# Patient Record
Sex: FEMALE | Race: BLACK OR AFRICAN AMERICAN | NOT HISPANIC OR LATINO | Employment: OTHER | URBAN - METROPOLITAN AREA
[De-identification: names, ages, dates, MRNs, and addresses within clinical notes are randomized per-mention and may not be internally consistent; named-entity substitution may affect disease eponyms.]

---

## 2017-02-02 ENCOUNTER — DOCUMENTATION ONLY (OUTPATIENT)
Dept: FAMILY MEDICINE | Facility: CLINIC | Age: 77
End: 2017-02-02

## 2017-02-02 NOTE — PROGRESS NOTES
Pre-Visit Chart Review  For Appointment Scheduled on 2/3/17    Health Maintenance Due   Topic Date Due    Lipid Panel  1940    TETANUS VACCINE  09/08/1959    DEXA SCAN  09/08/1981    Colonoscopy  09/08/1991    Zoster Vaccine  09/08/2001    Pneumococcal (65+) (1 of 2 - PCV13) 09/08/2006    Influenza Vaccine  08/01/2016

## 2017-02-09 ENCOUNTER — APPOINTMENT (OUTPATIENT)
Dept: LAB | Facility: HOSPITAL | Age: 77
End: 2017-02-09
Attending: NURSE PRACTITIONER
Payer: MEDICARE

## 2017-02-09 ENCOUNTER — OFFICE VISIT (OUTPATIENT)
Dept: UROLOGY | Facility: CLINIC | Age: 77
End: 2017-02-09
Payer: MEDICARE

## 2017-02-09 VITALS
SYSTOLIC BLOOD PRESSURE: 129 MMHG | WEIGHT: 219 LBS | HEIGHT: 68 IN | HEART RATE: 87 BPM | DIASTOLIC BLOOD PRESSURE: 71 MMHG | TEMPERATURE: 98 F | BODY MASS INDEX: 33.19 KG/M2

## 2017-02-09 DIAGNOSIS — R35.0 URINARY FREQUENCY: Primary | ICD-10-CM

## 2017-02-09 DIAGNOSIS — N39.0 URINARY TRACT INFECTION WITH HEMATURIA, SITE UNSPECIFIED: ICD-10-CM

## 2017-02-09 DIAGNOSIS — R31.9 URINARY TRACT INFECTION WITH HEMATURIA, SITE UNSPECIFIED: ICD-10-CM

## 2017-02-09 LAB
BILIRUB SERPL-MCNC: ABNORMAL MG/DL
BLOOD URINE, POC: 50
COLOR, POC UA: ABNORMAL
GLUCOSE UR QL STRIP: 50
KETONES UR QL STRIP: ABNORMAL
LEUKOCYTE ESTERASE URINE, POC: ABNORMAL
NITRITE, POC UA: ABNORMAL
PH, POC UA: 5
PROTEIN, POC: ABNORMAL
SPECIFIC GRAVITY, POC UA: 1.02
UROBILINOGEN, POC UA: ABNORMAL

## 2017-02-09 PROCEDURE — 81001 URINALYSIS AUTO W/SCOPE: CPT | Mod: PBBFAC,PO | Performed by: NURSE PRACTITIONER

## 2017-02-09 PROCEDURE — 87088 URINE BACTERIA CULTURE: CPT

## 2017-02-09 PROCEDURE — 99215 OFFICE O/P EST HI 40 MIN: CPT | Mod: PBBFAC,PO | Performed by: NURSE PRACTITIONER

## 2017-02-09 PROCEDURE — 88112 CYTOPATH CELL ENHANCE TECH: CPT | Mod: 26,,, | Performed by: PATHOLOGY

## 2017-02-09 PROCEDURE — 87186 SC STD MICRODIL/AGAR DIL: CPT

## 2017-02-09 PROCEDURE — 87077 CULTURE AEROBIC IDENTIFY: CPT

## 2017-02-09 PROCEDURE — 99999 PR PBB SHADOW E&M-EST. PATIENT-LVL V: CPT | Mod: PBBFAC,,, | Performed by: NURSE PRACTITIONER

## 2017-02-09 PROCEDURE — 87086 URINE CULTURE/COLONY COUNT: CPT

## 2017-02-09 PROCEDURE — 99204 OFFICE O/P NEW MOD 45 MIN: CPT | Mod: S$PBB,,, | Performed by: NURSE PRACTITIONER

## 2017-02-09 PROCEDURE — 88112 CYTOPATH CELL ENHANCE TECH: CPT | Performed by: PATHOLOGY

## 2017-02-09 RX ORDER — FLUCONAZOLE 150 MG/1
150 TABLET ORAL DAILY
Qty: 3 TABLET | Refills: 0 | Status: SHIPPED | OUTPATIENT
Start: 2017-02-09 | End: 2017-02-12

## 2017-02-09 RX ORDER — CIPROFLOXACIN 500 MG/1
500 TABLET ORAL 2 TIMES DAILY
Qty: 28 TABLET | Refills: 0 | Status: SHIPPED | OUTPATIENT
Start: 2017-02-09 | End: 2017-02-10

## 2017-02-09 NOTE — PATIENT INSTRUCTIONS

## 2017-02-09 NOTE — PROGRESS NOTES
[facility] Progress Note  Staff: TERE Richard    Chief Complaint:   Chief Complaint   Patient presents with    Urinary Frequency      HPI: Deonna Montero is a 76 y.o. female new patient here for increased urinary frequency which has progressively worsened x one month.  Pt is accompanied to clinic today by a family member female.    Questions asked the pt during office visit today:  DTF: Yes, NTF: 5x night  Urgency:Yes, incontinence with urgency? No;   Incontinence with laughing or straining: No;   If yes, how many pads/day? Changes 4 pads daily  G2, P 2, vaginal deliveries with no complications  Gross Hematuria:  No  History of UTI: Yes - few years ago.  Unknown if has history of kidney stones.  No Family History of bladder/kidney/prostate cancer.    Urologic meds: Ditropan XL 15 mg one tablet daily  Anticoagulant meds: None    Medical Hx includes: Pt is diabetic and takes insulin injections and metformin on a daily basis.  Pt and family states during ov today that she checks her glucose twice daily and is under control at this time.  Pt and family states that her glucose stays in the 130s.    No history of decreased kidney function or problems with kidneys functioning.    Relevant Labs:   UA today:    Color, UA yellow cloudy   Spec Grav, UA 1.025   pH, UA 5   WBC, UA ++   Nitrite, UA neg   Protein, UA ++ (A)   Glucose, UA 50   Ketones, UA neg   Urobilinogen, UA neg   Bilirubin, UA neg   Blood, UA 50     Review of Systems   Constitutional: Negative for chills, diaphoresis, fever and weight loss.   HENT: Negative for congestion, hearing loss, nosebleeds and sore throat.    Eyes: Negative for blurred vision and pain.   Respiratory: Negative for cough and wheezing.    Cardiovascular: Negative for chest pain, palpitations and leg swelling.   Gastrointestinal: Negative for abdominal pain, heartburn, nausea and vomiting.   Genitourinary: Positive for dysuria, frequency and urgency.  Negative for flank pain and hematuria.   Musculoskeletal: Negative for back pain, joint pain, myalgias and neck pain.   Skin: Negative for itching and rash.   Neurological: Negative for dizziness, tremors, sensory change, seizures, loss of consciousness, weakness and headaches.   Endo/Heme/Allergies: Does not bruise/bleed easily.   Psychiatric/Behavioral: Negative for depression and suicidal ideas. The patient is not nervous/anxious.      Physical Exam   Constitutional: She is oriented to person, place, and time. She appears well-developed and well-nourished.   HENT:   Head: Normocephalic and atraumatic.   Eyes: Conjunctivae and EOM are normal. Pupils are equal, round, and reactive to light.   Neck: Normal range of motion. Neck supple.   Cardiovascular: Normal rate, regular rhythm, normal heart sounds and intact distal pulses.    Pulmonary/Chest: Effort normal and breath sounds normal.   Abdominal: Soft. Bowel sounds are normal.   Musculoskeletal: Normal range of motion.   Neurological: She is alert and oriented to person, place, and time. She has normal reflexes.   Skin: Skin is warm and dry.     Psychiatric: She has a normal mood and affect. Her behavior is normal. Judgment and thought content normal.     A) Deonna Montero is a 76 y.o. female with   1. Urinary frequency    2. Urinary tract infection with hematuria, site unspecified      Plan)   1. We will send urine for culture and cytology testing.  2. Cipro 500 mg 1 po bid x 14 days prescribed to the patient.      Diflucan 150 mg prescribed to the patient.    I explained to the patient and family about Diabetes and urinary tract infections and how they affect each other.    I will see the pt back in office in three weeks to recheck urine and do exam if needed.  We will discuss then about discontinuing the Ditropan and starting on another medication should pt's symptoms not be mostly related to a current infection.    I have spent 30 minutes with this patient  and over 50% of visit was spent counseling with the patient.    Kusum MILESP-C

## 2017-02-09 NOTE — MR AVS SNAPSHOT
[facility]   [address]  [place] [address]  Phone: [phone]                  Deonna Montero   2017 2:00 PM   Office Visit    Description:  Female : 1940   Provider:  DIONISIO Patino   Department:  [facility]           Reason for Visit     Urinary Frequency           Diagnoses this Visit        Comments    Urinary frequency    -  Primary     Urinary tract infection with hematuria, site unspecified                To Do List           Goals (5 Years of Data)     None       These Medications        Disp Refills Start End    ciprofloxacin HCl (CIPRO) 500 MG tablet 28 tablet 0 2017    Take 1 tablet (500 mg total) by mouth 2 (two) times daily. - Oral    Pharmacy: [address] #: [phone]       fluconazole (DIFLUCAN) 150 MG Tab 3 tablet 0 2017    Take 1 tablet (150 mg total) by mouth once daily. - Oral    Pharmacy: [address] #: [phone]         [facility]     [facility] On Call Nurse Care Line -  Assistance  Registered nurses in the [facility] provide clinical advisement, health education, appointment booking, and other advisory services.  Call for this free service at [phone].             Medications           Message regarding Medications     Verify the changes and/or additions to your medication regime listed below are the same as discussed with your clinician today.  If any of these changes or additions are incorrect, please notify your healthcare provider.        START taking these NEW medications        Refills    ciprofloxacin HCl (CIPRO) 500 MG tablet 0    Sig: Take 1 tablet (500 mg total) by mouth 2 (two) times daily.    Class: Normal    Route: Oral    fluconazole (DIFLUCAN) 150 MG Tab 0    Sig: Take 1 tablet (150 mg total) by mouth once daily.    Class: Normal    Route: Oral          "  Verify that the below list of medications is an accurate representation of the medications you are currently taking.  If none reported, the list may be blank. If incorrect, please contact your healthcare provider. Carry this list with you in case of emergency.           Current Medications     amlodipine (NORVASC) 5 MG tablet once daily.     atorvastatin (LIPITOR) 10 MG tablet Take 10 mg by mouth once daily.      BD INSULIN PEN NEEDLE UF ORIG 29 x 1/2 " Ndle     BD INSULIN PEN NEEDLE UF SHORT 31 X 5/16 " Ndle     BD INSULIN SYRINGE ULT-FINE II 1/2 mL 31 x 5/16" Syrg     BD INSULIN SYRINGE ULTRA-FINE 1 mL 31 x 5/16" Syrg     BENICAR 40 mg tablet once daily.     donepezil (ARICEPT) 5 MG tablet Take 5 mg by mouth every evening.      gabapentin (NEURONTIN) 800 MG tablet TAKE 1 TABLET 3 TIMES A DAY    insulin aspart protamine-insulin aspart (NOVOLOG 70/30) 100 unit/mL (70-30) injection Inject into the skin 2 (two) times daily before meals. Dose Depends on Blood Sugar level 30 u am and 40 u pm if blood sugar high    olmesartan (BENICAR) 20 MG tablet Take 20 mg by mouth once daily.      ONE TOUCH ULTRA TEST Strp     oxybutynin (DITROPAN XL) 15 MG TR24 Take 15 mg by mouth once daily.     pantoprazole (PROTONIX) 20 MG tablet Take 40 mg by mouth once daily.    sertraline (ZOLOFT) 50 MG tablet Take 50 mg by mouth once daily.     timolol maleate 0.5% (TIMOPTIC) 0.5 % Drop     tizanidine (ZANAFLEX) 4 MG tablet once daily.     ciprofloxacin HCl (CIPRO) 500 MG tablet Take 1 tablet (500 mg total) by mouth 2 (two) times daily.    fluconazole (DIFLUCAN) 150 MG Tab Take 1 tablet (150 mg total) by mouth once daily.           Clinical Reference Information           Your Vitals Were     BP Pulse Temp Height Weight BMI    129/71 (BP Location: Right arm, Patient Position: Sitting, BP Method: Automatic) 87 97.7 °F (36.5 °C) (Oral) 5' 8" (1.727 m) 99.3 kg (219 lb) 33.3 kg/m2      Blood Pressure          Most Recent Value    BP  129/71    "   Allergies as of 2/9/2017     Codeine    Morphine (Pf)      Immunizations Administered on Date of Encounter - 2/9/2017     None      Orders Placed During Today's Visit      Normal Orders This Visit    Cytology, urine     POCT urinalysis, dipstick or tablet reag     Urine culture          2/9/2017  2:14 PM - Cammy Alonzo MA      Component Results     Component    Color, UA    yellow cloudy    Spec Grav, UA    1.025    pH, UA    5    WBC, UA    ++    Nitrite, UA    neg    Protein, UA (Abnormal)    ++    Glucose, UA    50    Ketones, UA    neg    Urobilinogen, UA    neg    Bilirubin, UA    neg    Blood, UA    50            [facility] Sign-Up     Activating your [facility] account is as easy as 1-2-3!     1) Visit [email or web address], select Sign Up Now, enter this activation code and your date of birth, then select Next.  [email or web address]  Expires: 3/26/2017  2:57 PM      2) Create a username and password to use when you visit [facility] in the future and select a security question in case you lose your password and select Next.    3) Enter your e-mail address and click Sign Up!    Additional Information  If you have questions, please e-mail [email or web address] or call [phone] to talk to our [facility] staff. Remember, [facility] is NOT to be used for urgent needs. For medical emergencies, dial 911.         Instructions      Bladder Infection, Female (Adult)    Urine is normally doesn't have any bacteria in it. But bacteria can get into the urinary tract from the skin around the rectum. Or they can travel in the blood from elsewhere in the body. Once they are in your urinary tract, they can cause infection in the urethra (urethritis), the bladder (cystitis), or the kidneys (pyelonephritis).  The most common place for an infection is in the bladder. This is called a bladder infection. This is one of the most common infections in women. Most bladder infections are easily treated. They are not serious unless the  "infection spreads to the kidney.  The phrases "bladder infection," "UTI," and "cystitis" are often used to describe the same thing. But they are not always the same. Cystitis is an inflammation of the bladder. The most common cause of cystitis is an infection.  Symptoms  The infection causes inflammation in the urethra and bladder. This causes many of the symptoms. The most common symptoms of a bladder infection are:  · Pain or burning when urinating  · Having to urinate more often than usual  · Urgent need to urinate  · Only a small amount of urine comes out  · Blood in urine  · Abdominal discomfort. This is usually in the lower abdomen above the pubic bone.  · Cloudy urine  · Strong- or bad-smelling urine  · Unable to urinate (urinary retention)  · Unable to hold urine in (urinary incontinence)  · Fever  · Loss of appetite  · Confusion (in older adults)  Causes  Bladder infections are not contagious. You can't get one from someone else, from a toilet seat, or from sharing a bath.  The most common cause of bladder infections is bacteria from the bowels. The bacteria get onto the skin around the opening of the urethra. From there, they can get into the urine and travel up to the bladder, causing inflammation and infection. This usually happens because of:  · Wiping improperly after urinating. Always wipe from front to back.  · Bowel incontinence  · Pregnancy  · Procedures such as having a catheter inserted  · Older age  · Not emptying your bladder. This can allow bacteria a chance to grow in your urine.  · Dehydration  · Constipation  · Sex  · Use of a diaphragm for birth control   Treatment  Bladder infections are diagnosed by a urine test. They are treated with antibiotics and usually clear up quickly without complications. Treatment helps prevent a more serious kidney infection.  Medicines  Medicines can help in the treatment of a bladder infection:  · Take antibiotics until they are used up, even if you feel " better. It is important to finish them to make sure the infection has cleared.  · You can use acetaminophen or ibuprofen for pain, fever, or discomfort, unless another medicine was prescribed. If you have chronic liver or kidney disease, talk with your healthcare provider before using these medicines. Also talk with your provider if you've ever had a stomach ulcer or gastrointestinal bleeding, or are taking blood-thinner medicines.  · If you are given phenazopydridine to reduce burning with urination, it will cause your urine to become a bright orange color. This can stain clothing.  Care and prevention  These self-care steps can help prevent future infections:  · Drink plenty of fluids to prevent dehydration and flush out your bladder. Do this unless you must restrict fluids for other health reasons, or your doctor told you not to.  · Proper cleaning after going to the bathroom is important. Wipe from front to back after using the toilet to prevent the spread of bacteria.  · Urinate more often. Don't try to hold urine in for a long time.  · Wear loose-fitting clothes and cotton underwear. Avoid tight-fitting pants.  · Improve your diet and prevent constipation. Eat more fresh fruit and vegetables, and fiber, and less junk and fatty foods.  · Avoid sex until your symptoms are gone.  · Avoid caffeine, alcohol, and spicy foods. These can irritate your bladder.  · Urinate right after intercourse to flush out your bladder.  · If you use birth control pills and have frequent bladder infections, discuss it with your doctor.  Follow-up care  Call your healthcare provider if all symptoms are not gone after 3 days of treatment. This is especially important if you have repeat infections.  If a culture was done, you will be told if your treatment needs to be changed. If directed, you can call to find out the results.  If X-rays were done, you will be told if the results will affect your treatment.  Call 911  Call 911 if any of  the following occur:  · Trouble breathing  · Hard to wake up or confusion  · Fainting or loss of consciousness  · Rapid heart rate  When to seek medical advice  Call your healthcare provider right away if any of these occur:  · Fever of 100.4ºF (38.0ºC) or higher, or as directed by your healthcare provider  · Symptoms are not better by the third day of treatment  · Back or belly (abdominal) pain that gets worse  · Repeated vomiting, or unable to keep medicine down  · Weakness or dizziness  · Vaginal discharge  · Pain, redness, or swelling in the outer vaginal area (labia)  Date Last Reviewed: 10/1/2016  © 0818-1763 [employer]. [address] [place] [address], [place] [address]. All rights reserved. This information is not intended as a substitute for professional medical care. Always follow your healthcare professional's instructions.             Language Assistance Services     ATTENTION: Language assistance services are available, free of charge. Please call [phone].      ATENCIÓN: Si habla elianañol, tiene a charles disposición servicios gratuitos de asistencia lingüística. Llame al [phone].     [facility] Ý: N?u b?n nói Ti?ng Vi?t, có các d?ch v? h? tr? ngôn ng? mi?n phí dành cho b?n. G?i s? [phone].         [facility] complies with applicable Federal civil rights laws and does not discriminate on the basis of race, color, national origin, age, disability, or sex.

## 2017-02-10 ENCOUNTER — TELEPHONE (OUTPATIENT)
Dept: UROLOGY | Facility: CLINIC | Age: 77
End: 2017-02-10

## 2017-02-10 RX ORDER — CEFUROXIME AXETIL 500 MG/1
500 TABLET ORAL 2 TIMES DAILY
Qty: 28 TABLET | Refills: 0 | Status: SHIPPED | OUTPATIENT
Start: 2017-02-10 | End: 2017-02-24

## 2017-02-10 NOTE — TELEPHONE ENCOUNTER
Called and spoke to the pharmacist the Cipro is contraindated with the Zanaflex that the patient is taking. Message given to NP to advise.

## 2017-02-10 NOTE — TELEPHONE ENCOUNTER
----- Message from Kim Toth sent at 2/10/2017  3:43 PM CST -----  Contact: pt  Pt was seen on 2-9-17, script was sent in error to [facility], please send to [facility]  cefUROXime (CEFTIN) 500 MG tablet    Call placed to pod, no answer  Call back on # [phone]  thanks      [facility] [place] [place] - [address]  [address]  [place] [address]  Phone: [phone] Fax: [phone]

## 2017-02-10 NOTE — TELEPHONE ENCOUNTER
Ceftin 500mg 1 tablet BID #[id number] called in to [facility] pharmacy to [facility]. Verbally clarified understanding.

## 2017-02-10 NOTE — TELEPHONE ENCOUNTER
Please call and notify the patient that we cancelled the Cipro and we are prescribing Ceftin twice daily for 15 days for possible infection.  Let them know we do not have her test results back yet but will call them when we do.  Thanks.

## 2017-02-10 NOTE — TELEPHONE ENCOUNTER
----- Message from Gregoria Cuevas sent at 2/10/2017 11:16 AM CST -----  Contact: patient  Patient returning a missed call about medication. Please advise. Call to pod. No answer.  Call back .  Thanks!

## 2017-02-10 NOTE — TELEPHONE ENCOUNTER
----- Message from Mira Higuera sent at 2/10/2017 10:32 AM CST -----  Contact: [facility] [phone]   wants to speak with a nurse regarding the Rx for CIPRO. Please call back at [phone]

## 2017-02-10 NOTE — TELEPHONE ENCOUNTER
Spoke with patient, asked patient is she still taking Zanaflex as it is contraindacated with the Cipro that the NP RX for her. She stated that she is, message given to the NP to advise and will notify patient for changes.

## 2017-02-12 LAB — BACTERIA UR CULT: NORMAL

## 2017-02-13 ENCOUNTER — TELEPHONE (OUTPATIENT)
Dept: UROLOGY | Facility: CLINIC | Age: 77
End: 2017-02-13

## 2017-02-13 NOTE — TELEPHONE ENCOUNTER
Called and spoke with patient, urine culture results given and informed to complete her antibiotic course, patient verbally understood.

## 2017-02-13 NOTE — TELEPHONE ENCOUNTER
----- Message from DIONISIO Patino sent at 2/13/2017  7:59 AM CST -----  Please let pt know their urine culture showed POSITIVE GROWTH FOR E.COLI INFECTION and the Cipro I prescribed to her will treat the infection.

## 2017-02-13 NOTE — TELEPHONE ENCOUNTER
I am sorry I forgot we switched the Cipro to Ceftin.  Please tell her to keep taking the Ceftin that it will treat the infection.  Thanks.    *Please see my original urine culture result note.

## 2017-03-07 ENCOUNTER — APPOINTMENT (OUTPATIENT)
Dept: LAB | Facility: HOSPITAL | Age: 77
End: 2017-03-07
Attending: NURSE PRACTITIONER
Payer: MEDICARE

## 2017-03-07 ENCOUNTER — OFFICE VISIT (OUTPATIENT)
Dept: UROLOGY | Facility: CLINIC | Age: 77
End: 2017-03-07
Payer: MEDICARE

## 2017-03-07 VITALS
DIASTOLIC BLOOD PRESSURE: 89 MMHG | HEIGHT: 68 IN | TEMPERATURE: 98 F | BODY MASS INDEX: 33.28 KG/M2 | HEART RATE: 90 BPM | SYSTOLIC BLOOD PRESSURE: 144 MMHG | WEIGHT: 219.56 LBS

## 2017-03-07 DIAGNOSIS — N39.0 URINARY TRACT INFECTION WITHOUT HEMATURIA, SITE UNSPECIFIED: Primary | ICD-10-CM

## 2017-03-07 DIAGNOSIS — R35.0 URINARY FREQUENCY: ICD-10-CM

## 2017-03-07 LAB
BILIRUB SERPL-MCNC: ABNORMAL MG/DL
BLOOD URINE, POC: ABNORMAL
COLOR, POC UA: ABNORMAL
GLUCOSE UR QL STRIP: ABNORMAL
KETONES UR QL STRIP: ABNORMAL
LEUKOCYTE ESTERASE URINE, POC: ABNORMAL
NITRITE, POC UA: ABNORMAL
PH, POC UA: 5
PROTEIN, POC: ABNORMAL
SPECIFIC GRAVITY, POC UA: 1.02
UROBILINOGEN, POC UA: ABNORMAL

## 2017-03-07 PROCEDURE — 99213 OFFICE O/P EST LOW 20 MIN: CPT | Mod: S$PBB,,, | Performed by: NURSE PRACTITIONER

## 2017-03-07 PROCEDURE — 88112 CYTOPATH CELL ENHANCE TECH: CPT | Mod: 26,,, | Performed by: PATHOLOGY

## 2017-03-07 PROCEDURE — 87086 URINE CULTURE/COLONY COUNT: CPT

## 2017-03-07 PROCEDURE — 87077 CULTURE AEROBIC IDENTIFY: CPT

## 2017-03-07 PROCEDURE — 88112 CYTOPATH CELL ENHANCE TECH: CPT | Performed by: PATHOLOGY

## 2017-03-07 PROCEDURE — 99215 OFFICE O/P EST HI 40 MIN: CPT | Mod: PBBFAC,PO | Performed by: NURSE PRACTITIONER

## 2017-03-07 PROCEDURE — 99999 PR PBB SHADOW E&M-EST. PATIENT-LVL V: CPT | Mod: PBBFAC,,, | Performed by: NURSE PRACTITIONER

## 2017-03-07 PROCEDURE — 87088 URINE BACTERIA CULTURE: CPT

## 2017-03-07 PROCEDURE — 87186 SC STD MICRODIL/AGAR DIL: CPT

## 2017-03-07 PROCEDURE — 81001 URINALYSIS AUTO W/SCOPE: CPT | Mod: PBBFAC,PO | Performed by: NURSE PRACTITIONER

## 2017-03-07 RX ORDER — OXYBUTYNIN CHLORIDE 15 MG/1
5 TABLET, EXTENDED RELEASE ORAL DAILY
COMMUNITY
End: 2017-03-07 | Stop reason: ALTCHOICE

## 2017-03-07 NOTE — PROGRESS NOTES
[facility] Progress Note  Staff: TERE Richard    Chief Complaint:   Chief Complaint   Patient presents with    Follow-up    Urinary Tract Infection      HPI: Deonna Montero is a 76 y.o. female here for routine recheck.  I initially saw this patient on 02/09/2017 with complaints of increased urinary frequency x one month.  Urine culture done at last ov showed ++E.Coli UTI and pt was treated with Cipro 500 mg 1 po BID x 14 days for the infection.    Urine cytology during last ov showed atypical cells at last ov but that was also same urine from infection source therefore it was inconclusive.    Urologic meds: Oxybutynin XL 15 mg one tablet daily  Anticoagulant meds: None    Relevant Labs:   UA today:    Color yellow clear   Spec Grav 1.025   pH, UA 5   WBC, UA trace (A)   Nitrite neg   Protein trace   Glucose, UA neg   Ketones, UA neg   Urobilinogen neg   Bilirubin neg   Blood, UA trace     Review of Systems   Constitutional: Negative for chills, diaphoresis, fever and weight loss.   HENT: Negative for congestion, hearing loss, nosebleeds and sore throat.    Eyes: Negative for blurred vision and pain.   Respiratory: Negative for cough and wheezing.    Cardiovascular: Negative for chest pain, palpitations and leg swelling.   Gastrointestinal: Negative for abdominal pain, heartburn, nausea and vomiting.   Genitourinary: Positive for frequency and urgency. Negative for dysuria, flank pain and hematuria.   Musculoskeletal: Negative for back pain, joint pain, myalgias and neck pain.   Skin: Negative for itching and rash.   Neurological: Negative for dizziness, tremors, sensory change, seizures, loss of consciousness, weakness and headaches.   Endo/Heme/Allergies: Does not bruise/bleed easily.   Psychiatric/Behavioral: Negative for depression and suicidal ideas. The patient is not nervous/anxious.      Physical Exam   Constitutional: She is oriented to person, place, and time. She  appears well-developed and well-nourished.   HENT:   Head: Normocephalic and atraumatic.   Eyes: Conjunctivae and EOM are normal. Pupils are equal, round, and reactive to light.   Neck: Normal range of motion. Neck supple.   Cardiovascular: Normal rate, regular rhythm, normal heart sounds and intact distal pulses.    Pulmonary/Chest: Effort normal and breath sounds normal.   Abdominal: Soft. Bowel sounds are normal.   Musculoskeletal: Normal range of motion.   Neurological: She is alert and oriented to person, place, and time. She has normal reflexes.   Skin: Skin is warm and dry.     Psychiatric: She has a normal mood and affect. Her behavior is normal. Judgment and thought content normal.     A) Deonna Montero is a 76 y.o. female with   1. Urinary tract infection without hematuria, site unspecified    2. Urinary frequency      Plan)   Hx of recent urinary tract infection:  1. We will repeat the urine culture and cytology to make sure pt UTI is gone and see if cytology still comes back abnormal.    Chronic urinary frequency:  1.  Oxybutynin discontinued.  2.  We will place the patient on a trial dose of Myrbetriq 25 mg one tablet daily for her urinary frequency.    I will see the patient back in 3-4 weeks for med recheck.    I have spent >20 minutes with this patient and over 50% of visit was spent counseling with the patient.    Kusum CARRANZA

## 2017-03-07 NOTE — MR AVS SNAPSHOT
"    [facility]   [address]  [place] [address]  Phone: [phone]                  Deonna Montero   3/7/2017 2:00 PM   Office Visit    Description:  Female : 1940   Provider:  DIONISIO Patino   Department:  [facility]           Reason for Visit     Follow-up     Urinary Tract Infection           Diagnoses this Visit        Comments    Urinary tract infection without hematuria, site unspecified    -  Primary     Urinary frequency                To Do List           Goals (5 Years of Data)     None      [facility]     [facility] Care [facility] -  Assistance  Registered nurses in the [facility] provide clinical advisement, health education, appointment booking, and other advisory services.  Call for this free service at [phone].             Medications           Message regarding Medications     Verify the changes and/or additions to your medication regime listed below are the same as discussed with your clinician today.  If any of these changes or additions are incorrect, please notify your healthcare provider.        STOP taking these medications     oxybutynin (DITROPAN XL) 15 MG TR24 Take 15 mg by mouth once daily.     oxybutynin (DITROPAN XL) 15 MG TR24 Take 5 mg by mouth once daily.           Verify that the below list of medications is an accurate representation of the medications you are currently taking.  If none reported, the list may be blank. If incorrect, please contact your healthcare provider. Carry this list with you in case of emergency.           Current Medications     amlodipine (NORVASC) 5 MG tablet once daily.     atorvastatin (LIPITOR) 10 MG tablet Take 10 mg by mouth once daily.      BD INSULIN PEN NEEDLE UF ORIG 29 x 1/2 " Ndle     BD INSULIN PEN NEEDLE UF SHORT 31 X 5/16 " Ndle     BD INSULIN SYRINGE ULT-FINE II 1/2 mL 31 x 5/16" Syrg     BD INSULIN SYRINGE ULTRA-FINE 1 mL 31 x 5/16" Syrg     " "BENICAR 40 mg tablet once daily.     donepezil (ARICEPT) 5 MG tablet Take 5 mg by mouth every evening.      gabapentin (NEURONTIN) 800 MG tablet TAKE 1 TABLET 3 TIMES A DAY    insulin aspart protamine-insulin aspart (NOVOLOG 70/30) 100 unit/mL (70-30) injection Inject into the skin 2 (two) times daily before meals. Dose Depends on Blood Sugar level 30 u am and 40 u pm if blood sugar high    olmesartan (BENICAR) 20 MG tablet Take 20 mg by mouth once daily.      ONE TOUCH ULTRA TEST Strp     pantoprazole (PROTONIX) 20 MG tablet Take 40 mg by mouth once daily.    sertraline (ZOLOFT) 50 MG tablet Take 50 mg by mouth once daily.     timolol maleate 0.5% (TIMOPTIC) 0.5 % Drop     tizanidine (ZANAFLEX) 4 MG tablet once daily.            Clinical Reference Information           Your Vitals Were     BP Pulse Temp Height Weight BMI    144/89 (BP Location: Left arm, Patient Position: Sitting, BP Method: Automatic) 90 97.8 °F (36.6 °C) (Oral) 5' 8" (1.727 m) 99.6 kg (219 lb 9.3 oz) 33.39 kg/m2      Blood Pressure          Most Recent Value    BP  (!)  144/89      Allergies as of 3/7/2017     Codeine    Morphine (Pf)      Immunizations Administered on Date of Encounter - 3/7/2017     None      Orders Placed During Today's Visit      Normal Orders This Visit    Cytology, urine     POCT urinalysis, dipstick or tablet reag     Urine culture          3/7/2017  1:49 PM - Cammy Alonzo MA      Component Results     Component    Color    yellow clear    Spec Grav    1.025    pH, UA    5    WBC, UA (Abnormal)    trace    Nitrite    neg    Protein    trace    Glucose, UA    neg    Ketones, UA    neg    Urobilinogen    neg    Bilirubin    neg    Blood, UA    trace            [facility] Sign-Up     Activating your [facility] account is as easy as 1-2-3!     1) Visit [email or web address], select Sign Up Now, enter this activation code and your date of birth, then select Next.  [email or web address]  Expires: 3/26/2017  2:57 PM      2) Create a " username and password to use when you visit [facility] in the future and select a security question in case you lose your password and select Next.    3) Enter your e-mail address and click Sign Up!    Additional Information  If you have questions, please e-mail [email or web address] or call [phone] to talk to our [facility] staff. Remember, [facility] is NOT to be used for urgent needs. For medical emergencies, dial 911.         Instructions      Overactive Bladder Syndrome (OAB)     Normally, urine stays in the bladder until a person decides to release it. With OAB, the bladder muscles contract involuntarily, causing a sudden urge to urinate and even urine leakage.   Your healthcare provider has told you that you have overactive bladder syndrome (OAB). Why OAB occurs is not known. But treatments are available to help control the bladder muscle and manage OAB. Read on to learn more.  What is overactive bladder syndrome?  OAB causes the bladder muscle to contract (squeeze involuntarily). This causes an intense urge to urinate, known as urgency. Urgency can occur many times during the day and night. In some cases, accidental urine leakage occurs with the urgency. This is called urge incontinence, which is the inability to control the urinary bladder function. There are many types of incontinence, urge incontinence being one of then. A disease that affects the bladder nerves, such as multiple sclerosis, can lead to OAB. Other conditions, such as urinary tract infection (UTI) or prostate problems in men, can lead to OAB.  But the exact cause of OAB is often not known.  How is overactive bladder syndrome diagnosed?  Your healthcare provider examines you and asks about your symptoms and health history. You may also have one or more of the following:  · Urine test to take samples of urine and have them checked for problems.  · Urinary diary to record how much fluid you take in and urinate out in a 3 day  period.  · Bladder ultrasound to study the bladder as it empties. Ultrasound uses sound waves to create detailed images of the inside of the body.  · Cystoscopy to allow the healthcare provider to look for problems in the urinary tract. The test uses a thin, flexible scope called a cystoscope with a light and camera on the end. The scope is inserted into the urethra (the tube that carries urine out of the body).  · Urodynamic studies, a battery of tests designed to measure and record many aspects of urinary bladder function, including pressures, volume, and urine flow.  How is overactive bladder syndrome treated?  Treatment depends on the cause and severity of your OAB. Treatments may include the following:  · Changing urination habits may be suggested. For instance, your healthcare provider may suggest that you urinate as soon as you feel the urge. You may also need to limit how much fluid you have during the day.  · Exercising your pelvic muscles can help strengthen muscles used during urination. These exercises are called Kegels. They involve prasanna as if you were stopping your urine stream and tightening your rectum as if trying not to pass gas. Your healthcare provider can help you learn how to do Kegels.  · Biofeedback to help you learn to control the movement of your bladder muscles. Sensors are placed on your abdomen. They turn signals given off by your muscles into lines on a computer screen.  · Medication may be given to relax the bladder muscle. Medication can also help ease bladder contractions, which reduces the urge to urinate.  · Neuromodulation may be done if medication and behavioral changes dont work. Electrical pulses are sent to the sacral nerves (nerves that affect the pelvic area). These pulses help relieve OAB and urge incontinence.  · Surgery to make the bladder larger may be done in severe cases.  With treatment, OAB can be managed. A condition, such as UTI, that has caused you to have  OAB will be treated. Treatment may involve taking medications for months or years. You may also need to make changes in your daily routine. This may include going to the bathroom more often than you think you need to. Or, you may need to cut back on caffeine and alcohol because these can make OAB symptoms worse. Your healthcare provider can tell you more.     Call the healthcare provider right away if you have any of the following:  · Fever of 100.4°F (38.0 °C) or higher   · No improvement with treatment  · Trouble urinating because of pain  · Back or abdominal pain   Date Last Reviewed: 9/8/2014 © 2000-2016 [employer]. [address] [place] [address], [place] [address]. All rights reserved. This information is not intended as a substitute for professional medical care. Always follow your healthcare professional's instructions.        Mirabegron extended-release tablets  What is this medicine?  MIRABEGRON (TOM a BEG marshall) is used to treat overactive bladder. This medicine reduces the amount of bathroom visits. It may also help to control wetting accidents.  How should I use this medicine?  Take this medicine by mouth with a glass of water. Follow the directions on the prescription label. Do not cut, crush or chew this medicine. You can take it with or without food. If it upsets your stomach, take it with food. Take your medicine at regular intervals. Do not take it more often than directed. Do not stop taking except on your doctor's advice.  Talk to your pediatrician regarding the use of this medicine in children. Special care may be needed.  What side effects may I notice from receiving this medicine?  Side effects that you should report to your doctor or health care professional as soon as possible:  · allergic reactions like skin rash, itching or hives, swelling of the face, lips, or tongue  · chest pain or palpitations  · severe or sudden headache  · high blood pressure  · fast, irregular  heartbeat  · redness, blistering, peeling or loosening of the skin, including inside the mouth  · signs of infection like fever or chills; cough; sore throat; pain or difficulty passing urine  · trouble passing urine or change in the amount of urine  Side effects that usually do not require medical attention (Report these to your doctor or health care professional if they continue or are bothersome.):  · constipation  · diarrhea  · dizziness  · dry eyes  · joint pain  · mild headache  · nausea  · runny nose  What may interact with this medicine?  · certain medicines for bladder problems like fesoterodine, oxybutynin, solifenacin, tolterodine  · desipramine  · digoxin  · flecainide  · ketoconazole  · MAOIs like Carbex, Eldepryl, Marplan, Nardil, and Parnate  · metoprolol  · propafenone  · thioridazine  · warfarin  What if I miss a dose?  If you miss a dose, take it as soon as you can. If it is almost time for your next dose, take only that dose. Do not take double or extra doses.  Where should I keep my medicine?  Keep out of the reach of children.  Store at room temperature between 15 and 30 degrees C (59 and 86 degrees F). Throw away any unused medicine after the expiration date.  What should I tell my health care provider before I take this medicine?  They need to know if you have any of these conditions:  · difficulty passing urine  · high blood pressure  · kidney disease  · liver disease  · an unusual or allergic reaction to mirabegron, other medicines, foods, dyes, or preservatives  · pregnant or trying to get pregnant  · breast-feeding  What should I watch for while using this medicine?  It may take 8 weeks to notice the full benefit from this medicine.  You may need to limit your intake tea, coffee, caffeinated sodas, and alcohol. These drinks may make your symptoms worse.  Visit your doctor or health care professional for regular checks on your progress. Check your blood pressure as directed. Ask your doctor  or health care professional what your blood pressure should be and when you should contact him or her.  Date Last Reviewed:   NOTE:This sheet is a summary. It may not cover all possible information. If you have questions about this medicine, talk to your doctor, pharmacist, or health care provider. Copyright© 2016 [employer]             Language Assistance Services     ATTENTION: Language assistance services are available, free of charge. Please call [phone].      ATENCIÓN: Si habla doc, tiene a charles disposición servicios gratuitos de asistencia lingüística. Llame al [phone].     CHÚ Ý: N?u b?n nói Ti?ng Vi?t, có các d?ch v? h? tr? ngôn ng? mi?n phí dành cho b?n. G?i s? [phone].         [facility] complies with applicable Federal civil rights laws and does not discriminate on the basis of race, color, national origin, age, disability, or sex.

## 2017-03-07 NOTE — PATIENT INSTRUCTIONS
Overactive Bladder Syndrome (OAB)     Normally, urine stays in the bladder until a person decides to release it. With OAB, the bladder muscles contract involuntarily, causing a sudden urge to urinate and even urine leakage.   Your healthcare provider has told you that you have overactive bladder syndrome (OAB). Why OAB occurs is not known. But treatments are available to help control the bladder muscle and manage OAB. Read on to learn more.  What is overactive bladder syndrome?  OAB causes the bladder muscle to contract (squeeze involuntarily). This causes an intense urge to urinate, known as urgency. Urgency can occur many times during the day and night. In some cases, accidental urine leakage occurs with the urgency. This is called urge incontinence, which is the inability to control the urinary bladder function. There are many types of incontinence, urge incontinence being one of then. A disease that affects the bladder nerves, such as multiple sclerosis, can lead to OAB. Other conditions, such as urinary tract infection (UTI) or prostate problems in men, can lead to OAB.  But the exact cause of OAB is often not known.  How is overactive bladder syndrome diagnosed?  Your healthcare provider examines you and asks about your symptoms and health history. You may also have one or more of the following:  · Urine test to take samples of urine and have them checked for problems.  · Urinary diary to record how much fluid you take in and urinate out in a 3 day period.  · Bladder ultrasound to study the bladder as it empties. Ultrasound uses sound waves to create detailed images of the inside of the body.  · Cystoscopy to allow the healthcare provider to look for problems in the urinary tract. The test uses a thin, flexible scope called a cystoscope with a light and camera on the end. The scope is inserted into the urethra (the tube that carries urine out of the body).  · Urodynamic studies, a battery of tests designed  to measure and record many aspects of urinary bladder function, including pressures, volume, and urine flow.  How is overactive bladder syndrome treated?  Treatment depends on the cause and severity of your OAB. Treatments may include the following:  · Changing urination habits may be suggested. For instance, your healthcare provider may suggest that you urinate as soon as you feel the urge. You may also need to limit how much fluid you have during the day.  · Exercising your pelvic muscles can help strengthen muscles used during urination. These exercises are called Kegels. They involve prasanna as if you were stopping your urine stream and tightening your rectum as if trying not to pass gas. Your healthcare provider can help you learn how to do Kegels.  · Biofeedback to help you learn to control the movement of your bladder muscles. Sensors are placed on your abdomen. They turn signals given off by your muscles into lines on a computer screen.  · Medication may be given to relax the bladder muscle. Medication can also help ease bladder contractions, which reduces the urge to urinate.  · Neuromodulation may be done if medication and behavioral changes dont work. Electrical pulses are sent to the sacral nerves (nerves that affect the pelvic area). These pulses help relieve OAB and urge incontinence.  · Surgery to make the bladder larger may be done in severe cases.  With treatment, OAB can be managed. A condition, such as UTI, that has caused you to have OAB will be treated. Treatment may involve taking medications for months or years. You may also need to make changes in your daily routine. This may include going to the bathroom more often than you think you need to. Or, you may need to cut back on caffeine and alcohol because these can make OAB symptoms worse. Your healthcare provider can tell you more.     Call the healthcare provider right away if you have any of the following:  · Fever of 100.4°F (38.0 °C)  or higher   · No improvement with treatment  · Trouble urinating because of pain  · Back or abdominal pain   Date Last Reviewed: 9/8/2014 © 2000-2016 [employer]. [address] [place] [address], [place] [address]. All rights reserved. This information is not intended as a substitute for professional medical care. Always follow your healthcare professional's instructions.        Mirabegron extended-release tablets  What is this medicine?  MIRABEGRON (TOM a BEG marshall) is used to treat overactive bladder. This medicine reduces the amount of bathroom visits. It may also help to control wetting accidents.  How should I use this medicine?  Take this medicine by mouth with a glass of water. Follow the directions on the prescription label. Do not cut, crush or chew this medicine. You can take it with or without food. If it upsets your stomach, take it with food. Take your medicine at regular intervals. Do not take it more often than directed. Do not stop taking except on your doctor's advice.  Talk to your pediatrician regarding the use of this medicine in children. Special care may be needed.  What side effects may I notice from receiving this medicine?  Side effects that you should report to your doctor or health care professional as soon as possible:  · allergic reactions like skin rash, itching or hives, swelling of the face, lips, or tongue  · chest pain or palpitations  · severe or sudden headache  · high blood pressure  · fast, irregular heartbeat  · redness, blistering, peeling or loosening of the skin, including inside the mouth  · signs of infection like fever or chills; cough; sore throat; pain or difficulty passing urine  · trouble passing urine or change in the amount of urine  Side effects that usually do not require medical attention (Report these to your doctor or health care professional if they continue or are bothersome.):  · constipation  · diarrhea  · dizziness  · dry eyes  · joint pain  · mild  headache  · nausea  · runny nose  What may interact with this medicine?  · certain medicines for bladder problems like fesoterodine, oxybutynin, solifenacin, tolterodine  · desipramine  · digoxin  · flecainide  · ketoconazole  · MAOIs like Carbex, Eldepryl, Marplan, Nardil, and Parnate  · metoprolol  · propafenone  · thioridazine  · warfarin  What if I miss a dose?  If you miss a dose, take it as soon as you can. If it is almost time for your next dose, take only that dose. Do not take double or extra doses.  Where should I keep my medicine?  Keep out of the reach of children.  Store at room temperature between 15 and 30 degrees C (59 and 86 degrees F). Throw away any unused medicine after the expiration date.  What should I tell my health care provider before I take this medicine?  They need to know if you have any of these conditions:  · difficulty passing urine  · high blood pressure  · kidney disease  · liver disease  · an unusual or allergic reaction to mirabegron, other medicines, foods, dyes, or preservatives  · pregnant or trying to get pregnant  · breast-feeding  What should I watch for while using this medicine?  It may take 8 weeks to notice the full benefit from this medicine.  You may need to limit your intake tea, coffee, caffeinated sodas, and alcohol. These drinks may make your symptoms worse.  Visit your doctor or health care professional for regular checks on your progress. Check your blood pressure as directed. Ask your doctor or health care professional what your blood pressure should be and when you should contact him or her.  Date Last Reviewed:   NOTE:This sheet is a summary. It may not cover all possible information. If you have questions about this medicine, talk to your doctor, pharmacist, or health care provider. Copyright© 2016 [employer]

## 2017-03-10 ENCOUNTER — TELEPHONE (OUTPATIENT)
Dept: UROLOGY | Facility: CLINIC | Age: 77
End: 2017-03-10

## 2017-03-10 LAB — BACTERIA UR CULT: NORMAL

## 2017-03-10 RX ORDER — DOXYCYCLINE HYCLATE 100 MG
100 TABLET ORAL 2 TIMES DAILY
Qty: 28 TABLET | Refills: 0 | Status: SHIPPED | OUTPATIENT
Start: 2017-03-10 | End: 2017-03-28 | Stop reason: ALTCHOICE

## 2017-03-28 ENCOUNTER — OFFICE VISIT (OUTPATIENT)
Dept: UROLOGY | Facility: CLINIC | Age: 77
End: 2017-03-28
Payer: MEDICARE

## 2017-03-28 VITALS — HEART RATE: 90 BPM | DIASTOLIC BLOOD PRESSURE: 78 MMHG | SYSTOLIC BLOOD PRESSURE: 127 MMHG | TEMPERATURE: 98 F

## 2017-03-28 DIAGNOSIS — R35.0 URINARY FREQUENCY: Primary | ICD-10-CM

## 2017-03-28 DIAGNOSIS — N39.498 OTHER URINARY INCONTINENCE: ICD-10-CM

## 2017-03-28 LAB
BILIRUB SERPL-MCNC: NORMAL MG/DL
BLOOD URINE, POC: NORMAL
COLOR, POC UA: NORMAL
GLUCOSE UR QL STRIP: NORMAL
KETONES UR QL STRIP: NORMAL
LEUKOCYTE ESTERASE URINE, POC: NORMAL
NITRITE, POC UA: NORMAL
PH, POC UA: 5
PROTEIN, POC: NORMAL
SPECIFIC GRAVITY, POC UA: 1.02
UROBILINOGEN, POC UA: NORMAL

## 2017-03-28 PROCEDURE — 99213 OFFICE O/P EST LOW 20 MIN: CPT | Mod: S$PBB,,, | Performed by: NURSE PRACTITIONER

## 2017-03-28 PROCEDURE — 99213 OFFICE O/P EST LOW 20 MIN: CPT | Mod: PBBFAC,PO | Performed by: NURSE PRACTITIONER

## 2017-03-28 PROCEDURE — 99999 PR PBB SHADOW E&M-EST. PATIENT-LVL III: CPT | Mod: PBBFAC,,, | Performed by: NURSE PRACTITIONER

## 2017-03-28 PROCEDURE — 81001 URINALYSIS AUTO W/SCOPE: CPT | Mod: PBBFAC,PO | Performed by: NURSE PRACTITIONER

## 2017-03-28 NOTE — PROGRESS NOTES
[facility] Note  Staff: TERE Richard    Referring provider and please cc:   PCP: Dr. Tompkins    Chief Complaint: Follow-up: UTI and urinary incontinence.    Subjective:        HPI: Deonna Montero is a 76 y.o. female presents for routine recheck in reference to her hx of urinary tract infection and incontinence issues.  I saw the pt at last ov on 03/07/2017 in which we discontinued the oxybutynin and tried the pt on Myrbetriq 25 mg one tablet daily for her incontinence/leakage issues.  Pt states today the medication is working very well for her lower urinary tract symptoms and she is happy with this medication.    Today pt denies any dysuria, hematuria or changes in her urinary habits.  She does not feel that she has a current infection at this time.    REVIEW OF SYSTEMS:  Review of Systems   Constitutional: Negative for chills, diaphoresis, fever and weight loss.   HENT: Negative for congestion, hearing loss, nosebleeds and sore throat.    Eyes: Negative for blurred vision and pain.   Respiratory: Negative for cough and wheezing.    Cardiovascular: Negative for chest pain, palpitations and leg swelling.   Gastrointestinal: Negative for abdominal pain, heartburn, nausea and vomiting.   Genitourinary: Positive for frequency and urgency. Negative for dysuria, flank pain and hematuria.   Musculoskeletal: Negative for back pain, joint pain, myalgias and neck pain.   Skin: Negative for itching and rash.   Neurological: Negative for dizziness, tremors, sensory change, seizures, loss of consciousness, weakness and headaches.   Endo/Heme/Allergies: Does not bruise/bleed easily.   Psychiatric/Behavioral: Negative for depression and suicidal ideas. The patient is not nervous/anxious.      PMHx:  Past Medical History:   Diagnosis Date    Back pain     Diabetes mellitus     Hypertension     Reflux     Short-term memory loss      PSHx:  Past Surgical History:   Procedure Laterality  Date    BACK SURGERY      with tea    CHOLECYSTECTOMY      napoleon      2 years ago    HYSTERECTOMY       Social History     Social History    Marital status:      Spouse name: N/A    Number of children: N/A    Years of education: N/A     Social History Main Topics    Smoking status: Former Smoker     Quit date: 10/15/2010    Smokeless tobacco: None    Alcohol use No    Drug use: None    Sexual activity: Not Asked     Other Topics Concern    None     Social History Narrative     Allergies:  Codeine and Morphine (pf)    Medications: reviewed   Anticoagulation: No    Objective:     Vitals:    03/28/17 1402   BP: 127/78   Pulse: 90   Temp: 97.8 °F (36.6 °C)     Physical Exam   Constitutional: She is oriented to person, place, and time. She appears well-developed and well-nourished.   HENT:   Head: Normocephalic and atraumatic.   Eyes: Conjunctivae and EOM are normal. Pupils are equal, round, and reactive to light.   Neck: Normal range of motion. Neck supple.   Cardiovascular: Normal rate, regular rhythm, normal heart sounds and intact distal pulses.    Pulmonary/Chest: Effort normal and breath sounds normal.   Abdominal: Soft. Bowel sounds are normal.   Musculoskeletal: Normal range of motion.   Neurological: She is alert and oriented to person, place, and time. She has normal reflexes.   Skin: Skin is warm and dry.     Psychiatric: She has a normal mood and affect. Her behavior is normal. Judgment and thought content normal.     LABS REVIEW:  UA today:   Color:Clear, Yellow  Spec. Grav.  1.020  PH  5.0  Negative for leukocytes, nitrates, protein, glucose, ketones, urobili, bili, and blood.    UCx:   Results for orders placed or performed in visit on 03/07/17   Urine culture   Result Value Ref Range    Urine Culture, Routine ESCHERICHIA COLI  50,000 - 99,999 cfu/ml          Susceptibility    Escherichia coli - CULTURE, URINE     Amp/Sulbactam <=8/4 Sensitive mcg/mL     Amikacin <=16 Sensitive mcg/mL      Ampicillin <=8 Sensitive mcg/mL     Amox/K Clav'ate <=8/4 Sensitive mcg/mL     Ceftriaxone <=8 Sensitive mcg/mL     Cefazolin <=8 Sensitive mcg/mL     Ciprofloxacin <=1 Sensitive mcg/mL     Cefepime <=8 Sensitive mcg/mL     Ertapenem <=2 Sensitive mcg/mL     Nitrofurantoin <=32 Sensitive mcg/mL     Gentamicin <=4 Sensitive mcg/mL     Meropenem <=4 Sensitive mcg/mL     Piperacillin/Tazo <=16 Sensitive mcg/mL     Trimeth/Sulfa <=2/38 Sensitive mcg/mL     Tetracycline <=4 Sensitive mcg/mL     Tobramycin <=4 Sensitive mcg/mL   Results for orders placed or performed in visit on 02/09/17   Urine culture   Result Value Ref Range    Urine Culture, Routine ESCHERICHIA COLI  >100,000 cfu/ml          Susceptibility    Escherichia coli - CULTURE, URINE     Amp/Sulbactam <=8/4 Sensitive mcg/mL     Amikacin <=16 Sensitive mcg/mL     Ampicillin <=8 Sensitive mcg/mL     Amox/K Clav'ate <=8/4 Sensitive mcg/mL     Ceftriaxone <=8 Sensitive mcg/mL     Cefazolin <=8 Sensitive mcg/mL     Ciprofloxacin <=1 Sensitive mcg/mL     Cefepime <=8 Sensitive mcg/mL     Ertapenem <=2 Sensitive mcg/mL     Nitrofurantoin <=32 Sensitive mcg/mL     Gentamicin <=4 Sensitive mcg/mL     Meropenem <=4 Sensitive mcg/mL     Piperacillin/Tazo <=16 Sensitive mcg/mL     Trimeth/Sulfa <=2/38 Sensitive mcg/mL     Tetracycline <=4 Sensitive mcg/mL     Tobramycin <=4 Sensitive mcg/mL     Cr:   Lab Results   Component Value Date    CREATININE 0.7 01/04/2013     Assessment:       1. Urinary frequency    2. Other urinary incontinence        Plan:     1.  Continue the Myrbetriq 25 mg one tablet daily for her urinary incontinence/leakage issues.    F/u with me in 3-4 months for recheck.    Kusum Benavides, FNP-C

## 2017-03-28 NOTE — MR AVS SNAPSHOT
[facility]   [address]  [place] [address]  Phone: [phone]                  Deonna Montero   3/28/2017 1:30 PM   Office Visit    Description:  Female : 1940   Provider:  DIONISIO Patino   Department:  [facility]           Reason for Visit     3 week recheck           Diagnoses this Visit        Comments    Urinary frequency    -  Primary            To Do List           Goals (5 Years of Data)     None       These Medications        Disp Refills Start End    mirabegron (MYRBETRIQ) 25 mg Tb24 ER tablet 30 tablet 11 3/28/2017 3/28/2018    Take 1 tablet (25 mg total) by mouth once daily. - Oral    Pharmacy: [facility] - [place], [place] - [address]  #: [phone]         [facility] On Call     [facility] -  Assistance  Registered nurses in the [facility] On Call Center provide clinical advisement, health education, appointment booking, and other advisory services.  Call for this free service at [phone].             Medications           Message regarding Medications     Verify the changes and/or additions to your medication regime listed below are the same as discussed with your clinician today.  If any of these changes or additions are incorrect, please notify your healthcare provider.        START taking these NEW medications        Refills    mirabegron (MYRBETRIQ) 25 mg Tb24 ER tablet 11    Sig: Take 1 tablet (25 mg total) by mouth once daily.    Class: Normal    Route: Oral      STOP taking these medications     doxycycline (VIBRA-TABS) 100 MG tablet Take 1 tablet (100 mg total) by mouth 2 (two) times daily.           Verify that the below list of medications is an accurate representation of the medications you are currently taking.  If none reported, the list may be blank. If incorrect, please contact your healthcare provider. Carry this list with you in case of emergency.           Current  "Medications     amlodipine (NORVASC) 5 MG tablet once daily.     atorvastatin (LIPITOR) 10 MG tablet Take 10 mg by mouth once daily.      BD INSULIN PEN NEEDLE UF ORIG 29 x 1/2 " Ndle     BD INSULIN PEN NEEDLE UF SHORT 31 X 5/16 " Ndle     BD INSULIN SYRINGE ULT-FINE II 1/2 mL 31 x 5/16" Syrg     BD INSULIN SYRINGE ULTRA-FINE 1 mL 31 x 5/16" Syrg     BENICAR 40 mg tablet once daily.     donepezil (ARICEPT) 5 MG tablet Take 5 mg by mouth every evening.      gabapentin (NEURONTIN) 800 MG tablet TAKE 1 TABLET 3 TIMES A DAY    insulin aspart protamine-insulin aspart (NOVOLOG 70/30) 100 unit/mL (70-30) injection Inject into the skin 2 (two) times daily before meals. Dose Depends on Blood Sugar level 30 u am and 40 u pm if blood sugar high    olmesartan (BENICAR) 20 MG tablet Take 20 mg by mouth once daily.      ONE TOUCH ULTRA TEST Strp     pantoprazole (PROTONIX) 20 MG tablet Take 40 mg by mouth once daily.    sertraline (ZOLOFT) 50 MG tablet Take 50 mg by mouth once daily.     timolol maleate 0.5% (TIMOPTIC) 0.5 % Drop     tizanidine (ZANAFLEX) 4 MG tablet once daily.     mirabegron (MYRBETRIQ) 25 mg Tb24 ER tablet Take 1 tablet (25 mg total) by mouth once daily.           Clinical Reference Information           Your Vitals Were     BP Pulse Temp             127/78 (BP Location: Right arm, Patient Position: Sitting, BP Method: Automatic) 90 97.8 °F (36.6 °C) (Oral)         Blood Pressure          Most Recent Value    BP  127/78      Allergies as of 3/28/2017     Codeine    Morphine (Pf)      Immunizations Administered on Date of Encounter - 3/28/2017     None      Orders Placed During Today's Visit      Normal Orders This Visit    POCT urinalysis, dipstick or tablet reag          3/28/2017  2:07 PM - Crystal Simon MA      Component Results     Component    Color    yellow clear    Spec Grav    1.020    pH, UA    5    WBC, UA    n    Nitrite    n    Protein    n    Glucose, UA    n    Ketones, UA    n    Urobilinogen "    n    Bilirubin    n    Blood, UA    n            [facility] Sign-Up     Activating your [facility] account is as easy as 1-2-3!     1) Visit [email or web address], select Sign Up Now, enter this activation code and your date of birth, then select Next.  [email or web address]  Expires: 5/12/2017  2:13 PM      2) Create a username and password to use when you visit [facility] in the future and select a security question in case you lose your password and select Next.    3) Enter your e-mail address and click Sign Up!    Additional Information  If you have questions, please e-mail [email or web address] or call [phone] to talk to our [facility] [facility] staff. Remember, [facility] is NOT to be used for urgent needs. For medical emergencies, dial 911.         Language Assistance Services     ATTENTION: Language assistance services are available, free of charge. Please call [phone].      ATENCIÓN: Si habla eliantrever, tiene a charles disposición servicios gratuitos de asistencia lingüística. Llame al [phone].     [facility] Ý: N?u b?n nói Ti?ng Vi?t, có các d?ch v? h? tr? ngôn ng? mi?n phí dành cho b?n. G?i s? [phone].         [facility] complies with applicable Federal civil rights laws and does not discriminate on the basis of race, color, national origin, age, disability, or sex.

## 2017-03-30 ENCOUNTER — TELEPHONE (OUTPATIENT)
Dept: UROLOGY | Facility: CLINIC | Age: 77
End: 2017-03-30

## 2017-03-30 NOTE — TELEPHONE ENCOUNTER
Returned pina and spoke with patient, she wanted coupon for Mybertriq. Coupon left at  to be picked up, she stated she will have her niece Angela get it for her. Patient verbally understood.

## 2017-03-30 NOTE — TELEPHONE ENCOUNTER
----- Message from Bruna Branham sent at 3/30/2017  3:14 PM CDT -----  Pt called stated that she was told by the pharmacist that the medication $340 and she wanted to know if she can get the coupons to help with the medication/pls call back at [phone]

## 2017-04-05 ENCOUNTER — TELEPHONE (OUTPATIENT)
Dept: UROLOGY | Facility: CLINIC | Age: 77
End: 2017-04-05

## 2017-04-05 NOTE — TELEPHONE ENCOUNTER
----- Message from Yvette Valdez sent at 4/5/2017  1:07 PM CDT -----  Contact: self   Patient wants to speak with a nurse regarding a generic for myrbetriq if so please send to [facility], any questions please call back at [phone]    [id number] - [place], [place] - [address]  [address]   [place] [address]  Phone: [phone] Fax: [phone]

## 2017-04-05 NOTE — TELEPHONE ENCOUNTER
Returned call and spoke with the patient, informed her that there is not a generic for Myrbetriq. The NP will send her insurance company, [employer],a letter of appeal and will will let her know the outcome, she verbally understood.

## 2017-04-06 ENCOUNTER — TELEPHONE (OUTPATIENT)
Dept: UROLOGY | Facility: CLINIC | Age: 77
End: 2017-04-06

## 2017-04-06 NOTE — TELEPHONE ENCOUNTER
----- Message from Leena Napier sent at 4/5/2017  2:28 PM CDT -----  Patient states that office is going to call up her insurance company to work out a price for her medication. She wanted to give office the number to call and her ID number. You should call [phone] and her ID is [id number]. If office has any questions, please call [phone].

## 2017-04-11 ENCOUNTER — TELEPHONE (OUTPATIENT)
Dept: UROLOGY | Facility: CLINIC | Age: 77
End: 2017-04-11

## 2017-04-11 NOTE — TELEPHONE ENCOUNTER
Patient returned call to office, informed that the appeal is still processing and we will notify her when we get something back, she verbally understood.

## 2017-04-11 NOTE — TELEPHONE ENCOUNTER
Returned call to patient to let her know that the myrbrtriq appeal is still in processing. No answer, unable to leave a message as the voice mailbox is not set up.

## 2017-04-11 NOTE — TELEPHONE ENCOUNTER
----- Message from Mira Higuera sent at 4/11/2017 11:43 AM CDT -----  Contact: self  Patient wants to speak with a nurse regarding the Rx for mirabegron. Please call back at [phone] (fzru)

## 2017-07-25 ENCOUNTER — OFFICE VISIT (OUTPATIENT)
Dept: UROLOGY | Facility: CLINIC | Age: 77
End: 2017-07-25
Payer: MEDICARE

## 2017-07-25 VITALS
BODY MASS INDEX: 33.04 KG/M2 | HEART RATE: 77 BPM | WEIGHT: 218 LBS | DIASTOLIC BLOOD PRESSURE: 75 MMHG | HEIGHT: 68 IN | TEMPERATURE: 98 F | SYSTOLIC BLOOD PRESSURE: 136 MMHG

## 2017-07-25 DIAGNOSIS — R32 URINARY INCONTINENCE, UNSPECIFIED TYPE: ICD-10-CM

## 2017-07-25 DIAGNOSIS — N39.0 RECURRENT UTI: Primary | ICD-10-CM

## 2017-07-25 DIAGNOSIS — R35.0 URINARY FREQUENCY: ICD-10-CM

## 2017-07-25 LAB
BILIRUB SERPL-MCNC: ABNORMAL MG/DL
BLOOD URINE, POC: ABNORMAL
COLOR, POC UA: ABNORMAL
GLUCOSE UR QL STRIP: ABNORMAL
KETONES UR QL STRIP: ABNORMAL
LEUKOCYTE ESTERASE URINE, POC: ABNORMAL
NITRITE, POC UA: POSITIVE
PH, POC UA: 5
PROTEIN, POC: ABNORMAL
SPECIFIC GRAVITY, POC UA: 1.02
UROBILINOGEN, POC UA: 1

## 2017-07-25 PROCEDURE — 99999 PR PBB SHADOW E&M-EST. PATIENT-LVL IV: CPT | Mod: PBBFAC,,, | Performed by: NURSE PRACTITIONER

## 2017-07-25 PROCEDURE — 81001 URINALYSIS AUTO W/SCOPE: CPT | Mod: PBBFAC,PO | Performed by: NURSE PRACTITIONER

## 2017-07-25 PROCEDURE — 99214 OFFICE O/P EST MOD 30 MIN: CPT | Mod: PBBFAC,PO | Performed by: NURSE PRACTITIONER

## 2017-07-25 PROCEDURE — 87077 CULTURE AEROBIC IDENTIFY: CPT

## 2017-07-25 PROCEDURE — 87088 URINE BACTERIA CULTURE: CPT

## 2017-07-25 PROCEDURE — 87086 URINE CULTURE/COLONY COUNT: CPT

## 2017-07-25 PROCEDURE — 99213 OFFICE O/P EST LOW 20 MIN: CPT | Mod: S$PBB,,, | Performed by: NURSE PRACTITIONER

## 2017-07-25 PROCEDURE — 87186 SC STD MICRODIL/AGAR DIL: CPT

## 2017-07-25 PROCEDURE — 1159F MED LIST DOCD IN RCRD: CPT | Mod: ,,, | Performed by: NURSE PRACTITIONER

## 2017-07-25 RX ORDER — TOLTERODINE 4 MG/1
4 CAPSULE, EXTENDED RELEASE ORAL DAILY
Qty: 30 CAPSULE | Refills: 12 | Status: SHIPPED | OUTPATIENT
Start: 2017-07-25 | End: 2017-08-15 | Stop reason: SDUPTHER

## 2017-07-25 RX ORDER — PANTOPRAZOLE SODIUM 40 MG/1
40 TABLET, DELAYED RELEASE ORAL DAILY
COMMUNITY
Start: 2017-05-12 | End: 2017-09-19

## 2017-07-25 RX ORDER — VALSARTAN 80 MG/1
80 TABLET ORAL NIGHTLY
COMMUNITY
Start: 2017-05-04 | End: 2018-09-11

## 2017-07-25 RX ORDER — HUMAN INSULIN 100 [IU]/ML
60 INJECTION, SUSPENSION SUBCUTANEOUS NIGHTLY
Status: ON HOLD | COMMUNITY
Start: 2017-05-12 | End: 2019-09-04 | Stop reason: HOSPADM

## 2017-07-25 RX ORDER — DONEPEZIL HYDROCHLORIDE 10 MG/1
10 TABLET, FILM COATED ORAL NIGHTLY
COMMUNITY
Start: 2017-07-04 | End: 2022-11-16

## 2017-07-25 RX ORDER — CEFUROXIME AXETIL 500 MG/1
500 TABLET ORAL 2 TIMES DAILY
Qty: 28 TABLET | Refills: 0 | Status: SHIPPED | OUTPATIENT
Start: 2017-07-25 | End: 2017-08-08

## 2017-07-25 NOTE — PROGRESS NOTES
[facility] Note  Staff: TERE Richard      Chief Complaint: Urinary frequency, urgency, hx of recurrent utis    Subjective:        HPI: Deonna Montero is a 76 y.o. female presents for routine recheck concerning her hx of recurrent UTIs, urinary frequency, urgency and leakage.    Pt has taken Oxybutynin XL 15 mg daily in the past which did not help with her lower urinary tract issues.  She tried Myrbetriq 25 mg daily which helped with some of her LUTS, but her insurance does not cover the medication cost.    REVIEW OF SYSTEMS:  Review of Systems   Constitutional: Negative for chills, diaphoresis, fever and weight loss.   HENT: Negative for congestion, hearing loss, nosebleeds and sore throat.    Eyes: Negative for blurred vision and pain.   Respiratory: Negative for cough and wheezing.    Cardiovascular: Negative for chest pain, palpitations and leg swelling.   Gastrointestinal: Negative for abdominal pain, heartburn, nausea and vomiting.   Genitourinary: Positive for flank pain, frequency and urgency. Negative for dysuria and hematuria.   Musculoskeletal: Negative for back pain, joint pain, myalgias and neck pain.   Skin: Negative for itching and rash.   Neurological: Negative for dizziness, tremors, sensory change, seizures, loss of consciousness, weakness and headaches.   Endo/Heme/Allergies: Does not bruise/bleed easily.   Psychiatric/Behavioral: Negative for depression and suicidal ideas. The patient is not nervous/anxious.      PMHx:  Past Medical History:   Diagnosis Date    Back pain     Diabetes mellitus     Hypertension     Reflux     Short-term memory loss      PSHx:  Past Surgical History:   Procedure Laterality Date    BACK SURGERY      with tea    CHOLECYSTECTOMY      napoleon      2 years ago    HYSTERECTOMY       Social History     Social History    Marital status:      Spouse name: N/A    Number of children: N/A    Years of education: N/A      Social History Main Topics    Smoking status: Former Smoker     Quit date: 10/15/2010    Smokeless tobacco: None    Alcohol use No    Drug use: Unknown    Sexual activity: Not Asked     Other Topics Concern    None     Social History Narrative    None     Allergies:  Codeine and Morphine (pf)    Medications: reviewed   Anticoagulation: No    Objective:     Vitals:    07/25/17 1301   BP: 136/75   Pulse: 77   Temp: 97.8 °F (36.6 °C)     Physical Exam   Vitals reviewed.  Constitutional: She is oriented to person, place, and time. She appears well-developed and well-nourished.   HENT:   Head: Normocephalic and atraumatic.   Eyes: Conjunctivae and EOM are normal. Pupils are equal, round, and reactive to light.   Neck: Normal range of motion. Neck supple.   Cardiovascular: Normal rate, regular rhythm, normal heart sounds and intact distal pulses.    Pulmonary/Chest: Effort normal and breath sounds normal.   Abdominal: Soft. Bowel sounds are normal.   Musculoskeletal: Normal range of motion.   Neurological: She is alert and oriented to person, place, and time. She has normal reflexes.   Skin: Skin is warm and dry.     Psychiatric: She has a normal mood and affect. Her behavior is normal. Judgment and thought content normal.     LABS REVIEW:  UA today:   Color, UA  dark yellow   Spec Grav UA 1.025   pH, UA 5   WBC, UA 1+   Nitrite, UA  positive   Protein trace   Glucose, UA n   Ketones, UA n   Urobilinogen, UA 1   Bilirubin 1+   Blood, UA trace     Assessment:       1. Recurrent UTI    2. Urinary incontinence, unspecified type          Plan:     1.  We will send urine for culture testing.  In the meantime I gave pt RX for Ceftin 500 mg 1 po BID x 14 days.    2.  CT RSS for evaluation of kidneys and to rule out any stones, hydronephrosis or other abnormalities due to recurrent UTIS and flank pain.    3.  We will try the pt on Detrol LA 4 mg one tablet daily for her LUTS.  Myrbetriq is not covered under her RX  insurance.    F/u with me in three weeks.    Kusum Benavides, JDP-C

## 2017-07-28 LAB — BACTERIA UR CULT: NORMAL

## 2017-08-02 ENCOUNTER — HOSPITAL ENCOUNTER (OUTPATIENT)
Dept: RADIOLOGY | Facility: HOSPITAL | Age: 77
Discharge: HOME | End: 2017-08-02
Attending: NURSE PRACTITIONER
Payer: MEDICARE

## 2017-08-02 DIAGNOSIS — R32 URINARY INCONTINENCE, UNSPECIFIED TYPE: ICD-10-CM

## 2017-08-02 DIAGNOSIS — N39.0 RECURRENT UTI: ICD-10-CM

## 2017-08-02 PROCEDURE — 74176 CT ABD & PELVIS W/O CONTRAST: CPT | Mod: TC

## 2017-08-02 PROCEDURE — 74176 CT ABD & PELVIS W/O CONTRAST: CPT | Mod: 26,,, | Performed by: RADIOLOGY

## 2017-08-15 ENCOUNTER — OFFICE VISIT (OUTPATIENT)
Dept: UROLOGY | Facility: CLINIC | Age: 77
End: 2017-08-15
Payer: MEDICARE

## 2017-08-15 VITALS
WEIGHT: 220 LBS | TEMPERATURE: 98 F | DIASTOLIC BLOOD PRESSURE: 78 MMHG | SYSTOLIC BLOOD PRESSURE: 159 MMHG | HEART RATE: 86 BPM | BODY MASS INDEX: 33.34 KG/M2 | HEIGHT: 68 IN

## 2017-08-15 DIAGNOSIS — R35.0 URINARY FREQUENCY: ICD-10-CM

## 2017-08-15 DIAGNOSIS — N39.0 RECURRENT UTI: Primary | ICD-10-CM

## 2017-08-15 LAB
BILIRUB SERPL-MCNC: ABNORMAL MG/DL
BLOOD URINE, POC: ABNORMAL
COLOR, POC UA: ABNORMAL
GLUCOSE UR QL STRIP: ABNORMAL
KETONES UR QL STRIP: ABNORMAL
LEUKOCYTE ESTERASE URINE, POC: ABNORMAL
NITRITE, POC UA: POSITIVE
PH, POC UA: 5
PROTEIN, POC: ABNORMAL
SPECIFIC GRAVITY, POC UA: 1.03
UROBILINOGEN, POC UA: ABNORMAL

## 2017-08-15 PROCEDURE — 87088 URINE BACTERIA CULTURE: CPT

## 2017-08-15 PROCEDURE — 99999 PR PBB SHADOW E&M-EST. PATIENT-LVL IV: CPT | Mod: PBBFAC,,, | Performed by: NURSE PRACTITIONER

## 2017-08-15 PROCEDURE — 99213 OFFICE O/P EST LOW 20 MIN: CPT | Mod: S$PBB,,, | Performed by: NURSE PRACTITIONER

## 2017-08-15 PROCEDURE — 1126F AMNT PAIN NOTED NONE PRSNT: CPT | Mod: ,,, | Performed by: NURSE PRACTITIONER

## 2017-08-15 PROCEDURE — 87077 CULTURE AEROBIC IDENTIFY: CPT

## 2017-08-15 PROCEDURE — 87086 URINE CULTURE/COLONY COUNT: CPT

## 2017-08-15 PROCEDURE — 1159F MED LIST DOCD IN RCRD: CPT | Mod: ,,, | Performed by: NURSE PRACTITIONER

## 2017-08-15 PROCEDURE — 99214 OFFICE O/P EST MOD 30 MIN: CPT | Mod: PBBFAC,PO | Performed by: NURSE PRACTITIONER

## 2017-08-15 PROCEDURE — 3008F BODY MASS INDEX DOCD: CPT | Mod: ,,, | Performed by: NURSE PRACTITIONER

## 2017-08-15 PROCEDURE — 87186 SC STD MICRODIL/AGAR DIL: CPT

## 2017-08-15 PROCEDURE — 81001 URINALYSIS AUTO W/SCOPE: CPT | Mod: PBBFAC,PO | Performed by: NURSE PRACTITIONER

## 2017-08-15 RX ORDER — ATORVASTATIN CALCIUM 10 MG/1
10 TABLET, FILM COATED ORAL DAILY
COMMUNITY
Start: 2017-08-14 | End: 2018-04-19

## 2017-08-15 RX ORDER — METFORMIN HYDROCHLORIDE 500 MG/1
500 TABLET ORAL NIGHTLY
Status: ON HOLD | COMMUNITY
Start: 2017-08-07 | End: 2019-09-04 | Stop reason: HOSPADM

## 2017-08-15 RX ORDER — HYDROCHLOROTHIAZIDE 25 MG/1
25 TABLET ORAL DAILY
COMMUNITY
Start: 2017-08-14 | End: 2017-09-19 | Stop reason: SDUPTHER

## 2017-08-15 RX ORDER — TOLTERODINE 4 MG/1
4 CAPSULE, EXTENDED RELEASE ORAL DAILY
Qty: 30 CAPSULE | Refills: 12 | Status: SHIPPED | OUTPATIENT
Start: 2017-08-15 | End: 2017-08-18

## 2017-08-15 NOTE — PROGRESS NOTES
[facility] Note  Staff: TERE Richard      Chief Complaint: Recurrent urinary tract infections, urinary incontinence.    Subjective:        HPI: Deonna Montero is a 76 y.o. female presents for routine recheck.  Pt has hx of recurrent E.Coli UTIs and urinary incontinence.  She has tried Oxybutynin in the past with no improvement in her symptoms.  I have tried Myrbetriq on this patient for her LUTS which helped with her leakage issues, but she is unable to afford the medication due to cost and insurance.  Therefore last ov I started pt on Detrol LA 4 mg daily and repeated urine culture which still showed +E.Coli UTI.      Today pt finished the Ceftin for recent E.Coli UTI, but does not remember starting the Detrol LA.  (Pt brought meds with her today, and I do not see a bottle of Detrol)  Unclear as to whether she has been taking the Detrol LA or if she never got it filled?     Pt currently denies dysuria, hematuria or problems with urination.  Pt does have some memory loss and lives by herself at home.    CT RSS performed on 08/02/2017:  IMPRESSION:  1.  No evidence for nephroureterolithiasis or obstructive uropathy.   2. No other acute CT findings in the abdomen or pelvis to explain this patient's symptoms. 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 A component of avascular necrosis is possible.    REVIEW OF SYSTEMS:  Review of Systems   Constitutional: Negative for chills, diaphoresis, fever and weight loss.   HENT: Negative for congestion, hearing loss, nosebleeds and sore throat.    Eyes: Negative for blurred vision and pain.   Respiratory: Negative for cough and wheezing.    Cardiovascular: Negative for chest pain, palpitations and leg swelling.   Gastrointestinal: Negative for abdominal pain,  heartburn, nausea and vomiting.   Genitourinary: Negative for dysuria, flank pain, frequency, hematuria and urgency.   Musculoskeletal: Negative for back pain, joint pain, myalgias and neck pain.   Skin: Negative for itching and rash.   Neurological: Negative for dizziness, tremors, sensory change, seizures, loss of consciousness, weakness and headaches.   Endo/Heme/Allergies: Does not bruise/bleed easily.   Psychiatric/Behavioral: Negative for depression and suicidal ideas. The patient is not nervous/anxious.      PMHx:  Past Medical History:   Diagnosis Date    Back pain     Diabetes mellitus     Hypertension     Reflux     Short-term memory loss      PSHx:  Past Surgical History:   Procedure Laterality Date    BACK SURGERY      with tea    CHOLECYSTECTOMY      napoleon      2 years ago    HYSTERECTOMY       Allergies:  Codeine and Morphine (pf)    Medications: reviewed   Anticoagulation: No    Objective:     Vitals:    08/15/17 1319   BP: (!) 159/78   Pulse: 86   Temp: 98 °F (36.7 °C)       Physical Exam   Vitals reviewed.  Constitutional: She is oriented to person, place, and time. She appears well-developed and well-nourished.   HENT:   Head: Normocephalic and atraumatic.   Eyes: Conjunctivae and EOM are normal. Pupils are equal, round, and reactive to light.   Neck: Normal range of motion. Neck supple.   Cardiovascular: Normal rate, regular rhythm, normal heart sounds and intact distal pulses.    Pulmonary/Chest: Effort normal and breath sounds normal.   Abdominal: Soft. Bowel sounds are normal.   Musculoskeletal: Normal range of motion.   Neurological: She is alert and oriented to person, place, and time. She has normal reflexes.   Skin: Skin is warm and dry.     Psychiatric: She has a normal mood and affect. Her behavior is normal. Judgment and thought content normal.     LABS REVIEW:  UA today:  Color, UA  yellow clear   Spec Grav UA 1.030   pH, UA 5   WBC, UA 1+   Nitrite, UA  positive   Protein trace    Glucose, UA n   Ketones, UA n   Urobilinogen, UA n   Bilirubin n   Blood, UA trace     Cr:   Lab Results   Component Value Date    CREATININE 0.7 01/04/2013       Assessment:       1. Recurrent UTI    2. Urinary frequency          Plan:     1.  Urine culture to be performed.  2.  I hand gave pt today a RX for Detrol LA to be filled and started today for her LUTS.  Pt verbalized understanding.    F/u with Urologist for further evaluation of recurrent E.Coli infections that are asymptomatic.  Colonized??  Due to pt's hx of memory loss and living by herself, would she know when she is having UTI symptoms?  I don't know if it is best to do a cystoscopy at this time for further evaluation???  I am referring for another opinion in this pt's case.     Kusum Benavides, DIONISIO-C

## 2017-08-16 ENCOUNTER — TELEPHONE (OUTPATIENT)
Dept: UROLOGY | Facility: CLINIC | Age: 77
End: 2017-08-16

## 2017-08-16 NOTE — TELEPHONE ENCOUNTER
----- Message from Dara Chamorro sent at 8/16/2017 10:39 AM CDT -----  Contact patient regarding new Medication given yesterday, she states she is unable to afford, phone # [phone].    Thank you

## 2017-08-16 NOTE — TELEPHONE ENCOUNTER
Patient states that tolterodine (Detrol) that was prescribed yesterday is too expensive ($187 for 30-day supply) and is requesting an alternative. Was unable to see what would be covered. Please advise.

## 2017-08-16 NOTE — TELEPHONE ENCOUNTER
----- Message from Sanjana Baptiste sent at 8/16/2017 11:01 AM CDT -----  Contact: self  Prescription that was written yesterday is expensive and needs something else called in.  Needs to be sent to [facility] with Aezacharyna. Please call back at [phone] (home)

## 2017-08-18 ENCOUNTER — TELEPHONE (OUTPATIENT)
Dept: UROLOGY | Facility: CLINIC | Age: 77
End: 2017-08-18

## 2017-08-18 LAB — BACTERIA UR CULT: NORMAL

## 2017-08-18 RX ORDER — FLUCONAZOLE 150 MG/1
150 TABLET ORAL DAILY
Qty: 3 TABLET | Refills: 0 | Status: SHIPPED | OUTPATIENT
Start: 2017-08-18 | End: 2017-08-21

## 2017-08-18 RX ORDER — CEFUROXIME AXETIL 500 MG/1
500 TABLET ORAL 2 TIMES DAILY
Qty: 42 TABLET | Refills: 0 | Status: SHIPPED | OUTPATIENT
Start: 2017-08-18 | End: 2017-09-08

## 2017-08-18 NOTE — TELEPHONE ENCOUNTER
----- Message from Laura Bauer sent at 8/18/2017  1:32 PM CDT -----  Call pt at [phone] twice she has gone to bathroom and see's light blood in urine

## 2017-08-18 NOTE — TELEPHONE ENCOUNTER
Returned call and spoke with patient, she stated that she is now seeing blood in her urine. Informed her that with a bladder infection it happens sometimes. Patient stated that she was scared, so she will go to the ER so they can help her faster,  Agreed if that will make her feel better, patient verbally understood.

## 2017-09-19 ENCOUNTER — OFFICE VISIT (OUTPATIENT)
Dept: UROLOGY | Facility: CLINIC | Age: 77
End: 2017-09-19
Payer: MEDICARE

## 2017-09-19 ENCOUNTER — APPOINTMENT (OUTPATIENT)
Dept: LAB | Facility: HOSPITAL | Age: 77
End: 2017-09-19
Attending: UROLOGY
Payer: MEDICARE

## 2017-09-19 ENCOUNTER — OFFICE VISIT (OUTPATIENT)
Dept: FAMILY MEDICINE | Facility: CLINIC | Age: 77
End: 2017-09-19
Payer: MEDICARE

## 2017-09-19 ENCOUNTER — DOCUMENTATION ONLY (OUTPATIENT)
Dept: FAMILY MEDICINE | Facility: CLINIC | Age: 77
End: 2017-09-19

## 2017-09-19 VITALS
HEIGHT: 68 IN | HEART RATE: 97 BPM | WEIGHT: 213.19 LBS | TEMPERATURE: 98 F | DIASTOLIC BLOOD PRESSURE: 83 MMHG | SYSTOLIC BLOOD PRESSURE: 151 MMHG | BODY MASS INDEX: 32.31 KG/M2

## 2017-09-19 VITALS
BODY MASS INDEX: 32.31 KG/M2 | HEART RATE: 97 BPM | HEIGHT: 68 IN | WEIGHT: 213.19 LBS | SYSTOLIC BLOOD PRESSURE: 147 MMHG | DIASTOLIC BLOOD PRESSURE: 75 MMHG

## 2017-09-19 DIAGNOSIS — N39.0 RECURRENT UTI: ICD-10-CM

## 2017-09-19 DIAGNOSIS — K59.00 CONSTIPATION, UNSPECIFIED CONSTIPATION TYPE: Primary | ICD-10-CM

## 2017-09-19 DIAGNOSIS — R31.0 GROSS HEMATURIA: ICD-10-CM

## 2017-09-19 DIAGNOSIS — M54.9 BACK PAIN, UNSPECIFIED BACK LOCATION, UNSPECIFIED BACK PAIN LATERALITY, UNSPECIFIED CHRONICITY: ICD-10-CM

## 2017-09-19 DIAGNOSIS — K59.00 CONSTIPATION, UNSPECIFIED CONSTIPATION TYPE: ICD-10-CM

## 2017-09-19 DIAGNOSIS — I10 BENIGN ESSENTIAL HTN: Primary | ICD-10-CM

## 2017-09-19 DIAGNOSIS — N32.81 OAB (OVERACTIVE BLADDER): ICD-10-CM

## 2017-09-19 LAB
BILIRUB SERPL-MCNC: ABNORMAL MG/DL
BLOOD URINE, POC: ABNORMAL
COLOR, POC UA: ABNORMAL
GLUCOSE UR QL STRIP: ABNORMAL
KETONES UR QL STRIP: ABNORMAL
LEUKOCYTE ESTERASE URINE, POC: ABNORMAL
NITRITE, POC UA: ABNORMAL
PH, POC UA: 5
PROTEIN, POC: 30
SPECIFIC GRAVITY, POC UA: 1025
UROBILINOGEN, POC UA: ABNORMAL

## 2017-09-19 PROCEDURE — 99999 PR PBB SHADOW E&M-EST. PATIENT-LVL V: CPT | Mod: PBBFAC,,, | Performed by: UROLOGY

## 2017-09-19 PROCEDURE — 87086 URINE CULTURE/COLONY COUNT: CPT

## 2017-09-19 PROCEDURE — 99215 OFFICE O/P EST HI 40 MIN: CPT | Mod: S$PBB,,, | Performed by: UROLOGY

## 2017-09-19 PROCEDURE — 88112 CYTOPATH CELL ENHANCE TECH: CPT | Mod: 26,,, | Performed by: PATHOLOGY

## 2017-09-19 PROCEDURE — 87077 CULTURE AEROBIC IDENTIFY: CPT

## 2017-09-19 PROCEDURE — 1159F MED LIST DOCD IN RCRD: CPT | Mod: ,,, | Performed by: FAMILY MEDICINE

## 2017-09-19 PROCEDURE — 88112 CYTOPATH CELL ENHANCE TECH: CPT | Performed by: PATHOLOGY

## 2017-09-19 PROCEDURE — 81002 URINALYSIS NONAUTO W/O SCOPE: CPT | Mod: PBBFAC,PN | Performed by: UROLOGY

## 2017-09-19 PROCEDURE — 99999 PR PBB SHADOW E&M-EST. PATIENT-LVL II: CPT | Mod: PBBFAC,,, | Performed by: FAMILY MEDICINE

## 2017-09-19 PROCEDURE — 87186 SC STD MICRODIL/AGAR DIL: CPT

## 2017-09-19 PROCEDURE — 51701 INSERT BLADDER CATHETER: CPT | Mod: PBBFAC,PN | Performed by: UROLOGY

## 2017-09-19 PROCEDURE — 99212 OFFICE O/P EST SF 10 MIN: CPT | Mod: PBBFAC,PO,25 | Performed by: FAMILY MEDICINE

## 2017-09-19 PROCEDURE — 1125F AMNT PAIN NOTED PAIN PRSNT: CPT | Mod: ,,, | Performed by: FAMILY MEDICINE

## 2017-09-19 PROCEDURE — 81000 URINALYSIS NONAUTO W/SCOPE: CPT

## 2017-09-19 PROCEDURE — 1159F MED LIST DOCD IN RCRD: CPT | Mod: ,,, | Performed by: UROLOGY

## 2017-09-19 PROCEDURE — 1126F AMNT PAIN NOTED NONE PRSNT: CPT | Mod: ,,, | Performed by: UROLOGY

## 2017-09-19 PROCEDURE — 99214 OFFICE O/P EST MOD 30 MIN: CPT | Mod: S$PBB,,, | Performed by: FAMILY MEDICINE

## 2017-09-19 PROCEDURE — 87088 URINE BACTERIA CULTURE: CPT

## 2017-09-19 PROCEDURE — 99215 OFFICE O/P EST HI 40 MIN: CPT | Mod: PBBFAC,27,PN | Performed by: UROLOGY

## 2017-09-19 PROCEDURE — 81001 URINALYSIS AUTO W/SCOPE: CPT | Mod: PBBFAC,PN | Performed by: UROLOGY

## 2017-09-19 RX ORDER — ADHESIVE BANDAGE
30 BANDAGE TOPICAL DAILY PRN
Qty: 473 ML | Refills: 1 | Status: SHIPPED | OUTPATIENT
Start: 2017-09-19 | End: 2022-11-16

## 2017-09-19 RX ORDER — POLYETHYLENE GLYCOL 3350 17 G/17G
POWDER, FOR SOLUTION ORAL
Qty: 255 G | Refills: 12 | Status: ON HOLD | OUTPATIENT
Start: 2017-09-19 | End: 2022-11-25

## 2017-09-19 RX ORDER — HYDROCHLOROTHIAZIDE 25 MG/1
50 TABLET ORAL DAILY
Qty: 30 TABLET | Refills: 3 | Status: SHIPPED | OUTPATIENT
Start: 2017-09-19 | End: 2018-09-11

## 2017-09-19 NOTE — PROGRESS NOTES
Subjective:       Patient ID: Deonna Montero is a 77 y.o. female.    Chief Complaint: Foot Swelling (left)    HPI     Hypertension  Patient reports compliance with medications.  The patient does not check BP levels regularly.  Pt does not engage in regular exercise and has attempted to maintain a low sodium diet   Patient further reports no vision changes/frequent HA's/edema/urinary changes.  Currently, the pt does not smoke.     Review of Systems   Constitutional: Negative for chills and fever.   HENT: Negative for sore throat.    Eyes: Negative for visual disturbance.   Respiratory: Negative for cough and shortness of breath.    Cardiovascular: Negative for chest pain and leg swelling.   Gastrointestinal: Positive for constipation. Negative for abdominal pain, blood in stool, diarrhea and vomiting.   Genitourinary: Negative for difficulty urinating, dysuria and hematuria.   Musculoskeletal: Positive for arthralgias. Negative for back pain.       Objective:      Physical Exam   Constitutional: She appears well-developed and well-nourished. No distress.   Obese female in no acute distress     HENT:   Head: Normocephalic and atraumatic.   Mouth/Throat: Oropharynx is clear and moist. No oropharyngeal exudate.   Eyes: EOM are normal. Pupils are equal, round, and reactive to light.   Neck: Normal range of motion. Neck supple. No thyromegaly present.   Cardiovascular: Normal rate, regular rhythm, normal heart sounds and intact distal pulses.    Pulmonary/Chest: Effort normal and breath sounds normal. No respiratory distress. She has no wheezes.   Abdominal: Soft. Bowel sounds are normal. She exhibits no distension and no mass. There is no tenderness.   Musculoskeletal: She exhibits no edema.   Lymphadenopathy:     She has no cervical adenopathy.   Neurological: She is alert.   Skin: Skin is warm. No rash noted. No erythema.   Psychiatric: She has a normal mood and affect. Her behavior is normal.   Vitals  reviewed.      Assessment:       1. Benign essential HTN        Plan:       1. Benign essential HTN  Increase HCTZ as pt has increased swelling and BP not at goal.   - hydrochlorothiazide (HYDRODIURIL) 25 MG tablet; Take 2 tablets (50 mg total) by mouth once daily.  Dispense: 30 tablet; Refill: 3    2. Constipation, unspecified constipation type  - magnesium hydroxide 400 mg/5 ml (MILK OF MAGNESIA) 400 mg/5 mL Susp; Take 30 mLs (2,400 mg total) by mouth daily as needed.  Dispense: 473 mL; Refill: 1    Patient readiness: acceptance and barriers:none    During the course of the visit the patient was educated and counseled about the following:     Hypertension:   Dietary sodium restriction.  Regular aerobic exercise.  Obesity:   Informal exercise measures discussed, e.g. taking stairs instead of elevator.  Regular aerobic exercise program discussed.    Goals: Hypertension: Reduce Blood Pressure and Obesity: Reduce calorie intake and BMI    Did patient meet goals/outcomes: no    The following self management tools provided: blood pressure log  excercise log    Patient Instructions (the written plan) was given to the patient/family.     Time spent with patient: 15 minutes    Portions of this note were created using Dragon voice recognition software. There may be voice recognition errors found in the text, and attempts were made to correct these errors prior to signature    Luis Solares MD    Family Medicine  9/19/2017

## 2017-09-19 NOTE — PROGRESS NOTES
Pre-Visit Chart Review  For Appointment Scheduled on (9/19)    Health Maintenance Due   Topic Date Due    Lipid Panel  1940    TETANUS VACCINE  09/08/1959    DEXA SCAN  09/08/1981    Zoster Vaccine  09/08/2001    Pneumococcal (65+) (1 of 2 - PCV13) 09/08/2006    Influenza Vaccine  08/01/2017

## 2017-09-19 NOTE — PROGRESS NOTES
[facility] Note - [place]  Staff: MD Juan Manuel    Referring provider and please cc: Luis  [facility]    Chief Complaint: recurrent uti and OAB    Subjective:        HPI: Deonna Montero is a 77 y.o. female presents with     Last seen by 8/15/17 (1 month)    OAB with UUI   -has had these sx for about a year  -frequency q2 hours or less and nocturia 3-4x with urgency and UUI.  -wears depends, maxi pad and tissue. Changes this 1x a day  -drinks prune juice. 1 tea every 2 days. occ coffee.   -has tried: oxbutynin, myrbetriq (which helped but was too expensive), detrol 4mg (but that was too expensive)  -denies JOSE L  -+constipation and says she has some relief of sx with having a bm    Recurrent uti  -has had 4 cultures that showed e.coli in the past year  -wears multiple pads  -ctrss 8/2/17 showed no stones  -cytology in 3/17 was atypical but repeat cytology was normal  -sx of uti include left lower quadrant pain (+constipation). No dysuria. +frequency and urgency but this has been present prior to uti's.   -diabetes x 10 years    Gross hematuria  Had GH 1.5 months ago that lasted for 2 days.  +infxn then.  ctrss 8/2/17 showed no stones  Has had no cysto  Cytology 3/7/17 normal, cytology 2/9/17 atypical     ECOG Status: 2 walks with walker    G2, P2,   Gross HematuriaYes   History of UTI: Yes     REVIEW OF SYSTEMS:  General ROS: no fevers, no chills  Psychological ROS: no depression  Endocrine ROS: no heat or cold  Respiratory ROS: no SOB  Cardiovascular ROS: no CP  Gastrointestinal ROS: no abdominal pain, + constipation, no diarrhea, noBRBPR  Musculoskeletal ROS: no muscle pain  Neurological ROS: no headaches  Dermatological ROS: no rashes  HEENT: noglasses, no sinus   ROS: per HPI     PMHx:  Past Medical History:   Diagnosis Date    Back pain     Diabetes mellitus     Hypertension     Reflux     Short-term memory loss      Kidney stones:  No    PSHx:  Past Surgical History:   Procedure Laterality Date    BACK SURGERY      with tea    CHOLECYSTECTOMY      napoleon      2 years ago    HYSTERECTOMY       Urologic or Gynecologic Surgery: yes    Stents/Valves/Foreign Bodies: No  Cardiac Evaluation: No    Screening Studies  Colonoscopy: last one was a few years ago by Dr.Trainor Pittman Hx:   malignancies: Yes - father with prostate cancer , gyn malignancies: Yes - mother with breast cancer . Mother  a 70 of breast cancer. Father  a 86  kidney stones: No     Soc Hx:  No tobacco  No alcohol  Lives in [place] by herself, friend drives her  :  Children: 2  Occupation:retired teacher    Allergies:  Codeine and Morphine (pf)    Medications: reviewed   Anticoagulation: No    Objective:     Vitals:    17 1538   BP: (!) 151/83   Pulse: 97   Temp: 98 °F (36.7 °C)     Tearful    General:WDWN in NAD  Eyes: PERRLA, normal conjunctiva  Respiratory: no increased work on breathing, clear to auscultation  Cardiovascular: regular rate and rhythm. No obvious extremity edema.  GI: no palpation of masses. No tenderness. No hepatosplenomegaly to palpation.  Musculoskeletal: normal range of motion of bilateral upper extremities. Normal muscle strength and tone.  Skin: no obvious rashes or lesions. No tightening of skin noted.  Neurologic: CN grossly normal. Normal sensation.   Psychiatric: awake, alert and oriented x 3. Mood and affect normal. Cooperative.    Pelvic exam  In and out cath with 30cc clear yellow urine  Pt could not tolerate position of vaginal exam to do stress test    LABS REVIEW:  UA today: 1.025/+leukocyte/nitrite +/protein  ua cath: ua same - sent for urine culture    UCx:   8/15/17 E.coli, nitrite +  17 E.coli, nitrite +  3/7/17  E.coli  17  E.coli    Cr:   Lab Results   Component Value Date    CREATININE 0.7 2013       PATHOLOGY REVIEW:  Voided urine 3/7/17: Negative for malignant cells.  Voided urine  2/9/1:Atypical cells present.    RADIOGRAPHIC REVIEW:  ctrss 8/2/17  No stones  No hydro  No kidney masses  Possible small left adenal adenoma  hsyterectomy  Other findings: Spinal stimulator, cholecystectomy, b femoracetabular OA        Assessment:       1. Constipation, unspecified constipation type    2. Recurrent UTI    3. OAB (overactive bladder)          Plan:     Constipation  -pain in LLQ is constipation, not uti.   -had been written for [employer] by  but it was too expensive  -take 1 cap miralax daily    Recurrent uti  -sending cath  Urine for urinalysis and culture, suspect she has no uti  -asymptomatic and will not treat until she has cysto. The llq pain she is having is from constipation.   -start a probiotic   -likely related to pad usage, will check if we can improve her incontinence (needs stress test as well at time of cyto). Did not tolerate vaginal exam today due to positioning. No ua 1 week prior.     oab  -has tried ditropan  -could not afford myrbetriq or detrol  -will see what oab med is covered and write her for this.     Gross hematuria  -has had a ctrss which showed no stones  -sending culture from cath urine today  -scheduling cytososcopy, will give abx 4d prior to cysto. Will treat based on culture from today. Need to do stress test at time of cysto. If she has +ua that day needs cath urine.     Referral to pain doctor for chronic back pain  Referral to rheumatology for arhtritis    F/u for cytoscopy, vaginal exam, stress test and possible botox if stress test is negative. Could not do stress test today.     Sanjana Zambrano MD

## 2017-09-20 ENCOUNTER — TELEPHONE (OUTPATIENT)
Dept: UROLOGY | Facility: CLINIC | Age: 77
End: 2017-09-20

## 2017-09-20 LAB
AMORPH CRY URNS QL MICRO: ABNORMAL
BACTERIA #/AREA URNS HPF: ABNORMAL /HPF
BILIRUB UR QL STRIP: NEGATIVE
CLARITY UR: ABNORMAL
COLOR UR: YELLOW
GLUCOSE UR QL STRIP: NEGATIVE
HGB UR QL STRIP: NEGATIVE
KETONES UR QL STRIP: ABNORMAL
LEUKOCYTE ESTERASE UR QL STRIP: NEGATIVE
MICROSCOPIC COMMENT: ABNORMAL
NITRITE UR QL STRIP: POSITIVE
PH UR STRIP: 6 [PH] (ref 5–8)
PROT UR QL STRIP: ABNORMAL
RBC #/AREA URNS HPF: 2 /HPF (ref 0–4)
SP GR UR STRIP: >=1.03 (ref 1–1.03)
SQUAMOUS #/AREA URNS HPF: 3 /HPF
URN SPEC COLLECT METH UR: ABNORMAL
UROBILINOGEN UR STRIP-ACNC: 1 EU/DL
WBC #/AREA URNS HPF: 4 /HPF (ref 0–5)

## 2017-09-22 LAB — BACTERIA UR CULT: NORMAL

## 2017-09-24 ENCOUNTER — TELEPHONE (OUTPATIENT)
Dept: UROLOGY | Facility: CLINIC | Age: 77
End: 2017-09-24

## 2017-09-24 RX ORDER — CIPROFLOXACIN 500 MG/1
500 TABLET ORAL 2 TIMES DAILY
Qty: 14 TABLET | Refills: 0 | Status: SHIPPED | OUTPATIENT
Start: 2017-09-24 | End: 2017-10-01

## 2017-09-25 NOTE — TELEPHONE ENCOUNTER
Please let pt know that she does have an infection but she does not need treatment if she is having no fevers or burning with urination.    I will write her for cipro 500 bid x 7days to start 4 days prior to her cytoscopy on oct 16

## 2017-09-28 ENCOUNTER — OFFICE VISIT (OUTPATIENT)
Dept: PAIN MEDICINE | Facility: CLINIC | Age: 77
End: 2017-09-28
Payer: MEDICARE

## 2017-09-28 VITALS
WEIGHT: 213 LBS | DIASTOLIC BLOOD PRESSURE: 77 MMHG | HEART RATE: 90 BPM | BODY MASS INDEX: 32.28 KG/M2 | SYSTOLIC BLOOD PRESSURE: 140 MMHG | HEIGHT: 68 IN

## 2017-09-28 DIAGNOSIS — G89.4 CHRONIC PAIN DISORDER: ICD-10-CM

## 2017-09-28 DIAGNOSIS — M51.36 DDD (DEGENERATIVE DISC DISEASE), LUMBAR: ICD-10-CM

## 2017-09-28 DIAGNOSIS — M96.1 POSTLAMINECTOMY SYNDROME OF LUMBAR REGION: Primary | ICD-10-CM

## 2017-09-28 PROCEDURE — 1159F MED LIST DOCD IN RCRD: CPT | Mod: ,,, | Performed by: ANESTHESIOLOGY

## 2017-09-28 PROCEDURE — 99999 PR PBB SHADOW E&M-EST. PATIENT-LVL IV: CPT | Mod: PBBFAC,,, | Performed by: ANESTHESIOLOGY

## 2017-09-28 PROCEDURE — 99214 OFFICE O/P EST MOD 30 MIN: CPT | Mod: S$PBB,,, | Performed by: ANESTHESIOLOGY

## 2017-09-28 PROCEDURE — 99214 OFFICE O/P EST MOD 30 MIN: CPT | Mod: PBBFAC,PO | Performed by: ANESTHESIOLOGY

## 2017-09-28 PROCEDURE — 1125F AMNT PAIN NOTED PAIN PRSNT: CPT | Mod: ,,, | Performed by: ANESTHESIOLOGY

## 2017-09-28 NOTE — PROGRESS NOTES
PCP: Bubba Tompkins MD      CC: low back and bilateral leg pain    Interval history: Ms. Montero is known patient with history of low back, hip and leg pain.  She was last seen in 10/2015.  She states having worsening lower back and bilateral leg pain.  She has had short lived relief from ESIs in the past.  Despite being short lived, patient wishes to have repeat procedure.  She has a SCS implant but states using it very sparingly.  No bowel bladder changes.  Pain is better with forward flexion. She uses a rolling walker for ambulation. She takes Ultracet about twice a day with moderate benefit. She uses Voltaren Gel with mild benefit.   Prior HPI:   Ms. Montero is referred by Dr. Hernandez for low back and left leg pain that has been persistent for about two years.  She has history of lumbar laminectomy by Dr. Sutton in 2011 for chronic low back pain.  Since the surgery, she started developing bilateral leg pain.  She was then evaluated by pain physician in [place], [place].  She underwent some injections that provided short lived relief.  She cannot recall what type of injections she received.  She eventually had a SCS implant placed in 2013.  She states having relief of her low back and bilateral leg pain.  However, she presents today with constant throbbing pain in her lower lateral left leg.  She states not receiving coverage of that area from the SCS.  She has not had any recent interrogation of her device.  She also has left side low back pain but pain is currently tolerable.  She takes recently stopping gabapentin and cymbalta due to increase sleepiness.  She cannot take NSAIDS due to ulcers.  She uses voltaren gel with mild benefits.     Pain Intervention history: s/p L4-5 and L5-S1 TF JAMILAH on 3/13/2014 and states having about 25%relief of her low back and left leg pain  - s/p left GTB on 4/10/14 with 50% relief of her left hip pain for 3.5 months, but repeat GTB on 8/2014 provided very short lived relief.  "  - b/l L3-4 TFESI 10/16/15 >50% relief for 1 month    ROS:  CONSTITUTIONAL: No fevers, chills, night sweats, wt. loss, appetite changes  EYES: No injection, dryness, tearing.  EARS: No drainage or pain  NOSE: No rhinorrhea or discharge  THROAT: No soreness or dryness, no oral ulcers.  LYMPH NODES: None noticed   CV: No chest pain, palpitations.   RESP: No shortness of breath, dyspnea on exertion, cough, wheezing, or hemoptysis  GI: No nausea, emesis, diarrhea, constipation, melena, hematochezia, pain.    : No dysuria, hematuria, urgency, or frequency  PSYCHIATRIC: No depression, anxiety, psychosis, hallucinations.  MSK: per HPI  SKIN: no rash    Past Medical History:   Diagnosis Date    Back pain     Diabetes mellitus     Hypertension     Reflux     Short-term memory loss      Past Surgical History:   Procedure Laterality Date    BACK SURGERY      with tea    CHOLECYSTECTOMY      napoleon      2 years ago    HYSTERECTOMY       Family History   Problem Relation Age of Onset    Diabetes Mother     Cancer Father     Diabetes Father     Hypertension Father     Cancer Son      Social History     Social History    Marital status:      Spouse name: N/A    Number of children: N/A    Years of education: N/A     Social History Main Topics    Smoking status: Former Smoker     Quit date: 10/15/2010    Smokeless tobacco: Former User    Alcohol use No    Drug use: No    Sexual activity: No     Other Topics Concern    None     Social History Narrative    None         Medications/Allergies: See med card    Vitals:    09/28/17 1017   BP: (!) 140/77   Pulse: 90   Weight: 96.6 kg (213 lb)   Height: 5' 8" (1.727 m)   PainSc: 10-Worst pain ever   PainLoc: Back         Physical exam:    Gen: A and O x3, pleasant, well-groomed  Skin: No rashes or obvious lesions  HEENT: PERRLA, normocephalic, atraumatic  CVS: Palpable peripheral pulses  Resp: easy work of breathing  Abdomen: soft, nontender   Musculoskeletal: " antalgic gait. Uses cane for assistance with ambulation    Neuro:  Lower extremities: 5/5 strength bilaterally  Reflexes:  Patellar 2+, Achilles 2+.  Sensory:Intact and symmetrical to light touch and pinprick in L2-S1 dermatomes bilaterally.    Lumbar spine:  Lumbar spine: ROM is full with flexion extension and oblique extension with no increased pain.    Shemar's test causes increased pain on left side.    Supine straight leg raise is negative bilaterally.    Internal and external rotation of the hip causes no increased pain on either side.  + tenderness upon palpation of the bilateral trochanteric bursas.  Myofascial exam: No tenderness to palpation across lumbar paraspinous muscles. + PSIS left sided tenderness        Imaging:    Lumbar Xray 5/2012  A posterior pedicle fusion is noted at the L3-L4 level with a minimal anterior listhesis of 5 mm of the L3 on L4 vertebral body. Intervertebral disk height loss is noted at the L3-L4 L4-L5 and L5-S1 levels.     Relative stability of alignment is noted upon flexion and extension. Surgical clips are noted in the right upper quadrant suggestive of cholecystectomy    Assessment:  Ms. Montero is a 77 y.o. female with     1. Postlaminectomy syndrome of lumbar region    2. DDD (degenerative disc disease), lumbar    3. Chronic pain disorder          Plan:  1.  I have stressed the importance of physical activity and a home exercise plan to help with pain and improve overall health.  2. Schedule caudal JAMILAH to see if that approach with help her pain.   3. May consider further SCS programming if above is not helpful  4. Follow up after procedure

## 2017-10-09 DIAGNOSIS — M54.16 LUMBAR RADICULOPATHY: Primary | ICD-10-CM

## 2017-10-16 ENCOUNTER — SURGERY (OUTPATIENT)
Age: 77
End: 2017-10-16

## 2017-10-16 ENCOUNTER — LAB VISIT (OUTPATIENT)
Dept: LAB | Facility: HOSPITAL | Age: 77
End: 2017-10-16
Attending: UROLOGY
Payer: MEDICARE

## 2017-10-16 DIAGNOSIS — R31.9 URINARY TRACT INFECTION WITH HEMATURIA, SITE UNSPECIFIED: ICD-10-CM

## 2017-10-16 DIAGNOSIS — N39.0 RECURRENT UTI: Primary | ICD-10-CM

## 2017-10-16 DIAGNOSIS — N39.0 URINARY TRACT INFECTION WITH HEMATURIA, SITE UNSPECIFIED: Primary | ICD-10-CM

## 2017-10-16 DIAGNOSIS — N39.0 URINARY TRACT INFECTION WITH HEMATURIA, SITE UNSPECIFIED: ICD-10-CM

## 2017-10-16 DIAGNOSIS — R31.9 URINARY TRACT INFECTION WITH HEMATURIA, SITE UNSPECIFIED: Primary | ICD-10-CM

## 2017-10-16 PROBLEM — K59.00 CONSTIPATION: Status: ACTIVE | Noted: 2017-10-16

## 2017-10-16 LAB
BACTERIA SPEC CULT: NORMAL
BILIRUB SERPL-MCNC: NORMAL MG/DL
BLOOD URINE, POC: NORMAL
CASTS: NORMAL
COLOR, POC UA: NORMAL
CRYSTALS: NORMAL
GLUCOSE UR QL STRIP: NORMAL
KETONES UR QL STRIP: NORMAL
LEUKOCYTE ESTERASE URINE, POC: NORMAL
NITRITE, POC UA: NORMAL
PH, POC UA: 5
PROTEIN, POC: NORMAL
RBC CELLS COUNTED: NORMAL
SPECIFIC GRAVITY, POC UA: 1.02
UROBILINOGEN, POC UA: NORMAL
WHITE BLOOD CELLS: NORMAL

## 2017-10-16 PROCEDURE — 87186 SC STD MICRODIL/AGAR DIL: CPT

## 2017-10-16 PROCEDURE — 87086 URINE CULTURE/COLONY COUNT: CPT

## 2017-10-16 PROCEDURE — 87077 CULTURE AEROBIC IDENTIFY: CPT

## 2017-10-16 PROCEDURE — 87088 URINE BACTERIA CULTURE: CPT

## 2017-10-17 ENCOUNTER — HOSPITAL ENCOUNTER (OUTPATIENT)
Facility: AMBULARY SURGERY CENTER | Age: 77
Discharge: HOME | End: 2017-10-17
Attending: ANESTHESIOLOGY | Admitting: ANESTHESIOLOGY
Payer: MEDICARE

## 2017-10-17 ENCOUNTER — TELEPHONE (OUTPATIENT)
Dept: UROLOGY | Facility: CLINIC | Age: 77
End: 2017-10-17

## 2017-10-17 ENCOUNTER — SURGERY (OUTPATIENT)
Age: 77
End: 2017-10-17

## 2017-10-17 DIAGNOSIS — M96.1 POSTLAMINECTOMY SYNDROME OF LUMBAR REGION: ICD-10-CM

## 2017-10-17 DIAGNOSIS — M51.36 DDD (DEGENERATIVE DISC DISEASE), LUMBAR: Primary | ICD-10-CM

## 2017-10-17 PROCEDURE — 99152 MOD SED SAME PHYS/QHP 5/>YRS: CPT | Mod: ,,, | Performed by: ANESTHESIOLOGY

## 2017-10-17 PROCEDURE — 62323 NJX INTERLAMINAR LMBR/SAC: CPT | Mod: ,,, | Performed by: ANESTHESIOLOGY

## 2017-10-17 PROCEDURE — G8918 PT W/O PREOP ORDER IV AB PRO: HCPCS | Performed by: ANESTHESIOLOGY

## 2017-10-17 PROCEDURE — 62323 NJX INTERLAMINAR LMBR/SAC: CPT | Performed by: ANESTHESIOLOGY

## 2017-10-17 PROCEDURE — G8907 PT DOC NO EVENTS ON DISCHARG: HCPCS | Performed by: ANESTHESIOLOGY

## 2017-10-17 RX ORDER — FENTANYL CITRATE 50 UG/ML
INJECTION, SOLUTION INTRAMUSCULAR; INTRAVENOUS
Status: DISCONTINUED
Start: 2017-10-17 | End: 2017-10-17 | Stop reason: WASHOUT

## 2017-10-17 RX ORDER — LIDOCAINE HYDROCHLORIDE 10 MG/ML
INJECTION, SOLUTION EPIDURAL; INFILTRATION; INTRACAUDAL; PERINEURAL
Status: DISCONTINUED | OUTPATIENT
Start: 2017-10-17 | End: 2017-10-17 | Stop reason: HOSPADM

## 2017-10-17 RX ORDER — MIDAZOLAM HYDROCHLORIDE 1 MG/ML
INJECTION INTRAMUSCULAR; INTRAVENOUS
Status: DISCONTINUED
Start: 2017-10-17 | End: 2017-10-17 | Stop reason: WASHOUT

## 2017-10-17 RX ORDER — DEXAMETHASONE SODIUM PHOSPHATE 10 MG/ML
INJECTION INTRAMUSCULAR; INTRAVENOUS
Status: DISCONTINUED | OUTPATIENT
Start: 2017-10-17 | End: 2017-10-17 | Stop reason: HOSPADM

## 2017-10-17 RX ORDER — DEXAMETHASONE SODIUM PHOSPHATE 10 MG/ML
INJECTION INTRAMUSCULAR; INTRAVENOUS
Status: DISCONTINUED
Start: 2017-10-17 | End: 2017-10-17 | Stop reason: HOSPADM

## 2017-10-17 RX ORDER — LIDOCAINE HYDROCHLORIDE 10 MG/ML
INJECTION, SOLUTION EPIDURAL; INFILTRATION; INTRACAUDAL; PERINEURAL
Status: DISCONTINUED
Start: 2017-10-17 | End: 2017-10-17 | Stop reason: HOSPADM

## 2017-10-17 RX ORDER — SODIUM CHLORIDE, SODIUM LACTATE, POTASSIUM CHLORIDE, CALCIUM CHLORIDE 600; 310; 30; 20 MG/100ML; MG/100ML; MG/100ML; MG/100ML
INJECTION, SOLUTION INTRAVENOUS ONCE AS NEEDED
Status: DISCONTINUED | OUTPATIENT
Start: 2017-10-17 | End: 2017-10-17 | Stop reason: HOSPADM

## 2017-10-17 RX ORDER — ALPRAZOLAM 0.25 MG/1
1 TABLET ORAL ONCE AS NEEDED
Status: COMPLETED | OUTPATIENT
Start: 2017-10-17 | End: 2017-10-17

## 2017-10-17 RX ORDER — SODIUM CHLORIDE 9 MG/ML
INJECTION, SOLUTION INTRAVENOUS CONTINUOUS
Status: DISCONTINUED | OUTPATIENT
Start: 2017-10-17 | End: 2017-10-17 | Stop reason: HOSPADM

## 2017-10-17 RX ADMIN — DEXAMETHASONE SODIUM PHOSPHATE 10 MG: 10 INJECTION INTRAMUSCULAR; INTRAVENOUS at 12:10

## 2017-10-17 RX ADMIN — SODIUM CHLORIDE 4 ML: 9 INJECTION, SOLUTION INTRAVENOUS at 12:10

## 2017-10-17 RX ADMIN — ALPRAZOLAM 1 MG: 0.25 TABLET ORAL at 11:10

## 2017-10-17 RX ADMIN — LIDOCAINE HYDROCHLORIDE 10 ML: 10 INJECTION, SOLUTION EPIDURAL; INFILTRATION; INTRACAUDAL; PERINEURAL at 12:10

## 2017-10-17 NOTE — TELEPHONE ENCOUNTER
----- Message from Raven Carvalho sent at 10/17/2017 10:02 AM CDT -----  Contact: self  Patient called regarding her Rx submitted to the pharmacy yesterday. Has some questions regarding. Will be leaving shortly for another appt and may stop the office. Please contact [phone] ([phone])

## 2017-10-17 NOTE — PLAN OF CARE
Patient 's blood glucose 364. Patient held her diabetes medications. Patient and caregiver instructed that she needs to take her diabetes medication as soon as she gets home. Patient initially asked for juice but was given diet sprite after blood sugar was discussed. Patient was brought to the car by nurse via wheelchair. Her walker was brought to the car.

## 2017-10-17 NOTE — OP NOTE
PROCEDURE DATE: 10/17/2017    PROCEDURE:  Caudal epidural steroid injection under fluoroscopy.    Diagnosis: Lumbar disc displacement without myelopathy  Post Op diagnosis: Same    PHYSICIAN: Justen Farley M.D.    MEDICATIONS INJECTED:  10 mg of dexamethasone and 4 ml of sterile, preservative-free NaCl.    LOCAL ANESTHETIC GIVEN:  Lidocaine 1%, 2 ml total    SEDATION MEDICATIONS: RN IV sedation    ESTIMATED BLOOD LOSS:  None    COMPLICATIONS:  None    TECHNIQUE:   After the patient was placed in prone position, the patient was prepped and draped in the usual sterile fashion using ChloraPrep and sterile towels.  Appropriate anatomic landmarks were determined by identifying the sacral hiatus in the lateral fluoroscopic view.  Local anesthetic was given via a 25g 1.5 inch needle by raising a wheal and infiltrating down to the periosteum.  A 3.5 inch 20 gauge touhy needle was introduced thru the sacral hiatus.  1ml of contrast was injected to confirm placement in the appropriate area and that there was no vascular uptake.  The medication was then injected slowly.  The patient tolerated the procedure well.    The patient was monitored after the procedure.  Patient was given post procedure and discharge instructions to follow at home.  The patient was discharged in a stable condition

## 2017-10-17 NOTE — DISCHARGE SUMMARY
"[facility]  Discharge Note  Short Stay    Admit Date: 10/17/2017    Discharge Date and Time: 10/17/2017    Attending Physician: Justen Farley MD     Discharge Provider: Justen Farley    Diagnoses:  Active Hospital Problems    Diagnosis  POA    *DDD (degenerative disc disease), lumbar [M51.36]  Yes      Resolved Hospital Problems    Diagnosis Date Resolved POA   No resolved problems to display.       Hospital Course: Caudal Vaughn  Discharged Condition: Good    Final Diagnoses:   Active Hospital Problems    Diagnosis  POA    *DDD (degenerative disc disease), lumbar [M51.36]  Yes      Resolved Hospital Problems    Diagnosis Date Resolved POA   No resolved problems to display.       Disposition: Home or Self Care    Follow up/Patient Instructions:    Medications:  Reconciled Home Medications:   Current Discharge Medication List      CONTINUE these medications which have NOT CHANGED    Details   atorvastatin (LIPITOR) 10 MG tablet Take 10 mg by mouth once daily.       BD INSULIN PEN NEEDLE UF ORIG 29 x 1/2 " Ndle       BD INSULIN PEN NEEDLE UF SHORT 31 X 5/16 " Ndle       BD INSULIN SYRINGE ULT-FINE II 1/2 mL 31 x 5/16" Syrg       BD INSULIN SYRINGE ULTRA-FINE 1 mL 31 x 5/16" Syrg       donepezil (ARICEPT) 10 MG tablet Take 10 mg by mouth every evening.       hydrochlorothiazide (HYDRODIURIL) 25 MG tablet Take 2 tablets (50 mg total) by mouth once daily.  Qty: 30 tablet, Refills: 3    Associated Diagnoses: Benign essential HTN      magnesium hydroxide 400 mg/5 ml (MILK OF MAGNESIA) 400 mg/5 mL Susp Take 30 mLs (2,400 mg total) by mouth daily as needed.  Qty: 473 mL, Refills: 1    Associated Diagnoses: Constipation, unspecified constipation type      metformin (GLUCOPHAGE) 500 MG tablet Take 500 mg by mouth 2 (two) times daily with meals.       mirabegron (MYRBETRIQ) 50 mg Tb24 Take 1 tablet (50 mg total) by mouth once daily.  Qty: 30 tablet, Refills: 5      NOVOLIN N 100 unit/mL injection Inject 40 Units into the " skin before breakfast. And 30 units in the evening      ONE TOUCH ULTRA TEST Strp       polyethylene glycol (GLYCOLAX) 17 gram/dose powder 1 cap daily  Qty: 255 g, Refills: 12      timolol maleate 0.5% (TIMOPTIC) 0.5 % Drop 1 drop once daily.   Refills: 6      valsartan (DIOVAN) 80 MG tablet Take 80 mg by mouth once daily.              Discharge Procedure Orders  Diet general     Activity as tolerated     Call MD for:  temperature >100.4     Call MD for:  persistent nausea and vomiting or diarrhea     Call MD for:  severe uncontrolled pain     Call MD for:  redness, tenderness, or signs of infection (pain, swelling, redness, odor or green/yellow discharge around incision site)     Call MD for:  difficulty breathing or increased cough     Call MD for:  severe persistent headache          Follow up with MD in 2-3 weeks    Discharge Procedure Orders (must include Diet, Follow-up, Activity):    Discharge Procedure Orders (must include Diet, Follow-up, Activity)  Diet general     Activity as tolerated     Call MD for:  temperature >100.4     Call MD for:  persistent nausea and vomiting or diarrhea     Call MD for:  severe uncontrolled pain     Call MD for:  redness, tenderness, or signs of infection (pain, swelling, redness, odor or green/yellow discharge around incision site)     Call MD for:  difficulty breathing or increased cough     Call MD for:  severe persistent headache

## 2017-10-17 NOTE — H&P (VIEW-ONLY)
PCP: Bubba Tompkins MD      CC: low back and bilateral leg pain    Interval history: Ms. Montero is known patient with history of low back, hip and leg pain.  She was last seen in 10/2015.  She states having worsening lower back and bilateral leg pain.  She has had short lived relief from ESIs in the past.  Despite being short lived, patient wishes to have repeat procedure.  She has a SCS implant but states using it very sparingly.  No bowel bladder changes.  Pain is better with forward flexion. She uses a rolling walker for ambulation. She takes Ultracet about twice a day with moderate benefit. She uses Voltaren Gel with mild benefit.   Prior HPI:   Ms. Montero is referred by Dr. Hernandez for low back and left leg pain that has been persistent for about two years.  She has history of lumbar laminectomy by Dr. Sutton in 2011 for chronic low back pain.  Since the surgery, she started developing bilateral leg pain.  She was then evaluated by pain physician in [place], [place].  She underwent some injections that provided short lived relief.  She cannot recall what type of injections she received.  She eventually had a SCS implant placed in 2013.  She states having relief of her low back and bilateral leg pain.  However, she presents today with constant throbbing pain in her lower lateral left leg.  She states not receiving coverage of that area from the SCS.  She has not had any recent interrogation of her device.  She also has left side low back pain but pain is currently tolerable.  She takes recently stopping gabapentin and cymbalta due to increase sleepiness.  She cannot take NSAIDS due to ulcers.  She uses voltaren gel with mild benefits.     Pain Intervention history: s/p L4-5 and L5-S1 TF JAMILAH on 3/13/2014 and states having about 25%relief of her low back and left leg pain  - s/p left GTB on 4/10/14 with 50% relief of her left hip pain for 3.5 months, but repeat GTB on 8/2014 provided very short lived relief.  "  - b/l L3-4 TFESI 10/16/15 >50% relief for 1 month    ROS:  CONSTITUTIONAL: No fevers, chills, night sweats, wt. loss, appetite changes  EYES: No injection, dryness, tearing.  EARS: No drainage or pain  NOSE: No rhinorrhea or discharge  THROAT: No soreness or dryness, no oral ulcers.  LYMPH NODES: None noticed   CV: No chest pain, palpitations.   RESP: No shortness of breath, dyspnea on exertion, cough, wheezing, or hemoptysis  GI: No nausea, emesis, diarrhea, constipation, melena, hematochezia, pain.    : No dysuria, hematuria, urgency, or frequency  PSYCHIATRIC: No depression, anxiety, psychosis, hallucinations.  MSK: per HPI  SKIN: no rash    Past Medical History:   Diagnosis Date    Back pain     Diabetes mellitus     Hypertension     Reflux     Short-term memory loss      Past Surgical History:   Procedure Laterality Date    BACK SURGERY      with tea    CHOLECYSTECTOMY      napoleon      2 years ago    HYSTERECTOMY       Family History   Problem Relation Age of Onset    Diabetes Mother     Cancer Father     Diabetes Father     Hypertension Father     Cancer Son      Social History     Social History    Marital status:      Spouse name: N/A    Number of children: N/A    Years of education: N/A     Social History Main Topics    Smoking status: Former Smoker     Quit date: 10/15/2010    Smokeless tobacco: Former User    Alcohol use No    Drug use: No    Sexual activity: No     Other Topics Concern    None     Social History Narrative    None         Medications/Allergies: See med card    Vitals:    09/28/17 1017   BP: (!) 140/77   Pulse: 90   Weight: 96.6 kg (213 lb)   Height: 5' 8" (1.727 m)   PainSc: 10-Worst pain ever   PainLoc: Back         Physical exam:    Gen: A and O x3, pleasant, well-groomed  Skin: No rashes or obvious lesions  HEENT: PERRLA, normocephalic, atraumatic  CVS: Palpable peripheral pulses  Resp: easy work of breathing  Abdomen: soft, nontender   Musculoskeletal: " antalgic gait. Uses cane for assistance with ambulation    Neuro:  Lower extremities: 5/5 strength bilaterally  Reflexes:  Patellar 2+, Achilles 2+.  Sensory:Intact and symmetrical to light touch and pinprick in L2-S1 dermatomes bilaterally.    Lumbar spine:  Lumbar spine: ROM is full with flexion extension and oblique extension with no increased pain.    Shemar's test causes increased pain on left side.    Supine straight leg raise is negative bilaterally.    Internal and external rotation of the hip causes no increased pain on either side.  + tenderness upon palpation of the bilateral trochanteric bursas.  Myofascial exam: No tenderness to palpation across lumbar paraspinous muscles. + PSIS left sided tenderness        Imaging:    Lumbar Xray 5/2012  A posterior pedicle fusion is noted at the L3-L4 level with a minimal anterior listhesis of 5 mm of the L3 on L4 vertebral body. Intervertebral disk height loss is noted at the L3-L4 L4-L5 and L5-S1 levels.     Relative stability of alignment is noted upon flexion and extension. Surgical clips are noted in the right upper quadrant suggestive of cholecystectomy    Assessment:  Ms. Montero is a 77 y.o. female with     1. Postlaminectomy syndrome of lumbar region    2. DDD (degenerative disc disease), lumbar    3. Chronic pain disorder          Plan:  1.  I have stressed the importance of physical activity and a home exercise plan to help with pain and improve overall health.  2. Schedule caudal JAMILAH to see if that approach with help her pain.   3. May consider further SCS programming if above is not helpful  4. Follow up after procedure

## 2017-10-17 NOTE — DISCHARGE INSTRUCTIONS
Recovery After Procedural Sedation (Adult)  You have been given medicine by vein to make you sleep during your surgery. This may have included both a pain medicine and sleeping medicine. Most of the effects have worn off. But you may still have some drowsiness for the next 6 to 8 hours.  Home care  Follow these guidelines when you get home:  · For the next 8 hours, you should be watched by a responsible adult. This person should make sure your condition is not getting worse.  · Don't drink any alcohol for the next 24 hours.  · Don't drive, operate dangerous machinery, or make important business or personal decisions during the next 24 hours.  Note: Your healthcare provider may tell you not to take any medicine by mouth for pain or sleep in the next 4 hours. These medicines may react with the medicines you were given in the hospital. This could cause a much stronger response than usual.  Follow-up care  Follow up with your healthcare provider if you are not alert and back to your usual level of activity within 12 hours.  When to seek medical advice  Call your healthcare provider right away if any of these occur:  · Drowsiness gets worse  · Weakness or dizziness gets worse  · Repeated vomiting  · You can't be awakened   Date Last Reviewed: 10/18/2016  © 7809-5073 [employer]. [address] [place] [address], [place] [address]. All rights reserved. This information is not intended as a substitute for professional medical care. Always follow your healthcare professional's instructions.      Pain injection instructions:     Steroids take about a week to relieve pain.  Initially you may get pain relief from the local anesthetic but this may wear off before the steroid works.    No driving for 24 hrs   Activity as tolerated- gradually increase activities.  Dont lift over 10 lbs for 24 hrs   No heat at injection sites x 2 days  Use ice for mild swelling and for comfort.  May shower today. No baths for two days.       Resume Aspirin, Plavix, or Coumadin the day after the procedure unless otherwise instructed.   If diabetic,monitor your glucose carefully as steroids can increase glucose level    Seek immediate medical help for:   Severe increase in your usual pain or appearance of new pain.  Prolonged or increasing weakness or numbness in the legs or arms.    - Numbing medicine was injected that affects nerves that carry information from       muscles to brain and vice versa.  This numbness can last 4-6 hrs so be very careful walking.    Fever above 101 ,Drainage,redness,active bleeding, or increased swelling at the injection site.  Headache, shortness of breath, chest pain, or breathing problems.

## 2017-10-17 NOTE — TELEPHONE ENCOUNTER
Spoke with patient informed her still awaiting urine culture results before antibiotics will be called in to the pharmacy. Patient verbally voiced understanding.

## 2017-10-18 ENCOUNTER — TELEPHONE (OUTPATIENT)
Dept: UROLOGY | Facility: CLINIC | Age: 77
End: 2017-10-18

## 2017-10-18 VITALS
SYSTOLIC BLOOD PRESSURE: 167 MMHG | WEIGHT: 213 LBS | HEART RATE: 75 BPM | TEMPERATURE: 98 F | RESPIRATION RATE: 19 BRPM | BODY MASS INDEX: 32.28 KG/M2 | DIASTOLIC BLOOD PRESSURE: 74 MMHG | OXYGEN SATURATION: 97 % | HEIGHT: 68 IN

## 2017-10-18 LAB — BACTERIA UR CULT: NORMAL

## 2017-10-18 RX ORDER — CIPROFLOXACIN 500 MG/1
500 TABLET ORAL 2 TIMES DAILY
Qty: 14 TABLET | Refills: 0 | Status: SHIPPED | OUTPATIENT
Start: 2017-10-18 | End: 2017-10-25

## 2017-10-18 NOTE — TELEPHONE ENCOUNTER
Spoke with patient informed her we are still waiting on urine culture results before medication will be sent in to the pharmacy. Patient verbally voiced understanding.

## 2017-10-18 NOTE — TELEPHONE ENCOUNTER
----- Message from Leticiaamanda Izaguirre sent at 10/18/2017 12:09 PM CDT -----  Patient states that she need to know if her prescription (dont know the name) was sent to the pharmacy.  Please call patient at [phone].

## 2017-10-19 NOTE — TELEPHONE ENCOUNTER
Spoke with patient informed her of prescriptions sent to the pharmacy. Patient verbally voiced understanding.

## 2017-10-23 ENCOUNTER — TELEPHONE (OUTPATIENT)
Dept: UROLOGY | Facility: CLINIC | Age: 77
End: 2017-10-23

## 2017-10-23 ENCOUNTER — HOSPITAL ENCOUNTER (OUTPATIENT)
Facility: AMBULARY SURGERY CENTER | Age: 77
Discharge: HOME | End: 2017-10-23
Attending: UROLOGY | Admitting: UROLOGY
Payer: MEDICARE

## 2017-10-23 ENCOUNTER — SURGERY (OUTPATIENT)
Age: 77
End: 2017-10-23

## 2017-10-23 DIAGNOSIS — N39.0 RECURRENT UTI: ICD-10-CM

## 2017-10-23 LAB
BACTERIA SPEC CULT: NORMAL
BILIRUB SERPL-MCNC: NORMAL MG/DL
BLOOD URINE, POC: NORMAL
CASTS: NORMAL
COLOR, POC UA: NORMAL
CRYSTALS: NORMAL
GLUCOSE UR QL STRIP: NORMAL
KETONES UR QL STRIP: NORMAL
LEUKOCYTE ESTERASE URINE, POC: NORMAL
NITRITE, POC UA: NORMAL
PH, POC UA: 5
PROTEIN, POC: NORMAL
RBC CELLS COUNTED: NORMAL
SPECIFIC GRAVITY, POC UA: 1.03
UROBILINOGEN, POC UA: NORMAL
WHITE BLOOD CELLS: NORMAL

## 2017-10-23 PROCEDURE — 52000 CYSTOURETHROSCOPY: CPT | Mod: ,,, | Performed by: UROLOGY

## 2017-10-23 PROCEDURE — G8907 PT DOC NO EVENTS ON DISCHARG: HCPCS | Performed by: UROLOGY

## 2017-10-23 PROCEDURE — 52000 CYSTOURETHROSCOPY: CPT | Performed by: UROLOGY

## 2017-10-23 PROCEDURE — G8918 PT W/O PREOP ORDER IV AB PRO: HCPCS | Performed by: UROLOGY

## 2017-10-23 RX ORDER — LIDOCAINE HYDROCHLORIDE 20 MG/ML
JELLY TOPICAL
Status: DISCONTINUED
Start: 2017-10-23 | End: 2017-10-23 | Stop reason: HOSPADM

## 2017-10-23 RX ORDER — WATER 1000 ML/1000ML
INJECTION, SOLUTION INTRAVENOUS
Status: DISCONTINUED | OUTPATIENT
Start: 2017-10-23 | End: 2017-10-23 | Stop reason: HOSPADM

## 2017-10-23 RX ORDER — LIDOCAINE HYDROCHLORIDE 20 MG/ML
JELLY TOPICAL
Status: DISCONTINUED | OUTPATIENT
Start: 2017-10-23 | End: 2017-10-23 | Stop reason: HOSPADM

## 2017-10-23 RX ADMIN — WATER 500 ML: 1000 INJECTION, SOLUTION INTRAVENOUS at 02:10

## 2017-10-23 RX ADMIN — LIDOCAINE HYDROCHLORIDE 5 ML: 20 JELLY TOPICAL at 02:10

## 2017-10-23 NOTE — H&P
[facility] H&P Note - [place]  Staff: MD Juan Manuel     Referring provider and please cc: Luis  [facility]     Chief Complaint: recurrent uti and OAB     Subjective:        HPI: Deonna Montero is a 77 y.o. female presents with      Last seen by 8/15/17 (1 month)     OAB with UUI   -has had these sx for about a year  -frequency q2 hours or less and nocturia 3-4x with urgency and UUI.  -wears depends, maxi pad and tissue. Changes this 1x a day  -drinks prune juice. 1 tea every 2 days. occ coffee.   -has tried: oxbutynin, myrbetriq (which helped but was too expensive), detrol 4mg (but that was too expensive)  -denies JOSE L  -+constipation and says she has some relief of sx with having a bm  -started on myrbetriq again in sept but no improvement     Recurrent uti  -has had 4 cultures that showed e.coli in the past year  -wears multiple pads  -ctrss 8/2/17 showed no stones  -cytology in 3/17 was atypical but repeat cytology was normal  -sx of uti include left lower quadrant pain (+constipation). No dysuria. +frequency and urgency but this has been present prior to uti's.   -diabetes x 10 years  -+cx last week and tx with cipro 500 bid x 7d. Returns stating she still has frequency     Gross hematuria  Had GH 1.5 months ago that lasted for 2 days.  +infxn then.  ctrss 8/2/17 showed no stones  Has had no cysto  Cytology 3/7/17 normal, cytology 2/9/17 atypical      ECOG Status: 2 walks with walker     G2, P2,   Gross HematuriaYes   History of UTI: Yes      REVIEW OF SYSTEMS:  General ROS: no fevers, no chills  Psychological ROS: no depression  Endocrine ROS: no heat or cold  Respiratory ROS: no SOB  Cardiovascular ROS: no CP  Gastrointestinal ROS: no abdominal pain, + constipation, no diarrhea, noBRBPR  Musculoskeletal ROS: no muscle pain  Neurological ROS: no headaches  Dermatological ROS: no rashes  HEENT: noglasses, no sinus   ROS: per HPI     PMHx:       Past  Medical History:   Diagnosis Date    Back pain      Diabetes mellitus      Hypertension      Reflux      Short-term memory loss        Kidney stones: No     PSHx:        Past Surgical History:   Procedure Laterality Date    BACK SURGERY         with tea    CHOLECYSTECTOMY        napoleon         2 years ago    HYSTERECTOMY          Urologic or Gynecologic Surgery: yes     Stents/Valves/Foreign Bodies: No  Cardiac Evaluation: No     Screening Studies  Colonoscopy: last one was a few years ago by Dr.Trainor Pittman Hx:   malignancies: Yes - father with prostate cancer , gyn malignancies: Yes - mother with breast cancer . Mother  a 70 of breast cancer. Father  a 86  kidney stones: No      Soc Hx:  No tobacco  No alcohol  Lives in [place] [address] by herself, friend drives her  :  Children: 2  Occupation:retired teacher     Allergies:  Codeine and Morphine (pf)     Medications: reviewed   Anticoagulation: No     Objective:      Vitals:    10/23/17 1417   BP: (!) 197/100   Pulse: 87   Resp: 20   Temp:        Tearful     General:WDWN in NAD  Eyes: PERRLA, normal conjunctiva  Respiratory: no increased work on breathing, clear to auscultation  Cardiovascular: regular rate and rhythm. No obvious extremity edema.  GI: no palpation of masses. No tenderness. No hepatosplenomegaly to palpation.  Musculoskeletal: normal range of motion of bilateral upper extremities. Normal muscle strength and tone.  Skin: no obvious rashes or lesions. No tightening of skin noted.  Neurologic: CN grossly normal. Normal sensation.   Psychiatric: awake, alert and oriented x 3. Mood and affect normal. Cooperative.     Pelvic exam 17  In and out cath with 30cc clear yellow urine  Pt could not tolerate position of vaginal exam to do stress test     LABS REVIEW:  UA today: protein only     UCx:   10/16/17 E.coli - cath: nitite +  8/15/17            E.coli, nitrite +  17            E.coli, nitrite +  3/7/17               E.coli  2/9/17              E.coli     Cr:         Lab Results   Component Value Date     CREATININE 0.7 01/04/2013         PATHOLOGY REVIEW:  Voided urine 3/7/17: Negative for malignant cells.  Voided urine 2/9/1:Atypical cells present.     RADIOGRAPHIC REVIEW:  ctrss 8/2/17  No stones  No hydro  No kidney masses  Possible small left adenal adenoma  hsyterectomy  Other findings: Spinal stimulator, cholecystectomy, b femoracetabular OA           Assessment:       1. Constipation, unspecified constipation type    2. Recurrent UTI    3. OAB (overactive bladder)           Plan:      Constipation  -pain in LLQ is constipation, not uti.   -had been written for [place] by  but it was too expensive  -take 1 cap miralax daily     Recurrent uti  -asymptomatic and will not treat until she has cysto. The llq pain she is having is from constipation.   -start a probiotic   -likely related to pad usage, will check if we can improve her incontinence (needs stress test as well at time of cyto). Did not tolerate vaginal exam today due to positioning. No ua 1 week prior.      oab  -has tried ditropan  -could not afford myrbetriq or detrol  -will see what oab med is covered and write her for this.      Gross hematuria  -has had a ctrss which showed no stones  -sending culture from cath urine today  -cytososcopy today     Referral to pain doctor for chronic back pain - seen by   Referral to rheumatology for arhtritis     cytoscopy, vaginal exam, stress test and possible botox if stress test is negative. Could not do stress test today.     This patient has been cleared for surgery in ambulatory surgical facility.        Sanjana Zambrano MD

## 2017-10-23 NOTE — OP NOTE
[facility] Procedure Note  Date: 10/23/2017    Procedure: Flexible cysto-uretheroscopy     Pre Procedure Diagnosis:recurrent uti and mixed incontinence    Post Procedure Diagnosis: same    UA: tr protein only    Surgeon: Sanjana Zambrano MD    Specimen: none    Anesthesia: 2% uro-jet lidocaine jelly for local analgesia    Flexible cysto-urethroscopy was performed after consent was obtained.  The risks and benefits were explained.    2% lidocaine urojet was used for local analgesia.  The genitalia was prepped and draped in the sterile fashion with betadine.    The flexible scope was advanced into the urethra and into the bladder.  Bilateral ureteral orifice were evaluated and noted to be normal with clear efflux.  The bladder was completely surveyed in a systematic fashion and the scope was retroflexed.   Cystoscopy findings as below in findings.   No strictures were noted.     The patient tolerated the procedure well without complication.    HPI: Deonna Montero is a 77 y.o. female who presents for cystoscopy today for recurrent uti and oab with incontinence, mixed. Says myrbertriq hasn't helped much, nor abx. Says she is only voiding 4x a day and 3-4x at night.     Findings: (pictures were  uploaded into media)  1. No tumors or stones, grade 1 trabeculations with 150cc in bladder  2. Evidence of recent infection  3.  Pelvic exam today:  negative stress test with coughing  In and out cath performed with 5 residual  Bladder filled to 150  Sensation to void felt at 50   Catheter removed  Large volume void with leak, asked her to kegel and leakage stopped  Then stress test with coughing, large volume  In and out cath with 30cc  Minimal prolapse      Plan:  Please print out a voiding diary and ask her to record how often she is urinating during the day and how many times at night as well as how many pad changes fo the next 3 days and the again the same before f/u  F/u in 4 to 6 weeks with repeat  stress test, saranya will schedule  Make sure she is taking myrbetriq  Finish abx   She needs to urinate every 1.5 hours to 2 hours  Kegels, 3x a day - 10x each time and when she feels like she is about to urinate   No caffeinated tea or sodas after 3 pm, limit fluids prior to bedtime (was drinking tea in pm)  Tried to explain why abx worked for her this time (culture)

## 2017-10-23 NOTE — DISCHARGE INSTRUCTIONS
Kegel Exercises  Kegel exercises dont need special clothing or equipment. Theyre easy to learn and simple to do. And if you do them right, no one can tell youre doing them, so they can be done almost anywhere. Your healthcare provider, nurse, or physical therapist can answer any questions you have and help you get started.    A weak pelvic floor   The pelvic floor muscles may weaken due to aging, pregnancy and vaginal childbirth, injury, surgery, chronic cough, or lack of exercise. If the pelvic floor is weak, your bladder and other pelvic organs may sag out of place. The urethra may also open too easily and allow urine to leak out. Kegel exercises can help you strengthen your pelvic floor muscles. Then they can better support the pelvic organs and control urine flow.  How Kegel exercises are done  Try each of the Kegel exercises described below. When youre doing them, try not to move your leg, buttock, or stomach muscles.  · Contract as if you were stopping your urine stream. But do it when youre not urinating.  · Tighten your rectum as if trying not to pass gas. Contract your anus, but dont move your buttocks.  · You may place a finger or 2 in the vagina and squeeze your finger with your vagina to learn which muscles to tighten.  Try to hold each Kegel for a slow count to 5. You probably wont be able to hold them for that long at first. But keep practicing. It will get easier as your pelvic floor gets stronger. Eventually, special weights that you place in your vagina may be recommended to help make your Kegels even more effective. Visit your healthcare provider if you have difficulties doing Kegel exercises.  Helpful hints  Here are some tips to follow:  · Do your Kegels as often as you can. The more you do them, the faster youll feel the results.  · Pick an activity you do often as a reminder. For instance, do your Kegels every time you sit down.  · Tighten your pelvic floor before you sneeze, get up  from a chair, cough, laugh, or lift. This protects your pelvic floor from injury and can help prevent urine leakage.   Date Last Reviewed: 8/5/2015 © 2000-2017 [employer]. [address] [place] [address], [place] [address]. All rights reserved. This information is not intended as a substitute for professional medical care. Always follow your healthcare professional's instructions.        Cystoscopy    Cystoscopy is a procedure that lets your doctor look directly inside your urethra and bladder. It can be used to:  · Help diagnose a problem with your urethra, bladder, or kidneys.  · Take a sample (biopsy) of bladder or urethral tissue.  · Treat certain problems (such as removing kidney stones).  · Place a stent to bypass an obstruction.  · Take special X-rays of the kidneys.  Based on the findings, your doctor may recommend other tests or treatments.  What is a cystoscope?  A cystoscope is a telescope-like instrument that contains lenses and fiberoptics (small glass wires that make bright light). The cystoscope may be straight and rigid, or flexible to bend around curves in the urethra. The doctor may look directly into the cystoscope, or project the image onto a monitor.  Getting ready  · Ask your doctor if you should stop taking any medicines before the procedure.  · Ask whether you should avoid eating or drinking anything after midnight before the procedure.  · Follow any other instructions your doctor gives you.  Tell your doctor before the exam if you:  · Take any medicines, such as aspirin or blood thinners  · Have allergies to any medicines  · Are pregnant   The procedure  Cystoscopy is done in the doctors office, surgery center, or hospital. The doctor and a nurse are present during the procedure. It takes only a few minutes, longer if a biopsy, X-ray, or treatment needs to be done.  During the procedure:  · You lie on an exam table on your back, knees bent and legs apart. You are covered with a  drape.  · Your urethra and the area around it are washed. Anesthetic jelly may be applied to numb the urethra. Other pain medicine is usually not needed. In some cases, you may be offered a mild sedative to help you relax. If a more extensive procedure is to be done, such as a biopsy or kidney stone removal, general anesthesia may be needed.  · The cystoscope is inserted. A sterile fluid is put into the bladder to expand it. You may feel pressure from this fluid.  · When the procedure is done, the cystoscope is removed.  After the procedure  If you had a sedative, general anesthesia, or spinal anesthesia, you must have someone drive you home. Once youre home:  · Drink plenty of fluids.  · You may have burning or light bleeding when you urinate--this is normal.  · Medicines may be prescribed to ease any discomfort or prevent infection. Take these as directed.  · Call your doctor if you have heavy bleeding or blood clots, burning that lasts more than a day, a fever over 100°F  (38° C), or trouble urinating.  Date Last Reviewed: 1/1/2017 © 2000-2017 [employer]. [address] [place] [address]. All rights reserved. This information is not intended as a substitute for professional medical care. Always follow your healthcare professional's instructions.    Voiding diary for 3 days this week.  Another voiding diary for 3 days before your next visit with Dr. Zambrano.  Kegel exercises  Void every 2 hours during the day..  NO caffeinated drinks after 3 pm .  Limit caffeine  Do kegel exercises 3 times a day .   10 times each set.  Also do kegel when you feel like you have to void.  Finish antibiotic  Be sure to take Mybetriq!!!!!!!!!

## 2017-10-23 NOTE — DISCHARGE SUMMARY
"[facility]  Discharge Note  Short Stay    Admit Date: 10/23/2017    Discharge Date and Time: No discharge date for patient encounter.     Attending Physician: Sanjana Zambrano,*     Discharge Provider: Sanjana Zambrano    Diagnoses:  Active Hospital Problems    Diagnosis  POA    Recurrent UTI [N39.0]  Yes      Resolved Hospital Problems    Diagnosis Date Resolved POA   No resolved problems to display.       Discharged Condition: good    Hospital Course: Patient was admitted for an outpatient procedure and tolerated the procedure well with no complications.    Final Diagnoses: Same as principal problem.    Disposition: Home or Self Care    Follow up/Patient Instructions:    Medications:  Reconciled Home Medications:   Current Discharge Medication List      CONTINUE these medications which have NOT CHANGED    Details   ciprofloxacin HCl (CIPRO) 500 MG tablet Take 1 tablet (500 mg total) by mouth 2 (two) times daily.  Qty: 14 tablet, Refills: 0      timolol maleate 0.5% (TIMOPTIC) 0.5 % Drop 1 drop once daily.   Refills: 6      atorvastatin (LIPITOR) 10 MG tablet Take 10 mg by mouth once daily.       BD INSULIN PEN NEEDLE UF ORIG 29 x 1/2 " Ndle       BD INSULIN PEN NEEDLE UF SHORT 31 X 5/16 " Ndle       BD INSULIN SYRINGE ULT-FINE II 1/2 mL 31 x 5/16" Syrg       BD INSULIN SYRINGE ULTRA-FINE 1 mL 31 x 5/16" Syrg       donepezil (ARICEPT) 10 MG tablet Take 10 mg by mouth every evening.       hydrochlorothiazide (HYDRODIURIL) 25 MG tablet Take 2 tablets (50 mg total) by mouth once daily.  Qty: 30 tablet, Refills: 3    Associated Diagnoses: Benign essential HTN      magnesium hydroxide 400 mg/5 ml (MILK OF MAGNESIA) 400 mg/5 mL Susp Take 30 mLs (2,400 mg total) by mouth daily as needed.  Qty: 473 mL, Refills: 1    Associated Diagnoses: Constipation, unspecified constipation type      metformin (GLUCOPHAGE) 500 MG tablet Take 500 mg by mouth 2 (two) times daily with meals.       mirabegron " (MYRBETRIQ) 50 mg Tb24 Take 1 tablet (50 mg total) by mouth once daily.  Qty: 30 tablet, Refills: 5      NOVOLIN N 100 unit/mL injection Inject 40 Units into the skin before breakfast. And 30 units in the evening      ONE TOUCH ULTRA TEST Strp       polyethylene glycol (GLYCOLAX) 17 gram/dose powder 1 cap daily  Qty: 255 g, Refills: 12      valsartan (DIOVAN) 80 MG tablet Take 80 mg by mouth once daily.              Discharge Procedure Orders  Diet general           Discharge Procedure Orders (must include Diet, Follow-up, Activity):    Discharge Procedure Orders (must include Diet, Follow-up, Activity)  Diet general

## 2017-10-23 NOTE — TELEPHONE ENCOUNTER
----- Message from Sanjana Zambrano MD sent at 10/23/2017  2:56 PM CDT -----  F/u in 4 - 6 weeks for stress test

## 2017-10-24 VITALS
SYSTOLIC BLOOD PRESSURE: 149 MMHG | TEMPERATURE: 98 F | OXYGEN SATURATION: 99 % | WEIGHT: 213 LBS | HEIGHT: 68 IN | HEART RATE: 85 BPM | BODY MASS INDEX: 32.28 KG/M2 | RESPIRATION RATE: 20 BRPM | DIASTOLIC BLOOD PRESSURE: 79 MMHG

## 2017-11-20 ENCOUNTER — TELEPHONE (OUTPATIENT)
Dept: PAIN MEDICINE | Facility: CLINIC | Age: 77
End: 2017-11-20

## 2017-11-20 NOTE — TELEPHONE ENCOUNTER
Pt wanted to go to an ORTHO ,for (b) of her hip pain       ----- Message from Elsa Mckeon sent at 11/20/2017 11:24 AM CST -----  Contact: self  Patient [phone] cancelled her appt for tomorrow as she is going out of town/she will call back at a later time to reschedule/she is asking to speak with Dr Farley but patient did not want to tell me what this is concerning/please call

## 2017-12-05 ENCOUNTER — LAB VISIT (OUTPATIENT)
Dept: LAB | Facility: HOSPITAL | Age: 77
End: 2017-12-05
Attending: UROLOGY
Payer: MEDICARE

## 2017-12-05 ENCOUNTER — OFFICE VISIT (OUTPATIENT)
Dept: UROLOGY | Facility: CLINIC | Age: 77
End: 2017-12-05
Payer: MEDICARE

## 2017-12-05 VITALS
BODY MASS INDEX: 32.59 KG/M2 | SYSTOLIC BLOOD PRESSURE: 155 MMHG | TEMPERATURE: 99 F | HEART RATE: 75 BPM | HEIGHT: 68 IN | WEIGHT: 215.06 LBS | DIASTOLIC BLOOD PRESSURE: 78 MMHG

## 2017-12-05 DIAGNOSIS — N95.2 ATROPHIC VAGINITIS: ICD-10-CM

## 2017-12-05 DIAGNOSIS — N39.3 SUI (STRESS URINARY INCONTINENCE, FEMALE): ICD-10-CM

## 2017-12-05 DIAGNOSIS — N32.81 OAB (OVERACTIVE BLADDER): Primary | ICD-10-CM

## 2017-12-05 DIAGNOSIS — K59.00 CONSTIPATION, UNSPECIFIED CONSTIPATION TYPE: ICD-10-CM

## 2017-12-05 DIAGNOSIS — R31.0 GROSS HEMATURIA: ICD-10-CM

## 2017-12-05 DIAGNOSIS — E13.3491: ICD-10-CM

## 2017-12-05 DIAGNOSIS — N32.81 OAB (OVERACTIVE BLADDER): ICD-10-CM

## 2017-12-05 DIAGNOSIS — N39.41 URGE INCONTINENCE: ICD-10-CM

## 2017-12-05 DIAGNOSIS — N39.0 RECURRENT UTI: ICD-10-CM

## 2017-12-05 LAB
AMORPH CRY URNS QL MICRO: ABNORMAL
BACTERIA #/AREA URNS HPF: ABNORMAL /HPF
BILIRUB SERPL-MCNC: ABNORMAL MG/DL
BILIRUB UR QL STRIP: NEGATIVE
BLOOD URINE, POC: ABNORMAL
CLARITY UR: ABNORMAL
COLOR UR: YELLOW
COLOR, POC UA: ABNORMAL
ESTIMATED AVG GLUCOSE: 226 MG/DL
GLUCOSE UR QL STRIP: ABNORMAL
GLUCOSE UR QL STRIP: NEGATIVE
HBA1C MFR BLD HPLC: 9.5 %
HGB UR QL STRIP: NEGATIVE
KETONES UR QL STRIP: ABNORMAL
KETONES UR QL STRIP: NEGATIVE
LEUKOCYTE ESTERASE UR QL STRIP: NEGATIVE
LEUKOCYTE ESTERASE URINE, POC: ABNORMAL
MICROSCOPIC COMMENT: ABNORMAL
NITRITE UR QL STRIP: POSITIVE
NITRITE, POC UA: ABNORMAL
PH UR STRIP: 6 [PH] (ref 5–8)
PH, POC UA: 5
PROT UR QL STRIP: ABNORMAL
PROTEIN, POC: ABNORMAL
RBC #/AREA URNS HPF: 1 /HPF (ref 0–4)
SP GR UR STRIP: 1.02 (ref 1–1.03)
SPECIFIC GRAVITY, POC UA: 1020
SQUAMOUS #/AREA URNS HPF: 2 /HPF
URN SPEC COLLECT METH UR: ABNORMAL
UROBILINOGEN UR STRIP-ACNC: NEGATIVE EU/DL
UROBILINOGEN, POC UA: ABNORMAL
WBC #/AREA URNS HPF: 1 /HPF (ref 0–5)

## 2017-12-05 PROCEDURE — 99999 PR PBB SHADOW E&M-EST. PATIENT-LVL III: CPT | Mod: PBBFAC,,, | Performed by: UROLOGY

## 2017-12-05 PROCEDURE — 81002 URINALYSIS NONAUTO W/O SCOPE: CPT | Mod: PBBFAC,PN | Performed by: UROLOGY

## 2017-12-05 PROCEDURE — 87088 URINE BACTERIA CULTURE: CPT

## 2017-12-05 PROCEDURE — 87186 SC STD MICRODIL/AGAR DIL: CPT

## 2017-12-05 PROCEDURE — 99213 OFFICE O/P EST LOW 20 MIN: CPT | Mod: PBBFAC,PN | Performed by: UROLOGY

## 2017-12-05 PROCEDURE — 87086 URINE CULTURE/COLONY COUNT: CPT

## 2017-12-05 PROCEDURE — 51725 SIMPLE CYSTOMETROGRAM: CPT | Mod: 26,S$PBB,, | Performed by: UROLOGY

## 2017-12-05 PROCEDURE — 87077 CULTURE AEROBIC IDENTIFY: CPT

## 2017-12-05 PROCEDURE — 99215 OFFICE O/P EST HI 40 MIN: CPT | Mod: S$PBB,25,, | Performed by: UROLOGY

## 2017-12-05 PROCEDURE — 81000 URINALYSIS NONAUTO W/SCOPE: CPT

## 2017-12-05 PROCEDURE — 83036 HEMOGLOBIN GLYCOSYLATED A1C: CPT

## 2017-12-05 PROCEDURE — 36415 COLL VENOUS BLD VENIPUNCTURE: CPT

## 2017-12-05 PROCEDURE — 51725 SIMPLE CYSTOMETROGRAM: CPT | Mod: PBBFAC,PN | Performed by: UROLOGY

## 2017-12-05 RX ORDER — SOLIFENACIN SUCCINATE 10 MG/1
10 TABLET, FILM COATED ORAL DAILY
Qty: 30 TABLET | Refills: 11 | Status: SHIPPED | OUTPATIENT
Start: 2017-12-05 | End: 2018-01-16 | Stop reason: ALTCHOICE

## 2017-12-05 RX ORDER — ESTRADIOL 0.1 MG/G
1 CREAM VAGINAL
Qty: 42.5 G | Refills: 6 | Status: SHIPPED | OUTPATIENT
Start: 2017-12-06 | End: 2017-12-12 | Stop reason: SDUPTHER

## 2017-12-05 RX ORDER — TRAVOPROST 0.04 MG/ML
1 SOLUTION/ DROPS OPHTHALMIC 2 TIMES DAILY
COMMUNITY
Start: 2017-11-29 | End: 2022-11-16

## 2017-12-05 NOTE — PROGRESS NOTES
[facility] [place]  Staff: MD Juan Manuel    Referring provider and please cc: Luis  [facility]    Chief Complaint: recurrent uti and OAB    Subjective:        HPI: Deonna Montero is a 77 y.o. female presents with     Last seen by 9/19/17 (3 months ago)    OAB with UUI   -has had these sx for about a year  -frequency q2 hours or less with urgency and UUI  - nocturia 3-4x with urgency and UUI.  + snores  -wears depends, maxi pad and tissue. Changes this 1x a day  -drinks prune juice. 1 tea every 2 days. occ coffee.   -has tried: oxbutynin, myrbetriq (which helped but was too expensive), detrol 4mg (but that was too expensive)  -denies JOSE L    -+constipation and says she has some relief of sx with having a bm, I had her start miralax daily and she's been having more regular bms daily. No difference on oab.   -I started her on myrbetriq 50mg and she says she's noticed no change. Still uses 5-6 pads a day.     Recurrent uti  -wears multiple pads (4 to 5d)  -ctrss 8/2/17 showed no stones  -cytology in 3/17 was atypical but repeat cytology was normal  -sx of uti:  left lower quadrant pain (+constipation). No dysuria. +frequency and urgency but this has been present prior to uti's.   -diabetes x 10 years.  I&O cath by pvr 9/19/17 30cc  -ua today nit+ but no symptoms    UCx/ua  10/23/17 No cx, ua: tr protein only (cysto)  10/16/17 E.coli, ua void: nitrite + - treated with cipro  9/19/17 E.coli (10k to 50k) ua cath and voided the same  8/15/17 E.coli, nitrite +  7/25/17 E.coli, nitrite +  3/7/17  E.coli  2/9/17  E.coli    Gross hematuria  -Had GH 1.5 months ago that lasted for 2 days.  -+infxn then.  -ctrss 8/2/17 showed no stones  -Cytology 9/21/17 normal, 3/7/17 normal, 2/9/17 atypical   -Cysto 10/23/17: no tumors or stones. Grade 1 trabeculations.     Referred to pain doctor for chronic back pain  - has been seen by   Referred to rheumatology for  arhtritis - has appt tomorrow    ECOG Status: 2 walks with walker    G2, P2,   Gross HematuriaYes   History of UTI: Yes     REVIEW OF SYSTEMS:  General ROS: no fevers, no chills  Psychological ROS: no depression  Endocrine ROS: no heat or cold  Respiratory ROS: no SOB  Cardiovascular ROS: no CP  Gastrointestinal ROS: no abdominal pain, + constipation, no diarrhea, noBRBPR  Musculoskeletal ROS: no muscle pain  Neurological ROS: no headaches  Dermatological ROS: no rashes  HEENT: noglasses, no sinus   ROS: per HPI     Past medical, surgical, social and family hx have been reviewed. There have not any changes.       Allergies:  Codeine and Morphine (pf)    Medications: reviewed   Anticoagulation: No    Objective:     Vitals:    12/05/17 1055   BP: (!) 155/78   Pulse: 75   Temp: 98.6 °F (37 °C)       General:WDWN in NAD  Eyes: PERRLA, normal conjunctiva  Respiratory: no increased work on breathing, clear to auscultation  Cardiovascular: regular rate and rhythm. No obvious extremity edema.  GI: no palpation of masses. No tenderness. No hepatosplenomegaly to palpation.  Musculoskeletal: normal range of motion of bilateral upper extremities. Normal muscle strength and tone.  Skin: no obvious rashes or lesions. No tightening of skin noted.  Neurologic: CN grossly normal. Normal sensation.   Psychiatric: awake, alert and oriented x 3. Mood and affect normal. Cooperative.    Pelvic exam 9/19/17  In and out cath with 30cc clear yellow urine    Pelvic exam 10/23/17:  negative stress test with coughing  In and out cath performed with 5 residual  Bladder filled to 150  Sensation to void felt at 50   Catheter removed  Large volume void with leak, asked her to kegel and leakage stopped  Then stress test with coughing, large volume  In and out cath with 30cc  Minimal prolapse     Pelvic exam today:  negative stress test with coughing  In and out cath performed with 0 residual  Bladder filled to 50 and she started having detrussor  contraction  Sensation to void felt at 50  Catheter removed  + stress test with coughing and valsalva  No prolapse, + atrophic vaginitis.       LABS REVIEW:  UA today:1.020/5/tr leuk/nit+tr blood/1+protein- asymptomatic   ua cath: no residual     Cr:   Lab Results   Component Value Date    CREATININE 0.7 01/04/2013       PATHOLOGY REVIEW:  Voided urine 9/19/17: negative  Voided urine 3/7/17: Negative for malignant cells.  Voided urine 2/9/1:Atypical cells present.    RADIOGRAPHIC REVIEW:  ctrss 8/2/17  No stones  No hydro  No kidney masses  Possible small left adenal adenoma  hsyterectomy  Other findings: Spinal stimulator, cholecystectomy, b femoracetabular OA        Assessment:       1. OAB (overactive bladder)    2. Urge incontinence    3. Recurrent UTI    4. Gross hematuria    5. Constipation, unspecified constipation type    6. JOSE L (stress urinary incontinence, female)          Plan:     Recurrent uti  -sending cath urine for urinalysis and culture, suspect she has no uti  -asymptomatic and will not treat   -start a probiotic, never started - given info today on this  -likely related to pad usage, will check if we can improve her incontinence     oab and mixed incontinence  -has tried ditropan, detrol and myrbetriq. Did well with myrbetriq and previously too expensive but copay now $49. She has been on this and no improvement.  -will start vesicare 10mg daily instead.   -JOSE L: signfiicant JOSE L again today.  Could be the cause of her OAB. Offered sling but she wants to try another medication instead. Minimal prolapse.   -will get a hba1c for now. Would need clearance prior to sling if she gets this done.   -start using estrace cream every other night with applicator to thicken tissue in the vaginal area in preparation for surgery. No h/o gyn cancer. Given info on estrace today  -given brochure on JOSE L and treatment with sling info today.     Gross hematuria  -has had a ctrss which showed no stones  (8/2017)  -cytology initially abnormal in feb, 2 repeats negative (most recent 9/2017)  -cystoscopy 9/2017 showed small capacity bladder, otherwise no tumors.     Constipation  -pain in LLQ is constipation, not uti.   -had been written for linzess by  but it was too expensive, needs to be filled by them  -continue 1 cap miralax daily. Start probiotic.       I spent 60 minutes with the patient of which more than half was spent in direct consultation with the patient in regards to our treatment and plan.    Sanjana Zambrano MD

## 2017-12-05 NOTE — PATIENT INSTRUCTIONS
Recurrent uti  -sending cath urine for urinalysis and culture, suspect she has no uti  -asymptomatic and will not treat   -start a probiotic, never started - given info today on this  -likely related to pad usage, will check if we can improve her incontinence     oab and mixed incontinence  -has tried ditropan, detrol and myrbetriq. Did well with myrbetriq and previously too expensive but copay now $49. She has been on this and no improvement.  -will start vesicare 10mg daily instead.   -JOSE L: signfiicant JOSE L again today.  Could be the cause of her OAB. Offered sling but she wants to try another medication instead. Minimal prolapse.   -will get a hba1c for now. Would need clearance prior to sling if she gets this done.   -start using estrace cream every other night with applicator to thicken tissue in the vaginal area in preparation for surgery. No h/o gyn cancer. Given info on estrace today  -given brochure on JOSE L and treatment with sling info today.     Gross hematuria  -has had a ctrss which showed no stones (8/2017)  -cytology initially abnormal in feb, 2 repeats negative (most recent 9/2017)  -cystoscopy 9/2017 showed small capacity bladder, otherwise no tumors.     Constipation  -pain in LLQ is constipation, not uti.   -had been written for ana paula by  but it was too expensive, needs to be filled by them  -continue 1 cap miralax daily. Start probiotic.

## 2017-12-06 ENCOUNTER — OFFICE VISIT (OUTPATIENT)
Dept: ORTHOPEDICS | Facility: CLINIC | Age: 77
End: 2017-12-06
Payer: MEDICARE

## 2017-12-06 VITALS
SYSTOLIC BLOOD PRESSURE: 154 MMHG | BODY MASS INDEX: 32.58 KG/M2 | WEIGHT: 215 LBS | HEART RATE: 75 BPM | RESPIRATION RATE: 18 BRPM | HEIGHT: 68 IN | DIASTOLIC BLOOD PRESSURE: 91 MMHG

## 2017-12-06 DIAGNOSIS — M16.0 PRIMARY OSTEOARTHRITIS OF HIPS, BILATERAL: Primary | ICD-10-CM

## 2017-12-06 DIAGNOSIS — M54.40 BACK PAIN OF LUMBAR REGION WITH SCIATICA: ICD-10-CM

## 2017-12-06 PROCEDURE — 72170 X-RAY EXAM OF PELVIS: CPT | Mod: ,,, | Performed by: ORTHOPAEDIC SURGERY

## 2017-12-06 PROCEDURE — 99203 OFFICE O/P NEW LOW 30 MIN: CPT | Mod: ,,, | Performed by: ORTHOPAEDIC SURGERY

## 2017-12-06 NOTE — PROGRESS NOTES
"Subjective:       Chief Complaint    Chief Complaint   Patient presents with    Right Hip - Pain    Left Hip - Pain    Initial Visit     BILATERAL HIP PAIN  est in epic. NO SCANS       HPI  Deonna Montero is a 77 y.o.  female who presentsWith low back and hip pain, pain around her buttocks. She has had pain around her back, buttocks hip area for years, getting progressively worse. Now she can hardly walk       Past History  Past Medical History:   Diagnosis Date    Back pain     Diabetes mellitus     Hypertension     Reflux     Short-term memory loss      Past Surgical History:   Procedure Laterality Date    BACK SURGERY      with tea    CHOLECYSTECTOMY      nap[place]on      2 years ago    HYSTERECTOMY       Social History     Social History    Marital status:      Spouse name: N/A    Number of children: N/A    Years of education: N/A     Occupational History    Not on file.     Social History Main Topics    Smoking status: Former Smoker     Quit date: 10/15/2010    Smokeless tobacco: Former User    Alcohol use No    Drug use: No    Sexual activity: No     Other Topics Concern    Not on file     Social History Narrative    No narrative on file         Medications  Current Outpatient Prescriptions   Medication Sig    atorvastatin (LIPITOR) 10 MG tablet Take 10 mg by mouth once daily.     BD INSULIN PEN NEEDLE UF ORIG 29 x 1/2 " Ndle     BD INSULIN PEN NEEDLE UF SHORT 31 X 5/16 " Ndle     BD INSULIN SYRINGE ULT-FINE II 1/2 mL 31 x 5/16" Syrg     BD INSULIN SYRINGE ULTRA-FINE 1 mL 31 x 5/16" Syrg     donepezil (ARICEPT) 10 MG tablet Take 10 mg by mouth every evening.     estradiol (ESTRACE) 0.01 % (0.1 mg/gram) vaginal cream Place 1 g vaginally 3 (three) times a week.    hydrochlorothiazide (HYDRODIURIL) 25 MG tablet Take 2 tablets (50 mg total) by mouth once daily.    magnesium hydroxide 400 mg/5 ml (MILK OF MAGNESIA) 400 mg/5 mL Susp Take 30 mLs (2,400 mg total) by mouth daily " as needed.    metformin (GLUCOPHAGE) 500 MG tablet Take 500 mg by mouth 2 (two) times daily with meals.     NOVOLIN N 100 unit/mL injection Inject 40 Units into the skin before breakfast. And 30 units in the evening    ONE TOUCH ULTRA TEST Strp     polyethylene glycol (GLYCOLAX) 17 gram/dose powder 1 cap daily    solifenacin (VESICARE) 10 MG tablet Take 1 tablet (10 mg total) by mouth once daily.    timolol maleate 0.5% (TIMOPTIC) 0.5 % Drop 1 drop once daily.     TRAVATAN Z 0.004 % Drop     valsartan (DIOVAN) 80 MG tablet Take 80 mg by mouth once daily.      No current facility-administered medications for this visit.        Allergies  Review of patient's allergies indicates:   Allergen Reactions    Codeine Anaphylaxis    Morphine (pf) Itching         Review of Systems     Constitutional: Negative    HENT: Negative  Eyes: Negative  Respiratory: Negative  Cardiovascular: Negative  Musculoskeletal: HPI  Skin: Negative  Neurological: Negative  Hematological: Negative  Endocrine: Negative      Physical Exam    Vitals:    12/06/17 1352   BP: (!) 154/91   Pulse: 75   Resp: 18     Physical Examination:Patient has difficulty standing straight. She has to hold onto things to walk. There is a lumbar scar present from 2 previous lumbar laminectomies. She has a jog of rotation with the hips at 90° sitting up with her knee bent to 90°. With her knee in extension. Abduction is 35° with discomfort. Hip flexion and rotation is very restricted and extremely painful. She is tender in the bilateral gluteal areas. Very tender over the anterior aspect of both hips.     Skin-clear  General appearance -  well appearing, and in no distress  Mental status - awake  Neck - supple  Chest -  symmetric air entry  Heart - normal rate   Abdomen - soft      Assessment/Plan   Primary osteoarthritis of hips, bilateral  -     X-Ray Pelvis 3 View inc Hip 2 view Left    Back pain of lumbar region with sciatica  -     X-Ray Pelvis 3 View inc  Hip 2 view Left        Patient is to be scheduled for anterior hip replacement on the left. She will require medical clearance.    This note was dictated using voice recognition software and may contain grammatical errors.

## 2017-12-06 NOTE — LETTER
December 6, 2017      Bubba Yin MD  [address]  [place] [address]           [facility]  [address] [place] [address]  [address]  [facility] [address]  Phone: [phone]  Fax: [phone]          Patient: Deonna Montero   MR Number: [id number]   YOB: 1940   Date of Visit: 12/6/2017       Dear Dr. Bubba Yni:    Thank you for referring Deonna Montero to me for evaluation. Attached you will find relevant portions of my assessment and plan of care.    If you have questions, please do not hesitate to call me. I look forward to following Deonna Montero along with you.    Sincerely,    Teo Negrete Jr., MD    Enclosure  CC:  No Recipients    If you would like to receive this communication electronically, please contact [email or web address] or [phone] to request more information on [employer] Link access.    For providers and/or their staff who would like to refer a patient to [facility], please contact us through our one-stop-shop provider referral line, [facility] [place], at [phone].    If you feel you have received this communication in error or would no longer like to receive these types of communications, please e-mail [email or web address]

## 2017-12-07 LAB — BACTERIA UR CULT: NORMAL

## 2017-12-08 ENCOUNTER — TELEPHONE (OUTPATIENT)
Dept: UROLOGY | Facility: CLINIC | Age: 77
End: 2017-12-08

## 2017-12-08 NOTE — TELEPHONE ENCOUNTER
Patient requesting a different medication to be called in to the pharmacy. [facility]. Insurance covers vesicare, myrbetriq and oxybutynin

## 2017-12-11 NOTE — TELEPHONE ENCOUNTER
Let her know she has tried all the other med on the list that is covered. Does she or does she not want to try the vesicare?    The next step would be a sling to treat her Stress incontinence since this is likley her primary issues      oab and mixed incontinence  -has tried ditropan, detrol and myrbetriq. Did well with myrbetriq and previously too expensive but copay now $49. She has been on this and no improvement.  -will start vesicare 10mg daily instead.   -JOSE L: signfiicant JOSE L again today.  Could be the cause of her OAB. Offered sling but she wants to try another medication instead. Minimal prolapse.

## 2017-12-12 ENCOUNTER — TELEPHONE (OUTPATIENT)
Dept: UROLOGY | Facility: CLINIC | Age: 77
End: 2017-12-12

## 2017-12-12 RX ORDER — ESTRADIOL 0.1 MG/G
1 CREAM VAGINAL
Qty: 42.5 G | Refills: 6 | Status: ON HOLD | OUTPATIENT
Start: 2017-12-13 | End: 2019-08-14 | Stop reason: CLARIF

## 2017-12-12 NOTE — TELEPHONE ENCOUNTER
Please let pt know I wrote her estrace from [employer]   Costs about $42  Last 3 months  Needs to call them

## 2017-12-12 NOTE — TELEPHONE ENCOUNTER
Spoke with patient she states she has already picked up vesicare prescription from [facility] and started medication

## 2018-01-10 ENCOUNTER — TELEPHONE (OUTPATIENT)
Dept: UROLOGY | Facility: CLINIC | Age: 78
End: 2018-01-10

## 2018-01-11 NOTE — TELEPHONE ENCOUNTER
Spoke with patient informed her she will continue to take both medications for her recurrent uti's and overactive bladder. Patient verbally voiced understanding

## 2018-01-11 NOTE — TELEPHONE ENCOUNTER
----- Message from Jhoana Dempsey sent at 1/11/2018  1:47 PM CST -----  Contact: claudia   Calling to see if she should get the prescription filled and how long should take them.   R  solifenacin (VESICARE) 10 MG tablet, estradiol (ESTRACE) 0.01 % (0.1 mg/gram) vaginal crea.  Please call [phone] (home)   thanks

## 2018-01-16 ENCOUNTER — TELEPHONE (OUTPATIENT)
Dept: UROLOGY | Facility: CLINIC | Age: 78
End: 2018-01-16

## 2018-01-16 NOTE — TELEPHONE ENCOUNTER
Spoke with patient she states she is not sure why mrybetriq was discontinued for vesicare. Informed patient she stated myrbetriq was too expensive so vesicare was called in. Patient states vesicare is too expensive and requesting for myrbetriq to be called in to [facility]

## 2018-02-20 ENCOUNTER — TELEPHONE (OUTPATIENT)
Dept: ORTHOPEDICS | Facility: CLINIC | Age: 78
End: 2018-02-20

## 2018-03-29 ENCOUNTER — TELEPHONE (OUTPATIENT)
Dept: UROLOGY | Facility: CLINIC | Age: 78
End: 2018-03-29

## 2018-03-29 RX ORDER — FESOTERODINE FUMARATE 8 MG/1
8 TABLET, EXTENDED RELEASE ORAL DAILY
Qty: 90 TABLET | Refills: 0 | Status: SHIPPED | OUTPATIENT
Start: 2018-03-29 | End: 2018-04-19

## 2018-03-29 NOTE — TELEPHONE ENCOUNTER
Received letter from insurance company    They want to confirm she's tried:  Ditropan - yes, no improvement  toviaz - yes, but too expensive  trospium - no, will start 8mg daily     Let pt know  If she fails this after a 6 week trial I can write for myrbetriq again.   She was no show on 3/6/18  Please make 3 month f/u. If she is no show I cannot write any more meds.

## 2018-05-02 PROBLEM — E11.9 TYPE 2 DIABETES MELLITUS WITHOUT COMPLICATION: Status: ACTIVE | Noted: 2018-05-02

## 2018-05-02 PROBLEM — R41.3 SHORT-TERM MEMORY LOSS: Status: ACTIVE | Noted: 2018-05-02

## 2018-05-02 PROBLEM — M16.12 PRIMARY OSTEOARTHRITIS OF LEFT HIP: Status: ACTIVE | Noted: 2018-05-02

## 2018-08-08 ENCOUNTER — HOSPITAL ENCOUNTER (OUTPATIENT)
Dept: RADIOLOGY | Facility: HOSPITAL | Age: 78
Discharge: HOME | End: 2018-08-08
Attending: ORTHOPAEDIC SURGERY
Payer: MEDICARE

## 2018-08-08 DIAGNOSIS — G89.4 CHRONIC PAIN SYNDROME: ICD-10-CM

## 2018-08-08 DIAGNOSIS — M47.896 OTHER SPONDYLOSIS, LUMBAR REGION: ICD-10-CM

## 2018-08-08 DIAGNOSIS — G89.4 CHRONIC PAIN SYNDROME: Primary | ICD-10-CM

## 2018-08-08 DIAGNOSIS — M47.896 OTHER SPONDYLOSIS, LUMBAR REGION: Primary | ICD-10-CM

## 2018-08-08 PROCEDURE — 72110 X-RAY EXAM L-2 SPINE 4/>VWS: CPT | Mod: 26,,, | Performed by: RADIOLOGY

## 2018-08-08 PROCEDURE — 72110 X-RAY EXAM L-2 SPINE 4/>VWS: CPT | Mod: TC,FY

## 2018-08-22 ENCOUNTER — HOSPITAL ENCOUNTER (OUTPATIENT)
Dept: RADIOLOGY | Facility: HOSPITAL | Age: 78
Discharge: HOME | End: 2018-08-22
Attending: ORTHOPAEDIC SURGERY
Payer: MEDICARE

## 2018-08-22 DIAGNOSIS — G89.4 CHRONIC PAIN SYNDROME: ICD-10-CM

## 2018-08-22 PROCEDURE — 72070 X-RAY EXAM THORAC SPINE 2VWS: CPT | Mod: TC,FY

## 2018-08-22 PROCEDURE — 72070 X-RAY EXAM THORAC SPINE 2VWS: CPT | Mod: 26,,, | Performed by: RADIOLOGY

## 2018-09-11 ENCOUNTER — OFFICE VISIT (OUTPATIENT)
Dept: UROLOGY | Facility: CLINIC | Age: 78
End: 2018-09-11
Payer: MEDICARE

## 2018-09-11 VITALS
TEMPERATURE: 98 F | WEIGHT: 212.31 LBS | HEIGHT: 66 IN | DIASTOLIC BLOOD PRESSURE: 78 MMHG | RESPIRATION RATE: 18 BRPM | HEART RATE: 76 BPM | SYSTOLIC BLOOD PRESSURE: 180 MMHG | BODY MASS INDEX: 34.12 KG/M2

## 2018-09-11 DIAGNOSIS — N32.81 OAB (OVERACTIVE BLADDER): Primary | ICD-10-CM

## 2018-09-11 DIAGNOSIS — N39.3 SUI (STRESS URINARY INCONTINENCE, FEMALE): ICD-10-CM

## 2018-09-11 DIAGNOSIS — R82.71 ASYMPTOMATIC BACTERIURIA: ICD-10-CM

## 2018-09-11 LAB
BILIRUB SERPL-MCNC: ABNORMAL MG/DL
BLOOD URINE, POC: ABNORMAL
COLOR, POC UA: ABNORMAL
GLUCOSE UR QL STRIP: 1000
KETONES UR QL STRIP: ABNORMAL
LEUKOCYTE ESTERASE URINE, POC: ABNORMAL
NITRITE, POC UA: ABNORMAL
PH, POC UA: 5
PROTEIN, POC: ABNORMAL
SPECIFIC GRAVITY, POC UA: 1.01
UROBILINOGEN, POC UA: ABNORMAL

## 2018-09-11 PROCEDURE — 99213 OFFICE O/P EST LOW 20 MIN: CPT | Mod: PBBFAC,PN | Performed by: UROLOGY

## 2018-09-11 PROCEDURE — 99999 PR PBB SHADOW E&M-EST. PATIENT-LVL III: CPT | Mod: PBBFAC,,, | Performed by: UROLOGY

## 2018-09-11 PROCEDURE — 99214 OFFICE O/P EST MOD 30 MIN: CPT | Mod: S$PBB,,, | Performed by: UROLOGY

## 2018-09-11 PROCEDURE — 81002 URINALYSIS NONAUTO W/O SCOPE: CPT | Mod: PBBFAC,PN | Performed by: UROLOGY

## 2018-09-11 RX ORDER — CITALOPRAM 20 MG/1
TABLET, FILM COATED ORAL
Status: ON HOLD | COMMUNITY
End: 2019-08-14 | Stop reason: CLARIF

## 2018-09-11 RX ORDER — BRIMONIDINE TARTRATE AND TIMOLOL MALEATE 2; 5 MG/ML; MG/ML
1 SOLUTION OPHTHALMIC DAILY
COMMUNITY
Start: 2018-05-30 | End: 2022-11-16

## 2018-09-11 RX ORDER — HYDROCODONE BITARTRATE AND ACETAMINOPHEN 10; 325 MG/1; MG/1
TABLET ORAL
Status: ON HOLD | COMMUNITY
End: 2019-08-14 | Stop reason: CLARIF

## 2018-09-11 RX ORDER — TRAMADOL HYDROCHLORIDE 50 MG/1
50 TABLET ORAL EVERY 4 HOURS PRN
COMMUNITY
End: 2022-11-16

## 2018-09-11 RX ORDER — TEMAZEPAM 15 MG/1
15 CAPSULE ORAL NIGHTLY
COMMUNITY
End: 2022-11-16

## 2018-09-11 RX ORDER — INSULIN ASPART 100 [IU]/ML
INJECTION, SUSPENSION SUBCUTANEOUS
Status: ON HOLD | COMMUNITY
End: 2019-09-04 | Stop reason: HOSPADM

## 2018-09-11 RX ORDER — METOPROLOL SUCCINATE 50 MG/1
50 TABLET, EXTENDED RELEASE ORAL DAILY
Status: ON HOLD | COMMUNITY
Start: 2018-09-04 | End: 2022-11-25

## 2018-09-11 RX ORDER — LOSARTAN POTASSIUM 100 MG/1
TABLET ORAL
Status: ON HOLD | COMMUNITY
Start: 2018-07-31 | End: 2019-08-14 | Stop reason: CLARIF

## 2018-09-11 RX ORDER — GABAPENTIN 800 MG/1
TABLET ORAL
Status: ON HOLD | COMMUNITY
End: 2019-08-14 | Stop reason: CLARIF

## 2018-09-11 RX ORDER — BRIMONIDINE TARTRATE 1 MG/ML
1 SOLUTION/ DROPS OPHTHALMIC 3 TIMES DAILY
COMMUNITY
End: 2022-11-16

## 2018-09-11 RX ORDER — AMLODIPINE BESYLATE 5 MG/1
TABLET ORAL
Status: ON HOLD | COMMUNITY
End: 2019-08-14 | Stop reason: CLARIF

## 2018-09-11 RX ORDER — ATORVASTATIN CALCIUM 10 MG/1
TABLET, FILM COATED ORAL
Status: ON HOLD | COMMUNITY
End: 2019-08-14 | Stop reason: CLARIF

## 2018-09-11 RX ORDER — HYDROCHLOROTHIAZIDE 12.5 MG/1
TABLET ORAL
Status: ON HOLD | COMMUNITY
Start: 2018-06-06 | End: 2019-08-15 | Stop reason: CLARIF

## 2018-09-11 NOTE — PATIENT INSTRUCTIONS
Buy fiber gummies from [employer] and take 2 every day   Stop drinking sodas   Follow-up with the urogynecologists for a pessary       Recurrent uti/asymptomatic bacteriuria  -asymptomatic and will not treat   -used estrace cream with applicator but too expensive. If she starts getting uti's again apply with finger to inside of vagina twice a week.   -likely related to pad usage, will check if we can improve her incontinence     oab and mixed incontinence  -has tried ditropan, detrol, vesicare, myrbetriq. Continue myrbetriq for now. Refill given for a year.   -try cutting out the soda and take 2 fiber gummies a day tp [revent constipation.   -JOSE L: signficant JOSE L seen on stress test 12/5/17 and 10/23/17.  Could be the cause of her OAB. Offered sling but she wants to try another medication instead. Minimal prolapse. Poor surgical candidate.   -a1c 4/19/18 8.5   -pt would like to see if a pessary would help her, will refer to urogyn for pessary trial.     Gross hematuria/microscopic hematuria  -has had a ctrss which showed no stones (8/2017)  -cytology initially abnormal in feb, 2 repeats negative (most recent 9/2017)  -cystoscopy 10/23/2017 showed small capacity bladder, otherwise no tumors.   -no further workup     Follow-up with urogynecology for pessary  Understands that if there is no improvement she may learn to live with her incontinence.   Follow-up with us if she has gross hematuria otherwise f/u with NP in 1 year        Sanjana Zambrano MD

## 2018-09-11 NOTE — PROGRESS NOTES
[facility] Note - [place]  Staff: MD Juan Manuel    Referring provider and please cc: Luis  PC[facility]    Chief Complaint: recurrent uti and OAB    Subjective:        HPI: Deonna Montero is a 78 y.o. female presents with     Had a hip surgery since I last saw her.     OAB with mixed incontinence  -frequency q2 hours or less with urgency and UUI, nocturia 3-4x with urgency and UUI.  + snores. wears depends, maxi pad and tissue.   -drinks prune juice. 1 tea every 2 days. occ coffee.   -has tried: oxbutynin, vesicare, detrol 4mg (but that was too expensive) and myrbetriq.   -still denies JOSE L although stress test on 10/23/17, 12/15/17 showed + leak and detrussor contraction with 50cc in bladder  - Drinks 2 diet drinks a day, she did stop drinking tea and still has constipation but improved from every 4 to 5 days to every 2 to 3 days.     Recurrent uti, asymptomatic bacteriuria, gross hematuria  -Had GH with infx in 10/2017 that lasted for 2 days.  -workup: ctrss 8/2/17 showed no stone. Cytology 9/21/17 normal, 3/7/17 normal, 2/9/17 atypical . Cysto 10/23/17: no tumors or stones. Grade 1 trabeculations.   -RF for GH: UTI, former tobacco user (10 pack years)  -RF for uti: wears multiple pads (4 to 5d). diabetes x 10 years.  a1c 4/2018 8.5  -sx of uti:  left lower quadrant pain (+constipation). No dysuria. +frequency and urgency but this has been present prior to uti's. Usually asymptomatic  -used estrace every other day but stopped bc it was expensive.     Denies any recent gross hematuria or uti      ua today: 2+ blood/2+protein/1000 glucose  Urine history  12/5/17 E.coli, ua void: nit+  10/23/17 No cx, ua: tr protein only (cysto)  10/16/17 E.coli, ua void: nitrite + - treated with cipro  9/19/17 E.coli (10k to 50k) ua cath and voided the same, I&O cath by pvr 9/19/17 30cc  8/15/17 E.coli, nitrite +  7/25/17 E.coli, nitrite  +  3/7/17  E.coli  2/9/17  E.coli      ECOG Status: 2 walks with walker    REVIEW OF SYSTEMS:  General ROS: no fevers, no chills  Psychological ROS: no depression  Endocrine ROS: no heat or cold  Respiratory ROS: no SOB  Cardiovascular ROS: no CP  Gastrointestinal ROS: no abdominal pain, + constipation, no diarrhea, noBRBPR  Musculoskeletal ROS: no muscle pain  Neurological ROS: no headaches  Dermatological ROS: no rashes  HEENT: noglasses, no sinus   ROS: per HPI     Past medical, surgical, social and family hx have been reviewed. There have not any changes.       Allergies:  Codeine; Morphine; and Morphine (pf)    Medications: reviewed   Anticoagulation: No    Objective:     Vitals:    09/11/18 1437   BP: (!) 180/78   Pulse: 76   Resp: 18   Temp: 98.3 °F (36.8 °C)       General:WDWN in NAD  Eyes: PERRLA, normal conjunctiva  Respiratory: no increased work on breathing, clear to auscultation  Cardiovascular: regular rate and rhythm. No obvious extremity edema.  GI: no palpation of masses. No tenderness. No hepatosplenomegaly to palpation.  Musculoskeletal: normal range of motion of bilateral upper extremities. Normal muscle strength and tone.  Skin: no obvious rashes or lesions. No tightening of skin noted.  Neurologic: CN grossly normal. Normal sensation.   Psychiatric: awake, alert and oriented x 3. Mood and affect normal. Cooperative.    Pelvic exam 9/19/17  In and out cath with 30cc clear yellow urine    Pelvic exam 10/23/17:  negative stress test with coughing  In and out cath performed with 5 residual  Bladder filled to 150  Sensation to void felt at 50   Catheter removed  Large volume void with leak, asked her to kegel and leakage stopped  Then stress test with coughing, large volume  In and out cath with 30cc  Minimal prolapse     Pelvic exam 12/15/17:  negative stress test with coughing  In and out cath performed with 0 residual  Bladder filled to 50 and she started having detrussor contraction  Sensation  to void felt at 50  Catheter removed  + stress test with coughing and valsalva  No prolapse, + atrophic vaginitis.       LABS REVIEW:    BMP  Lab Results   Component Value Date     (L) 05/03/2018    K 4.8 05/03/2018     05/03/2018    CO2 28 05/03/2018    BUN 12 05/03/2018    CREATININE 0.77 05/03/2018    CALCIUM 8.5 05/03/2018    ANIONGAP 7 (L) 05/03/2018    ESTGFRAFRICA >60 05/03/2018    EGFRNONAA >60 05/03/2018     Lab Results   Component Value Date    WBC 6.26 05/05/2018    HGB 10.9 (L) 05/05/2018    HCT 33.3 (L) 05/05/2018    MCV 87 05/05/2018     05/05/2018       PATHOLOGY REVIEW:  Voided urine 9/19/17: negative  Voided urine 3/7/17: Negative for malignant cells.  Voided urine 2/9/1:Atypical cells present.    RADIOGRAPHIC REVIEW:  ctrss 8/2/17  No stones  No hydro  No kidney masses  Possible small left adenal adenoma  hsyterectomy  Other findings: Spinal stimulator, cholecystectomy, b femoracetabular OA        Assessment:       1. OAB (overactive bladder)    2. JOSE L (stress urinary incontinence, female)    3. Asymptomatic bacteriuria          Plan:     Recurrent uti/asymptomatic bacteriuria  -asymptomatic and will not treat   -used estrace cream with applicator but too expensive. If she starts getting uti's again apply with finger to inside of vagina twice a week.   -likely related to pad usage, will check if we can improve her incontinence     oab and mixed incontinence  -has tried ditropan, detrol, vesicare, myrbetriq. Continue myrbetriq for now. Refill given for a year.   -try cutting out the soda and take 2 fiber gummies a day tp [revent constipation.   -JOSE L: signficant JOSE L seen on stress test 12/5/17 and 10/23/17.  Could be the cause of her OAB. Offered sling but she wants to try another medication instead. Minimal prolapse. Poor surgical candidate.   -a1c 4/19/18 8.5   -pt would like to see if a pessary would help her, will refer to urogyn for pessary trial.     Gross  hematuria/microscopic hematuria  -has had a ctrss which showed no stones (8/2017)  -cytology initially abnormal in feb, 2 repeats negative (most recent 9/2017)  -cystoscopy 10/23/2017 showed small capacity bladder, otherwise no tumors.   -no further workup     Follow-up with urogynecology for pessary  Understands that if there is no improvement she may learn to live with her incontinence.   Follow-up with us if she has gross hematuria otherwise f/u with NP in 1 year        Sanjana Zambrano MD

## 2018-09-18 ENCOUNTER — INITIAL CONSULT (OUTPATIENT)
Dept: UROGYNECOLOGY | Facility: CLINIC | Age: 78
End: 2018-09-18
Payer: MEDICARE

## 2018-09-18 VITALS
HEART RATE: 65 BPM | SYSTOLIC BLOOD PRESSURE: 155 MMHG | HEIGHT: 68 IN | BODY MASS INDEX: 32.24 KG/M2 | DIASTOLIC BLOOD PRESSURE: 78 MMHG | WEIGHT: 212.75 LBS

## 2018-09-18 DIAGNOSIS — Z87.440 HISTORY OF RECURRENT UTI (URINARY TRACT INFECTION): ICD-10-CM

## 2018-09-18 DIAGNOSIS — N39.46 MIXED INCONTINENCE URGE AND STRESS: ICD-10-CM

## 2018-09-18 DIAGNOSIS — R39.14 FEELING OF INCOMPLETE BLADDER EMPTYING: ICD-10-CM

## 2018-09-18 DIAGNOSIS — R35.0 URINARY FREQUENCY: Primary | ICD-10-CM

## 2018-09-18 LAB
BILIRUB SERPL-MCNC: NORMAL MG/DL
BLOOD URINE, POC: NORMAL
COLOR, POC UA: YELLOW
GLUCOSE UR QL STRIP: NORMAL
KETONES UR QL STRIP: NORMAL
LEUKOCYTE ESTERASE URINE, POC: NORMAL
NITRITE, POC UA: NORMAL
PH, POC UA: 5
PROTEIN, POC: NORMAL
SPECIFIC GRAVITY, POC UA: 1.02
UROBILINOGEN, POC UA: NORMAL

## 2018-09-18 PROCEDURE — 81002 URINALYSIS NONAUTO W/O SCOPE: CPT | Mod: PBBFAC,PO | Performed by: NURSE PRACTITIONER

## 2018-09-18 PROCEDURE — 99213 OFFICE O/P EST LOW 20 MIN: CPT | Mod: S$PBB,ICN,, | Performed by: NURSE PRACTITIONER

## 2018-09-18 PROCEDURE — 99999 PR PBB SHADOW E&M-EST. PATIENT-LVL V: CPT | Mod: PBBFAC,,, | Performed by: NURSE PRACTITIONER

## 2018-09-18 PROCEDURE — 99215 OFFICE O/P EST HI 40 MIN: CPT | Mod: PBBFAC,PO | Performed by: NURSE PRACTITIONER

## 2018-09-18 NOTE — LETTER
September 18, 2018      Sanjana Zambrano MD  [address] [place] [address] Dr  [address]  [place] [address]           [facility]  [address] [facility], [address]  [place] [address]  Phone: [phone]  Fax: [phone]          Patient: Deonna Montero   MR Number: [id number]   YOB: 1940   Date of Visit: 9/18/2018       Dear Dr. Sanjana Zambrano:    Thank you for referring Deonna Montero to me for evaluation. Attached you will find relevant portions of my assessment and plan of care.    If you have questions, please do not hesitate to call me. I look forward to following Deonna Montero along with you.    Sincerely,    REGI Clark, [facility]    Enclosure  CC:  No Recipients    If you would like to receive this communication electronically, please contact [email or web address] or [phone] to request more information on [employer] Link access.    For providers and/or their staff who would like to refer a patient to [facility], please contact us through our one-stop-shop provider referral line, [facility], at [phone].    If you feel you have received this communication in error or would no longer like to receive these types of communications, please e-mail [email or web address]

## 2018-09-18 NOTE — PROGRESS NOTES
Subjective:       Patient ID: Deonna Montero is a 78 y.o. female.    Chief Complaint: discuss pessary    HPI  Deonna Montero is a 78 y.o. female who is new to me,  presents today for possible pessary placement.  She has a long history of OAB and incontinence.  The pt denies any JOSE L, but Dr. Zambrano has 2 encounters in the office that showed + JOSE L with smaller volume.  The pt has tried ditropan and detrol with no change in her symptoms so she stopped taking them.  She is currently on myrbetriq. On the myrbetriq she has frequency x q 2 hours during the day and night. As mentioned earlier, the pt denies any JOSE L, but does complain of UUI.  She wears both pads and diapers and is soaking through both of them and changing them multiple times a day.  She has a history of recurrent UTI, but denies any LUTS today.  She does have some feeling of PVF at times.  She denies any vaginal discharge, bulging or bleeding.  She has not had a recent WWE and has been told she no longer needs mammograms.  She is .  She has had a hysterectomy in the past for bleeding.  She is not sexually active.      Review of Systems   Constitutional: Negative for activity change, fever and unexpected weight change.   HENT: Negative for hearing loss.    Eyes: Negative for visual disturbance.   Respiratory: Negative for shortness of breath and wheezing.    Cardiovascular: Negative for chest pain, palpitations and leg swelling.   Gastrointestinal: Negative for abdominal pain, constipation and diarrhea.   Genitourinary: Positive for frequency and urgency. Negative for dyspareunia, dysuria, vaginal bleeding and vaginal discharge.   Musculoskeletal: Positive for gait problem and joint swelling. Negative for neck pain.   Skin: Negative for rash and wound.   Allergic/Immunologic: Negative for immunocompromised state.   Neurological: Negative for tremors, speech difficulty and weakness.   Hematological: Does not bruise/bleed easily.    Psychiatric/Behavioral: Negative for agitation and confusion.       Objective:      Physical Exam   Constitutional: She is oriented to person, place, and time. She appears well-developed and well-nourished. No distress.   HENT:   Head: Normocephalic and atraumatic.   Neck: Neck supple. No thyromegaly present.   Cardiovascular:   Trace pedal edema bilaterally   Pulmonary/Chest: Effort normal. No respiratory distress.   Abdominal: Soft. There is no tenderness. No hernia.   Musculoskeletal: Normal range of motion.        Left hip: She exhibits tenderness.        Lumbar back: She exhibits tenderness.   Pt had left hip surgery in 07/18.  She continues to have Left hip pain and back pain.  She uses a walker for ambulation.   Neurological: She is alert and oriented to person, place, and time.   Skin: Skin is warm and dry. No rash noted.   Psychiatric: She has a normal mood and affect. Her behavior is normal.     Pelvic Exam:  V: No lesions. No palpable nodes.   Va: no discharge or bleeding.  Good length and support  Meatus:No caruncle or stenosis  Urethra: Non tender. No suburethral masses. No hypermobility.  - JOSE L in supine position.  Cx/Cuff: Normal   Uterus: surgically absent  Ad: No mass or tenderness.  Levators :Symmetrical. Normal tone. Non tender.  BL: Non tender  RV: No hemorrhoids.      Assessment:       1. Urinary frequency    2. Mixed incontinence urge and stress    3. History of recurrent UTI (urinary tract infection)    4. Feeling of incomplete bladder emptying        Plan:       Urinary frequency- we will have her continue the myrbetriq and add vesicare 5 mg in the morning.    -     POCT urine dipstick without microscope    Mixed incontinence urge and stress- Add the vesicare as noted above.  I do not believe that she would get much noticeable difference with the pessary.  If the vesicare does not change her symptoms at all, I would recommend a CMG for evaluation.      History of recurrent UTI (urinary  tract infection)- monitor at this time.    Feeling of incomplete bladder emptying- discussed double voiding techniques    RTC 1 month

## 2019-08-14 ENCOUNTER — HOSPITAL ENCOUNTER (INPATIENT)
Facility: HOSPITAL | Age: 79
LOS: 22 days | Discharge: HOME HEALTH | DRG: 057 | End: 2019-09-05
Attending: PHYSICAL MEDICINE & REHABILITATION | Admitting: PHYSICAL MEDICINE & REHABILITATION
Payer: MEDICARE

## 2019-08-14 DIAGNOSIS — I63.9 CVA (CEREBRAL VASCULAR ACCIDENT): ICD-10-CM

## 2019-08-14 DIAGNOSIS — I63.431 CEREBROVASCULAR ACCIDENT (CVA) DUE TO EMBOLISM OF RIGHT POSTERIOR CEREBRAL ARTERY: ICD-10-CM

## 2019-08-14 LAB — POCT GLUCOSE: 169 MG/DL (ref 70–110)

## 2019-08-14 PROCEDURE — 25000003 PHARM REV CODE 250: Performed by: PHYSICAL MEDICINE & REHABILITATION

## 2019-08-14 PROCEDURE — 12800000 HC REHAB SEMI-PRIVATE ROOM

## 2019-08-14 PROCEDURE — 63600175 PHARM REV CODE 636 W HCPCS: Performed by: PHYSICAL MEDICINE & REHABILITATION

## 2019-08-14 RX ORDER — INSULIN ASPART 100 [IU]/ML
0-5 INJECTION, SOLUTION INTRAVENOUS; SUBCUTANEOUS
Status: DISCONTINUED | OUTPATIENT
Start: 2019-08-14 | End: 2019-08-25

## 2019-08-14 RX ORDER — ONDANSETRON 4 MG/1
4 TABLET, ORALLY DISINTEGRATING ORAL EVERY 6 HOURS PRN
Status: DISCONTINUED | OUTPATIENT
Start: 2019-08-14 | End: 2019-09-05 | Stop reason: HOSPADM

## 2019-08-14 RX ORDER — METOPROLOL SUCCINATE 50 MG/1
50 TABLET, EXTENDED RELEASE ORAL DAILY
Status: DISCONTINUED | OUTPATIENT
Start: 2019-08-15 | End: 2019-09-05 | Stop reason: HOSPADM

## 2019-08-14 RX ORDER — IBUPROFEN 200 MG
24 TABLET ORAL
Status: DISCONTINUED | OUTPATIENT
Start: 2019-08-14 | End: 2019-09-05 | Stop reason: HOSPADM

## 2019-08-14 RX ORDER — PANTOPRAZOLE SODIUM 40 MG/1
40 TABLET, DELAYED RELEASE ORAL DAILY
Status: DISCONTINUED | OUTPATIENT
Start: 2019-08-15 | End: 2019-09-05 | Stop reason: HOSPADM

## 2019-08-14 RX ORDER — GLUCAGON 1 MG
1 KIT INJECTION
Status: DISCONTINUED | OUTPATIENT
Start: 2019-08-14 | End: 2019-09-05 | Stop reason: HOSPADM

## 2019-08-14 RX ORDER — IBUPROFEN 200 MG
16 TABLET ORAL
Status: DISCONTINUED | OUTPATIENT
Start: 2019-08-14 | End: 2019-09-05 | Stop reason: HOSPADM

## 2019-08-14 RX ORDER — RAMELTEON 8 MG/1
8 TABLET ORAL NIGHTLY PRN
Status: DISCONTINUED | OUTPATIENT
Start: 2019-08-14 | End: 2019-09-05 | Stop reason: HOSPADM

## 2019-08-14 RX ORDER — ACETAMINOPHEN 325 MG/1
650 TABLET ORAL EVERY 6 HOURS PRN
Status: DISCONTINUED | OUTPATIENT
Start: 2019-08-14 | End: 2019-09-05 | Stop reason: HOSPADM

## 2019-08-14 RX ORDER — DONEPEZIL HYDROCHLORIDE 5 MG/1
10 TABLET, FILM COATED ORAL NIGHTLY
Status: DISCONTINUED | OUTPATIENT
Start: 2019-08-14 | End: 2019-08-15

## 2019-08-14 RX ORDER — BRIMONIDINE TARTRATE AND TIMOLOL MALEATE 2; 5 MG/ML; MG/ML
2 SOLUTION OPHTHALMIC DAILY
Status: DISCONTINUED | OUTPATIENT
Start: 2019-08-15 | End: 2019-08-15

## 2019-08-14 RX ORDER — LACTULOSE 10 G/15ML
20 SOLUTION ORAL EVERY 6 HOURS PRN
Status: DISCONTINUED | OUTPATIENT
Start: 2019-08-14 | End: 2019-09-05 | Stop reason: HOSPADM

## 2019-08-14 RX ORDER — ASPIRIN 325 MG
325 TABLET ORAL DAILY
Status: DISCONTINUED | OUTPATIENT
Start: 2019-08-15 | End: 2019-08-15

## 2019-08-14 RX ORDER — TRAVOPROST OPHTHALMIC SOLUTION 0.04 MG/ML
1 SOLUTION OPHTHALMIC NIGHTLY
Status: DISCONTINUED | OUTPATIENT
Start: 2019-08-14 | End: 2019-09-05 | Stop reason: HOSPADM

## 2019-08-14 RX ORDER — METFORMIN HYDROCHLORIDE 500 MG/1
500 TABLET ORAL 2 TIMES DAILY WITH MEALS
Status: DISCONTINUED | OUTPATIENT
Start: 2019-08-14 | End: 2019-08-14 | Stop reason: DRUGHIGH

## 2019-08-14 RX ORDER — METFORMIN HYDROCHLORIDE 500 MG/1
500 TABLET ORAL 2 TIMES DAILY WITH MEALS
Status: DISCONTINUED | OUTPATIENT
Start: 2019-08-15 | End: 2019-09-05 | Stop reason: HOSPADM

## 2019-08-14 RX ADMIN — DONEPEZIL HYDROCHLORIDE 10 MG: 5 TABLET, FILM COATED ORAL at 09:08

## 2019-08-14 RX ADMIN — RAMELTEON 8 MG: 8 TABLET, FILM COATED ORAL at 09:08

## 2019-08-14 RX ADMIN — HUMAN INSULIN 15 UNITS: 100 INJECTION, SUSPENSION SUBCUTANEOUS at 05:08

## 2019-08-14 NOTE — PLAN OF CARE
Problem: Adult Inpatient Plan of Care  Goal: Plan of Care Review  Alert, oriented, appetite is good, fall prevention ongoing, bed and chair alarm in use at all times

## 2019-08-15 LAB
ALBUMIN SERPL BCP-MCNC: 3 G/DL (ref 3.5–5.2)
ALP SERPL-CCNC: 124 U/L (ref 55–135)
ALT SERPL W/O P-5'-P-CCNC: 32 U/L (ref 10–44)
ANION GAP SERPL CALC-SCNC: 9 MMOL/L (ref 8–16)
AST SERPL-CCNC: 31 U/L (ref 10–40)
BASOPHILS # BLD AUTO: 0.01 K/UL (ref 0–0.2)
BASOPHILS NFR BLD: 0.3 % (ref 0–1.9)
BILIRUB SERPL-MCNC: 0.3 MG/DL (ref 0.1–1)
BUN SERPL-MCNC: 10 MG/DL (ref 8–23)
CALCIUM SERPL-MCNC: 9.4 MG/DL (ref 8.7–10.5)
CHLORIDE SERPL-SCNC: 104 MMOL/L (ref 95–110)
CO2 SERPL-SCNC: 29 MMOL/L (ref 23–29)
CREAT SERPL-MCNC: 0.8 MG/DL (ref 0.5–1.4)
DIFFERENTIAL METHOD: ABNORMAL
EOSINOPHIL # BLD AUTO: 0.1 K/UL (ref 0–0.5)
EOSINOPHIL NFR BLD: 2 % (ref 0–8)
ERYTHROCYTE [DISTWIDTH] IN BLOOD BY AUTOMATED COUNT: 13.7 % (ref 11.5–14.5)
EST. GFR  (AFRICAN AMERICAN): >60 ML/MIN/1.73 M^2
EST. GFR  (NON AFRICAN AMERICAN): >60 ML/MIN/1.73 M^2
ESTIMATED AVG GLUCOSE: 209 MG/DL (ref 68–131)
GLUCOSE SERPL-MCNC: 129 MG/DL (ref 70–110)
HBA1C MFR BLD HPLC: 8.9 % (ref 4–5.6)
HCT VFR BLD AUTO: 38.1 % (ref 37–48.5)
HGB BLD-MCNC: 12.2 G/DL (ref 12–16)
IMM GRANULOCYTES # BLD AUTO: 0 K/UL (ref 0–0.04)
LYMPHOCYTES # BLD AUTO: 1.4 K/UL (ref 1–4.8)
LYMPHOCYTES NFR BLD: 38.6 % (ref 18–48)
MCH RBC QN AUTO: 28.4 PG (ref 27–31)
MCHC RBC AUTO-ENTMCNC: 32 G/DL (ref 32–36)
MCV RBC AUTO: 89 FL (ref 82–98)
MONOCYTES # BLD AUTO: 0.3 K/UL (ref 0.3–1)
MONOCYTES NFR BLD: 8.6 % (ref 4–15)
NEUTROPHILS # BLD AUTO: 1.8 K/UL (ref 1.8–7.7)
NEUTROPHILS NFR BLD: 50.5 % (ref 38–73)
NRBC BLD-RTO: 0 /100 WBC
PLATELET # BLD AUTO: 196 K/UL (ref 150–350)
PMV BLD AUTO: 11.5 FL (ref 9.2–12.9)
POCT GLUCOSE: 124 MG/DL (ref 70–110)
POCT GLUCOSE: 193 MG/DL (ref 70–110)
POCT GLUCOSE: 195 MG/DL (ref 70–110)
POCT GLUCOSE: 222 MG/DL (ref 70–110)
POCT GLUCOSE: 223 MG/DL (ref 70–110)
POTASSIUM SERPL-SCNC: 3.9 MMOL/L (ref 3.5–5.1)
PROT SERPL-MCNC: 6.6 G/DL (ref 6–8.4)
RBC # BLD AUTO: 4.29 M/UL (ref 4–5.4)
SODIUM SERPL-SCNC: 142 MMOL/L (ref 136–145)
WBC # BLD AUTO: 3.5 K/UL (ref 3.9–12.7)

## 2019-08-15 PROCEDURE — 97110 THERAPEUTIC EXERCISES: CPT

## 2019-08-15 PROCEDURE — 80053 COMPREHEN METABOLIC PANEL: CPT

## 2019-08-15 PROCEDURE — 97116 GAIT TRAINING THERAPY: CPT

## 2019-08-15 PROCEDURE — 92523 SPEECH SOUND LANG COMPREHEN: CPT

## 2019-08-15 PROCEDURE — 12800000 HC REHAB SEMI-PRIVATE ROOM

## 2019-08-15 PROCEDURE — 36415 COLL VENOUS BLD VENIPUNCTURE: CPT

## 2019-08-15 PROCEDURE — 63600175 PHARM REV CODE 636 W HCPCS: Performed by: PHYSICAL MEDICINE & REHABILITATION

## 2019-08-15 PROCEDURE — 85025 COMPLETE CBC W/AUTO DIFF WBC: CPT

## 2019-08-15 PROCEDURE — 25000003 PHARM REV CODE 250: Performed by: PHYSICAL MEDICINE & REHABILITATION

## 2019-08-15 PROCEDURE — 97161 PT EVAL LOW COMPLEX 20 MIN: CPT

## 2019-08-15 PROCEDURE — 83036 HEMOGLOBIN GLYCOSYLATED A1C: CPT

## 2019-08-15 PROCEDURE — 97165 OT EVAL LOW COMPLEX 30 MIN: CPT

## 2019-08-15 PROCEDURE — 97535 SELF CARE MNGMENT TRAINING: CPT

## 2019-08-15 PROCEDURE — 97802 MEDICAL NUTRITION INDIV IN: CPT

## 2019-08-15 RX ORDER — NAPROXEN SODIUM 220 MG/1
81 TABLET, FILM COATED ORAL DAILY
Status: DISCONTINUED | OUTPATIENT
Start: 2019-08-16 | End: 2019-09-05 | Stop reason: HOSPADM

## 2019-08-15 RX ORDER — BRIMONIDINE TARTRATE AND TIMOLOL MALEATE 2; 5 MG/ML; MG/ML
1 SOLUTION OPHTHALMIC 2 TIMES DAILY
Status: DISCONTINUED | OUTPATIENT
Start: 2019-08-15 | End: 2019-09-05 | Stop reason: HOSPADM

## 2019-08-15 RX ORDER — DONEPEZIL HYDROCHLORIDE 5 MG/1
20 TABLET, FILM COATED ORAL NIGHTLY
Status: DISCONTINUED | OUTPATIENT
Start: 2019-08-15 | End: 2019-08-22

## 2019-08-15 RX ORDER — ENOXAPARIN SODIUM 100 MG/ML
40 INJECTION SUBCUTANEOUS EVERY 24 HOURS
Status: DISCONTINUED | OUTPATIENT
Start: 2019-08-15 | End: 2019-09-05 | Stop reason: HOSPADM

## 2019-08-15 RX ORDER — AMLODIPINE BESYLATE 5 MG/1
5 TABLET ORAL NIGHTLY
Status: DISCONTINUED | OUTPATIENT
Start: 2019-08-15 | End: 2019-08-16

## 2019-08-15 RX ORDER — CLONIDINE HYDROCHLORIDE 0.1 MG/1
0.1 TABLET ORAL EVERY 6 HOURS PRN
Status: DISCONTINUED | OUTPATIENT
Start: 2019-08-15 | End: 2019-09-05 | Stop reason: HOSPADM

## 2019-08-15 RX ADMIN — INSULIN ASPART 2 UNITS: 100 INJECTION, SOLUTION INTRAVENOUS; SUBCUTANEOUS at 12:08

## 2019-08-15 RX ADMIN — RAMELTEON 8 MG: 8 TABLET, FILM COATED ORAL at 09:08

## 2019-08-15 RX ADMIN — AMLODIPINE BESYLATE 5 MG: 5 TABLET ORAL at 09:08

## 2019-08-15 RX ADMIN — BRIMONIDINE TARTRATE AND TIMOLOL MALEATE 1 DROP: 2; 5 SOLUTION OPHTHALMIC at 09:08

## 2019-08-15 RX ADMIN — CLONIDINE HYDROCHLORIDE 0.1 MG: 0.1 TABLET ORAL at 09:08

## 2019-08-15 RX ADMIN — HUMAN INSULIN 15 UNITS: 100 INJECTION, SUSPENSION SUBCUTANEOUS at 07:08

## 2019-08-15 RX ADMIN — ACETAMINOPHEN 650 MG: 325 TABLET ORAL at 09:08

## 2019-08-15 RX ADMIN — TRAVOPROST 1 DROP: 0.04 SOLUTION/ DROPS OPHTHALMIC at 09:08

## 2019-08-15 RX ADMIN — PANTOPRAZOLE SODIUM 40 MG: 40 TABLET, DELAYED RELEASE ORAL at 09:08

## 2019-08-15 RX ADMIN — ASPIRIN 325 MG ORAL TABLET 325 MG: 325 PILL ORAL at 09:08

## 2019-08-15 RX ADMIN — DONEPEZIL HYDROCHLORIDE 20 MG: 5 TABLET, FILM COATED ORAL at 09:08

## 2019-08-15 RX ADMIN — METFORMIN HYDROCHLORIDE 500 MG: 500 TABLET ORAL at 04:08

## 2019-08-15 RX ADMIN — INSULIN ASPART 2 UNITS: 100 INJECTION, SOLUTION INTRAVENOUS; SUBCUTANEOUS at 04:08

## 2019-08-15 RX ADMIN — HUMAN INSULIN 15 UNITS: 100 INJECTION, SUSPENSION SUBCUTANEOUS at 09:08

## 2019-08-15 RX ADMIN — LINACLOTIDE 144 MCG: 72 CAPSULE, GELATIN COATED ORAL at 09:08

## 2019-08-15 RX ADMIN — ENOXAPARIN SODIUM 40 MG: 100 INJECTION SUBCUTANEOUS at 04:08

## 2019-08-15 RX ADMIN — BRIMONIDINE TARTRATE, TIMOLOL MALEATE 2 DROP: 2; 5 SOLUTION/ DROPS OPHTHALMIC at 09:08

## 2019-08-15 RX ADMIN — METOPROLOL SUCCINATE 50 MG: 50 TABLET, EXTENDED RELEASE ORAL at 09:08

## 2019-08-15 NOTE — PT/OT/SLP EVAL
PhysicalTherapy   Evaluation    Deonna Montero   MRN: [id number]     PT Received On: 08/15/19  PT Start Time: 1330     PT Stop Time: 1445    PT Total Time (min): 75 min       Billable Minutes:  Evaluation 45, Gait [id number] and Therapeutic Exercise 15  Total Minutes: 75    Diagnosis: <principal problem not specified>  Past Medical History:   Diagnosis Date    Back pain     Diabetes mellitus     Hypertension     Reflux     Short-term memory loss     Stroke       Past Surgical History:   Procedure Laterality Date    ARTHROPLASTY-HIP Left 5/2/2018    [facility] OR    BACK SURGERY      with tea    CHOLECYSTECTOMY      CYSTOSCOPY N/A 10/23/2017    [facility]    CYSTOSCOPY N/A 10/16/2017    [facility] at [facility] OR    napoleon      2 years ago    HYSTERECTOMY      INJECTION-STEROID-EPIDURAL-CAUDAL N/A 10/17/2017    [facility] at [facility] OR    INJECTION-STEROID-EPIDURAL-TRANSFORAMINAL Bilateral 10/16/2015    [facility] at [facility] OR    INJECTION-STEROID-EPIDURAL-TRANSFORAMINAL Left 3/13/2014    [facility] at [facility] OR    INTRAOCULAR PROSTHESES INSERTION Left 2014    SPINAL CORD STIMULATOR IMPLANT      3-4 years ago-nonfuncitioning    TOTAL HIP ARTHROPLASTY         Referring physician: Clay  Date referred to PT: 8/12/19    General Precautions: Standard,  falls      Patient History:  Lives With: child(sahil), adult  Living Arrangements: house  Home Layout: Other (see comments)(1 story home)  Transportation Anticipated: family or friend will provide  Equipment Currently Used at Home: walker, rolling, wheelchair, shower chair      Previous Level of Function:  Ambulation Skills: needs device  Transfer Skills: needs device    Subjective:  Communicated with nurse and patient prior to session.    Chief Complaint: not able to walk good   Patient goals: walk better and  go home    Pain/Comfort  Location - Side 1: Right  Location - Orientation 1: posterior  Location 1: shoulder  Pain Addressed 1: Reposition, Cessation of Activity  Pain Rating Post-Intervention 1: 3/10  Pain Rating 2: 6/10  Location - Side 2: Right  Location - Orientation 2: lower  Location 2: leg  Pain Addressed 2: Reposition, Cessation of Activity  Pain Rating Post-Intervention 2: 4/10    Objective:  Patient found sitting in  in room.    Cognitive Exam:  Oriented to: person,place and time  Safety awareness/insight to disability: intact    Physical Exam:  Postural examination/scapula alignment:    -       Rounded shoulders  -       Forward head    Skin integrity: Visible skin intact    Sensation:    -       Intact    Upper Extremity Range of Motion:  Refer to OT evaluation for UE ROM.    Upper Extremity Strength:  Refer to OT evaluation for UE strength.    Lower Extremity Range of Motion:  Right Lower Extremity: WFL  Left Lower Extremity: WFL    Lower Extremity Strength:  Right Lower Extremity: Deficits: 4/5 overall  Left Lower Extremity: WNL     Fine motor coordination:  -Intact    Gross motor coordination: WFL    Functional Mobility:    Bed Mobility :   Supine to sit: Moderate Assistance   Sit to supine: Moderate Assistance   Rolling: Moderate Assistance   Scooting: Moderate Assistance    Transfers:  Sit to stand:Maximum Assistance with Rolling Walker and not leaning forward enough  Bed <> Chair:  Stand Pivot with Maximum Assistance with Rolling Walker min assist once standing up    Wheelchair:  WC mobility x 50 feet with min assist for stearing    Gait:  Ambulated x 100 feet with RW with decreased gait speed and decreased bilateral step length using step to gait pattern.    Stairs:  Not attempted.      Balance:   Static Sit: GOOD-: Takes MODERATE challenges from all directions but inconsistently  Dynamic Sit:  FAIR+: Maintains balance through MINIMAL excursions of active trunk motion  Static Stand: POOR+: Needs  MINIMAL assist to maintain  Dynamic stand: POOR: Needs MOD (moderate) assist during gait    Endurance deficit:  Moderate LE fatigue and minimal SOB with gait x 100 feet    Additional Treatment:  Exercise in supine to include ankle pumps, SAQ, SLR; hip abduction, heel slides and quad sets.  All done bilateral LE x 12 reps with 5 second hold on isometrics.  SCIFIT x 8 minutes at load 1.  Gait training x 100 feet, x 150 feet with RW with min assist using slow step to gait pattern.    Patient left up in chair with call button in reach and chair alarm on.    Assessment:  Deonna Montero is a 78 y.o. female with a medical diagnosis of CVA with right easton.. She presents with decreased strength, decreased balance, decreased muscle endurance, multiple pain sites, fair posture and decreased independence and safety with all mobility.    Rehab potential is good.    Activity tolerance: Good    Plan: plan care intiated, bed mobility initiated, transfer training iniated, gait training, balance training and strengthening    Discharge recommendations: home, home health PT     Equipment recommendations: none    GOALS:   Multidisciplinary Problems     Physical Therapy Goals        Problem: Physical Therapy Goal    Goal Priority Disciplines Outcome Goal Variances Interventions   Physical Therapy Goal     PT, PT/OT Ongoing (interventions implemented as appropriate)     Description:  Goals to be met by: 2019    Patient will increase functional independence with mobility by performin. Supine to sit with Stand-by Assistance  2. Sit to supine with Stand-by Assistance  3. Sit to stand transfer with Stand-by Assistance  4. Bed to chair transfer with Supervision using Rolling Walker  5. Gait  x 150 feet with Supervision using Rolling Walker.   6. Wheelchair propulsion x150 feet with Modified [place] using bilateral uppper extremities                      PLAN:    Patient to be seen daily to address the above listed  problems via gait training, therapeutic activities, therapeutic exercises, therapeutic groups, wheelchair management/training, neuromuscular re-education  Plan of Care expires: 09/12/19  Plan of Care reviewed with: patient          Ge Hernandezilligan, PT 8/15/2019

## 2019-08-15 NOTE — PLAN OF CARE
Problem: Adult Inpatient Plan of Care  Goal: Plan of Care Review  Alert, oriented, appetite is good, incont of urine wears briefs. Mod - max assist with transfers at times. bs management ongoing. Bed and chair alarm in use

## 2019-08-15 NOTE — H&P
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    HPI:  78-year-old  right-hand-dominant female was initially admitted to [facility] with left-sided CVA with right hemiparesis on 07/23/2019 was hospitalized till 07/26/2019 for neuro workup including CT scan showed no acute changes, carotids without any significant stenosis, MRI consistent with left pontine as well as occipital ischemic infarcts.  Patient has been medically stabilized and transferred to swing bed with steady improvement and is now transferred down to  for further rehabilitation.    Past Medical History:  Hypertension, insulin-dependent diabetes mellitus, hyperlipidemia, overactive bladder, chronic constipation, left eye enucleation, degenerative joint disease, left eye surgery, tonsillectomy, cholecystectomy, hysterectomy, low back surgery, left hip pain.    Social History:  Lives in a private house along with her son, daughter and the and grandson.  Patient claims she was independent and active taking care of herself prior to this hospitalization.    Family History:  Non-contributory.    Review of Systems: Non-Contributory.    Current Medications:   Current Facility-Administered Medications:     acetaminophen tablet 650 mg, 650 mg, Oral, Q6H PRN, Nicky Diamond MD    aspirin chewable tablet 81 mg, 81 mg, Oral, Daily, Nicky Diamond MD    brimonidine-timolol 0.2-0.5 % ophthalmic solution 1 drop, 1 drop, Right Eye, BID, Nicky Diamond MD    dextrose 50% injection 12.5 g, 12.5 g, Intravenous, PRN, Nicky Diamond MD    dextrose 50% injection 25 g, 25 g,  Intravenous, PRN, Gollamudi H. MD Lobo    donepezil tablet 20 mg, 20 mg, Oral, QHS, Gollamudi H. MD Lobo    enoxaparin injection 40 mg, 40 mg, Subcutaneous, Daily, Gollamudi H. MD Lobo    glucagon (human recombinant) injection 1 mg, 1 mg, Intramuscular, PRN, Gollamudi H. MD Lobo    glucose chewable tablet 16 g, 16 g, Oral, PRN, Gollamudi H. MD Lobo    glucose chewable tablet 24 g, 24 g, Oral, PRN, Gollamudi H. MD Lobo    insulin aspart U-100 pen 0-5 Units, 0-5 Units, Subcutaneous, QID (AC + HS) PRN, Gollamudi H. MD Lobo    insulin NPH injection 15 Units, 15 Units, Subcutaneous, BID AC, Gollamudi H. MD Lobo, 15 Units at 08/15/19 0910    lactulose 20 gram/30 mL solution Soln 20 g, 20 g, Oral, Q6H PRN, Gollamudi H. MD Lobo    linaCLOtide capsule 144 mcg, 144 mcg, Oral, Daily, Gollamudi H. MD Lobo, 144 mcg at 08/15/19 0910    metFORMIN tablet 500 mg, 500 mg, Oral, BID WM, Gollamudi H. MD Lobo    metoprolol succinate (TOPROL-XL) 24 hr tablet 50 mg, 50 mg, Oral, Daily, Gollamudi H. MD Lobo, 50 mg at 08/15/19 0910    ondansetron disintegrating tablet 4 mg, 4 mg, Oral, Q6H PRN, Gollamudi H. MD Lobo    pantoprazole EC tablet 40 mg, 40 mg, Oral, Daily, Gollamudi H. MD Lobo, 40 mg at 08/15/19 0910    ramelteon tablet 8 mg, 8 mg, Oral, Nightly PRN, Gollamudi H. MD Lobo, 8 mg at 08/14/19 2113    travoprost 0.004 % ophthalmic solution 1 drop, 1 drop, Both Eyes, QHS, Gollamudi H. MD Lobo    Allergies:    Review of patient's allergies indicates:   Allergen Reactions    Codeine Anaphylaxis and Itching    Morphine Itching    Morphine (pf) Itching       Physical Exam:  Alert, oriented x3, voices no complaints.  Follows commands well.  Head and neck is atraumatic, normocephalic, no jugular venous distention noted.  Lungs are clear to auscultation.  Heart with regular rate and rhythm.  Abdomen is soft, nontender, bowel sounds noted.  Extremities without any edema or calf tenderness  noted.  Neurologically speech presents with mild dysarthria questionable dysphagia problems.  Cranial nerve exam presents with the left eye blindness, mild left 7th central impairment.  Rest of the cranial nerve exam appears to be grossly intact.  Sensory examination presents with right eastno sensory impairment.  Deep tendon reflexes are 1 to 2+ and equal in bilateral upper and lower extremities.  Motor wise has functional active to passive range of motion with good motor strength throughout.  Coordination presents with mild dysmetria with rapid alternating movements.  Urinary incontinence, needs assistance with basic ADLs, transfers, mobility.    Assessment/Plan:     Deonna Montero is a 78 y.o. female being admitted to inpatient rehabilitation on 8/14/2019 for left-sided CVA with impaired mobility and ADLs.   Hypertension.  Insulin-dependent diabetes mellitus.  Hyperlipidemia.  Degenerative joint disease.  Left eye enucleation.    Plan:  Will have PT, OT, speech therapy and nursing evaluation 3 hr per day for range of motion, PREs, bed mobility, safe functional transfers, ADL, cognitive, adaptive device evaluation, high level ADLs, balance, coordination training, monitor her blood pressures, blood sugars, family training, gait training and be able to discharge her home.          Nicky Diamond MD  Department of Physical Medicine & Rehab   [facility]    Post Admission Assessment:    Deonna Montero is a 78 y.o. female being admitted to inpatient rehabilitation on 8/14/2019 for left-sided CVA with impaired mobility and ADLs.   Patient is appropriate for inpatient rehabilitation and is expected to tolerate 3 hours of therapy a day or 15 hours of therapy a week.  Patient is expected to benefit from the following therapy services: Physical Therapy, Occupational Therapy,Speech Therapy and nursing evaluation.  Goals: Supervision to Mod I with Mobility, ADLs, Transfers using LRAD    Precautions:  Fall, Aspiration.  ELOS:  2-3 weeks.  Disposition: Home with family     I have had the opportunity to examine the patient within 24 hours of admission and have reviewed the Pre-Admission Assessment and find it consistent with my examination and evaluation of the patient. I confirm that this patient is appropriate for admission and treatment in this inpatient rehabilitation hospital, needs intense interdisciplinary rehabilitation care under my direction and is expected to achieve meaningful goals within a reasonable period of time that are consistent with the planned discharge disposition.     The patient will be admitted for inpatient comprehensive interdisciplinary rehabilitation to address the impairments and medical conditions listed above while assessing equipment needs and compensatory strategies, with coordinated interdisciplinary services that will include physical therapy, occupational therapy, and close monitoring and treatment with 24-hour rehabilitative nursing. This interdisciplinary program will be performed under the direction of a physiatrist.

## 2019-08-15 NOTE — ASSESSMENT & PLAN NOTE
Contributing Nutrition Diagnosis  Altered nutrition related lab values    Related to (etiology):   Inconsistent carbohydrate intake    Signs and Symptoms (as evidenced by):   Elevated glucose    Interventions/Recommendations (treatment strategy):  DM diet    Nutrition Diagnosis Status:   New

## 2019-08-15 NOTE — PROGRESS NOTES
"[facility]  Adult Nutrition  Progress Note    SUMMARY    Intervention: Nutrition supplement therapy-commercial beverage, texture modified diet, and nutrition education    Recommendation:   1) Change diet to No salt added, 3-4 carbohydrate servings/meal, texture per SLP - cardiac once appetite improves   2) Add Boost glucose control BID   3) Weigh pt weekly     Goals: 1) PO intakes > 50% of meals and supplements at f/u   Nutrition Goal Status: new  Communication of RD Recs: (POC, sticky note, diet message, second sign)    Reason for Assessment    Reason For Assessment: consult  Diagnosis: (CVA)  Relevant Medical History: HTN, DM2, GERD, dementia, CVA  Interdisciplinary Rounds: attended    General Information Comments: 79 y/o female admitted to rehab s/p CVA. Pt with a fair appetite today, states she knows she was,"not eating as much as she should have been," PTA. Discussed heart healthy/diabetic, mechanical soft diet with pt and left menu/handouts in room for pt and family as pt with dementia. Agreeable to supplements. NFPE done 8/15/19, no wasting seen. Insignificant wt loss noted in chart.    Nutrition Discharge Planning: To be determined DM diet ( 3-4 carb servings/meal), no salt added, texture per SLP 9 cardiac if apppetite improves)    Nutrition Risk Screen    Nutrition Risk Screen: no indicators present    Nutrition/Diet History    Patient Reported Diet/Restrictions/Preferences: diabetic diet  Spiritual, Cultural Beliefs, Sikhism Practices, Values that Affect Care: no  Factors Affecting Nutritional Intake: decreased appetite, difficulty/impaired swallowing    Anthropometrics    Temp: 96.5 °F (35.8 °C)  Height Method: Measured  Height: 5' 8"  Height (inches): 68 in  Weight Method: Bed Scale  Weight: 96.2 kg (212 lb 1.3 oz)  Weight (lb): 212.08 lb  Ideal Body Weight (IBW), Female: 140 lb  % Ideal Body Weight, Female (lb): 151.49 lb  BMI (Calculated): 32.3  BMI Grade: 30 - 34.9- obesity - " grade I  Usual Body Weight (UBW), k.6 kg(18)  % Usual Body Weight: 95.83  % Weight Change From Usual Weight: -4.37 %       Lab/Procedures/Meds    Pertinent Labs Reviewed: reviewed  BMP  Lab Results   Component Value Date     08/15/2019    K 3.9 08/15/2019     08/15/2019    CO2 29 08/15/2019    BUN 10 08/15/2019    CREATININE 0.8 08/15/2019    CALCIUM 9.4 08/15/2019    ANIONGAP 9 08/15/2019    ESTGFRAFRICA >60 08/15/2019    EGFRNONAA >60 08/15/2019     POCT Glucose   Date Value Ref Range Status   08/15/2019 223 (H) 70 - 110 mg/dL Final   08/15/2019 124 (H) 70 - 110 mg/dL Final   2019 195 (H) 70 - 110 mg/dL Final   2019 169 (H) 70 - 110 mg/dL Final     Lab Results   Component Value Date    HGBA1C 8.9 (H) 08/15/2019       Pertinent Medications Reviewed: reviewed  Pertinent Medications Comments: lactulose, insulin, metformin, zofran    Estimated/Assessed Needs    Weight Used For Calorie Calculations: 96.2 kg (212 lb 1.3 oz)  Energy Calorie Requirements (kcal): [place] St Jeor ( no activity factor obesity) = 1495 kcal  Energy Need Method: [place]-St Jeor  Protein Requirements: 0.8 g protein/kg ( 77 g protein)  Weight Used For Protein Calculations: 96.2 kg (212 lb 1.3 oz)  Fluid Requirements (mL): 25 ml/kg = 2400 ml  Estimated Fluid Requirement Method: other (see comments)  CHO Requirement: 45-50%      Nutrition Prescription Ordered    Current Diet Order: mechanical Soft    Evaluation of Received Nutrient/Fluid Intake    Energy Calories Required: not meeting needs  Protein Required: not meeting needs  Fluid Required: meeting needs  Tolerance: tolerating  % Intake of Estimated Energy Needs: 45%  % Meal Intake: 30%  Nutrition Risk    Level of Risk/Frequency of Follow-up: moderate 2 x weekly    Assessment and Plan    Type 2 diabetes mellitus without complication  Contributing Nutrition Diagnosis  Altered nutrition related lab values    Related to (etiology):   Inconsistent carbohydrate  intake    Signs and Symptoms (as evidenced by):   Elevated glucose    Interventions/Recommendations (treatment strategy):  DM diet    Nutrition Diagnosis Status:   New       Inadequate energy intake  R/t decreased appetite  As evidenced by PO intakes < 50% of EEN x 1 day  New    Monitor and Evaluation    Food and Nutrient Intake: energy intake, food and beverage intake  Food and Nutrient Adminstration: diet order  Anthropometric Measurements: weight  Biochemical Data, Medical Tests and Procedures: electrolyte and renal panel, glucose/endocrine profile  Nutrition-Focused Physical Findings: overall appearance     Malnutrition Assessment     Skin (Micronutrient): (Aníbal = 16)  Teeth (Micronutrient): (With teeth)   Micronutrient Evaluation: no deficiencies               Edema (Fluid Accumulation): 0-->no edema present   Subcutaneous Fat Loss (Final Summary): well nourished  Muscle Loss Evaluation (Final Summary): well nourished         Nutrition Follow-Up    RD Follow-up?: Yes

## 2019-08-15 NOTE — PLAN OF CARE
Problem: Oral Intake Inadequate  Goal: Improved Oral Intake    Intervention: Promote and Optimize Oral Intake  Intervention: Nutrition supplement therapy-commercial beverage, texture modified diet, and nutrition education     Recommendation:   1) Change diet to No salt added, 3-4 carbohydrate servings/meal, texture per SLP - cardiac once appetite improves   2) Add Boost glucose control BID   3) Weigh pt weekly      Goals: 1) PO intakes > 50% of meals and supplements at f/u   Nutrition Goal Status: new  Communication of RD Recs: (POC, sticky note, diet message, second sign)

## 2019-08-15 NOTE — PT/OT/SLP EVAL
Speech Language Pathology  Evaluation    Deonna Montero   MRN: [id number]   Admitting Diagnosis: Status post new onset left pontine and occipital ischemic infarct with dysarthria, balance coordination problems.    Diet recommendations: Solid Diet Level: (P) Chopped meat, Dental Soft  Liquid Diet Level: (P) Thin   · Medications in puree followed by sips of liquid 2/2 esophageal dysmotility noted on MBS   · Remain upright 30 mins following meals    SLP Treatment Date: 08/15/19  Speech Start Time: 0945     Speech Stop Time: 1050     Speech Total (min): 65 min       TREATMENT BILLABLE MINUTES:  Eval 65     Diagnosis: Status post new onset left pontine and occipital ischemic infarct with dysarthria, balance coordination problems.    SLP diagnosis: mild cognitive linguistic deficit and dysarthria    HPI:  78-year-old  right-hand-dominant female was initially admitted to [facility] with left-sided CVA with right hemiparesis on 07/23/2019 was hospitalized till 07/26/2019 for neuro workup including CT scan showed no acute changes, carotids without any significant stenosis, MRI consistent with left pontine as well as occipital ischemic infarcts.  Patient has been medically stabilized and transferred to swing bed with steady improvement and is now transferred down to us for further rehabilitation.    Past Medical History:   Diagnosis Date    Back pain     Diabetes mellitus     Hypertension     Reflux     Short-term memory loss     Stroke      Past Surgical History:   Procedure Laterality Date    ARTHROPLASTY-HIP Left 5/2/2018    [facility] OR    BACK SURGERY      with tea    CHOLECYSTECTOMY      CYSTOSCOPY N/A 10/23/2017    [facility] OR    CYSTOSCOPY N/A 10/16/2017    Performed by Sanjana Zambrano MD at [facility] OR    nap[place]on      2 years ago    HYSTERECTOMY      INJECTION-STEROID-EPIDURAL-CAUDAL N/A 10/17/2017    Performed by  "[facility]ley [facility] at [facility] OR    INJECTION-STEROID-EPIDURAL-TRANSFORAMINAL Bilateral 10/16/2015    [facility] at [facility] OR    INJECTION-STEROID-EPIDURAL-TRANSFORAMINAL Left 3/13/2014    [facility] at [facility] OR    INTRAOCULAR PROSTHESES INSERTION Left 2014    SPINAL CORD STIMULATOR IMPLANT      3-4 years ago-nonfuncitioning    TOTAL HIP ARTHROPLASTY         Has the patient been evaluated by SLP for swallowing? : (P) Yes  Keep patient NPO?: (P) No   General Precautions: Standard, (P) fall(Simultaneous filing. User may not have seen previous data.)    Current Respiratory Status: (P) room air     Social History:  Lives in a private house along with her son, daughter and the and grandson.  Patient claims she was independent and active taking care of herself prior to this hospitalization.       Subjective:  "Why do I talk so loud?"  Patient goals: Improve speech production .    Pain/Comfort  Pain Rating 1: (P) 0/10(Simultaneous filing. User may not have seen previous data.)     Objective:      Assessment completed using Blair Cognitive Assessment (MOCA) and Cognitive Linguistic Quick Test (CLQT) with Pt achieving score of 21/30 on MOCA and composite severity rating of 3.4 on CLQT indicating mild cognitive linguistic deficits 2/2 moderate attention deficit & mild memory and visuospatial deficits. It should be noted that composite score may not reflect Pt's true abilities as baseline vision impairment appeared to affect performance in measuring tasks (symbol cancellation and maze). Executive function and Language considered WNL.       Cognitive Status:  Behavioral Observations: alert and appropriate-  Memory   Immediate: Intact as demonstrated by Pt's ability to repeat sentences, numerical items, and 5/5 unrelated words    Short-term: Pt able to recall 3/5 items ind'ly increasing to 5/5 with multiple choice cue. Pt demonstrates functional recall by providing detailed information such as " recent medical history, stating names (MD/PT/OT/NSG) and recent daily events (meals/previous therapies/etc.). 6/10 possible points on story retelling, answering Y/N questions re: material w/ 100% acc.    Long-term: Intact; Pt provided detailed biographical and medical history   Orientation: orientedx4  Attention: moderate deficit on CLQT  Problem Solving: recommend functional problem solving assessment   Pragmatics: WNL  Executive Function: WNL-CLQT    Language: WNL- CLQT     Auditory Comprehension: able to identify objects, answers yes/no questions and able to follow commands    Verbal Expression: Intact naming, automatic speech, repetition, fluency and conversational speech    Motor Speech: dyarthria warranting further assessment however, perceptual speech characteristics noted within evaluation appear consistent with hyperkinetic dysarthia.    Voice: no dysphonia     Reading: Intact oral reading at sentence level     Writing: DNT    Visual-Spatial: Mild impairment per CLQT      Assessment:  Deonna Montero is a 78 y.o. female with a medical diagnosis of Status post new onset left pontine and occipital ischemic infarct and presents with Dysarthria most closely classified as Hyperkinetic 2/2 lesion location and perceptual features. Mild cognitive linguistic impairment c/b deficits in attention, memory, and visuospatial skills.          Discharge recommendations:       Goals:   Multidisciplinary Problems     SLP Goals        Problem: SLP Goal    Goal Priority Disciplines Outcome   SLP Goal     SLP Ongoing (interventions implemented as appropriate)   Description:  1. Demonstrate understanding of speech subsystems, STBY A  2. Complete controlled exhalation tasks to enhance control of respiration for speech production, MOD A   3. Coordinate initiation of phonation with exhalation to improve naturalness of speech production, MOD A  4. Progressing from multisyllabic words to connected utterances, Pt will  demonstrate control of speech subsystems in order to achieve appropriate coordination of speech subsystems for natural speech production, MAX A- pending goals 2 &3   5. Participate in assessment of functional problem solving                      Plan:   Patient to be seen Therapy Frequency: (P) 5 x/week  Planned Interventions: (P) Motor Speech Therapy, Cognitive-Linguistic Therapy  Plan of Care expires:    Plan of Care reviewed with:                   Tao Washington CCC-SLP  08/15/2019

## 2019-08-15 NOTE — PLAN OF CARE
Problem: Physical Therapy Goal  Goal: Physical Therapy Goal  Goals to be met by: 2019    Patient will increase functional independence with mobility by performin. Supine to sit with Stand-by Assistance  2. Sit to supine with Stand-by Assistance  3. Sit to stand transfer with Stand-by Assistance  4. Bed to chair transfer with Supervision using Rolling Walker  5. Gait  x 150 feet with Supervision using Rolling Walker.   6. Wheelchair propulsion x150 feet with Modified [place] using bilateral uppper extremities    Outcome: Ongoing (interventions implemented as appropriate)  Patient plans on being discharged home to stay with family.  Patient already owns RW and WC for home mobility.

## 2019-08-15 NOTE — PLAN OF CARE
Problem: SLP Goal  Goal: SLP Goal  1. Demonstrate understanding of speech subsystems, STBY A  2. Complete controlled exhalation tasks to enhance control of respiration for speech production, MOD A   3. Coordinate initiation of phonation with exhalation to improve naturalness of speech production, MOD A  4. Progressing from multisyllabic words to connected utterances, Pt will demonstrate control of speech subsystems in order to achieve appropriate coordination of speech subsystems for natural speech production, MAX A- pending goals 2 &3   5. Participate in assessment of functional problem solving   Outcome: Ongoing (interventions implemented as appropriate)  POC initiated

## 2019-08-15 NOTE — PLAN OF CARE
Problem: Adult Inpatient Plan of Care  Goal: Plan of Care Review  Outcome: Ongoing (interventions implemented as appropriate)  Patient awake/alert. No c/o pain noted this shift. Incontinence noted at times. Free from falls. Plan of care continued.

## 2019-08-16 LAB
POCT GLUCOSE: 109 MG/DL (ref 70–110)
POCT GLUCOSE: 170 MG/DL (ref 70–110)
POCT GLUCOSE: 180 MG/DL (ref 70–110)

## 2019-08-16 PROCEDURE — 12800000 HC REHAB SEMI-PRIVATE ROOM

## 2019-08-16 PROCEDURE — 92507 TX SP LANG VOICE COMM INDIV: CPT

## 2019-08-16 PROCEDURE — 25000003 PHARM REV CODE 250: Performed by: PHYSICAL MEDICINE & REHABILITATION

## 2019-08-16 PROCEDURE — 97530 THERAPEUTIC ACTIVITIES: CPT

## 2019-08-16 PROCEDURE — 63600175 PHARM REV CODE 636 W HCPCS: Performed by: PHYSICAL MEDICINE & REHABILITATION

## 2019-08-16 PROCEDURE — 97535 SELF CARE MNGMENT TRAINING: CPT

## 2019-08-16 PROCEDURE — 97542 WHEELCHAIR MNGMENT TRAINING: CPT

## 2019-08-16 PROCEDURE — 92610 EVALUATE SWALLOWING FUNCTION: CPT

## 2019-08-16 PROCEDURE — 97116 GAIT TRAINING THERAPY: CPT

## 2019-08-16 PROCEDURE — 97110 THERAPEUTIC EXERCISES: CPT

## 2019-08-16 RX ORDER — AMLODIPINE BESYLATE 5 MG/1
10 TABLET ORAL DAILY
Status: DISCONTINUED | OUTPATIENT
Start: 2019-08-17 | End: 2019-09-05 | Stop reason: HOSPADM

## 2019-08-16 RX ORDER — POLYETHYLENE GLYCOL 3350 17 G/17G
17 POWDER, FOR SOLUTION ORAL DAILY
Status: DISCONTINUED | OUTPATIENT
Start: 2019-08-16 | End: 2019-08-23

## 2019-08-16 RX ORDER — AMLODIPINE BESYLATE 5 MG/1
5 TABLET ORAL ONCE
Status: COMPLETED | OUTPATIENT
Start: 2019-08-16 | End: 2019-08-16

## 2019-08-16 RX ADMIN — HUMAN INSULIN 15 UNITS: 100 INJECTION, SUSPENSION SUBCUTANEOUS at 04:08

## 2019-08-16 RX ADMIN — LINACLOTIDE 145 MCG: 145 CAPSULE, GELATIN COATED ORAL at 09:08

## 2019-08-16 RX ADMIN — AMLODIPINE BESYLATE 5 MG: 5 TABLET ORAL at 09:08

## 2019-08-16 RX ADMIN — METFORMIN HYDROCHLORIDE 500 MG: 500 TABLET ORAL at 06:08

## 2019-08-16 RX ADMIN — METFORMIN HYDROCHLORIDE 500 MG: 500 TABLET ORAL at 09:08

## 2019-08-16 RX ADMIN — ENOXAPARIN SODIUM 40 MG: 100 INJECTION SUBCUTANEOUS at 06:08

## 2019-08-16 RX ADMIN — TRAVOPROST 1 DROP: 0.04 SOLUTION/ DROPS OPHTHALMIC at 08:08

## 2019-08-16 RX ADMIN — DONEPEZIL HYDROCHLORIDE 20 MG: 5 TABLET, FILM COATED ORAL at 08:08

## 2019-08-16 RX ADMIN — BRIMONIDINE TARTRATE AND TIMOLOL MALEATE 1 DROP: 2; 5 SOLUTION OPHTHALMIC at 08:08

## 2019-08-16 RX ADMIN — POLYETHYLENE GLYCOL (3350) 17 G: 17 POWDER, FOR SOLUTION ORAL at 09:08

## 2019-08-16 RX ADMIN — METOPROLOL SUCCINATE 50 MG: 50 TABLET, EXTENDED RELEASE ORAL at 09:08

## 2019-08-16 RX ADMIN — INSULIN ASPART 1 UNITS: 100 INJECTION, SOLUTION INTRAVENOUS; SUBCUTANEOUS at 08:08

## 2019-08-16 RX ADMIN — BRIMONIDINE TARTRATE AND TIMOLOL MALEATE 1 DROP: 2; 5 SOLUTION OPHTHALMIC at 09:08

## 2019-08-16 RX ADMIN — PANTOPRAZOLE SODIUM 40 MG: 40 TABLET, DELAYED RELEASE ORAL at 09:08

## 2019-08-16 RX ADMIN — ASPIRIN 81 MG CHEWABLE TABLET 81 MG: 81 TABLET CHEWABLE at 09:08

## 2019-08-16 RX ADMIN — HUMAN INSULIN 15 UNITS: 100 INJECTION, SUSPENSION SUBCUTANEOUS at 06:08

## 2019-08-16 NOTE — PT/OT/SLP PROGRESS
Occupational Therapy  Treatment    Deonna Montero   MRN: [id number]   Admitting Diagnosis: Status post new onset left pontine and occipital ischemic infarct with dysarthria, balance coordination problems.     OT Date of Treatment: 08/16/19   Total Time (min): 20 min      Billable Minutes:  Self Care/Home Management 20  Total Minutes: 20    General Precautions: Standard, aspiration, fall, vision impaired  Orthopedic Precautions: N/A  Braces: N/A         Subjective:  Communicated with nurse prior to session.    Pain Rating 1: 0/10                   Objective:  Patient found with: (w/c seat belt)    Functional Mobility:  Transfer Training:  W/c<>toilet with Moderate Assist with use of wall bar.    Toilet Training:  ModA .  Pt able to wipe self post urination.  She was able to partially perform pushing clothing up and down.      Balance:   Static Sit: SBA /Supervision sitting supported.  Dynamic Sit: CGA/SBA   Static Stand: Beto  Dynamic stand:ModA      Additional Treatment:  Pt instructed in safe sit<>stand and step turn transfer sequences for body position and hand placement sequence.      Patient left up in chair with call button in reach and chair alarm on.    ASSESSMENT:  Deonna Montero demonstrated increased ability to follow instructions for sit<>stand with flexing forward and maintaining head in a flexed position.  She was able to better assist in stepping to right and left with decreased fear of falling and to release hands for assist in managing LE clothing.    Rehab potential is good    Activity tolerance: Good    Discharge recommendations: home     Equipment recommendations: (TBD)     GOALS:   Multidisciplinary Problems     Occupational Therapy Goals        Problem: Occupational Therapy Goal    Goal Priority Disciplines Outcome Interventions   Occupational Therapy Goal     OT, PT/OT Ongoing (interventions implemented as appropriate)    Description:  Goals to be met by:    Patient will  increase functional independence with ADLs by performing:    Feeding with Modified [place].  UE Dressing with Set-up Assistance.  LE Dressing with Stand-by Assistance with set up of clothes.  Grooming while seated with Modified [place].  Toileting from bedside commode over the toilet with Supervision for hygiene and clothing management.   Toilet transfer to bedside commode over the toilet with Contact Guard Assistance.  Bathing from  shower chair/bench with Minimal Assistanc.  Shower transfer to TTB with CGA and verbal instructions.                    Plan:  Patient to be seen 6 x/week to address the above listed problems via self-care/home management, community/work re-entry, therapeutic activities, therapeutic exercises, therapeutic groups, neuromuscular re-education, wheelchair management/training  Plan of Care expires:    Plan of Care reviewed with: patient         Sonya Hamilton OT  08/16/2019

## 2019-08-16 NOTE — PLAN OF CARE
Problem: Adult Inpatient Plan of Care  Goal: Plan of Care Review  Outcome: Ongoing (interventions implemented as appropriate)  AAOx3 tolerating therapy well no acute distress noted

## 2019-08-16 NOTE — PLAN OF CARE
Problem: Occupational Therapy Goal  Goal: Occupational Therapy Goal  Goals to be met by:    Patient will increase functional independence with ADLs by performing:    Feeding with Modified [place].  UE Dressing with Set-up Assistance.  LE Dressing with Stand-by Assistance with set up of clothes.  Grooming while seated with Modified [place].  Toileting from bedside commode over the toilet with Supervision for hygiene and clothing management.   Toilet transfer to bedside commode over the toilet with Contact Guard Assistance.  Bathing from  shower chair/bench with Minimal Assistanc.  Shower transfer to TTB with CGA and verbal instructions.   Outcome: Ongoing (interventions implemented as appropriate)  OT tx for self care and functional mobility.

## 2019-08-16 NOTE — PLAN OF CARE
Problem: SLP Goal  Goal: SLP Goal  1. Demonstrate understanding of speech subsystems, STBY A  2. Complete controlled exhalation tasks to enhance control of respiration for speech production, MOD A   3. Coordinate initiation of phonation with exhalation to improve naturalness of speech production, MOD A  4. Progressing from multisyllabic words to connected utterances, Pt will demonstrate control of speech subsystems in order to achieve appropriate coordination of speech subsystems for natural speech production, MAX A- pending goals 2 &3   5. Participate in assessment of functional problem solving    Outcome: Ongoing (interventions implemented as appropriate)  2. Clinician measured ability to sustain vowel -- today 2-3 seconds.  4.  Coordinated exhalation with speech production 75% independently on three-syllable words  5.  Assessment of functional problem solving initiated.      Comments: Cooperative, pleasant, alert.  Spontaneous conversational speech 95% intelligible.

## 2019-08-16 NOTE — NURSING
Dr. Diamond informed of pt increasing BP and pain level. BP-197/88.  Pt states pain to lt shoulder blade.  Ordered Clonodine 0.1mg now/ PRN(with parameters) q 6 and Norvasc 5mg now/HS.  Hydrocodone 5mg q 6 PRN ordered for pain but pt refused and states she doesn't feel comfortable taking due to allergy to Codeine.

## 2019-08-16 NOTE — PATIENT CARE CONFERENCE
Weekly Staffing Report      Date Admitted: 8/14/2019 :   Staffing Date: 8/16/2019     Patient Active Problem List   Diagnosis    DDD (degenerative disc disease)    Postlaminectomy syndrome of lumbar region    Lumbar radiculopathy    SI (sacroiliac) joint dysfunction    DDD (degenerative disc disease), lumbar    Benign essential HTN    Constipation    Recurrent UTI    Primary osteoarthritis of hips, bilateral    Back pain of lumbar region with sciatica    Primary osteoarthritis of left hip    Type 2 diabetes mellitus without complication    Short-term memory loss    CVA (cerebral vascular accident)          Team Members Present:  Physician Team Member: Dr Diamond  Nursing Team Member: Bernardo Leon RN  Case Management Team Member: Teri Sloan RN  PT Team Member: Chris Megilligan PT  OT Team Member: Sonya Hamilton OT  SLP Team Member: Nellie VIVEROS  Other (Discipline and Name): Ayana Toth RD    Nursing  Skin: Intact  Bowel: Incontinent  Bladder:incontinent  Diet: diabetic, 1800 calorie, mechanical soft.  Appetite: good   Pain Level: 7/10    Physical Therapy  Supine to Sit:  moderate assist  Sit to Stand: maximal assist  Gait:  feet minimal assist Rolling walker  Wheelchair Mobility: 25-50 feet minimal assist  Stairs: N/A      Occupational Therapy  Feeding: standy by assistance  Grooming:N/A  UED: standy by assistance  LED: total assist  Bathing:moderate assist   Toileting:total assist  Toilet Transfer:  total assist  Tub Transfer:  total assist      Speech Therapy  Swallow: Oropharyngeal dysphagia  MMS:21/30  Memory: supervision  Receptive Language: independent  Expressive Language: supervision  Problem solving: supervision            Goals:  Sup. To SBA.      Tolerates 3 hours of therapy: Yes    Discharge Destination: Home with H/H.    Estimated Length of Stay: 3 weeks.

## 2019-08-16 NOTE — PLAN OF CARE
"Met with patient to complete social assessment.  Patient provided she was able to bathe and toilet herself independently, she ambulated with a walker and used a w/c at times.  She stated when she would cook she did so at the w/c level.  She stated she would walk with the walker to the mailbox but the mailman started bringing her mail to the door.  Patient states she lives with her son and his wife and their school aged son.  She states both son and daughter in law work.  Patient's home is a [facility] with no steps.  She stated she had a lady come twice a week to help with cleaning and washing clothes.  She stated her neighbor and son would help with the grocery shopping.  Patient reports she has a walk in shower with built in seat and portable seat along with handrails in the shower.  Patient has no respiratory needs and she does have a glucometer at home to monitor BS and states she is able to give herself insulin.  Patient stated she had HH, [facility] in Nov after her hip surgery.  PCP is Dr. Bubba Yin.  Pharmacy is [facility] in [place].  Role of CM explained to patient.  Contact numbers for family:son "SHIRLEY" [phone], garrick Angela [phone] and garrick Lopes [phone].  Patient voiced no questions.      Patient asked for CM to call son and ask him to bring her phone and pants.  Call placed to son, no answer.    Spoke with garrick Lopes confirmed above information patient provided.  Provided information on what family can expect while patient is on Rehab.  Communicated with Marianne patient was asking for pants and her cell phone, she stated she would let her son know.  No questions voiced.      CM will assist with discharge planning and needs as needed.      "

## 2019-08-16 NOTE — PROGRESS NOTES
"REHAB FOLLOWUP NOTE    Deonna Montero is a 78 y.o. female patient.    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    History:  78-year-old  right-hand-dominant female was initially admitted to [facility] with left-sided CVA with right hemiparesis on 07/23/2019 was hospitalized till 07/26/2019 for neuro workup including CT scan showed no acute changes, carotids without any significant stenosis, MRI consistent with left pontine as well as occipital ischemic infarcts.  Patient has been medically stabilized and transferred to swing bed with steady improvement and is now transferred down to us for further rehabilitation.    Past Medical History:   Diagnosis Date    Back pain     Diabetes mellitus     Hypertension     Reflux     Short-term memory loss     Stroke      Temp: 96.6 °F (35.9 °C) (08/15/19 1957)  Pulse: 66 (08/16/19 0000)  Resp: 18 (08/15/19 1957)  BP: 134/71 (08/16/19 0000)  SpO2: 95 % (08/15/19 2121)  Weight: 96.2 kg (212 lb 1.3 oz) (08/15/19 1334)  Height: 5' 8" (172.7 cm) (08/15/19 1339)    Lab Results   Component Value Date    WBC 3.50 (L) 08/15/2019    HGB 12.2 08/15/2019    HCT 38.1 08/15/2019     08/15/2019    ALT 32 08/15/2019    AST 31 08/15/2019     08/15/2019    K 3.9 08/15/2019     08/15/2019    CREATININE 0.8 08/15/2019    BUN 10 08/15/2019    CO2 29 08/15/2019    INR 1.1 04/19/2018    HGBA1C 8.9 (H) 08/15/2019     Physical Examination:  Alert, oriented x3, follows commands well.  Lungs are clear to auscultation.  Heart with regular rate and rhythm.  Abdomen is soft, nontender, bowel sounds noted.  Complaints of constipation.  Extremities without any edema or calf tenderness noted.  Blood pressure elevated adjust medications.  Needs assistance with basic ADLs, transfers, mobility.    Impression:  Status post left-sided CVA with right " hemiparesis.  Hypertension.  Insulin-dependent diabetes mellitus.  Hyperlipidemia.  Degenerative joint disease.  Left eye enucleation.    Recommendations:  Continue current PT, OT, speech therapy and nursing care.          Nicky Diamond MD  8/16/2019

## 2019-08-16 NOTE — PT/OT/SLP EVAL
Occupational Therapy  Evaluation    Deonna Montero   MRN: [id number]   Admitting Diagnosis: Status post new onset left pontine and occipital ischemic infarct with dysarthria, balance coordination problems.  OT Date of Treatment: 08/15/19   OT Start Time: 1055  OT Stop Time: 1225  OT Total Time (min): 90 min    Billable Minutes:  Evaluation 75 and Self Care/Home Management 15  Total Minutes:90    Diagnosis:  Physician's HPI:  78-year-old  right-hand-dominant female was initially admitted to [facility] with left-sided CVA with right hemiparesis on 07/23/2019 was hospitalized till 07/26/2019 for neuro workup including CT scan showed no acute changes, carotids without any significant stenosis, MRI consistent with left pontine as well as occipital ischemic infarcts.  Patient has been medically stabilized and transferred to swing bed with steady improvement and is now transferred down to us for further rehabilitation.    Past Medical History:   Diagnosis Date    Back pain     Diabetes mellitus     Hypertension     Reflux     Short-term memory loss     Stroke       Past Surgical History:   Procedure Laterality Date    ARTHROPLASTY-HIP Left 5/2/2018    [facility] OR    BACK SURGERY      with tea    CHOLECYSTECTOMY      CYSTOSCOPY N/A 10/23/2017    Performed by [facility] at [facility] OR    CYSTOSCOPY N/A 10/16/2017    Performed by [facility]ana [facility]brano [facility] at [facility] OR    nap[place]on      2 years ago    HYSTERECTOMY      INJECTION-STEROID-EPIDURAL-CAUDAL N/A 10/17/2017    Performed by Justen Farley MD at [facility] OR    INJECTION-STEROID-EPIDURAL-TRANSFORAMINAL Bilateral 10/16/2015    Performed by [facility]en Farley MD at [facility] OR    INJECTION-STEROID-EPIDURAL-TRANSFORAMINAL Left 3/13/2014    Performed by [facility]en [facility]ley [facility] at [facility] OR    INTRAOCULAR PROSTHESES INSERTION Left 2014    SPINAL CORD STIMULATOR IMPLANT      3-4 years ago-nonfuncitioning    TOTAL HIP  ARTHROPLASTY         Referring physician: Dr Diamond  Date referred to OT: 08/14/2019    General Precautions: Standard, aspiration, diabetic, fall(Prosthetic left eye.)  Orthopedic Precautions: N/A  Braces: N/A    Spiritual, Cultural Beliefs, Nondenominational Practices, Values that Affect Care: no     Patient History:  Living Environment  Lives With: child(sahil), adult(Son, daughter in law and 13 year old grandson.)  Living Arrangements: house  Home Layout: Able to live on 1st floor(one story house)  Transportation Anticipated: family or friend will provide  Living Environment Comment: Pt has a standard toilet.  (Pt reports she has a lady to clean her house and she sometimes will prepare basic meal.  Her son and daughter in law perform all the housework.  Her neices prepare meals and freeze them for pt during the day as her son and daugher in law work. )  Equipment Currently Used at Home: shower chair, walker, rolling    Prior level of function:    Bed Mobility/Transfers: independent  Grooming: independent  Bathing: independent  Upper Body Dressing: independent  Lower Body Dressing: independent  Toileting: independent  Homemaking Responsibilities: No  Driving License: No  Mode of Transportation: Car, Friends, Family  Occupation: Retired  Type of Occupation:      Dominant hand: right    Subjective:  Communicated with nurse prior to session.    Chief Complaint: weakness  Patient/Family stated goals:Pt wants to take care of herself.     Pain/Comfort  Pain Rating 1: 0/10    Objective:  Patient found with: (w/c belt.)    Cognitive Exam:  Oriented to: Person, Place, Time and Situation  Follows Commands/attention: Follows one-step commands, but needs repetition at times.  Communication: dysarthria.  Speaks slowly and enunciates her words.  Memory:  Impaired STM  Safety awareness/insight to disability: impaired  Coping skills/emotional control: Appropriate to situation    Visual/perceptual:  Pt has L prosthetic eye and  impaired peripheral vision.    Physical Exam:  Postural examination/scapula alignment:    -       Rounded shoulders  -       Forward head  Skin integrity: Dry  Edema: None noted UEs.    Sensation:   WFL    Upper Extremity Range of Motion:  Right Upper Extremity:WFL with R shoulder slightly limited  Left Upper Extremity:WFL    Upper Extremity Strength:  Right Upper Extremity: 3/5 for shoulder rotation and 4-/5 other shoulder movements.  4/5 distally.  Left Upper Extremity: 4/5     Strength:WFL bilaterally    Fine motor coordination:   WFL for self care.  Pt has long finger nails that interfere with fine motor use    Gross motor coordination:  WFL     Functional Mobility:  Bed Mobility:   Not assessed.    Transfers:  Dependent with assist x2 for safety for w/c<>3n1 over toilet and w/c<>shower with grab bar.  ModA sit<>stand from the w/c with OTR assist per gait belt.    Feeding:  Set up.  Verbal instructions required for bite size, speed of eating, chin tuck and alternating sips of drink.    Grooming:  Not assessed today.    Bathing:  Mod A with use of TTB with cut out and wall bar and extended shower hose.    UE Dressing:  Set up for pull over shirt only.    LE Dressing:  Assist of 2 required for standing balance and to maneuver the pants up to the waist.  Pt dependent to button and zip her pants.  Pt dependent to reach and don socks.    Toileting:  Pt dependent to reach and clean herself.  She reports she stood at home to clean self and was unable to reach in sitting or standing today.  She required assist of 2 for clothing management.    Balance:   Static Sit: SBA /Supervision sitting supported.  Dynamic Sit: CGA/SBA   Static Stand: Beto  Dynamic stand: MaxA to assist of 2 for safety    Additional Treatment:  Pt instructed in rehab program. Pt training in self care, functional mobility and safety initiated with edgardo montiel.  Pt's niece who was present at end of session was instructed in the cues to provide to  patient while feeding herself.  She was able to repeat them.  She was instructed to call the nurse per the call button for any concerns.      Patient left up in chair with chair alarm on, nurse notified and pt's niece  present to supervise pt for the rest of the meal.      Assessment:  Deonna Montero is a 78 y.o. 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   Pt presents with ADL/self care deficits, mobility deficits, balance deficits, visual deficits, impaired LB reach and impaired activity tolerance.  Pt will benefit from skilled OT tx to improve all areas of self care and functional mobility from dependency/SBA to CGA to Mod I level and return to living at home with her son, daughter in law and  Grandson.      Rehab potential is good    Activity tolerance: Fair to good with rest.    Discharge recommendations: home     Equipment recommendations: (TBD)     GOALS:   Multidisciplinary Problems     Occupational Therapy Goals        Problem: Occupational Therapy Goal    Goal Priority Disciplines Outcome Interventions   Occupational Therapy Goal     OT, PT/OT     Description:  Goals to be met by:    Patient will increase functional independence with ADLs by performing:    Feeding with Modified [place].  UE Dressing with Set-up Assistance.  LE Dressing with Stand-by Assistance with set up of clothes.  Grooming while seated with Modified [place].  Toileting from bedside commode over the toilet with Supervision for hygiene and clothing management.   Toilet transfer to bedside commode over the toilet with Contact Guard Assistance.  Bathing from  shower chair/bench with Minimal Assistanc.  Shower transfer to TTB with CGA and verbal instructions.                    PLAN: Patient to be seen 6 x/week to address the above listed problems via self-care/home management, community/work re-entry, therapeutic  activities, therapeutic exercises, therapeutic groups, neuromuscular re-education, cognitive retraining, wheelchair management/training  Plan of Care expires:    Plan of Care reviewed with: patient(niece)         Sonya Hamilton, OT  08/15/2019

## 2019-08-16 NOTE — PLAN OF CARE
Overall Plan of Care      Deonna Montero   MRN: [id number]  Admit Date/Time: 8/14/2019  1:06 PM   Admitting Diagnosis:  Status post new onset left pontine and occipital ischemic infarct.    Estimated Length of Stay (days):  2-3 weeks.    1.  Medical Prognosis:     I have reviewed each team member's Treatment Plan and the current list of barriers/impairments and limitations for this patient.  I approve the goals and recommendations outlined in the Transdisciplinary Plan of Care     1. Assessment:      Medical Prognosis       [ x  ]  Improvement expected in admitting condition, with associated             Functional improvement       [   ] Medical condition is chronic or deteriorating, but the patient          Should have functional improvement       [   ]  Other:        Medical/Nursing Interventions:    [x  ]  Fall Prevention Program                                 [x  ]  Adequate Nutrition/Hydration    [x  ]  Monitor Skin Condition                                   [ x ]  Adaptive Equipment    [ x ]  Implement Rehab Therapy Goals                  [ x ]  Bowel and Bladder Program    [  ]  Monitor Surgical Site Healing                         [  ]  Manage Swallowing disorder    [ x ]  Manage Swallowing Disorder                         [ x ]  Pain Management    [ x ]  Effectiveness of Medications                          [x  ]  Patient / Family Education    [  ]  Manage Risk of UTI ([facility])                   [x  ]  Diabetic Teaching, Blood Sugar                                                                                      Checks / Insulin Administration    [  ]  Peg Tube Feeds                                             [ x ]  Frequent Vitals/Neuro                                                                                       Assessments and/or Changes     [  ] Trach Care/Education           [  ]  Ostomy Care/Education      Therapy Plan:    Physical Therapy:  Intensity (hrs/day): 1-1.5  Frequency  (day/wk:) 7  Estimated Discharge Date: 09/12/19     PT Treatment Plan:  Plan: gait training, therapeutic activities, therapeutic exercises, therapeutic groups, wheelchair management/training, neuromuscular re-education      Occupational Therapy:  Intensity (hrs/day): 1-1.5  Frequency (day/wk:) 7  Estimated Discharge Date:       OT Treatment Plan:  OT Plan: self-care/home management, community/work re-entry, therapeutic activities, therapeutic exercises, therapeutic groups, neuromuscular re-education, cognitive retraining, wheelchair management/training    SLP:  Intensity (hrs/day): 1-1.5  Frequency (day/wk:) 5  Estimated Discharge Date:      SLP Treatment Plan:  SLP Plan: Therapy Frequency: (P) 5 x/week      Therapy Recommendations:    Therapy Discharge Recommendations: home     Therapy Equipment Recommendations: (TBD)          Anticipated Functional Outcome:    [  ]  Independent    [  ]  Modified Independent    [ x ]  Requiring Supervision / Assistance      Discharge Planning:    Discharge Disposition:    [x ]  Home with Home Health    [  ]  Home with Oupatient    [  ]  Assisted Living Facility      Team Goal:    To enable the patient's safe return to the home or a community based environment upon discharge.       Nicky Diamond MD  08/16/2019  9:25 AM

## 2019-08-16 NOTE — PLAN OF CARE
Problem: Oral Intake Inadequate  Goal: Improved Oral Intake  Goals to be met by:     Patient will increase functional independence with ADLs by performing:    Feeding with Modified [place].  UE Dressing with Set-up Assistance.  LE Dressing with Stand-by Assistance.  Grooming while standing with Supervision.  Toileting from toilet with Assistive Devices as needed for hygiene and clothing management.   Toilet transfer to toilet with Contact Guard Assistance.  Bathing from  shower chair/bench with CGA.  Shower/tub transfer with CGA  OT for evaluation and initiation of tx.

## 2019-08-16 NOTE — PLAN OF CARE
Problem: Physical Therapy Goal  Goal: Physical Therapy Goal  Goals to be met by: 2019    Patient will increase functional independence with mobility by performin. Supine to sit with Stand-by Assistance  2. Sit to supine with Stand-by Assistance  3. Sit to stand transfer with Stand-by Assistance  4. Bed to chair transfer with Supervision using Rolling Walker  5. Gait  x 150 feet with Supervision using Rolling Walker.   6. Wheelchair propulsion x150 feet with Modified [place] using bilateral uppper extremities     Outcome: Ongoing (interventions implemented as appropriate)    PT for there ex, gait, w/c and activity.

## 2019-08-16 NOTE — PT/OT/SLP PROGRESS
"Speech Language Pathology  Speech-Language Treatment and Bedside Swallow Eval            Deonna Montero   MRN: [id number]     Diet recommendations: Solid Diet Level: Mechanical soft( IDDSI 5), Chopped meat  Liquid Diet Level: Thin   Aspiration precautions (based on results of modified barium swallow of 7/30/19 and bedside swallow eval of 8/16/19):  1 bite/sip at a time, Alternating bites/sips, Avoid talking while eating, Chin tucked slightly during swallows, Eliminate distractions, HOB to 90 degrees (preferably out of bed and in chair at 90 degrees for meals), Meds whole 1 at a time in pudding consistency followed by sips of liquid, based on modified barium swallow study report; and Small bites/sips; remain up for 30 minutes after meals. Denture plate in for meals.    SLP Treatment Date: 08/16/19     First Visit (Speech-Language/Cognitive therapy)  Speech Start Time: 0905     Speech Stop Time: 0949(seen for additional minutes later in day)     Minutes: 44 min       Second visit (Swallow Eval Bedside)  Start: 1150  Stop: 1221  Minutes: 31    SPEECH-LANGUAGE TREATMENT BILLABLE MINUTES: 44  CLINICAL SWALLOW EVALUATION BILLABLE MINUTES: 31    TOTAL MINUTES: 75    Speech Therapy Individual 44 and Eval Swallow and Oral Function 31    General Precautions: Standard, aspiration, fall  Current Respiratory Status: room air       Subjective:  "I'm so loud." (pt feels her voice is loud; having some difficulty regulating prosody)    Objective:    Patient found with: (chair alarm).  Pt participated in motor speech tx tasks and verbal problem solving tasks.  During second visit, pt's swallow was evaluated bedside in order to determine whether any change in diet may be appropriate. See assessment and plan of care sections below for details.    Pain/Comfort  Pain Rating 1: 0/10    Assessment:  Deonna Montero is a 78 y.o. female with a SLP diagnosis of Dysarthria and Cognitive-Linguistic Impairment.  Modified barium " swallow at prior hospital 7/30/19 reported mild oral and mild-mod pharyngeal dysphagia with signs possibly consistent with esophageal dysphagia dysphagia.    Tjkyck-Ouhjnimi-Aufzndiph tx Today:  Pt was alert and verbal at conversation level.  Pt states she lost her glasses at prior hospital.  Overall intelligibility in spontaneous speech judged 95%.  She sustained vocalization for 2-3 seconds (impaired).  She demonstrated weak articulatory contacts during therapeutic assessment of diadochokinetic rates:  Produced /p/ x 21 in 10 seconds; produced /t/ x 26 in 10 seconds; produced /k/ x 23 in 10 seconds (speed and strength of all three consonant productions were impaired).    She was educated regarding use of breath control/support for speech. She coordinated exhalation with speech production 70% independently on three-syllable words (100% max assist/model). She was able to imitate clinician's prosody in production of the 3-syllable words 80%. Abnormal volume was demonstrated x1.    Assessment of functional problem solving was initiated. Pt identified safety issues in functional daily living activity photos independently 80%.  She demonstrated understanding that she cannot get out of bed unassisted.  She demonstrated ability to locate nurse call button.  She reported she was having difficulty remembering whether she had taken her meds at home.    Bedside Swallow Evaluation    Oral Mechanism: tongue grossly midline; able to lateralized tongue to mouth corners; upper plate; some dentition in lower jaw. Pucker mildly reduced in strength/tightness with mild droop at upper/left corner of upper lip; spontaneous smile slightly reduced in retraction on right; smile on request within functional limits.    Voice/larynx: functional, reduced in intensity (able to cough on command; speaking voice is soft but audible in quiet room).  Voice dry and clear before and after intake (pt evaluated with lunch tray).    Dry swallow: present  "based on palpated upward laryngeal movement, with delay.    Pt report:  No spontaneous reports of difficulty. Upon questioning, pt reports occasional "squirt" of saliva back into her mouth; denies burning; on questioning, indicates she occasionally needs to swallow more than once.  Wears her upper plate for meals and removes plate at night.    Consistencies tested/results: No overt sign of aspiration but signs consistent with oral-pharyngeal dysphagia as documented in 7/30/19 modified barium swallow study report.    A) chopped, cooked squash and zucchini  --moderately extended mastication, trace mid-lingual residue; occasional second swallow to clear    B) chopped/minced fish  --moderately extended mastication, trace mid-lingual residue; occasional second swallow to clear    C) mashed potato  --moderately extended mastication, trace mid-lingual residue    D) thin liquid (punch)  --self-regulated straw sips within functional limits    Summary of swallow assessment results:  No overt sign of aspiration; slow eater/extended mastication; bolus/spoon-size for food functional to slightly larger than appropriate; trace lingual residue; occasionally appears to need two swallows to clear.    Strategies trialed:  Based on modified barium swallow study note of 7/30/19 pt was cued to use slight chin tuck during swallows to assist in airway protection/assist clearance of residue; patient reported chin tuck to be helpful.  However there was no overt sign of aspiration/cough even when chin tuck was not utilized.    Diet recommendations:     IDDSI 5 Mechanical soft/well-chopped meats  Liquid Diet Level: Thin    Aspiration precautions (based on results of modified barium swallow of 7/30/19 and bedside swallow eval of 8/16/19):  1 bite/sip at a time, Alternating bites/sips, Avoid talking while eating, Chin tucked slightly during swallows, Eliminate distractions, HOB to 90 degrees (preferably out of bed and in chair at 90 degrees for " meals), Meds whole 1 at a time in pudding consistency followed by sips of liquid as tolerated, based on modified barium swallow; and Small bites/sips; remain up for 30 minutes after meals. Denture plate in for all meals.    Discharge recommendations: Discharge Facility/Level of Care Needs: (to be determined)     Goals:   Multidisciplinary Problems     SLP Goals        Problem: SLP Goal    Goal Priority Disciplines Outcome   SLP Goal     SLP Ongoing (interventions implemented as appropriate)   Description:  1. Demonstrate understanding of speech subsystems, STBY A  2. Complete controlled exhalation tasks to enhance control of respiration for speech production, MOD A   3. Coordinate initiation of phonation with exhalation to improve naturalness of speech production, MOD A  4. Progressing from multisyllabic words to connected utterances, Pt will demonstrate control of speech subsystems in order to achieve appropriate coordination of speech subsystems for natural speech production, MAX A- pending goals 2 &3   5. Participate in assessment of functional problem solving                      Plan:   Patient to be seen Therapy Frequency: 5 x/week   Planned Interventions: Cognitive-Linguistic Therapy, Motor Speech Therapy, Dysphagia Evaluation(dysphagia tx if warranted)  Plan of Care Expires:    Plan of Care reviewed with: patient  SLP Follow-up?: Yes  SLP - Next Visit Date: 08/17/19           Nellie Piña CCC-SLP 8/16/2019

## 2019-08-16 NOTE — PLAN OF CARE
Problem: Adult Inpatient Plan of Care  Goal: Plan of Care Review  Outcome: Ongoing (interventions implemented as appropriate)  BP @MN-134/71.  Incontinent of bladder.  Calls for assistance.  Pericare and brief changed per CNA and nurse  Turns in bed independently.    Swallows pills whole with applesauce. Pain to lt shoulder blade managed with Tylenol 650mg.  Administered Rozerem 8mg per order.  Slept well during the night.  Bed alarm activated.

## 2019-08-16 NOTE — PT/OT/SLP PROGRESS
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    OT Date of Treatment: 08/16/19   Total Time (min): 60 min      Billable Minutes:  Self Care/Home Management 30 and Therapeutic Activity 30  Total Minutes: 60    General Precautions: Standard, fall  Orthopedic Precautions: N/A  Braces: N/A    Spiritual, Cultural Beliefs, Roman Catholic Practices, Values that Affect Care: no    Subjective:  Communicated with nursing prior to session.    Pain/Comfort  Pain Rating 1: 0/10    Objective:   Patient found sitting in her wheelchair at bedside.  Cooperative, not impulsive.    Grooming:  Patient peformed hand washing with Supervision or Set-up Assistance at sitting at sink.  Patient performed face washing with Supervision or Set-up Assistance at sitting at sink.  Patient performed oral hygeine with Supervision or Set-up Assistance at sitting at sink.  Patient performe hair grooming with Supervision or Set-up Assistance at sitting at sink.      Additional Treatment:  Patient was closely supervised with self propelling of herself via wheelchair with staff escort and SBA with verbal cues for technique.  Challenged patient to assemble an over sized puzzle at table top, in 30 mins time, patient has 25 % of puzzle together with SBA from [employer].   Left eye patch was removed during activity, returned once session was done.    Patient left up in chair with all lines intact, call button in reach, chair alarm on and nursing notified    ASSESSMENT:  Deonna Montero is a 78 y.o. 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 .    Rehab potential is excellent    Activity tolerance: Good      GOALS:   Multidisciplinary Problems     Occupational Therapy Goals     Not on file                 Plan:  Patient to be seen 6 x/week to address the above listed problems via self-care/home management, community/work re-entry, therapeutic activities, therapeutic exercises, therapeutic groups, neuromuscular re-education, cognitive retraining, wheelchair management/training  Plan of Care reviewed with: patient         Mayo Kiara, [facility]  08/16/2019

## 2019-08-16 NOTE — PT/OT/SLP PROGRESS
Physical Therapy         Treatment        Deonna Montero   MRN: [id number]     PT Received On: 08/16/19  Total Time (min): 65        Billable Minutes:  Gait [id number], Therapeutic Activity 20, Therapeutic Exercise 20 and Train/Wheelchair Management 10  Total Minutes: 65    Treatment Type: Treatment  PT/PTA: PT             General Precautions: Standard, aspiration, fall, vision impaired  Orthopedic Precautions: Orthopedic Precautions : N/A   Braces: Braces: N/A         Subjective:  Communicated with nurse Caldwell prior to session.    Pain/Comfort  Pain Rating 1: 5/10  Location - Orientation 1: lower  Location 1: back  Pain Addressed 1: Reposition, Cessation of Activity, Distraction, Pre-medicate for activity  Pain Rating Post-Intervention 1: 5/10    Objective:  Patient found sitting up in w/c in room.   Patient found with: (w/c alarm)    Functional Mobility:  Bed Mobility:   Supine to sit: Moderate Assistance   Sit to supine: Moderate Assistance   Rolling: Activity did not occur   Scooting: Minimal Assistance    Transfer Training:  Sit to stand:Moderate Assistance with Rolling Walker & vc  Bed <> Chair:  Step Transfer with Minimal Assistance and Moderate Assistance with bed rail & vc  Toilet Transfer:  Pt Step Transfer with Minimal Assistance with Grab bars & vc    Wheelchair Training:  Pt propelled Standard wheelchair x 125 feet on Level tile with  Bilateral upper extremity and Bilateral lower extremity with Minimal Assistance.     Gait Training:  Gait training on level tile with Rolling Walker and Minimal Assistance for balance x 75' & 66'.    Additional Treatment:  Pt performed B LE there ex sitting x 30 reps with 2# wt and green TB, with vc for proper execution and joint protection: ap, laq, marching, hip abd/add, ham curls.    Toileting with transfers as noted above and Dep for hygiene and clothing management.      Activity Tolerance:  Patient tolerated treatment well, Patient limited by fatigue and Patient  limited by pain    Patient left HOB elevated with call button in reach, bed alarm on and nurse notified.    Assessment:  Deonna Montero is a 78 y.o. female with a medical diagnosis of CVA. She presents with impaired functional mobility, generalized weakness, pain, visual impairment, impaired balance and dysarthria. Pt is motivated to return to prior level of function.    Rehab potential is fair.    Activity tolerance: Fair    Discharge recommendations:       Equipment recommendations:       GOALS:   Multidisciplinary Problems     Physical Therapy Goals        Problem: Physical Therapy Goal    Goal Priority Disciplines Outcome Goal Variances Interventions   Physical Therapy Goal     PT, PT/OT Ongoing (interventions implemented as appropriate)     Description:  Goals to be met by: 2019    Patient will increase functional independence with mobility by performin. Supine to sit with Stand-by Assistance  2. Sit to supine with Stand-by Assistance  3. Sit to stand transfer with Stand-by Assistance  4. Bed to chair transfer with Supervision using Rolling Walker  5. Gait  x 150 feet with Supervision using Rolling Walker.   6. Wheelchair propulsion x150 feet with Modified [place] using bilateral uppper extremities                      PLAN:    Patient to be seen daily  to address the above listed problems via gait training, therapeutic activities, therapeutic exercises, therapeutic groups, neuromuscular re-education, wheelchair management/training  Plan of Care expires: 19  Plan of Care reviewed with: patient         Mara Roy, PT 2019

## 2019-08-17 LAB
POCT GLUCOSE: 140 MG/DL (ref 70–110)
POCT GLUCOSE: 213 MG/DL (ref 70–110)
POCT GLUCOSE: 215 MG/DL (ref 70–110)
POCT GLUCOSE: 217 MG/DL (ref 70–110)

## 2019-08-17 PROCEDURE — 97110 THERAPEUTIC EXERCISES: CPT

## 2019-08-17 PROCEDURE — 25000003 PHARM REV CODE 250: Performed by: PHYSICAL MEDICINE & REHABILITATION

## 2019-08-17 PROCEDURE — 97535 SELF CARE MNGMENT TRAINING: CPT

## 2019-08-17 PROCEDURE — 97530 THERAPEUTIC ACTIVITIES: CPT

## 2019-08-17 PROCEDURE — 97116 GAIT TRAINING THERAPY: CPT

## 2019-08-17 PROCEDURE — 63600175 PHARM REV CODE 636 W HCPCS: Performed by: PHYSICAL MEDICINE & REHABILITATION

## 2019-08-17 PROCEDURE — 12800000 HC REHAB SEMI-PRIVATE ROOM

## 2019-08-17 PROCEDURE — 92526 ORAL FUNCTION THERAPY: CPT

## 2019-08-17 PROCEDURE — 92507 TX SP LANG VOICE COMM INDIV: CPT

## 2019-08-17 PROCEDURE — 81003 URINALYSIS AUTO W/O SCOPE: CPT

## 2019-08-17 RX ADMIN — BRIMONIDINE TARTRATE AND TIMOLOL MALEATE 1 DROP: 2; 5 SOLUTION OPHTHALMIC at 08:08

## 2019-08-17 RX ADMIN — ASPIRIN 81 MG CHEWABLE TABLET 81 MG: 81 TABLET CHEWABLE at 09:08

## 2019-08-17 RX ADMIN — DONEPEZIL HYDROCHLORIDE 20 MG: 5 TABLET, FILM COATED ORAL at 08:08

## 2019-08-17 RX ADMIN — POLYETHYLENE GLYCOL (3350) 17 G: 17 POWDER, FOR SOLUTION ORAL at 09:08

## 2019-08-17 RX ADMIN — TRAVOPROST 1 DROP: 0.04 SOLUTION/ DROPS OPHTHALMIC at 08:08

## 2019-08-17 RX ADMIN — HUMAN INSULIN 15 UNITS: 100 INJECTION, SUSPENSION SUBCUTANEOUS at 06:08

## 2019-08-17 RX ADMIN — INSULIN ASPART 2 UNITS: 100 INJECTION, SOLUTION INTRAVENOUS; SUBCUTANEOUS at 05:08

## 2019-08-17 RX ADMIN — PANTOPRAZOLE SODIUM 40 MG: 40 TABLET, DELAYED RELEASE ORAL at 09:08

## 2019-08-17 RX ADMIN — METFORMIN HYDROCHLORIDE 500 MG: 500 TABLET ORAL at 09:08

## 2019-08-17 RX ADMIN — METFORMIN HYDROCHLORIDE 500 MG: 500 TABLET ORAL at 05:08

## 2019-08-17 RX ADMIN — HUMAN INSULIN 15 UNITS: 100 INJECTION, SUSPENSION SUBCUTANEOUS at 05:08

## 2019-08-17 RX ADMIN — BRIMONIDINE TARTRATE AND TIMOLOL MALEATE 1 DROP: 2; 5 SOLUTION OPHTHALMIC at 09:08

## 2019-08-17 RX ADMIN — LINACLOTIDE 145 MCG: 145 CAPSULE, GELATIN COATED ORAL at 09:08

## 2019-08-17 RX ADMIN — ENOXAPARIN SODIUM 40 MG: 100 INJECTION SUBCUTANEOUS at 05:08

## 2019-08-17 RX ADMIN — METOPROLOL SUCCINATE 50 MG: 50 TABLET, EXTENDED RELEASE ORAL at 09:08

## 2019-08-17 RX ADMIN — INSULIN ASPART 2 UNITS: 100 INJECTION, SOLUTION INTRAVENOUS; SUBCUTANEOUS at 12:08

## 2019-08-17 RX ADMIN — INSULIN ASPART 1 UNITS: 100 INJECTION, SOLUTION INTRAVENOUS; SUBCUTANEOUS at 08:08

## 2019-08-17 RX ADMIN — AMLODIPINE BESYLATE 10 MG: 5 TABLET ORAL at 09:08

## 2019-08-17 NOTE — PLAN OF CARE
Problem: Physical Therapy Goal  Goal: Physical Therapy Goal  Goals to be met by: 2019    Patient will increase functional independence with mobility by performin. Supine to sit with Stand-by Assistance  2. Sit to supine with Stand-by Assistance  3. Sit to stand transfer with Stand-by Assistance  4. Bed to chair transfer with Supervision using Rolling Walker  5. Gait  x 150 feet with Supervision using Rolling Walker.   6. Wheelchair propulsion x150 feet with Modified [place] using bilateral uppper extremities     Outcome: Ongoing (interventions implemented as appropriate)    PT for there ex, w/c, gait and activity

## 2019-08-17 NOTE — PROGRESS NOTES
"REHAB FOLLOWUP NOTE    Deonna Montero is a 78 y.o. female patient.    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    History:  78-year-old  right-hand-dominant female was initially admitted to [facility] with left-sided CVA with right hemiparesis on 07/23/2019 was hospitalized till 07/26/2019 for neuro workup including CT scan showed no acute changes, carotids without any significant stenosis, MRI consistent with left pontine as well as occipital ischemic infarcts.  Patient has been medically stabilized and transferred to swing bed with steady improvement and is now transferred down to us for further rehabilitation.    Past Medical History:   Diagnosis Date    Back pain     Diabetes mellitus     Hypertension     Reflux     Short-term memory loss     Stroke      Temp: 97.7 °F (36.5 °C) (08/17/19 0750)  Pulse: 84 (08/17/19 0750)  Resp: 18 (08/17/19 0750)  BP: 128/65 (08/17/19 0750)  SpO2: 95 % (08/17/19 0750)  Weight: 96.2 kg (212 lb 1.3 oz) (08/15/19 1334)  Height: 5' 8" (172.7 cm) (08/15/19 1339)    Lab Results   Component Value Date    WBC 3.50 (L) 08/15/2019    HGB 12.2 08/15/2019    HCT 38.1 08/15/2019     08/15/2019    ALT 32 08/15/2019    AST 31 08/15/2019     08/15/2019    K 3.9 08/15/2019     08/15/2019    CREATININE 0.8 08/15/2019    BUN 10 08/15/2019    CO2 29 08/15/2019    INR 1.1 04/19/2018    HGBA1C 8.9 (H) 08/15/2019     Physical Examination:  Alert, oriented x3, follows commands well.  Lungs are clear to auscultation.  Heart with regular rate and rhythm.  Extremities without any edema or calf tenderness noted.  Blood pressure and blood sugars controlled.  Complaints of frequency and incontinence.  Would go ahead and check her urine.  Needs assistance with basic ADLs, transfers, mobility.    Impression:  Status post left-sided CVA with right " hemiparesis.  Hypertension.  Insulin-dependent diabetes mellitus.  Hyperlipidemia.  Degenerative joint disease.  Left eye enucleation.    Recommendations:  Continue current PT, OT, speech therapy and nursing care.          Nicky Diamond MD  8/17/2019

## 2019-08-17 NOTE — PLAN OF CARE
Problem: Adult Inpatient Plan of Care  Goal: Plan of Care Review  Outcome: Ongoing (interventions implemented as appropriate)  Plan of care reviewed with patient and patient verbalized understanding.  Safety maintained, call light within reach, bed locked and in low position, side rails up x3.

## 2019-08-17 NOTE — PT/OT/SLP PROGRESS
Physical Therapy         Treatment        Deonna Montero   MRN: [id number]     PT Received On: 08/17/19  Total Time (min): 60        Billable Minutes:  Gait Training 10, Therapeutic Activity 10, Therapeutic Exercise 35 and Train/Wheelchair Management 5  Total Minutes: 60    Treatment Type: Treatment  PT/PTA: PT             General Precautions: Standard, aspiration, fall, vision impaired  Orthopedic Precautions: Orthopedic Precautions : N/A   Braces: Braces: N/A         Subjective:  Communicated with CYNDEE Chang prior to session.    Pain/Comfort  Pain Rating 1: 4/10  Location - Orientation 1: lower  Location 1: back  Pain Addressed 1: Reposition, Distraction  Pain Rating Post-Intervention 1: 4/10    Objective:  Patient found sitting up in w/c in room after breakfast.  Patient found with: (w/c alarm)    Functional Mobility:  Transfer Training:  Sit to stand:Minimal Assistance with Rolling Walker & vc    Wheelchair Training:  Pt propelled Standard wheelchair x 150 feet on Level tile with  Bilateral upper extremity and Bilateral lower extremity with Minimal Assistance and Moderate Assistance.     Gait Training:  Gait training on level tile with Rolling Walker and CGA for safety x 100 feet    Additional Treatment:  Pt performed B LE there ex sitting x 30 reps with 3# wt and blue TB, with vc for proper execution and joint protection: ap, laq, marching, hip abd/add, ham curls.    SciFit stepper x 10 min level 1    Activity Tolerance:  Patient tolerated treatment well, Patient limited by fatigue and Patient limited by pain    Patient left up in chair with call button in reach, chair alarm on and nurse notified.    Assessment:  Deonna Montero is a 78 y.o. female with a medical diagnosis of CVA. She presents with impaired functional mobility, balance, activity tolerance, strength, vision with gait instability. Pt's sit <> stand transfer improved today and balance/stability improved with gait, as well.    Rehab  potential is fair.    Activity tolerance: Fair    Discharge recommendations:       Equipment recommendations:       GOALS:   Multidisciplinary Problems     Physical Therapy Goals        Problem: Physical Therapy Goal    Goal Priority Disciplines Outcome Goal Variances Interventions   Physical Therapy Goal     PT, PT/OT Ongoing (interventions implemented as appropriate)     Description:  Goals to be met by: 2019    Patient will increase functional independence with mobility by performin. Supine to sit with Stand-by Assistance  2. Sit to supine with Stand-by Assistance  3. Sit to stand transfer with Stand-by Assistance  4. Bed to chair transfer with Supervision using Rolling Walker  5. Gait  x 150 feet with Supervision using Rolling Walker.   6. Wheelchair propulsion x150 feet with Modified [place] using bilateral uppper extremities                      PLAN:    Patient to be seen daily  to address the above listed problems via gait training, therapeutic activities, therapeutic exercises, therapeutic groups, neuromuscular re-education, wheelchair management/training  Plan of Care expires: 19  Plan of Care reviewed with: patient         Mara Kirill, PT 2019

## 2019-08-17 NOTE — PT/OT/SLP PROGRESS
Speech Language Pathology Treatment          Deonna Montero   MRN: [id number]     Diet recommendations: Solid Diet Level: Mechanical soft, Chopped meat  Liquid Diet Level: Thin 1 bite/sip at a time, Alternating bites/sips, Avoid talking while eating, Chin tuck, Eliminate distractions, Meds whole buried in puree and Remain upright 30 minutes post meal    SLP Treatment Date: 08/17/19  Speech Start Time: 1408     Speech Stop Time: 1508     Speech Total (min): 60 min       TREATMENT BILLABLE MINUTES:  Speech Therapy Individual 50 and Treatment Swallowing Dysfunction 10    General Precautions: Standard, aspiration, fall, vision impaired  Current Respiratory Status: room air       Subjective:  I was waiting for you.    Objective:   Patient found in bed. Patient found with: bed alarm.  See Care plan for tx details.  Pt seen for a few minutes before lunch, assured she had Swallowing Guidelines posted, dentures in place.   Also completed OMEx and artic drills.      Assessment:  Deonna Montero is a 78 y.o. female with a SLP diagnosis of Dysphagia, Dysarthria and Cognitive-Linguistic Impairment. She presented with good effort, continuing improvement in articulatory precision & some improvement in cognitive function for functional problem solving.  Cont POC    Discharge recommendations: Discharge Facility/Level of Care Needs: (to be determined)     Goals:   Multidisciplinary Problems     SLP Goals        Problem: SLP Goal    Goal Priority Disciplines Outcome   SLP Goal     SLP Ongoing (interventions implemented as appropriate)   Description:  1. Demonstrate understanding of speech subsystems, STBY A  2. Complete controlled exhalation tasks to enhance control of respiration for speech production, MOD A   3. Coordinate initiation of phonation with exhalation to improve naturalness of speech production, MOD A  4. Progressing from multisyllabic words to connected utterances, Pt will demonstrate control of speech  subsystems in order to achieve appropriate coordination of speech subsystems for natural speech production, MAX A- pending goals 2 &3   5. Participate in assessment of functional problem solving                      Plan:   Patient to be seen Therapy Frequency: 5 x/week   Planned Interventions: Cognitive-Linguistic Therapy, Motor Speech Therapy, Dysphagia Evaluation(dysphagia tx if warranted)  Plan of Care Expires:    Plan of Care reviewed with: patient  SLP Follow-up?: Yes  SLP - Next Visit Date: 08/19/19           Jesenia Carranza CCC-SLP/A 8/17/2019

## 2019-08-17 NOTE — PT/OT/SLP PROGRESS
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     OT Date of Treatment: 08/17/19   Total Time (min): 60 min      Billable Minutes:  Self Care/Home Management 40 and Therapeutic Activity 20  Total Minutes: 60    General Precautions: Standard, aspiration, fall, vision impaired  Orthopedic Precautions: N/A  Braces: N/A    Spiritual, Cultural Beliefs, Islam Practices, Values that Affect Care: no    Subjective:  Communicated with RN prior to session.    Pain/Comfort  Pain Rating 1: 0/10  Pain Rating Post-Intervention 1: 0/10    Objective:  Patient found seated in w/c with w/c alarm on.    Functional Mobility:  Transfer Training:  Wheelchair <> toilet: CGA and RW    Grooming:  Pt performed oral hygiene while seated in w/c at sink with SBA and set up     LE Dressing:  Patient don/doffed socks with Minimal Assistance and dressing stick and sock aid      Balance:   Static Sit: FAIR: Maintains without assist, but unable to take any challenges   Dynamic Sit:  FAIR: Cannot move trunk without losing balance  Static Stand: POOR+: Needs MINIMAL assist to maintain  Dynamic stand: POOR: Needs MOD (moderate) assist during gait    Additional Treatment:  OT ed patient on safety with walker use for functional mobility with cues for hand placement & sequencing.    OT ed pt on use of adaptive equipment for LB dressing & safe item retrieval with reacher with demonstration provided.        Patient left in w/c with w/c alarm on with call button in reach and nurse notified    ASSESSMENT:  Deonna Montero is a 78 y.o. female. At start of tx session, pt stated that "something didn't feel right." Pt went to explain that she experienced minor facial numbness of R side. Pt also endorsed slightly slurred speech when she got back from PT. Nurse was notified and assessed pt. " "An UE ROM and strength assessment was performed that found no sign of weakness. Pt also explained that her R facial numbness resolved shortly before starting OT tx. Pt's concern of "something not feeling right" resolved after she re-applied her bonding paste for her dentures. Pt performed well with LB dressing and transfers. Pt would continue to benefit from continued skilled OT services to maximize independence with ADLs and functional mobility.     Rehab potential is good    Activity tolerance: Good    Discharge recommendations: home     Equipment recommendations: (TBD)     GOALS:   Multidisciplinary Problems     Occupational Therapy Goals        Problem: Occupational Therapy Goal    Goal Priority Disciplines Outcome Interventions   Occupational Therapy Goal     OT, PT/OT Ongoing (interventions implemented as appropriate)    Description:  Goals to be met by:    Patient will increase functional independence with ADLs by performing:    Feeding with Modified [place].  UE Dressing with Set-up Assistance.  LE Dressing with Stand-by Assistance with set up of clothes.  Grooming while seated with Modified [place].  Toileting from bedside commode over the toilet with Supervision for hygiene and clothing management.   Toilet transfer to bedside commode over the toilet with Contact Guard Assistance.  Bathing from  shower chair/bench with Minimal Assistanc.  Shower transfer to TTB with CGA and verbal instructions.                    Plan:  Patient to be seen 6 x/week to address the above listed problems via self-care/home management, community/work re-entry, therapeutic exercises, therapeutic activities, neuromuscular re-education, wheelchair management/training, therapeutic groups  Plan of Care expires:    Plan of Care reviewed with: patient         James Blum, OT  08/17/2019  "

## 2019-08-17 NOTE — PLAN OF CARE
Problem: Occupational Therapy Goal  Goal: Occupational Therapy Goal  Goals to be met by:    Patient will increase functional independence with ADLs by performing:    Feeding with Modified [place].  UE Dressing with Set-up Assistance.  LE Dressing with Stand-by Assistance with set up of clothes.  Grooming while seated with Modified [place].  Toileting from bedside commode over the toilet with Supervision for hygiene and clothing management.   Toilet transfer to bedside commode over the toilet with Contact Guard Assistance.  Bathing from  shower chair/bench with Minimal Assistanc.  Shower transfer to TTB with CGA and verbal instructions.   Outcome: Ongoing (interventions implemented as appropriate)  OT goals remain appropriate.

## 2019-08-17 NOTE — PLAN OF CARE
Problem: SLP Goal  Goal: SLP Goal  1. Demonstrate understanding of speech subsystems, STBY A  2. Complete controlled exhalation tasks to enhance control of respiration for speech production, MOD A   3. Coordinate initiation of phonation with exhalation to improve naturalness of speech production, MOD A  4. Progressing from multisyllabic words to connected utterances, Pt will demonstrate control of speech subsystems in order to achieve appropriate coordination of speech subsystems for natural speech production, MAX A- pending goals 2 &3   5. Participate in assessment of functional problem solving    Outcome: Ongoing (interventions implemented as appropriate)  1. Reviewed voiced & voiceless sounds, breath support for speech, lingual placement for sounds  2. Maintained vowels for 4-7 secs on single breath without forcing.  Maintained /s,z,v,f, th/ for 2-4 secs.  4. In imitation for 2-4 syllable words & 2-3 syallable phrases  5. Stated solutions to 5 Level 1 functional problems given extra time.

## 2019-08-18 LAB
BILIRUB UR QL STRIP: NEGATIVE
CLARITY UR: CLEAR
COLOR UR: YELLOW
GLUCOSE UR QL STRIP: NEGATIVE
HGB UR QL STRIP: NEGATIVE
KETONES UR QL STRIP: NEGATIVE
LEUKOCYTE ESTERASE UR QL STRIP: NEGATIVE
NITRITE UR QL STRIP: NEGATIVE
PH UR STRIP: 7 [PH] (ref 5–8)
POCT GLUCOSE: 168 MG/DL (ref 70–110)
POCT GLUCOSE: 226 MG/DL (ref 70–110)
POCT GLUCOSE: 274 MG/DL (ref 70–110)
PROT UR QL STRIP: NEGATIVE
SP GR UR STRIP: 1.01 (ref 1–1.03)
URN SPEC COLLECT METH UR: NORMAL
UROBILINOGEN UR STRIP-ACNC: 1 EU/DL

## 2019-08-18 PROCEDURE — 63600175 PHARM REV CODE 636 W HCPCS: Performed by: PHYSICAL MEDICINE & REHABILITATION

## 2019-08-18 PROCEDURE — 97116 GAIT TRAINING THERAPY: CPT

## 2019-08-18 PROCEDURE — 25000003 PHARM REV CODE 250: Performed by: PHYSICAL MEDICINE & REHABILITATION

## 2019-08-18 PROCEDURE — 97530 THERAPEUTIC ACTIVITIES: CPT

## 2019-08-18 PROCEDURE — 12800000 HC REHAB SEMI-PRIVATE ROOM

## 2019-08-18 RX ADMIN — PANTOPRAZOLE SODIUM 40 MG: 40 TABLET, DELAYED RELEASE ORAL at 09:08

## 2019-08-18 RX ADMIN — DONEPEZIL HYDROCHLORIDE 20 MG: 5 TABLET, FILM COATED ORAL at 08:08

## 2019-08-18 RX ADMIN — ENOXAPARIN SODIUM 40 MG: 100 INJECTION SUBCUTANEOUS at 04:08

## 2019-08-18 RX ADMIN — BRIMONIDINE TARTRATE AND TIMOLOL MALEATE 1 DROP: 2; 5 SOLUTION OPHTHALMIC at 09:08

## 2019-08-18 RX ADMIN — LACTULOSE 20 G: 20 SOLUTION ORAL at 09:08

## 2019-08-18 RX ADMIN — AMLODIPINE BESYLATE 10 MG: 5 TABLET ORAL at 09:08

## 2019-08-18 RX ADMIN — METFORMIN HYDROCHLORIDE 500 MG: 500 TABLET ORAL at 09:08

## 2019-08-18 RX ADMIN — TRAVOPROST 1 DROP: 0.04 SOLUTION/ DROPS OPHTHALMIC at 08:08

## 2019-08-18 RX ADMIN — HUMAN INSULIN 15 UNITS: 100 INJECTION, SUSPENSION SUBCUTANEOUS at 04:08

## 2019-08-18 RX ADMIN — HUMAN INSULIN 15 UNITS: 100 INJECTION, SUSPENSION SUBCUTANEOUS at 06:08

## 2019-08-18 RX ADMIN — INSULIN ASPART 3 UNITS: 100 INJECTION, SOLUTION INTRAVENOUS; SUBCUTANEOUS at 04:08

## 2019-08-18 RX ADMIN — BRIMONIDINE TARTRATE AND TIMOLOL MALEATE 1 DROP: 2; 5 SOLUTION OPHTHALMIC at 08:08

## 2019-08-18 RX ADMIN — ASPIRIN 81 MG CHEWABLE TABLET 81 MG: 81 TABLET CHEWABLE at 09:08

## 2019-08-18 RX ADMIN — RAMELTEON 8 MG: 8 TABLET, FILM COATED ORAL at 02:08

## 2019-08-18 RX ADMIN — METFORMIN HYDROCHLORIDE 500 MG: 500 TABLET ORAL at 04:08

## 2019-08-18 RX ADMIN — POLYETHYLENE GLYCOL (3350) 17 G: 17 POWDER, FOR SOLUTION ORAL at 09:08

## 2019-08-18 RX ADMIN — METOPROLOL SUCCINATE 50 MG: 50 TABLET, EXTENDED RELEASE ORAL at 09:08

## 2019-08-18 RX ADMIN — INSULIN ASPART 3 UNITS: 100 INJECTION, SOLUTION INTRAVENOUS; SUBCUTANEOUS at 11:08

## 2019-08-18 RX ADMIN — LINACLOTIDE 145 MCG: 145 CAPSULE, GELATIN COATED ORAL at 09:08

## 2019-08-18 RX ADMIN — RAMELTEON 8 MG: 8 TABLET, FILM COATED ORAL at 08:08

## 2019-08-18 NOTE — PLAN OF CARE
Problem: Adult Inpatient Plan of Care  Goal: Plan of Care Review  Outcome: Ongoing (interventions implemented as appropriate)  AAOx3 vss denies pain no acute distress noted at this time

## 2019-08-18 NOTE — PLAN OF CARE
Problem: Physical Therapy Goal  Goal: Physical Therapy Goal  Goals to be met by: 2019    Patient will increase functional independence with mobility by performin. Supine to sit with Stand-by Assistance  2. Sit to supine with Stand-by Assistance  3. Sit to stand transfer with Stand-by Assistance  4. Bed to chair transfer with Supervision using Rolling Walker  5. Gait  x 150 feet with Supervision using Rolling Walker.   6. Wheelchair propulsion x150 feet with Modified [place] using bilateral uppper extremities     Outcome: Ongoing (interventions implemented as appropriate)    PT for gait and activity.

## 2019-08-18 NOTE — PT/OT/SLP PROGRESS
Physical Therapy         Treatment        Deonna Montero   MRN: [id number]     PT Received On: 19  Total Time (min): 25        Billable Minutes:  Gait Training 10 and Therapeutic Activity 15  Total Minutes: 25    Treatment Type: Treatment  PT/PTA: PT             General Precautions: Standard, aspiration, fall, vision impaired  Orthopedic Precautions: Orthopedic Precautions : N/A   Braces: Braces: N/A         Subjective:  Communicated with nurse Caldwell prior to session.    Pain/Comfort  Pain Rating 1: 0/10  Pain Rating Post-Intervention 1: 0/10    Objective:  Patient found sitting on toilet for BM.   Patient found with: (w/c alarm)    Functional Mobility:  Transfer Training:  Sit to stand:Minimal Assistance with Rolling Walker    Toilet Transfer:  Pt Step Transfer with Minimal Assistance with Grab bars & vc(pt required assist for hygiene and clothing management)    Gait Training:  Gait training with Rolling Walker x 100 feet and 50 feet with CGa for safety and vc for upright posture    Additional Treatment:  SciFit stepper x 10 min level 2    Activity Tolerance:  Patient tolerated treatment well    Patient left up in chair with call button in reach, chair alarm on and nurse notified.    Assessment:  Deonna Montero is a 78 y.o. female with a medical diagnosis of CVA. She presents with impaired functional mobility, balance, activity tolerance, strength, vision with gait instability.   .    Rehab potential is fair.    Activity tolerance: Fair    Discharge recommendations:       Equipment recommendations:       GOALS:   Multidisciplinary Problems     Physical Therapy Goals        Problem: Physical Therapy Goal    Goal Priority Disciplines Outcome Goal Variances Interventions   Physical Therapy Goal     PT, PT/OT Ongoing (interventions implemented as appropriate)     Description:  Goals to be met by: 2019    Patient will increase functional independence with mobility by performin. Supine to  sit with Stand-by Assistance  2. Sit to supine with Stand-by Assistance  3. Sit to stand transfer with Stand-by Assistance  4. Bed to chair transfer with Supervision using Rolling Walker  5. Gait  x 150 feet with Supervision using Rolling Walker.   6. Wheelchair propulsion x150 feet with Modified [place] using bilateral uppper extremities                      PLAN:    Patient to be seen daily  to address the above listed problems via gait training, therapeutic activities, therapeutic exercises, therapeutic groups, neuromuscular re-education, wheelchair management/training  Plan of Care expires: 09/12/19  Plan of Care reviewed with: patient         Mara Kirill, PT 8/18/2019

## 2019-08-18 NOTE — PLAN OF CARE
Problem: Adult Inpatient Plan of Care  Goal: Plan of Care Review  Outcome: Ongoing (interventions implemented as appropriate)  Patient alert and oriented.  Transfers moderate to max assist.  Incontinent of bladder during shift.  Denies pain or complaint, NAD noted, free of falls.

## 2019-08-19 LAB
POCT GLUCOSE: 124 MG/DL (ref 70–110)
POCT GLUCOSE: 155 MG/DL (ref 70–110)
POCT GLUCOSE: 156 MG/DL (ref 70–110)
POCT GLUCOSE: 252 MG/DL (ref 70–110)

## 2019-08-19 PROCEDURE — 97530 THERAPEUTIC ACTIVITIES: CPT

## 2019-08-19 PROCEDURE — 92507 TX SP LANG VOICE COMM INDIV: CPT

## 2019-08-19 PROCEDURE — 63600175 PHARM REV CODE 636 W HCPCS: Performed by: PHYSICAL MEDICINE & REHABILITATION

## 2019-08-19 PROCEDURE — 97110 THERAPEUTIC EXERCISES: CPT

## 2019-08-19 PROCEDURE — 97535 SELF CARE MNGMENT TRAINING: CPT

## 2019-08-19 PROCEDURE — 97803 MED NUTRITION INDIV SUBSEQ: CPT

## 2019-08-19 PROCEDURE — 12800000 HC REHAB SEMI-PRIVATE ROOM

## 2019-08-19 PROCEDURE — 97116 GAIT TRAINING THERAPY: CPT

## 2019-08-19 PROCEDURE — 25000003 PHARM REV CODE 250: Performed by: PHYSICAL MEDICINE & REHABILITATION

## 2019-08-19 PROCEDURE — 97542 WHEELCHAIR MNGMENT TRAINING: CPT

## 2019-08-19 PROCEDURE — 92526 ORAL FUNCTION THERAPY: CPT

## 2019-08-19 RX ORDER — LISINOPRIL 10 MG/1
10 TABLET ORAL NIGHTLY
Status: DISCONTINUED | OUTPATIENT
Start: 2019-08-19 | End: 2019-09-03

## 2019-08-19 RX ADMIN — HUMAN INSULIN 15 UNITS: 100 INJECTION, SUSPENSION SUBCUTANEOUS at 06:08

## 2019-08-19 RX ADMIN — ENOXAPARIN SODIUM 40 MG: 100 INJECTION SUBCUTANEOUS at 04:08

## 2019-08-19 RX ADMIN — BRIMONIDINE TARTRATE AND TIMOLOL MALEATE 1 DROP: 2; 5 SOLUTION OPHTHALMIC at 08:08

## 2019-08-19 RX ADMIN — RAMELTEON 8 MG: 8 TABLET, FILM COATED ORAL at 08:08

## 2019-08-19 RX ADMIN — METFORMIN HYDROCHLORIDE 500 MG: 500 TABLET ORAL at 04:08

## 2019-08-19 RX ADMIN — DONEPEZIL HYDROCHLORIDE 20 MG: 5 TABLET, FILM COATED ORAL at 08:08

## 2019-08-19 RX ADMIN — TRAVOPROST 1 DROP: 0.04 SOLUTION/ DROPS OPHTHALMIC at 08:08

## 2019-08-19 RX ADMIN — ASPIRIN 81 MG CHEWABLE TABLET 81 MG: 81 TABLET CHEWABLE at 08:08

## 2019-08-19 RX ADMIN — HUMAN INSULIN 15 UNITS: 100 INJECTION, SUSPENSION SUBCUTANEOUS at 04:08

## 2019-08-19 RX ADMIN — METFORMIN HYDROCHLORIDE 500 MG: 500 TABLET ORAL at 08:08

## 2019-08-19 RX ADMIN — AMLODIPINE BESYLATE 10 MG: 5 TABLET ORAL at 08:08

## 2019-08-19 RX ADMIN — PANTOPRAZOLE SODIUM 40 MG: 40 TABLET, DELAYED RELEASE ORAL at 08:08

## 2019-08-19 RX ADMIN — LINACLOTIDE 145 MCG: 145 CAPSULE, GELATIN COATED ORAL at 08:08

## 2019-08-19 RX ADMIN — METOPROLOL SUCCINATE 50 MG: 50 TABLET, EXTENDED RELEASE ORAL at 08:08

## 2019-08-19 RX ADMIN — LISINOPRIL 10 MG: 10 TABLET ORAL at 08:08

## 2019-08-19 RX ADMIN — INSULIN ASPART 3 UNITS: 100 INJECTION, SOLUTION INTRAVENOUS; SUBCUTANEOUS at 12:08

## 2019-08-19 NOTE — PLAN OF CARE
Problem: Adult Inpatient Plan of Care  Goal: Plan of Care Review  Alert. Slurred speech. Delayed response. Incontinent of bladder. Bed and chair alarms utilized at all times. Remained free from falls. Standard precautions maintained.

## 2019-08-19 NOTE — PT/OT/SLP PROGRESS
Occupational Therapy  Treatment    Deonna Montero   MRN: [id number]   Admitting Diagnosis: <principal problem not specified>    OT Date of Treatment: 08/19/19   Total Time (min): 80 min      Billable Minutes:  Self Care/Home Management 80 min  Total Minutes: 80    General Precautions: Standard, aspiration, fall, vision impaired  Orthopedic Precautions:    Braces:      Spiritual, Cultural Beliefs, Islam Practices, Values that Affect Care: no    Subjective:  Communicated with CYNDEE Thomas prior to session.         Objective:       Functional Mobility:  Bed Mobility:   Supine to sit: Activity did not occur   Sit to supine: Activity did not occur   Rolling: Activity did not occur   Scooting: Activity did not occur    Transfer Training:   Sit to stand:Supervision or Set-up Assistance and verbal cues to place R hand on surface and push up to stand w/ nose over toes and rocking twice before standing with Rolling Walker and close supervision.  Bed <> Chair:  Stand Pivot with Supervision or Set-up Assistance with Rolling Walker and to w/c.  Patient tub bench transfer Stand Pivot with Contact Guard Assistance to guide pt on to tub bench with Grab bars and from w/c..    Grooming:  Patient performe hair grooming with Supervision or Set-up Assistance at w/c beside bed after getting dressed. .    Bathing:  Patient performed bathing with Minimal Assistance with grab bar and Min A from HAQ to wash feet at Shower.    UE Dressing:  Patient performed UE Dressing with Supervision or Set-up Assistance with assist for item retrieival from closet (top shelf) at Edge of bed.    LE Dressing:  Patient don/doffed socks with Supervision or Set-up Assistance, Patient don/doffed shoes with Moderate Assistance and Patient performed don/doffed adult brief with Stand-by Assistance    Balance:   Static Sit: FAIR: Maintains without assist, but unable to take any challenges   Dynamic Sit:  FAIR+: Maintains balance through MINIMAL excursions of  active trunk motion  Static Stand: FAIR: Maintains without assist but unable to take challenges    Additional Treatment:  Bath and Dressing today.  -Pt doffed socks with dressing stick Mod I; Did not jordy socks this session as she wanted to wear her shoes (they do not require socks).   Delivered a long-handled shoe horn to pt and instructed and demonstrated use. Pt used w/ R foot and Mod A from HAQ: pt engaged in good questioning of how to use this AE and had a good understanding of its use at end of session.  -Pt given long-handled sponge today after shower due to pt not being able to reach her LE and feet without assist. Its use will need to be addressed at future session.  -Pt needing extra time to complete bathing task and verbal cues to move to a different area of the body to wash.  -Pt is able to direct self care well but needs safety reminders frequently.    Patient left up in w/c with call button in reach, chair alarm on and SLP Nellie present    ASSESSMENT:  Deonna Montero is a 78 y.o. female with a medical diagnosis of L  CVA and presents with willingness to participate in therapy. She is able to verbalize understanding of new tasks and direct her care (she was able to tell CYNDEE Thomas that she takes her medicine w/ applesauce since her stroke in order to swallow the pills). She has strong family support.    Rehab potential is good    Activity tolerance: Good    Discharge recommendations:       Equipment recommendations:       GOALS:   Multidisciplinary Problems     Occupational Therapy Goals        Problem: Occupational Therapy Goal    Goal Priority Disciplines Outcome Interventions   Occupational Therapy Goal     OT, PT/OT Ongoing (interventions implemented as appropriate)    Description:  Goals to be met by:    Patient will increase functional independence with ADLs by performing:    Feeding with Modified [place].  UE Dressing with Set-up Assistance.  LE Dressing with Stand-by Assistance  with set up of clothes.  Grooming while seated with Modified [place].  Toileting from bedside commode over the toilet with Supervision for hygiene and clothing management.   Toilet transfer to bedside commode over the toilet with Contact Guard Assistance.  Bathing from  shower chair/bench with Minimal Assistanc.  Shower transfer to TTB with CGA and verbal instructions.                    Plan:  Patient to be seen 6 x/week to address the above listed problems via self-care/home management, community/work re-entry, therapeutic exercises, therapeutic activities, neuromuscular re-education, wheelchair management/training, therapeutic groups  Plan of Care expires:    Plan of Care reviewed with: patient         BRAYAN Coombs  08/19/2019

## 2019-08-19 NOTE — PT/OT/SLP PROGRESS
Physical Therapy         Treatment        Deonna Montero   MRN: [id number]     PT Received On: 08/19/19  Total Time (min): 65        Billable Minutes:  Gait Training 15, Therapeutic Activity 15, Therapeutic Exercise 20 and Train/Wheelchair Management 15  Total Minutes: 65    Treatment Type: Treatment  PT/PTA: PT             General Precautions: Standard, aspiration, fall, vision impaired  Orthopedic Precautions: Orthopedic Precautions : N/A   Braces: Braces: N/A         Subjective:  Communicated with CYNDEE Thomas prior to session. Pt reported she uses a rollator at home and wanted to see how she would do using it.    Pain/Comfort  Pain Rating 1: 8/10  Location 1: back  Pain Addressed 1: Cessation of Activity, Other (see comments)(only during gait)  Pain Rating Post-Intervention 1: 0/10    Objective:  Patient found sitting up in w/c finishing with ST session.   Patient found with: (w/c alarm)    Functional Mobility:  Transfer Training:  Sit to stand:Contact Guard Assistance and and vc for technique with Rolling Walker      Wheelchair Training:  Pt propelled Standard wheelchair x 150 feet on Level tile with  Bilateral upper extremity and Bilateral lower extremity with Minimal Assistance.     Gait Training:  Gait training on level tile with rollator x 69 feet with Minimal Assistance to steady due to decreased stability of rollator(pt wanted to see how she did with the kind of walker she has at home).  Gait training with Rolling Walker x 115 feet with CGA for safety    Additional Treatment:  SciFit stepper x 10 min level 1    Pt performed B LE there ex sitting x 30-50 reps with 3# wt and blue TB, with vc for proper execution and joint protection: ap, laq, marching, hip abd/add, ham curls.    Activity Tolerance:  Patient tolerated treatment well    Patient left up in chair with call button in reach, chair alarm on and nurse notified.    Assessment:  Deonna Montero is a 78 y.o. female with a medical diagnosis of  CVA. She presents with impaired functional mobility, balance, activity tolerance, strength, vision with gait instability. Pt not safe yet with use of rollator for gait due to decreased stability of that type of walker.  .    Rehab potential is fair.    Activity tolerance: Fair    Discharge recommendations:       Equipment recommendations:       GOALS:   Multidisciplinary Problems     Physical Therapy Goals        Problem: Physical Therapy Goal    Goal Priority Disciplines Outcome Goal Variances Interventions   Physical Therapy Goal     PT, PT/OT Ongoing (interventions implemented as appropriate)     Description:  Goals to be met by: 2019    Patient will increase functional independence with mobility by performin. Supine to sit with Stand-by Assistance  2. Sit to supine with Stand-by Assistance  3. Sit to stand transfer with Stand-by Assistance  4. Bed to chair transfer with Supervision using Rolling Walker  5. Gait  x 150 feet with Supervision using Rolling Walker.   6. Wheelchair propulsion x150 feet with Modified [place] using bilateral uppper extremities                      PLAN:    Patient to be seen daily  to address the above listed problems via gait training, therapeutic activities, therapeutic exercises, therapeutic groups, neuromuscular re-education, wheelchair management/training  Plan of Care expires: 19  Plan of Care reviewed with: patient         Mara Roy, PT 2019

## 2019-08-19 NOTE — PT/OT/SLP PROGRESS
"Speech Language Pathology Treatment          Deonna Montero   MRN: [id number]     Diet recommendations: Solid Diet Level: Mechanical soft(IDDSI 5)  Liquid Diet Level: Thin Swallow Precautions:   1 bite/sip at a time, Alternating bites/sips as tolerated/needed, Avoid talking while eating, Chin tuck, Eliminate distractions, HOB to 90 degrees or, preferably up in chair at 90 degrees for meal, Meds one at a time whole buried in puree, Remain upright 30 minutes post meal and Small bites/sips    SLP Treatment Date: 08/19/19     First Visit:  Speech Start Time: 1015     Speech Stop Time: 1055(seen later for additional minutes)     Total (min): 40 min       Second Visit:  Start:1247  Stop: 1259  Minutes: 12    Total Minutes:  52    TREATMENT BILLABLE MINUTES:  Speech Therapy Individual 40 and Treatment Swallowing Dysfunction 12    General Precautions: Standard, aspiration, fall  Current Respiratory Status: room air       Subjective:  "I need my glasses."    Objective:    Patient found with: (chair alarm). Pt treated for cognitive speech-language deficits and dysphagia.  See plan of care and assessment sections for details.    Pain/Comfort  Pain Rating 1: 0/10    Assessment:  Deonna Montero is a 78 y.o. female with a SLP diagnosis of Dysphagia, Dysarthria and Cognitive-Linguistic Impairment. Today she demonstrated ability to imitate 5-7 word sentences with appropriate stress on target words with 70% accuracy.  She demonstrated inappropriate volume (too high) during 20% of trials.  She had difficulty when trying to read contrastive sentences emphasizing various words from a text provided by clinician (skipping lines, losing track of place on page).  She stated she would read more comfortably with her glasses.  She attempted to locate her daughter-in-law's phone number in her small address book, but was unable to do so (presumably due to visual issues without her glasses).  She denied difficulty swallowing with " meals today.  Clinician continued pt education regarding safe swallow strategies (staff education info form previously posted).  Today clinician provided pt with separate form in larger print and further educated pt regarding each point as discussed in swallow precaution list above.  Pt verbalized understanding.    Diet recommendations:      Mechanical Soft IDDSI 5  Liquid Diet Level: Thin    Swallow Precautions:  1 bite/sip at a time, Alternating bites/sips as tolerated/needed, Avoid talking while eating, Chin tuck, Eliminate distractions, HOB to 90 degrees or, preferably up in chair at 90 degrees for meal, Meds one at a time whole buried in puree, Remain upright 30 minutes post meal and Small bites/sips  Discharge recommendations: Discharge Facility/Level of Care Needs: outpatient speech therapy     Goals:   Multidisciplinary Problems     SLP Goals        Problem: SLP Goal    Goal Priority Disciplines Outcome   SLP Goal     SLP Ongoing (interventions implemented as appropriate)   Description:  1. Demonstrate understanding of speech subsystems, STBY A  2. Complete controlled exhalation tasks to enhance control of respiration for speech production, MOD A   3. Coordinate initiation of phonation with exhalation to improve naturalness of speech production, MOD A  4. Progressing from multisyllabic words to connected utterances, Pt will demonstrate control of speech subsystems in order to achieve appropriate coordination of speech subsystems for natural speech production, MAX A- pending goals 2 &3   5. Participate in assessment of functional problem solving                      Plan:   Patient to be seen Therapy Frequency: 5 x/week   Planned Interventions: Cognitive-Linguistic Therapy, Motor Speech Therapy, Language Therapy, Dysphagia Therapy  Plan of Care Expires:    Plan of Care reviewed with: patient  SLP Follow-up?: Yes  SLP - Next Visit Date: 08/20/19           Nellie Piña CCC-SLP 8/19/2019

## 2019-08-19 NOTE — PLAN OF CARE
Problem: Physical Therapy Goal  Goal: Physical Therapy Goal  Goals to be met by: 2019    Patient will increase functional independence with mobility by performin. Supine to sit with Stand-by Assistance  2. Sit to supine with Stand-by Assistance  3. Sit to stand transfer with Stand-by Assistance  4. Bed to chair transfer with Supervision using Rolling Walker  5. Gait  x 150 feet with Supervision using Rolling Walker.   6. Wheelchair propulsion x150 feet with Modified [place] using bilateral uppper extremities     Outcome: Ongoing (interventions implemented as appropriate)    PT for there ex, gait, w/c and activity

## 2019-08-19 NOTE — PROGRESS NOTES
"[facility]  Adult Nutrition  Progress Note    SUMMARY    Intervention: Nutrition supplement therapy-commercial beverage, texture modified diet, and nutrition education    Recommendation:   1) Change diet to No salt added, 3-4 carbohydrate servings/meal, texture per SLP - cardiac once appetite improves   2) Continue Boost glucose control BID   3) Weigh pt weekly     Goals: 1) PO intakes > 50% of meals and supplements at f/u - 2) PO intake >=85% EEN during admit   Nutrition Goal Status: 1) met 2) new  Communication of RD Recs: (POC, sticky note)    Reason for Assessment    Reason For Assessment: RD follow up  Diagnosis: (CVA)  Relevant Medical History: HTN, DM2, GERD, dementia, CVA  Interdisciplinary Rounds: did not attend  General Information Comments: 77 y/o female admitted to rehab s/p CVA. Pt with a fair appetite today, states she knows she was,"not eating as much as she should have been," PTA. Discussed heart healthy/diabetic, mechanical soft diet with pt and left menu/handouts in room for pt and family as pt with dementia. Agreeable to supplements. NFPE done 8/15/19, no wasting seen. Insignificant wt loss noted in chart.  8/19/19:  PO intake good, getting and using supplements.     Nutrition Discharge Planning: To be determined DM diet ( 3-4 carb servings/meal), no salt added, texture per SLP 9 cardiac if apppetite improves)    Nutrition Risk Screen    Nutrition Risk Screen: no indicators present    Nutrition/Diet History    Patient Reported Diet/Restrictions/Preferences: diabetic diet  Spiritual, Cultural Beliefs, [employer] Practices, Values that Affect Care: no  Factors Affecting Nutritional Intake: decreased appetite, difficulty/impaired swallowing    Anthropometrics    Temp: 97 °F (36.1 °C)  Height Method: Measured  Height: 5' 8"  Height (inches): 68 in  Weight Method: Bed Scale  Weight: 96.2 kg (212 lb 1.3 oz)  Weight (lb): 212.08 lb  Ideal Body Weight (IBW), Female: 140 lb  % Ideal Body " Weight, Female (lb): 151.49 lb  BMI (Calculated): 32.3  BMI Grade: 30 - 34.9- obesity - grade I  Usual Body Weight (UBW), k.6 kg(18)  % Usual Body Weight: 95.83  % Weight Change From Usual Weight: -4.37 %       Lab/Procedures/Meds    Pertinent Labs Reviewed: reviewed  BMP  Lab Results   Component Value Date     08/15/2019    K 3.9 08/15/2019     08/15/2019    CO2 29 08/15/2019    BUN 10 08/15/2019    CREATININE 0.8 08/15/2019    CALCIUM 9.4 08/15/2019    ANIONGAP 9 08/15/2019    ESTGFRAFRICA >60 08/15/2019    EGFRNONAA >60 08/15/2019     POCT Glucose   Date Value Ref Range Status   2019 252 (H) 70 - 110 mg/dL Final   2019 124 (H) 70 - 110 mg/dL Final   2019 156 (H) 70 - 110 mg/dL Final   2019 226 (H) 70 - 110 mg/dL Final   2019 274 (H) 70 - 110 mg/dL Final   2019 168 (H) 70 - 110 mg/dL Final   2019 217 (H) 70 - 110 mg/dL Final   2019 215 (H) 70 - 110 mg/dL Final   2019 213 (H) 70 - 110 mg/dL Final   2019 140 (H) 70 - 110 mg/dL Final   2019 180 (H) 70 - 110 mg/dL Final     Lab Results   Component Value Date    HGBA1C 8.9 (H) 08/15/2019       Pertinent Medications Reviewed: reviewed  Pertinent Medications Comments: lactulose, insulin, metformin, zofran    Estimated/Assessed Needs    Weight Used For Calorie Calculations: 96.2 kg (212 lb 1.3 oz)  Energy Calorie Requirements (kcal): Eitzen St Jeor ( no activity factor obesity) = 1495 kcal  Energy Need Method: Eitzen-St Jeor  Protein Requirements: 0.8 g protein/kg ( 77 g protein)  Weight Used For Protein Calculations: 96.2 kg (212 lb 1.3 oz)  Fluid Requirements (mL): 25 ml/kg = 2400 ml  Estimated Fluid Requirement Method: other (see comments)  CHO Requirement: 45-50%      Nutrition Prescription Ordered    Current Diet Order: mechanical Soft    Evaluation of Received Nutrient/Fluid Intake    Energy Calories Required:  meeting needs  Protein Required:  meeting needs  Fluid Required:  meeting needs    Intake/Output Summary (Last 24 hours) at 8/19/2019 1610  Last data filed at 8/19/2019 0617  Gross per 24 hour   Intake 1140 ml   Output --   Net 1140 ml     Tolerance: tolerating  % Intake of Estimated Energy Needs: 75->100%  % Meal Intake: %  Nutrition Risk    Level of Risk/Frequency of Follow-up: moderate 1 x weekly    Assessment and Plan     Inadequate energy intake  R/t decreased appetite  As evidenced by PO intakes < 50% of EEN x 1 day  improving    Monitor and Evaluation    Food and Nutrient Intake: energy intake, food and beverage intake  Food and Nutrient Adminstration: diet order  Anthropometric Measurements: weight  Biochemical Data, Medical Tests and Procedures: electrolyte and renal panel, glucose/endocrine profile  Nutrition-Focused Physical Findings: overall appearance     Malnutrition Assessment     Skin (Micronutrient): (Aníbal = 16)  Teeth (Micronutrient): (With teeth)   Micronutrient Evaluation: no deficiencies               Edema (Fluid Accumulation): 0-->no edema present   Subcutaneous Fat Loss (Final Summary): well nourished  Muscle Loss Evaluation (Final Summary): well nourished         Nutrition Follow-Up    RD Follow-up?: Yes

## 2019-08-19 NOTE — PLAN OF CARE
Problem: Occupational Therapy Goal  Goal: Occupational Therapy Goal  Goals to be met by:    Patient will increase functional independence with ADLs by performing:    Feeding with Modified [place].  UE Dressing with Set-up Assistance.  LE Dressing with Stand-by Assistance with set up of clothes.  Grooming while seated with Modified [place].  Toileting from bedside commode over the toilet with Supervision for hygiene and clothing management.   Toilet transfer to bedside commode over the toilet with Contact Guard Assistance.  Bathing from  shower chair/bench with Minimal Assistanc.  Shower transfer to TTB with CGA and verbal instructions.   Outcome: Ongoing (interventions implemented as appropriate)  Goals remain appropriate.

## 2019-08-19 NOTE — PLAN OF CARE
Problem: Oral Intake Inadequate  Goal: Improved Oral Intake  Goals to be met by:     Patient will increase functional independence with ADLs by performing:    Feeding with Modified [place].  UE Dressing with Set-up Assistance.  LE Dressing with Stand-by Assistance.  Grooming while standing with Supervision.  Toileting from toilet with Assistive Devices as needed for hygiene and clothing management.   Toilet transfer to toilet with Contact Guard Assistance.  Bathing from  shower chair/bench with CGA.  Shower/tub transfer with CGA     Intervention: Promote and Optimize Oral Intake  Intervention: Nutrition supplement therapy-commercial beverage, texture modified diet, and nutrition education    Recommendation:   1) Change diet to No salt added, 3-4 carbohydrate servings/meal, texture per SLP - cardiac once appetite improves   2) Continue Boost glucose control BID   3) Weigh pt weekly     Goals: 1) PO intakes > 50% of meals and supplements at f/u - 2) PO intake >=85% EEN during admit   Nutrition Goal Status: 1) met 2) new  Communication of RD Recs: (POC, sticky note)

## 2019-08-19 NOTE — PLAN OF CARE
Problem: SLP Goal  Goal: SLP Goal  1. Demonstrate understanding of speech subsystems, STBY A  2. Complete controlled exhalation tasks to enhance control of respiration for speech production, MOD A   3. Coordinate initiation of phonation with exhalation to improve naturalness of speech production, MOD A  4. Progressing from multisyllabic words to connected utterances, Pt will demonstrate control of speech subsystems in order to achieve appropriate coordination of speech subsystems for natural speech production, MAX A- pending goals 2 &3   5. Participate in assessment of functional problem solving    Outcome: Ongoing (interventions implemented as appropriate)  3-4. Pt imitated clinician produce 5-7 word sentences, imitating prosody and stress on target words, in order to convey target meaning 70% accuracy.    5. Pt needed mod verbal cues to provide complete sequence of steps involved in morning tasks at home.  She sequenced 4-part picture sequencing cards with min-mod verbal cues (also needed assist due to missing glasses).  Pt gave clinician permission to call her son or daughter-in-law to request that extra pair of glasses be brought to her hospital room.

## 2019-08-19 NOTE — PROGRESS NOTES
"REHAB FOLLOWUP NOTE    Deonna Montero is a 78 y.o. female patient.    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    History:  78-year-old  right-hand-dominant female was initially admitted to [facility] with left-sided CVA with right hemiparesis on 07/23/2019 was hospitalized till 07/26/2019 for neuro workup including CT scan showed no acute changes, carotids without any significant stenosis, MRI consistent with left pontine as well as occipital ischemic infarcts.  Patient has been medically stabilized and transferred to swing bed with steady improvement and is now transferred down to us for further rehabilitation.    Past Medical History:   Diagnosis Date    Back pain     Diabetes mellitus     Hypertension     Reflux     Short-term memory loss     Stroke      Temp: 97 °F (36.1 °C) (08/19/19 0730)  Pulse: 69 (08/19/19 0730)  Resp: 18 (08/19/19 0730)  BP: (!) 166/73 (08/19/19 0730)  SpO2: (!) 94 % (08/19/19 0730)  Weight: 96.2 kg (212 lb 1.3 oz) (08/15/19 1334)  Height: 5' 8" (172.7 cm) (08/15/19 1339)    Lab Results   Component Value Date    WBC 3.50 (L) 08/15/2019    HGB 12.2 08/15/2019    HCT 38.1 08/15/2019     08/15/2019    ALT 32 08/15/2019    AST 31 08/15/2019     08/15/2019    K 3.9 08/15/2019     08/15/2019    CREATININE 0.8 08/15/2019    BUN 10 08/15/2019    CO2 29 08/15/2019    INR 1.1 04/19/2018    HGBA1C 8.9 (H) 08/15/2019     Physical Examination:  Alert, oriented x3, follows commands well.  Lungs are clear to auscultation.  Heart with regular rate and rhythm.  Extremities without any edema or calf tenderness noted.  Blood sugars controlled.  Blood pressure elevated, adjust medicine.  Complaints of frequency urine clear.    Needs assistance with basic ADLs, transfers, mobility.    Impression:  Status post left-sided CVA with right hemiparesis.  Hypertension.  Insulin-dependent " diabetes mellitus.  Hyperlipidemia.  Degenerative joint disease.  Left eye enucleation.    Recommendations:  Continue current PT, OT, speech therapy and nursing care.          Nicky Diamond MD  8/19/2019

## 2019-08-19 NOTE — PLAN OF CARE
Problem: Adult Inpatient Plan of Care  Goal: Plan of Care Review  Outcome: Ongoing (interventions implemented as appropriate)  Patient alert and oriented.  Resting quietly through shift.  Mod to max assist to turn and reposition.  Denies pain or complaint, NAD noted, free of falls.

## 2019-08-20 LAB
POCT GLUCOSE: 143 MG/DL (ref 70–110)
POCT GLUCOSE: 181 MG/DL (ref 70–110)
POCT GLUCOSE: 182 MG/DL (ref 70–110)
POCT GLUCOSE: 185 MG/DL (ref 70–110)
POCT GLUCOSE: 252 MG/DL (ref 70–110)

## 2019-08-20 PROCEDURE — 92507 TX SP LANG VOICE COMM INDIV: CPT

## 2019-08-20 PROCEDURE — 97110 THERAPEUTIC EXERCISES: CPT

## 2019-08-20 PROCEDURE — 97530 THERAPEUTIC ACTIVITIES: CPT

## 2019-08-20 PROCEDURE — 25000003 PHARM REV CODE 250: Performed by: PHYSICAL MEDICINE & REHABILITATION

## 2019-08-20 PROCEDURE — 97535 SELF CARE MNGMENT TRAINING: CPT

## 2019-08-20 PROCEDURE — 12800000 HC REHAB SEMI-PRIVATE ROOM

## 2019-08-20 PROCEDURE — 97116 GAIT TRAINING THERAPY: CPT

## 2019-08-20 PROCEDURE — 63600175 PHARM REV CODE 636 W HCPCS: Performed by: PHYSICAL MEDICINE & REHABILITATION

## 2019-08-20 RX ADMIN — BRIMONIDINE TARTRATE AND TIMOLOL MALEATE 1 DROP: 2; 5 SOLUTION OPHTHALMIC at 08:08

## 2019-08-20 RX ADMIN — ENOXAPARIN SODIUM 40 MG: 100 INJECTION SUBCUTANEOUS at 04:08

## 2019-08-20 RX ADMIN — POLYETHYLENE GLYCOL (3350) 17 G: 17 POWDER, FOR SOLUTION ORAL at 08:08

## 2019-08-20 RX ADMIN — ASPIRIN 81 MG CHEWABLE TABLET 81 MG: 81 TABLET CHEWABLE at 08:08

## 2019-08-20 RX ADMIN — HUMAN INSULIN 15 UNITS: 100 INJECTION, SUSPENSION SUBCUTANEOUS at 04:08

## 2019-08-20 RX ADMIN — METFORMIN HYDROCHLORIDE 500 MG: 500 TABLET ORAL at 08:08

## 2019-08-20 RX ADMIN — DONEPEZIL HYDROCHLORIDE 20 MG: 5 TABLET, FILM COATED ORAL at 08:08

## 2019-08-20 RX ADMIN — INSULIN ASPART 3 UNITS: 100 INJECTION, SOLUTION INTRAVENOUS; SUBCUTANEOUS at 11:08

## 2019-08-20 RX ADMIN — PANTOPRAZOLE SODIUM 40 MG: 40 TABLET, DELAYED RELEASE ORAL at 08:08

## 2019-08-20 RX ADMIN — RAMELTEON 8 MG: 8 TABLET, FILM COATED ORAL at 08:08

## 2019-08-20 RX ADMIN — LISINOPRIL 10 MG: 10 TABLET ORAL at 08:08

## 2019-08-20 RX ADMIN — HUMAN INSULIN 15 UNITS: 100 INJECTION, SUSPENSION SUBCUTANEOUS at 06:08

## 2019-08-20 RX ADMIN — METFORMIN HYDROCHLORIDE 500 MG: 500 TABLET ORAL at 04:08

## 2019-08-20 RX ADMIN — AMLODIPINE BESYLATE 10 MG: 5 TABLET ORAL at 08:08

## 2019-08-20 RX ADMIN — TRAVOPROST 1 DROP: 0.04 SOLUTION/ DROPS OPHTHALMIC at 08:08

## 2019-08-20 RX ADMIN — METOPROLOL SUCCINATE 50 MG: 50 TABLET, EXTENDED RELEASE ORAL at 08:08

## 2019-08-20 RX ADMIN — LINACLOTIDE 145 MCG: 145 CAPSULE, GELATIN COATED ORAL at 08:08

## 2019-08-20 NOTE — PT/OT/SLP PROGRESS
Occupational Therapy  Treatment    Deonna Montero   MRN: [id number]   Admitting Diagnosis: Status post new onset left pontine and occipital ischemic infarct with dysarthria, balance coordination problems     OT Date of Treatment: 08/20/19   Total Time (min): 85 min    Billable Minutes:  Self Care/Home Management 25, Therapeutic Activity 50 and Therapeutic Exercise 10  Total Minutes: 85    General Precautions: Standard, aspiration, fall, vision impaired  Orthopedic Precautions: N/A  Braces: N/A    Spiritual, Cultural Beliefs, Faith Practices, Values that Affect Care: no    Subjective:  Communicated with nurse prior to session.    Pain/Comfort  Pain Rating 1: 0/10(pt did c/o cramping in abdomen while seated EOB at end of session while doffing shoes)  Pain Addressed 1: Reposition, Cessation of Activity, Distraction, Nurse notified  Pain Rating Post-Intervention 1: 0/10    Objective:  Patient found with: (chair alarm)    Functional Mobility:  Bed Mobility:   Supine to sit: Maximum Assistance   Sit to supine: Moderate Assistance   Rolling: Activity did not occur   Scooting: Minimal Assistance    Transfer Training:  Sit to stand: Moderate Assistance with grab bar and step by step verbal cues with tactile cue as needed for proper hand placement   Bed <> Chair: Stand Pivot with Maximum Assistance with No Assistive Device with step by step verbal instruction for proper hand placement; pt requires Max (A) for slow, controlled descent to EOB  Toilet Transfer:  Stand Pivot with Moderate Assistance with grab bar and bedside commode positioned over toilet - step by step verbal instruction with tactile cues as needed for transfer sequence and proper hand placement (elian pushing down on sit surface rather than pulling on grab bar to stand)      Grooming:  Set-up/Supervision sitting sink side     LE Dressing:  Max (A) to change wet brief from toilet level    Toilet Training:  Pt performed toileting with Maximum Assistance  "with bedside commode positioned over toilet and use of grab bar - pt requesting to stand to clean; OTR providing step by step instruction for proper hand placement and safety - OTR instructing pt to position wipe on grab bar, push down on bedside commode to stand and then once standing, utilize unilateral UE support of grab bar while performing hygiene with other UE with CGA; pt requires (A) to manage clothing up/down hips as well     Additional Treatment:  - figure ground worksheet - Supervision with 100% accuracy, mathematical Min (A) to complete with 1 incorrect, math using coins with 1 incorrect with Min (A) to identify mistake, visual scanning to cross out "M"s and missing 3 - min verbal cues and able to identify and locate with SBA  - Wheelchair propulsion with Min (A) with verbal cues for object avoidance and scanning R environment     Patient left HOB elevated with all lines intact, call button in reach and bed alarm on    ASSESSMENT:  Deonna Montero is a 78 y.o. female with a medical diagnosis of Status post new onset left pontine and occipital ischemic infarct with dysarthria, balance coordination problems and presents with improvements towards OT goals. Patient will continue to benefit from skilled interdisciplinary therapy services per est POC.     Rehab potential is good    Activity tolerance: Good    Discharge recommendations: home     Equipment recommendations: other (see comments)(TBD)     GOALS:   Multidisciplinary Problems     Occupational Therapy Goals        Problem: Occupational Therapy Goal    Goal Priority Disciplines Outcome Interventions   Occupational Therapy Goal     OT, PT/OT Ongoing (interventions implemented as appropriate)    Description:  Goals to be met by:    Patient will increase functional independence with ADLs by performing:    Feeding with Modified [place].  UE Dressing with Set-up Assistance.  LE Dressing with Stand-by Assistance with set up of clothes.  Grooming " while seated with Modified [place].  Toileting from bedside commode over the toilet with Supervision for hygiene and clothing management.   Toilet transfer to bedside commode over the toilet with Contact Guard Assistance.  Bathing from  shower chair/bench with Minimal Assistanc.  Shower transfer to TTB with CGA and verbal instructions.                    Plan:  Patient to be seen 6 x/week to address the above listed problems via self-care/home management, community/work re-entry, therapeutic activities, therapeutic exercises, therapeutic groups, neuromuscular re-education, wheelchair management/training  Plan of Care expires:    Plan of Care reviewed with: patient    Carmen TRE Mathews, JAQUANR  08/20/2019

## 2019-08-20 NOTE — PT/OT/SLP PROGRESS
"Speech Language Pathology Treatment          Deonna Montero   MRN: [id number]     Diet recommendations: Solid Diet Level: Mechanical soft(IDDSI 5)  Liquid Diet Level: Thin Swallow Precautions:   1 bite/sip at a time, Alternating bites/sips as tolerated/needed, Avoid talking while eating, Chin tuck, Eliminate distractions, HOB to 90 degrees or, preferably up in chair at 90 degrees for meal, Meds one at a time whole buried in puree, Remain upright 30 minutes post meal and Small bites/sips    SLP Treatment Date: 08/20/19  Speech Start Time: 1530     Speech Stop Time: 1610     Speech Total (min): 40 min       TREATMENT BILLABLE MINUTES:  Speech Therapy Individual 40    General Precautions: Standard, aspiration, fall, vision impaired  Current Respiratory Status: room air       Subjective:  "My speech wasn't good when I was talking to my friend"    Objective:   Patient found semi reclined in bed; visitor present at bedside  Pt educated re: subsytems of speech with presentation of visual aid to enhance understanding. Following education and demonstration of diaphragmatic breathing, Pt reclined in supine position and provided with visual feedback to enhance understanding during breathing exercise. Pt appeared to demonstrate adequate inhalation and exhalation patterns per observation of visual feedback. With fading of visual aid, Pt then elevated to upright position prior to initiation of applied phonation. However, Pt then c/o intense cramping localized to left rib. Discomfort alleviated with various positional changes however, unable to establish appropriate level of comfort to continue. Swallow guidelines recalled at 100% acc.        Assessment:  Deonna Montero is a 78 y.o. female with a SLP diagnosis of Dysphagia, Dysarthria and Cognitive-Linguistic Impairment.       Discharge recommendations: Discharge Facility/Level of Care Needs: home     Goals:   Multidisciplinary Problems     SLP Goals        Problem: " SLP Goal    Goal Priority Disciplines Outcome   SLP Goal     SLP Ongoing (interventions implemented as appropriate)   Description:  1. Demonstrate understanding of speech subsystems, STBY A  2. Complete controlled exhalation tasks to enhance control of respiration for speech production, MOD A   3. Coordinate initiation of phonation with exhalation to improve naturalness of speech production, MOD A  4. Progressing from multisyllabic words to connected utterances, Pt will demonstrate control of speech subsystems in order to achieve appropriate coordination of speech subsystems for natural speech production, MAX A- pending goals 2 &3   5. Participate in assessment of functional problem solving                      Plan:   Patient to be seen Therapy Frequency: 5 x/week   Planned Interventions: Cognitive-Linguistic Therapy, Motor Speech Therapy, Dysphagia Therapy  Plan of Care Expires:    Plan of Care reviewed with: patient  SLP Follow-up?: Yes  SLP - Next Visit Date: 08/21/19           Tao Washington CCC-SLP 8/20/2019

## 2019-08-20 NOTE — PT/OT/SLP PROGRESS
Physical Therapy         Treatment        Deonna Montero   MRN: [id number]     PT Received On: 08/20/19  Total Time (min): 75        Billable Minutes:  Gait Training 15, Therapeutic Activity 30, Therapeutic Exercise 25 and Train/Wheelchair Management 5  Total Minutes: 75    Treatment Type: Treatment  PT/PTA: PT             General Precautions: Standard, aspiration, fall, vision impaired  Orthopedic Precautions: Orthopedic Precautions : N/A   Braces: Braces: N/A         Subjective:  Communicated with nurse Silva prior to session.    Pain/Comfort  Pain Rating 1: 0/10  Pain Rating Post-Intervention 1: 0/10    Objective:  Patient found sitting up in w/c with a family member present.   Patient found with: (w/c alarm)    Functional Mobility:  Transfer Training:  Sit to stand:Minimal Assistance with Rolling Walker & vc  Toilet Transfer:  Pt Step Transfer with Minimal Assistance with Grab bars &vc    Wheelchair Training:  Pt propelled Standard wheelchair x 150 feet on Level tile with  Bilateral upper extremity and Bilateral lower extremity with Minimal Assistance.     Gait Training:  Gait training on level tile with Rolling Walker x 139ft with CGA and vc for upright posture and remaining inside RW base.    Additional Treatment:  Pt performed B LE there ex sitting x 30 reps with 3# wt and blue TB, with vc for proper execution and joint protection: ap, laq, marching, hip abd/add, ham curls.    SciFit stepper x 10 min level 2    Toileting: transfers as noted above with pt needing assist with clothing management to change wet brief and for hygiene.      Activity Tolerance:  Patient tolerated treatment well and Patient limited by fatigue    Patient left up in chair with call button in reach, chair alarm on and nurse notified.    Assessment:  Deonna Montero is a 78 y.o. female with a medical diagnosis of CVA. She presents with impaired functional mobility, balance, activity tolerance, strength, vision with gait  instability. Pt very sleepy and needed re-direction to task during there ex and stepper.  Transfers progressing, but pt needs frequent vc during gait training due to walker being too far out in front of pt.      Rehab potential is fair.    Activity tolerance: Fair    Discharge recommendations:       Equipment recommendations:       GOALS:   Multidisciplinary Problems     Physical Therapy Goals        Problem: Physical Therapy Goal    Goal Priority Disciplines Outcome Goal Variances Interventions   Physical Therapy Goal     PT, PT/OT Ongoing (interventions implemented as appropriate)     Description:  Goals to be met by: 2019    Patient will increase functional independence with mobility by performin. Supine to sit with Stand-by Assistance  2. Sit to supine with Stand-by Assistance  3. Sit to stand transfer with Stand-by Assistance  4. Bed to chair transfer with Supervision using Rolling Walker  5. Gait  x 150 feet with Supervision using Rolling Walker.   6. Wheelchair propulsion x150 feet with Modified [place] using bilateral uppper extremities                      PLAN:    Patient to be seen daily  to address the above listed problems via gait training, therapeutic activities, therapeutic exercises, therapeutic groups, neuromuscular re-education, wheelchair management/training  Plan of Care expires: 19  Plan of Care reviewed with: patient         Mara Roy, PT 2019

## 2019-08-20 NOTE — PLAN OF CARE
Problem: Adult Inpatient Plan of Care  Goal: Patient-Specific Goal (Individualization)  Outcome: Ongoing (interventions implemented as appropriate)  Patient needs assistance to the bathroom by helping her to transfer from the bed to the wheelchair & vice versa.  She is usually incontinent of urine during the night.  Patient continues with slurred speech.

## 2019-08-20 NOTE — PLAN OF CARE
Problem: Physical Therapy Goal  Goal: Physical Therapy Goal  Goals to be met by: 2019    Patient will increase functional independence with mobility by performin. Supine to sit with Stand-by Assistance  2. Sit to supine with Stand-by Assistance  3. Sit to stand transfer with Stand-by Assistance  4. Bed to chair transfer with Supervision using Rolling Walker  5. Gait  x 150 feet with Supervision using Rolling Walker.   6. Wheelchair propulsion x150 feet with Modified [place] using bilateral uppper extremities     Outcome: Ongoing (interventions implemented as appropriate)    PT for gait, there ex, w/c and activity.

## 2019-08-20 NOTE — PLAN OF CARE
Problem: SLP Goal  Goal: SLP Goal  1. Demonstrate understanding of speech subsystems, STBY A  2. Complete controlled exhalation tasks to enhance control of respiration for speech production, MOD A   3. Coordinate initiation of phonation with exhalation to improve naturalness of speech production, MOD A  4. Progressing from multisyllabic words to connected utterances, Pt will demonstrate control of speech subsystems in order to achieve appropriate coordination of speech subsystems for natural speech production, MAX A- pending goals 2 &3   5. Participate in assessment of functional problem solving    Outcome: Ongoing (interventions implemented as appropriate)  Continue POC

## 2019-08-20 NOTE — PROGRESS NOTES
"REHAB FOLLOWUP NOTE    Deonna Montero is a 78 y.o. female patient.    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    History:  78-year-old  right-hand-dominant female was initially admitted to [facility] with left-sided CVA with right hemiparesis on 07/23/2019 was hospitalized till 07/26/2019 for neuro workup including CT scan showed no acute changes, carotids without any significant stenosis, MRI consistent with left pontine as well as occipital ischemic infarcts.  Patient has been medically stabilized and transferred to swing bed with steady improvement and is now transferred down to us for further rehabilitation.    Past Medical History:   Diagnosis Date    Back pain     Diabetes mellitus     Hypertension     Reflux     Short-term memory loss     Stroke      Temp: 96.2 °F (35.7 °C) (08/20/19 0724)  Pulse: 67 (08/20/19 0724)  Resp: 18 (08/20/19 0724)  BP: (!) 143/64 (08/20/19 0724)  SpO2: (!) 93 % (08/20/19 0724)  Weight: 96.2 kg (212 lb 1.3 oz) (08/15/19 1334)  Height: 5' 8" (172.7 cm) (08/15/19 1339)    Lab Results   Component Value Date    WBC 3.50 (L) 08/15/2019    HGB 12.2 08/15/2019    HCT 38.1 08/15/2019     08/15/2019    ALT 32 08/15/2019    AST 31 08/15/2019     08/15/2019    K 3.9 08/15/2019     08/15/2019    CREATININE 0.8 08/15/2019    BUN 10 08/15/2019    CO2 29 08/15/2019    INR 1.1 04/19/2018    HGBA1C 8.9 (H) 08/15/2019     Physical Examination:  Alert, oriented x3, follows commands well.  Lungs are clear to auscultation.  Heart with regular rate and rhythm.  Extremities without any edema or calf tenderness noted.  Blood sugars controlled.  Blood pressure monitor.  Complaints of insomnia.  Needs assistance with basic ADLs, transfers, mobility.    Impression:  Status post left-sided CVA with right hemiparesis.  Hypertension.  Insulin-dependent diabetes " mellitus.  Hyperlipidemia.  Degenerative joint disease.  Left eye enucleation.    Recommendations:  Continue current PT, OT, speech therapy and nursing care.          Nicky Diamond MD  8/20/2019

## 2019-08-20 NOTE — PLAN OF CARE
Problem: Adult Inpatient Plan of Care  Goal: Plan of Care Review  Fall prevention ongoing, alert, oriented, appetite is good , mod assist with transfers at times. Right sided hemiparesis ongoing. incont of urine at times.

## 2019-08-20 NOTE — PLAN OF CARE
Problem: Occupational Therapy Goal  Goal: Occupational Therapy Goal  Goals to be met by:    Patient will increase functional independence with ADLs by performing:    Feeding with Modified [place].  UE Dressing with Set-up Assistance.  LE Dressing with Stand-by Assistance with set up of clothes.  Grooming while seated with Modified [place].  Toileting from bedside commode over the toilet with Supervision for hygiene and clothing management.   Toilet transfer to bedside commode over the toilet with Contact Guard Assistance.  Bathing from  shower chair/bench with Minimal Assistanc.  Shower transfer to TTB with CGA and verbal instructions.   Outcome: Ongoing (interventions implemented as appropriate)  OT goals remain appropriate.

## 2019-08-21 LAB
ALBUMIN SERPL BCP-MCNC: 3.6 G/DL (ref 3.5–5.2)
ALP SERPL-CCNC: 142 U/L (ref 55–135)
ALT SERPL W/O P-5'-P-CCNC: 35 U/L (ref 10–44)
ANION GAP SERPL CALC-SCNC: 11 MMOL/L (ref 8–16)
AST SERPL-CCNC: 32 U/L (ref 10–40)
BASOPHILS # BLD AUTO: 0.01 K/UL (ref 0–0.2)
BASOPHILS NFR BLD: 0.3 % (ref 0–1.9)
BILIRUB SERPL-MCNC: 0.3 MG/DL (ref 0.1–1)
BUN SERPL-MCNC: 22 MG/DL (ref 8–23)
CALCIUM SERPL-MCNC: 10 MG/DL (ref 8.7–10.5)
CHLORIDE SERPL-SCNC: 102 MMOL/L (ref 95–110)
CO2 SERPL-SCNC: 27 MMOL/L (ref 23–29)
CREAT SERPL-MCNC: 1 MG/DL (ref 0.5–1.4)
DIFFERENTIAL METHOD: ABNORMAL
EOSINOPHIL # BLD AUTO: 0 K/UL (ref 0–0.5)
EOSINOPHIL NFR BLD: 1.1 % (ref 0–8)
ERYTHROCYTE [DISTWIDTH] IN BLOOD BY AUTOMATED COUNT: 13.6 % (ref 11.5–14.5)
EST. GFR  (AFRICAN AMERICAN): >60 ML/MIN/1.73 M^2
EST. GFR  (NON AFRICAN AMERICAN): 54 ML/MIN/1.73 M^2
GLUCOSE SERPL-MCNC: 201 MG/DL (ref 70–110)
HCT VFR BLD AUTO: 42.8 % (ref 37–48.5)
HGB BLD-MCNC: 13.8 G/DL (ref 12–16)
IMM GRANULOCYTES # BLD AUTO: 0.01 K/UL (ref 0–0.04)
LYMPHOCYTES # BLD AUTO: 1.4 K/UL (ref 1–4.8)
LYMPHOCYTES NFR BLD: 38.4 % (ref 18–48)
MCH RBC QN AUTO: 29.2 PG (ref 27–31)
MCHC RBC AUTO-ENTMCNC: 32.2 G/DL (ref 32–36)
MCV RBC AUTO: 91 FL (ref 82–98)
MONOCYTES # BLD AUTO: 0.4 K/UL (ref 0.3–1)
MONOCYTES NFR BLD: 10.5 % (ref 4–15)
NEUTROPHILS # BLD AUTO: 1.8 K/UL (ref 1.8–7.7)
NEUTROPHILS NFR BLD: 49.4 % (ref 38–73)
NRBC BLD-RTO: 0 /100 WBC
PLATELET # BLD AUTO: 222 K/UL (ref 150–350)
PMV BLD AUTO: 11.6 FL (ref 9.2–12.9)
POCT GLUCOSE: 152 MG/DL (ref 70–110)
POCT GLUCOSE: 192 MG/DL (ref 70–110)
POCT GLUCOSE: 196 MG/DL (ref 70–110)
POTASSIUM SERPL-SCNC: 4.7 MMOL/L (ref 3.5–5.1)
PROT SERPL-MCNC: 7.8 G/DL (ref 6–8.4)
RBC # BLD AUTO: 4.73 M/UL (ref 4–5.4)
SODIUM SERPL-SCNC: 140 MMOL/L (ref 136–145)
WBC # BLD AUTO: 3.62 K/UL (ref 3.9–12.7)

## 2019-08-21 PROCEDURE — 97127 HC THERAPEUTIC INTVTN, COGN FUNCTION - ST: CPT

## 2019-08-21 PROCEDURE — 80053 COMPREHEN METABOLIC PANEL: CPT

## 2019-08-21 PROCEDURE — 36415 COLL VENOUS BLD VENIPUNCTURE: CPT

## 2019-08-21 PROCEDURE — 92526 ORAL FUNCTION THERAPY: CPT

## 2019-08-21 PROCEDURE — 12800000 HC REHAB SEMI-PRIVATE ROOM

## 2019-08-21 PROCEDURE — 25000003 PHARM REV CODE 250: Performed by: PHYSICAL MEDICINE & REHABILITATION

## 2019-08-21 PROCEDURE — 85025 COMPLETE CBC W/AUTO DIFF WBC: CPT

## 2019-08-21 PROCEDURE — 97535 SELF CARE MNGMENT TRAINING: CPT

## 2019-08-21 PROCEDURE — 63600175 PHARM REV CODE 636 W HCPCS: Performed by: PHYSICAL MEDICINE & REHABILITATION

## 2019-08-21 RX ORDER — TRAMADOL HYDROCHLORIDE 50 MG/1
50 TABLET ORAL 3 TIMES DAILY
Status: DISCONTINUED | OUTPATIENT
Start: 2019-08-21 | End: 2019-08-21

## 2019-08-21 RX ADMIN — ENOXAPARIN SODIUM 40 MG: 100 INJECTION SUBCUTANEOUS at 06:08

## 2019-08-21 RX ADMIN — METOPROLOL SUCCINATE 50 MG: 50 TABLET, EXTENDED RELEASE ORAL at 09:08

## 2019-08-21 RX ADMIN — METFORMIN HYDROCHLORIDE 500 MG: 500 TABLET ORAL at 09:08

## 2019-08-21 RX ADMIN — PANTOPRAZOLE SODIUM 40 MG: 40 TABLET, DELAYED RELEASE ORAL at 09:08

## 2019-08-21 RX ADMIN — LINACLOTIDE 145 MCG: 145 CAPSULE, GELATIN COATED ORAL at 09:08

## 2019-08-21 RX ADMIN — AMLODIPINE BESYLATE 10 MG: 5 TABLET ORAL at 09:08

## 2019-08-21 RX ADMIN — RAMELTEON 8 MG: 8 TABLET, FILM COATED ORAL at 08:08

## 2019-08-21 RX ADMIN — DONEPEZIL HYDROCHLORIDE 20 MG: 5 TABLET, FILM COATED ORAL at 08:08

## 2019-08-21 RX ADMIN — BRIMONIDINE TARTRATE AND TIMOLOL MALEATE 1 DROP: 2; 5 SOLUTION OPHTHALMIC at 09:08

## 2019-08-21 RX ADMIN — LISINOPRIL 10 MG: 10 TABLET ORAL at 08:08

## 2019-08-21 RX ADMIN — ACETAMINOPHEN 650 MG: 325 TABLET ORAL at 06:08

## 2019-08-21 RX ADMIN — TRAVOPROST 1 DROP: 0.04 SOLUTION/ DROPS OPHTHALMIC at 08:08

## 2019-08-21 RX ADMIN — HUMAN INSULIN 15 UNITS: 100 INJECTION, SUSPENSION SUBCUTANEOUS at 06:08

## 2019-08-21 RX ADMIN — BRIMONIDINE TARTRATE AND TIMOLOL MALEATE 1 DROP: 2; 5 SOLUTION OPHTHALMIC at 08:08

## 2019-08-21 RX ADMIN — TRAMADOL HYDROCHLORIDE 50 MG: 50 TABLET, FILM COATED ORAL at 09:08

## 2019-08-21 RX ADMIN — ASPIRIN 81 MG CHEWABLE TABLET 81 MG: 81 TABLET CHEWABLE at 09:08

## 2019-08-21 NOTE — PLAN OF CARE
Problem: Occupational Therapy Goal  Goal: Occupational Therapy Goal  Goals to be met by:    Patient will increase functional independence with ADLs by performing:    Feeding with Modified [place].  UE Dressing with Set-up Assistance.  LE Dressing with Stand-by Assistance with set up of clothes.  Grooming while seated with Modified [place].  Toileting from bedside commode over the toilet with Supervision for hygiene and clothing management.   Toilet transfer to bedside commode over the toilet with Contact Guard Assistance.  Bathing from  shower chair/bench with Minimal Assistanc.  Shower transfer to TTB with CGA and verbal instructions.   Outcome: Ongoing (interventions implemented as appropriate)  OT goals remain appropriate.

## 2019-08-21 NOTE — PROGRESS NOTES
"REHAB FOLLOWUP NOTE    Deonna Montero is a 78 y.o. female patient.    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    History:  78-year-old  right-hand-dominant female was initially admitted to [facility] with left-sided CVA with right hemiparesis on 07/23/2019 was hospitalized till 07/26/2019 for neuro workup including CT scan showed no acute changes, carotids without any significant stenosis, MRI consistent with left pontine as well as occipital ischemic infarcts.  Patient has been medically stabilized and transferred to swing bed with steady improvement and is now transferred down to us for further rehabilitation.    Past Medical History:   Diagnosis Date    Back pain     Diabetes mellitus     Hypertension     Reflux     Short-term memory loss     Stroke      Temp: 98 °F (36.7 °C) (08/20/19 2015)  Pulse: 72 (08/20/19 2015)  Resp: 18 (08/20/19 2015)  BP: (!) 129/58 (08/20/19 2015)  SpO2: 95 % (08/20/19 2015)  Weight: 96.2 kg (212 lb 1.3 oz) (08/15/19 1334)  Height: 5' 8" (172.7 cm) (08/15/19 1339)    Lab Results   Component Value Date    WBC 3.50 (L) 08/15/2019    HGB 12.2 08/15/2019    HCT 38.1 08/15/2019     08/15/2019    ALT 32 08/15/2019    AST 31 08/15/2019     08/15/2019    K 3.9 08/15/2019     08/15/2019    CREATININE 0.8 08/15/2019    BUN 10 08/15/2019    CO2 29 08/15/2019    INR 1.1 04/19/2018    HGBA1C 8.9 (H) 08/15/2019     Physical Examination:  Alert, oriented x3, follows commands well.  Complaints of cramps and aching all over the body.  Lungs are clear to auscultation.  Heart with regular rate and rhythm.  Extremities without any edema or calf tenderness noted.  Blood sugars controlled.  Blood pressure monitor.   Needs assistance with basic ADLs, transfers, mobility.    Impression:  Status post left-sided CVA with right hemiparesis.  Hypertension.  Insulin-dependent diabetes " mellitus.  Hyperlipidemia.  Degenerative joint disease.  Left eye enucleation.    Recommendations:  Continue current PT, OT, speech therapy and nursing care.          Nicky Diamond MD  8/21/2019

## 2019-08-21 NOTE — PT/OT/SLP PROGRESS
"Speech Language Pathology Treatment          Deonna Montero   MRN: [id number]     Diet recommendations: Based on swallow ability/endurance today, recommend downgrade of diet consistency to puree IDDSI Level 4  as tolerated (hold tray if pt not fully alert)  Liquid Diet Level: Thin   Swallow Precautions: HOLD TRAY IF PATIENT IS NOT ALERT; SUPERVISION WITH MEALS.  1 bite/sip at a time, Avoid talking while eating, Chin tuck during swallow, Eliminate distractions, HOB to 90 degrees (preferably out of bed and in chair for meals), Monitor for s/s of aspiration, Remain upright 30 minutes post meal and Small bites/sips.  Crush meds if crushable. (Discussed with nurse/diet change form completed)    SLP Treatment Date: 08/21/19     First visit:  Speech Start Time: 0912     Speech Stop Time: 1019(seen later for additional time)     Minutes: 67 min       Second visit:  Start: 1140  Stop: 1149  Minutes:  9    Third visit:  Start 1450  Stop 1500  Minutes: 10    Fourth Visit  Start: 1645  Stop: 1718  Minutes: 33        Total Minutes:  119    TREATMENT BILLABLE MINUTES:  Speech Therapy Individual 26 and Treatment Swallowing Dysfunction 93    General Precautions: Standard, aspiration, fall, diabetic  Current Respiratory Status: room air       Subjective:  "I want to stay with the program."    Objective:    Pt treated for dysphagia and cognitive-linguistic deficits today.  Pt participated in re-administration of the [place] Cognitive Assessment (MOCA).  Pt participated in ongoing swallow safety education and therapeutic reassessment of tolerance of current diet, given fatigue/endurance issues demonstrated today.  See plan of care and assessment sections below for details.    Pain/Comfort  Pain Rating 1: (pt c/o dizziness (reported to nurse))    Assessment:  Deonna Montero is a 78 y.o. female with a SLP diagnosis of Dysphagia, Dysarthria and Cognitive-Linguistic Impairment.   Pt achieved 14 of 30 possible points today " on the [place] Cognitive Assessment (MOCA). Errors were noted in the areas of executive function, delayed recall, immediate recall/repetition of complex sentences; she demonstrated functional temporal orientation, although confused about year.      Pt did not feel well today; appeared tired.  Initially refused breakfast (mechanical soft).  Later attempted raisin bran with milk (mixed) provided by nurse.  Mastication noted to be slower than usual; cough noted after 20% trials.  Clinician provided yogurt after discussing pt status with nurse.  Pt tolerated 4 ounces of yogurt, using chin tuck without cues.  She tolerated thin liquid by self-regulated cup sip x approximately 3 oz.  Overall, pt is more dysarthric today (slower speech with weaker articulatory contacts, with mildly reduced intelligibility).  She reports not sleeping last night; denies headache; gives inconsistent report of dizziness.  Discussed with nurse (nurse checked blood pressure).    Pt seen briefly with lunch tray.  Pt chewing slowly food when clinician entered room; extended mastication.  Too tired to self-feed further.  Clinician offered assistance with nutrition drink via straw sips (holding up container for pt to take sips); no overt sign of aspiration during/after several sips; however pt too tired to attempt additional food.  Clinician recommended to pt/nurse pt not attempt food until more alert/feeling better.      Next pt contact at 1450.  Pt up in chair in room; pt states she ate some warmed up food from lunch tray and vomited.  She is speaking more clearly than earlier in day but complains of stomach cramps.  Nurse calling MD for instructions.  Pt tolerated several single sips thin liquid and ice chips with clinician recommendations to take small sips, swallow hard.     Pt treated with dinner tray at 1645.  Dysarthric (moderate); denture in place (upper). Up in wheelchair.  Very extended mastication on chopped vegetable (cooked). Slow to  propel/swallow mashed potato.  Refused mech soft meat on tray.  Only wanted yogurt.  4 oz intake (functional but slow).  Tolerated thin liquid by straw.  Hiccups.  Left pt in w/c with belt/alarm on; call button in reach.      ASSESSMENT/RECOMMENDATION:   DECREASED ENDURANCE;  DOWNGRADE DIET CONSISTENCY TO PUREE; SUPERVISION WITH MEALS; CRUSH CRUSHABLE MEDS INTO APPLESAUCE OR PUDDING/YOGURT.         Swallow Precautions: HOLD TRAY IF PATIENT IS NOT ALERT; SUPERVISION WITH MEALS.  1 bite/sip at a time, Avoid talking while eating, Chin tuck during swallow, Eliminate distractions, HOB to 90 degrees (preferably out of bed and in chair for meals), Monitor for s/s of aspiration, Remain upright 30 minutes post meal and Small bites/sips.  Crush meds.    Discharge recommendations: Discharge Facility/Level of Care Needs: to be determined    Goals:   Multidisciplinary Problems     SLP Goals        Problem: SLP Goal    Goal Priority Disciplines Outcome   SLP Goal     SLP Ongoing (interventions implemented as appropriate)   Description:  1. Demonstrate understanding of speech subsystems, STBY A  2. Complete controlled exhalation tasks to enhance control of respiration for speech production, MOD A   3. Coordinate initiation of phonation with exhalation to improve naturalness of speech production, MOD A  4. Progressing from multisyllabic words to connected utterances, Pt will demonstrate control of speech subsystems in order to achieve appropriate coordination of speech subsystems for natural speech production, MAX A- pending goals 2 &3   5. Participate in assessment of functional problem solving    6. Demonstrate understanding of swallow safety precautions with 80% accuracy, min cues.                       Plan:   Patient to be seen Therapy Frequency: 5 x/week   Planned Interventions: Cognitive-Linguistic Therapy, Dysphagia Therapy, Motor Speech Therapy, Language Therapy  Plan of Care Expires:    Plan of Care reviewed with:  patient  SLP Follow-up?: Yes  SLP - Next Visit Date: 08/22/19           Nellie Piña CCC-SLP 8/21/2019

## 2019-08-21 NOTE — PT/OT/SLP PROGRESS
"Physical Therapy      Patient Name:  Deonna Montero   MRN:  [id number]    Patient not seen today secondary to refused PT due to "stomach cramps" in a.m., "dizzy" @ 12:30 p.m.  RN notified.  Will follow-up.    Raman Hernandez, PT    "

## 2019-08-21 NOTE — PLAN OF CARE
Problem: SLP Goal  Goal: SLP Goal  1. Demonstrate understanding of speech subsystems, STBY A  2. Complete controlled exhalation tasks to enhance control of respiration for speech production, MOD A   3. Coordinate initiation of phonation with exhalation to improve naturalness of speech production, MOD A  4. Progressing from multisyllabic words to connected utterances, Pt will demonstrate control of speech subsystems in order to achieve appropriate coordination of speech subsystems for natural speech production, MAX A- pending goals 2 &3   5. Participate in assessment of functional problem solving    6. Demonstrate understanding of swallow safety precautions with 80% accuracy, min cues.    Outcome: Ongoing (interventions implemented as appropriate)  Pt not feeling well.  Tired; states she didn't sleep well. More dysarthric than when last seen by this clinician.  Discussed with MD/nurse and other staff.    Pt participated in readministration of [place] Cognitive Assessment.    Pt too tired to eat lunch at 1140.    Comments: Very tired today.  However, pt tried to be cooperative in tx to best of her ability.

## 2019-08-21 NOTE — PLAN OF CARE
Problem: Adult Inpatient Plan of Care  Goal: Plan of Care Review  Outcome: Ongoing (interventions implemented as appropriate)  Patient alert and oriented.  Moderate assist to transfer, turn, and reposition.  Denies pain or complaint, NAD noted, free of falls.

## 2019-08-21 NOTE — PT/OT/SLP PROGRESS
"Occupational Therapy  Treatment    Deonna Montero   MRN: [id number]   Admitting Diagnosis: Status post new onset left pontine and occipital ischemic infarct with dysarthria, balance coordination problems.     OT Date of Treatment: 08/21/19   Total Time (min): 55 min    Billable Minutes:  Self Care/Home Management 55  Total Minutes: 55    General Precautions: Standard, aspiration, fall, diabetic  Orthopedic Precautions: N/A  Braces: N/A    Spiritual, Cultural Beliefs, Latter day Practices, Values that Affect Care: no    Subjective:  Communicated with nurse prior to session.  "I'm really trying to do what I'm supposed to do, but I just can't right now." - pt reports dizziness while in shower and during dressing after shower; BP measuring 122/72; nurse notified & acknowledging     Pain/Comfort  Pain Rating 1: (no c/o pain; however, pt does report dizziness)  Pain Addressed 1: Reposition, Cessation of Activity, Nurse notified, Other (see comments)(BP measuring 122/72)  Pain Rating Post-Intervention 1: 0/10    Objective:  Patient found with: (chair alarm)    Functional Mobility:  Bed Mobility:   Supine to sit: Activity did not occur   Sit to supine: Maximum Assistance   Rolling: Activity did not occur   Scooting: Moderate Assistance    Transfer Training:  Sit to stand: Moderate Assistance with Grab bars with verbal cues for correct body positioning   Bed <> Chair: Stand Pivot with Maximum Assistance - Mod }(A) for sit > stand; however, pt requires Max (A) for slow, controlled descent to EOB with use of bed rail and step by step verbal instruction for transfer sequence and proper hand placement   Tub bench transfer Stand Pivot with Moderate Assistance with grab bar .    Grooming:  SBA sitting sink side     Bathing:  Patient performed bathing with Moderate Assistance with grab bar, Handheld shower head, long-handled sponge and tub bench at Shower - pt does c/o slight dizziness while in shower     UE Dressing:  Patient " "performed UE Dressing with Stand-by Assistance with Set-up of clothing at Wheelchair.    LE Dressing:  Pt doffed socks and adult pull up with SBA using dressing stick  Pt donned socks, adult pull up and long pants with Total (A); pt set up with brief and dressing stick and provided with education/instruction regarding use of dressing stick to increase safety & independence with LB dressing tasks; pt attempting to thread R LE using dressing stick but patient almost immediately stopping and stating, "I can't do this. I feel too dizzy to try this." OTR providing assistance to thread and manage clothing up/down hips    Toilet Training:  Pt urinating in shower     Additional Treatment:  - OTR reinforcing education/instruction regarding correct transfer sequence/techniques, safety awareness, use of adaptive equipment to increase safety and independence with ADL and [place] dressing techniques; pt will benefit from daily reinforcement of education/instruction     Patient left up in chair with all lines intact, call button in reach, bed alarm on and nurse notified    ASSESSMENT:  Deonna Montero is a 78 y.o. female with a medical diagnosis of Status post new onset left pontine and occipital ischemic infarct with dysarthria, balance coordination problems and presents with seemingly worsening of dysarthria and reports of increased dizziness during today's session; pt reports she told Dr. Diamond this morning. /72 during OT session and OTR assisting pt to bed upon completion of bathing/dressing. RNShira notified and acknowledging all previously reported.     Rehab potential is good    Activity tolerance: Good    Discharge recommendations: home     Equipment recommendations: other (see comments)(TBD)     GOALS:   Multidisciplinary Problems     Occupational Therapy Goals        Problem: Occupational Therapy Goal    Goal Priority Disciplines Outcome Interventions   Occupational Therapy Goal     OT, PT/OT Ongoing " (interventions implemented as appropriate)    Description:  Goals to be met by:    Patient will increase functional independence with ADLs by performing:    Feeding with Modified [place].  UE Dressing with Set-up Assistance.  LE Dressing with Stand-by Assistance with set up of clothes.  Grooming while seated with Modified [place].  Toileting from bedside commode over the toilet with Supervision for hygiene and clothing management.   Toilet transfer to bedside commode over the toilet with Contact Guard Assistance.  Bathing from  shower chair/bench with Minimal Assistanc.  Shower transfer to TTB with CGA and verbal instructions.                    Plan:  Patient to be seen 6 x/week to address the above listed problems via self-care/home management, community/work re-entry, therapeutic activities, therapeutic exercises, therapeutic groups, neuromuscular re-education, wheelchair management/training  Plan of Care expires:    Plan of Care reviewed with: patient    Carmen TRE Mathews LOTR  08/21/2019

## 2019-08-21 NOTE — PLAN OF CARE
Problem: Adult Inpatient Plan of Care  Goal: Plan of Care Review  Alert, oriented, appetite is fair to good fall prevention ongoing bed and chair alarm in use at all times. Routine med teaching ongoing

## 2019-08-22 LAB
POCT GLUCOSE: 187 MG/DL (ref 70–110)
POCT GLUCOSE: 193 MG/DL (ref 70–110)
POCT GLUCOSE: 201 MG/DL (ref 70–110)
POCT GLUCOSE: 250 MG/DL (ref 70–110)
POCT GLUCOSE: 324 MG/DL (ref 70–110)

## 2019-08-22 PROCEDURE — 63600175 PHARM REV CODE 636 W HCPCS: Performed by: PHYSICAL MEDICINE & REHABILITATION

## 2019-08-22 PROCEDURE — 97530 THERAPEUTIC ACTIVITIES: CPT

## 2019-08-22 PROCEDURE — 97110 THERAPEUTIC EXERCISES: CPT

## 2019-08-22 PROCEDURE — 12800000 HC REHAB SEMI-PRIVATE ROOM

## 2019-08-22 PROCEDURE — 25000003 PHARM REV CODE 250: Performed by: PHYSICAL MEDICINE & REHABILITATION

## 2019-08-22 PROCEDURE — 92507 TX SP LANG VOICE COMM INDIV: CPT

## 2019-08-22 PROCEDURE — 92526 ORAL FUNCTION THERAPY: CPT

## 2019-08-22 RX ORDER — DONEPEZIL HYDROCHLORIDE 5 MG/1
10 TABLET, FILM COATED ORAL NIGHTLY
Status: DISCONTINUED | OUTPATIENT
Start: 2019-08-22 | End: 2019-09-05 | Stop reason: HOSPADM

## 2019-08-22 RX ORDER — AMITRIPTYLINE HYDROCHLORIDE 10 MG/1
10 TABLET, FILM COATED ORAL NIGHTLY
Status: DISCONTINUED | OUTPATIENT
Start: 2019-08-22 | End: 2019-08-29

## 2019-08-22 RX ADMIN — HUMAN INSULIN 18 UNITS: 100 INJECTION, SUSPENSION SUBCUTANEOUS at 04:08

## 2019-08-22 RX ADMIN — DONEPEZIL HYDROCHLORIDE 10 MG: 5 TABLET, FILM COATED ORAL at 08:08

## 2019-08-22 RX ADMIN — ENOXAPARIN SODIUM 40 MG: 100 INJECTION SUBCUTANEOUS at 04:08

## 2019-08-22 RX ADMIN — HUMAN INSULIN 15 UNITS: 100 INJECTION, SUSPENSION SUBCUTANEOUS at 06:08

## 2019-08-22 RX ADMIN — LACTULOSE 20 G: 20 SOLUTION ORAL at 08:08

## 2019-08-22 RX ADMIN — METFORMIN HYDROCHLORIDE 500 MG: 500 TABLET ORAL at 04:08

## 2019-08-22 RX ADMIN — LISINOPRIL 10 MG: 10 TABLET ORAL at 08:08

## 2019-08-22 RX ADMIN — BRIMONIDINE TARTRATE AND TIMOLOL MALEATE 1 DROP: 2; 5 SOLUTION OPHTHALMIC at 08:08

## 2019-08-22 RX ADMIN — RAMELTEON 8 MG: 8 TABLET, FILM COATED ORAL at 08:08

## 2019-08-22 RX ADMIN — ACETAMINOPHEN 650 MG: 325 TABLET ORAL at 08:08

## 2019-08-22 RX ADMIN — POLYETHYLENE GLYCOL (3350) 17 G: 17 POWDER, FOR SOLUTION ORAL at 08:08

## 2019-08-22 RX ADMIN — ASPIRIN 81 MG CHEWABLE TABLET 81 MG: 81 TABLET CHEWABLE at 08:08

## 2019-08-22 RX ADMIN — INSULIN ASPART 4 UNITS: 100 INJECTION, SOLUTION INTRAVENOUS; SUBCUTANEOUS at 11:08

## 2019-08-22 RX ADMIN — TRAVOPROST 1 DROP: 0.04 SOLUTION/ DROPS OPHTHALMIC at 08:08

## 2019-08-22 RX ADMIN — METOPROLOL SUCCINATE 50 MG: 50 TABLET, EXTENDED RELEASE ORAL at 08:08

## 2019-08-22 RX ADMIN — ONDANSETRON 4 MG: 4 TABLET, ORALLY DISINTEGRATING ORAL at 12:08

## 2019-08-22 RX ADMIN — METFORMIN HYDROCHLORIDE 500 MG: 500 TABLET ORAL at 08:08

## 2019-08-22 RX ADMIN — LINACLOTIDE 145 MCG: 145 CAPSULE, GELATIN COATED ORAL at 08:08

## 2019-08-22 RX ADMIN — AMLODIPINE BESYLATE 10 MG: 5 TABLET ORAL at 08:08

## 2019-08-22 RX ADMIN — PANTOPRAZOLE SODIUM 40 MG: 40 TABLET, DELAYED RELEASE ORAL at 08:08

## 2019-08-22 NOTE — PLAN OF CARE
Problem: SLP Goal  Goal: SLP Goal  1. Demonstrate understanding of speech subsystems, STBY A  2. Complete controlled exhalation tasks to enhance control of respiration for speech production, MOD A   3. Coordinate initiation of phonation with exhalation to improve naturalness of speech production, MOD A  4. Progressing from multisyllabic words to connected utterances, Pt will demonstrate control of speech subsystems in order to achieve appropriate coordination of speech subsystems for natural speech production, MAX A- pending goals 2 &3   5. Participate in assessment of functional problem solving    6. Demonstrate understanding of swallow safety precautions with 80% accuracy, min cues.  7. Pt will per form target oral exercises for improved labial/lingual strength for increased swallow efficiency with min cues, 70% accuracy, 5-10 reps.  8. Pt will perform articulation drills focusing on increasing strength of articulatory contacts in initial, medial, final positions of single-syllable and two-syllable words, given clinician model, 80% accuracy (for improved speech intelligibility)  9. Pt will perform target dysphagia exercises ( effortful swallows, Mendelsohn maneuver and second swallows with food/liquid); Mendelsohn maneuver, Ellyn task without food ) with model 80% accuracy, for increased swallow safety (based on results of prior modified barium swallow).  Outcome: Ongoing (interventions implemented as appropriate)  Much improved alertness and swallow noted with puree breakfast tray.  Pt will be seen later in day for additional therapy. Continue plan of care as updated.    Comments: Alert and cooperative.  Feeling better.

## 2019-08-22 NOTE — PLAN OF CARE
Problem: Diabetes Comorbidity  Goal: Blood Glucose Level Within Desired Range    Intervention: Maintain Glycemic Control  Blood glucose 250.  Patient has not had much to eat today & vomitted on the day shift.  Explained to patient stress can elevate your glucose.  Patient with nausea/vomiting.  Zofran 4 mg given sublingual.

## 2019-08-22 NOTE — PATIENT CARE CONFERENCE
Weekly Staffing Report      Date Admitted: 8/14/2019 :   Staffing Date: 8/22/2019     Patient Active Problem List   Diagnosis    DDD (degenerative disc disease)    Postlaminectomy syndrome of lumbar region    Lumbar radiculopathy    SI (sacroiliac) joint dysfunction    DDD (degenerative disc disease), lumbar    Benign essential HTN    Constipation    Recurrent UTI    Primary osteoarthritis of hips, bilateral    Back pain of lumbar region with sciatica    Primary osteoarthritis of left hip    Type 2 diabetes mellitus without complication    Short-term memory loss    CVA (cerebral vascular accident)          Team Members Present:  Physician Team Member: Dr Diamond  Nursing Team Member: Ge Lai RN   Case Management Team Member: Teri Sloan RN  PT Team Member: Raman Hernandez PT  OT Team Member: Sho Blum OT  SLP Team Member: Nellie VIVEROS  Other (Discipline and Name): Ayana Toth RD    Nursing  Skin: Intact  Bowel: Continent  Bladder:continent  Diet: diabetic, 1800 calorie  Appetite: fair   Pain Level: 8/10    Physical Therapy  Supine to Sit:  minimal assist  Sit to Stand: minimal assist  Gait: 125-150 feet contact guard Rolling walker  Wheelchair Mobility: 125-150 feet minimal assist  Stairs: N/A      Occupational Therapy  Feeding: standy by assistance  Grooming:standy by assistance  UED: standy by assistance  LED: total assist  Bathing:moderate assist   Toileting:maximal assist  Toilet Transfer:  maximal assist  Tub Transfer:  maximal assist      Speech Therapy  Swallow: Mild dysphagia.  MMS: 14/30  Memory: minimal assist  Receptive Language: supervision  Expressive Language: supervision  Problem solving: minimal assist            Goals:  SBA to supervision.      Tolerates 3 hours of therapy: Yes    Discharge Destination: Home with H/H.    Estimated Length of Stay: 9/5/19.

## 2019-08-22 NOTE — PROGRESS NOTES
Team conference attended and patient progress discussed.  Update provided to patient on progress and ELOS.  Reviewed discharge plan with patient.  No questions or concerns voiced at this time.  Updated garrick Lopes on progress and ELOS.  Patient's son asked to be called after lunch due to his work schedule.  Will follow up with son.  CM will assist with discharge planning.

## 2019-08-22 NOTE — PT/OT/SLP PROGRESS
"Speech Language Pathology Treatment          Deonna Montero   MRN: [id number]     Diet recommendations: Solid Diet Level: Puree IDDSI 4  Liquid Diet Level: Thin   Swallow Precautions: 1 bite/sip at a time, Avoid talking while eating, Chin tuck, Double swallow with each bite/sip, Eliminate distractions, Feed only when awake/alert, HOB to 90 degrees (preferably out of bed for meals), Meds crushed in puree, Remain upright 30 minutes post meal and Small bites/sips.    SLP Treatment Date: 08/22/19     First visit  Speech Start Time: 0739     Speech Stop Time: 0755(pt seen later for additional minutes)     Speech Total (min): 16 min     Second Visit  Start: 1234  Stop: 1304  Minutes: 30    Total Minutes:   46    TREATMENT BILLABLE MINUTES:  Speech Therapy Individual 30 and Treatment Swallowing Dysfunction 16    General Precautions: Standard, fall, aspiration, diabetic  Current Respiratory Status: room air       Subjective:  "It's hard to move my tongue that way." (during oral exercises)    Objective:    Patient found with: (chair alarm).  Pt treated for dysphagia and dysarthria today.  See assessment and plan of care sections for details.    Pain/Comfort  Pain Rating 1: 0/10    Assessment:  Deonna Montero is a 78 y.o. female with a SLP diagnosis of Dysphagia, Dysarthria and Cognitive-Linguistic Impairment.  Today she was trained in oral exercises to benefit both speech intelligibility and oral stage of swallow efficiency.  She completed 5-10 repetitions of target ex (copies kept in folder in her room) including lip seal strengthening task and tongue range/endurance tasks.  She demonstrated difficulty fully ranging tongue to posterior-lateral oral cavity bilaterally. Exercise performance accuracy judged 60%; good effort.  Subjectively, her dysarthria is judged moderate (much better than yesterday, but not as clear as Monday).  Intake from food trays today shows increase in quantity consumed (better than 50% " at breakfast and about 80% at lunch).  No cough noted during breakfast today.  Pt completed swallows of puree within 3-4 seconds, based on time from lip seal on spoon to visible laryngeal movement upward.  She is more alert than yesterday.  Discussed dysarthria and swallow issues with nurse.    Mild-mod oral-pharyngeal dysphagia without sign of aspiration today (improved from yesterday).  Moderate Dysarthria today (improved from yesterday but more dysarthric than on Monday).    Diet recommendations:      Puree IDDSI 4  Liquid Diet Level: Thin   Swallow Precautions: 1 bite/sip at a time, Avoid talking while eating, Chin tuck, Double swallow with each bite/sip, Eliminate distractions, Feed only when awake/alert, HOB to 90 degrees (preferably out of bed for meals), Meds crushed in puree, Remain upright 30 minutes post meal and Small bites/sips.      Discharge recommendations: Discharge Facility/Level of Care Needs: (to be determined)     Goals:   Multidisciplinary Problems     SLP Goals        Problem: SLP Goal    Goal Priority Disciplines Outcome   SLP Goal     SLP Ongoing (interventions implemented as appropriate)   Description:  1. Demonstrate understanding of speech subsystems, STBY A  2. Complete controlled exhalation tasks to enhance control of respiration for speech production, MOD A   3. Coordinate initiation of phonation with exhalation to improve naturalness of speech production, MOD A  4. Progressing from multisyllabic words to connected utterances, Pt will demonstrate control of speech subsystems in order to achieve appropriate coordination of speech subsystems for natural speech production, MAX A- pending goals 2 &3   5. Participate in assessment of functional problem solving    6. Demonstrate understanding of swallow safety precautions with 80% accuracy, min cues.  7. Pt will per form target oral exercises for improved labial/lingual strength for increased swallow efficiency with min cues, 70% accuracy,  5-10 reps.  8. Pt will perform articulation drills focusing on increasing strength of articulatory contacts in initial, medial, final positions of single-syllable and two-syllable words, given clinician model, 80% accuracy (for improved speech intelligibility)  9. Pt will perform target dysphagia exercises ( effortful swallows, Mendelsohn maneuver and second swallows with food/liquid); Mendelsohn maneuver, Ellyn task without food ) with model 80% accuracy, for increased swallow safety (based on results of prior modified barium swallow).                     Plan:   Patient to be seen Therapy Frequency: 5 x/week   Planned Interventions: Cognitive-Linguistic Therapy, Dysphagia Therapy, Motor Speech Therapy, Language Therapy  Plan of Care Expires:    Plan of Care reviewed with: patient  SLP Follow-up?: Yes  SLP - Next Visit Date: 08/23/19           Nellie Piña CCC-SLP 8/22/2019

## 2019-08-22 NOTE — PT/OT/SLP PROGRESS
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    Pain/Comfort  Pain Rating 1: 0/10    Objective:   Patient was found sitting in her wheelchair at bedside, pleasant and cooperative.    Additional Treatment:  Patient was educated on BUE exercising at table top.  Bilat alicia done for 4 sets of 3 mins each using 4 lbs, rest breaks needed and given.  Challenged patient to seach through a large cut of yellow thera puty to find hidden beans and marbles, pulling and pinching, all found.  BUE were engaged to transfer all graded clothes pins (all colors) x 2 rounds.    Patient left up in chair with all lines intact, call button in reach, chair alarm on, nursing notified and nursing (Shira) present    ASSESSMENT:  Deonna Montero is a 78 y.o. 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  Patient is very willing to participate, but she operated at a slow pace and had minor complaints of sporadic short lived muscle cramps, all reported to nursing.    Rehab potential is good    Activity tolerance: Good      GOALS:   Multidisciplinary Problems     Occupational Therapy Goals        Problem: Occupational Therapy Goal    Goal Priority Disciplines Outcome Interventions   Occupational Therapy Goal     OT, PT/OT Ongoing (interventions implemented as appropriate)    Description:  Goals to be met by:    Patient will increase  functional independence with ADLs by performing:    Feeding with Modified [place].  UE Dressing with Set-up Assistance.  LE Dressing with Stand-by Assistance with set up of clothes.  Grooming while seated with Modified [place].  Toileting from bedside commode over the toilet with Supervision for hygiene and clothing management.   Toilet transfer to bedside commode over the toilet with Contact Guard Assistance.  Bathing from  shower chair/bench with Minimal Assistanc.  Shower transfer to TTB with CGA and verbal instructions.                    Plan:  Patient to be seen 6 x/week to address the above listed problems via self-care/home management, community/work re-entry, therapeutic activities, therapeutic exercises, therapeutic groups, neuromuscular re-education, wheelchair management/training  Plan of Care reviewed with: patient         Mayo GREGORY Craig  08/22/2019

## 2019-08-22 NOTE — PROGRESS NOTES
"Spoke with patient's son "SHIRLEY" (Hilario) and provided him with an update on patient progress and ELOS.  No questions voiced at this time.  CM will assist with discharge planning as needed.  "

## 2019-08-22 NOTE — PT/OT/SLP PROGRESS
"Physical Therapy         Treatment        Deonna Montero   MRN: [id number]     PT Received On: 19  Total Time (min): 85        Billable Minutes:  Therapeutic Activity 45 and Therapeutic Exercise 15  Total Minutes: 60    Treatment Type: Treatment  PT/PTA: PT     PTA Visit Number: 0       General Precautions: Standard, fall  Orthopedic Precautions: Orthopedic Precautions : N/A     Subjective:  Communicated with nurse prior to session.     Objective:  Patient found seated in w/c, cooperative.       Functional Mobility:    Transfer Training:  Sit to stand:Minimal Assistance with Rolling Walker  x 3    Wheelchair Trainin' x 2 (slow) with mod assist and cues    Gait Trainin' x 2, 100', with RW with CGA and cues    Additional Treatment:  SciFit Stepone: L 3.0 x 10 min (plus set up) UEs and LEs    Activity Tolerance:  Treatment limited secondary to medical complications "cramps"    Patient left up in chair with call button in reach, chair alarm on and RN notified.    Rehab potential is fair.    Activity tolerance: Fair    GOALS:   Multidisciplinary Problems     Physical Therapy Goals        Problem: Physical Therapy Goal    Goal Priority Disciplines Outcome Goal Variances Interventions   Physical Therapy Goal     PT, PT/OT Ongoing (interventions implemented as appropriate)     Description:  Goals to be met by: 2019    Patient will increase functional independence with mobility by performin. Supine to sit with Stand-by Assistance  2. Sit to supine with Stand-by Assistance  3. Sit to stand transfer with Stand-by Assistance  4. Bed to chair transfer with Supervision using Rolling Walker  5. Gait  x 150 feet with Supervision using Rolling Walker.   6. Wheelchair propulsion x150 feet with Modified [place] using bilateral uppper extremities                      PLAN:    Patient to be seen daily  to address the above listed problems via gait training, therapeutic activities, therapeutic " exercises, neuromuscular re-education, wheelchair management/training  Plan of Care expires: 09/12/19  Plan of Care reviewed with: patient         Raman T David, PT 8/22/2019

## 2019-08-22 NOTE — PLAN OF CARE
Problem: Adult Inpatient Plan of Care  Goal: Plan of Care Review  Alert, oriented, appetite is good fall prevention ongoing. C/o constipation, lactulose given per md orders large soft loose stool x 2 this am . Max assist with ilir care.bed and chair alarm in use at all times. Med teaching and bs management ongoing.

## 2019-08-23 LAB
POCT GLUCOSE: 156 MG/DL (ref 70–110)
POCT GLUCOSE: 206 MG/DL (ref 70–110)
POCT GLUCOSE: 227 MG/DL (ref 70–110)
POCT GLUCOSE: 272 MG/DL (ref 70–110)

## 2019-08-23 PROCEDURE — 12800000 HC REHAB SEMI-PRIVATE ROOM

## 2019-08-23 PROCEDURE — 97530 THERAPEUTIC ACTIVITIES: CPT

## 2019-08-23 PROCEDURE — 92526 ORAL FUNCTION THERAPY: CPT

## 2019-08-23 PROCEDURE — 97542 WHEELCHAIR MNGMENT TRAINING: CPT

## 2019-08-23 PROCEDURE — 97110 THERAPEUTIC EXERCISES: CPT

## 2019-08-23 PROCEDURE — 25000003 PHARM REV CODE 250: Performed by: PHYSICAL MEDICINE & REHABILITATION

## 2019-08-23 PROCEDURE — 63600175 PHARM REV CODE 636 W HCPCS: Performed by: PHYSICAL MEDICINE & REHABILITATION

## 2019-08-23 PROCEDURE — 97116 GAIT TRAINING THERAPY: CPT

## 2019-08-23 RX ORDER — POLYETHYLENE GLYCOL 3350 17 G/17G
17 POWDER, FOR SOLUTION ORAL DAILY PRN
Status: DISCONTINUED | OUTPATIENT
Start: 2019-08-23 | End: 2019-09-05 | Stop reason: HOSPADM

## 2019-08-23 RX ADMIN — TRAVOPROST 1 DROP: 0.04 SOLUTION/ DROPS OPHTHALMIC at 08:08

## 2019-08-23 RX ADMIN — LINACLOTIDE 145 MCG: 145 CAPSULE, GELATIN COATED ORAL at 08:08

## 2019-08-23 RX ADMIN — HUMAN INSULIN 18 UNITS: 100 INJECTION, SUSPENSION SUBCUTANEOUS at 06:08

## 2019-08-23 RX ADMIN — BRIMONIDINE TARTRATE AND TIMOLOL MALEATE 1 DROP: 2; 5 SOLUTION OPHTHALMIC at 08:08

## 2019-08-23 RX ADMIN — PANTOPRAZOLE SODIUM 40 MG: 40 TABLET, DELAYED RELEASE ORAL at 08:08

## 2019-08-23 RX ADMIN — INSULIN ASPART 2 UNITS: 100 INJECTION, SOLUTION INTRAVENOUS; SUBCUTANEOUS at 04:08

## 2019-08-23 RX ADMIN — INSULIN ASPART 1 UNITS: 100 INJECTION, SOLUTION INTRAVENOUS; SUBCUTANEOUS at 08:08

## 2019-08-23 RX ADMIN — HUMAN INSULIN 18 UNITS: 100 INJECTION, SUSPENSION SUBCUTANEOUS at 04:08

## 2019-08-23 RX ADMIN — INSULIN ASPART 3 UNITS: 100 INJECTION, SOLUTION INTRAVENOUS; SUBCUTANEOUS at 11:08

## 2019-08-23 RX ADMIN — AMLODIPINE BESYLATE 10 MG: 5 TABLET ORAL at 08:08

## 2019-08-23 RX ADMIN — METFORMIN HYDROCHLORIDE 500 MG: 500 TABLET ORAL at 08:08

## 2019-08-23 RX ADMIN — METFORMIN HYDROCHLORIDE 500 MG: 500 TABLET ORAL at 04:08

## 2019-08-23 RX ADMIN — AMITRIPTYLINE HYDROCHLORIDE 10 MG: 10 TABLET, FILM COATED ORAL at 08:08

## 2019-08-23 RX ADMIN — DONEPEZIL HYDROCHLORIDE 10 MG: 5 TABLET, FILM COATED ORAL at 08:08

## 2019-08-23 RX ADMIN — LISINOPRIL 10 MG: 10 TABLET ORAL at 08:08

## 2019-08-23 RX ADMIN — RAMELTEON 8 MG: 8 TABLET, FILM COATED ORAL at 09:08

## 2019-08-23 RX ADMIN — ENOXAPARIN SODIUM 40 MG: 100 INJECTION SUBCUTANEOUS at 04:08

## 2019-08-23 RX ADMIN — METOPROLOL SUCCINATE 50 MG: 50 TABLET, EXTENDED RELEASE ORAL at 08:08

## 2019-08-23 RX ADMIN — ASPIRIN 81 MG CHEWABLE TABLET 81 MG: 81 TABLET CHEWABLE at 08:08

## 2019-08-23 NOTE — PLAN OF CARE
Problem: Adult Inpatient Plan of Care  Goal: Plan of Care Review  Outcome: Ongoing (interventions implemented as appropriate)  Has been sleeping in recliner chair . Stated that she sleeps at home in a recliner.  Call light in reach. Rounding hourly. Cont poc

## 2019-08-23 NOTE — NURSING
Wanted chair belt taken off. Told her it was for safety but she insisted. Belt disconnected, advised to call for assist.

## 2019-08-23 NOTE — PLAN OF CARE
Problem: Occupational Therapy Goal  Goal: Occupational Therapy Goal  Goals to be met by:    Patient will increase functional independence with ADLs by performing:    Feeding with Modified [place].  UE Dressing with Set-up Assistance.  LE Dressing with Stand-by Assistance with set up of clothes.  Grooming while seated with Modified [place].  Toileting from bedside commode over the toilet with Supervision for hygiene and clothing management.   Toilet transfer to bedside commode over the toilet with Contact Guard Assistance.  Bathing from  shower chair/bench with Minimal Assistanc.  Shower transfer to TTB with CGA and verbal instructions.   Outcome: Ongoing (interventions implemented as appropriate)  OT goals remain appropriate.

## 2019-08-23 NOTE — NURSING
Was up in chair and at 2000 put back to bed. And after 30 minutes in bed pt started to c/o left leg pain. Crying. Stating feeling like mumtaz horse. Was medicated with tylenol. And at 2030 insisted to get out of bed and get in chair. Still not comfortable in w/c. Then assisted her into reclining chair. As soon as she got into the reclining chair, pt stated pain went away.  Had called dr pérez to get pain med. He called back and order elevil 10mg. Pt fell asleep in reclining chair. Table across chair to prevent from getting up on her own. Chair has no alarm. Will monitor,

## 2019-08-23 NOTE — NURSING
At 0600 pericare given for urine incont. Attends changed. Got pt dressed. Requested to get back in recliner. Slept real well. Cont poc

## 2019-08-23 NOTE — PROGRESS NOTES
"REHAB FOLLOWUP NOTE    Deonna Montero is a 78 y.o. female patient.    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    History:  78-year-old  right-hand-dominant female was initially admitted to [facility] with left-sided CVA with right hemiparesis on 07/23/2019 was hospitalized till 07/26/2019 for neuro workup including CT scan showed no acute changes, carotids without any significant stenosis, MRI consistent with left pontine as well as occipital ischemic infarcts.  Patient has been medically stabilized and transferred to swing bed with steady improvement and is now transferred down to us for further rehabilitation.    Past Medical History:   Diagnosis Date    Back pain     Diabetes mellitus     Hypertension     Reflux     Short-term memory loss     Stroke      Temp: 97.1 °F (36.2 °C) (08/23/19 0733)  Pulse: 68 (08/23/19 0733)  Resp: 18 (08/23/19 0733)  BP: (!) 151/68 (08/23/19 0733)  SpO2: 95 % (08/23/19 0733)  Weight: 96.2 kg (212 lb 1.3 oz) (08/15/19 1334)  Height: 5' 8" (172.7 cm) (08/15/19 1339)    Lab Results   Component Value Date    WBC 3.62 (L) 08/21/2019    HGB 13.8 08/21/2019    HCT 42.8 08/21/2019     08/21/2019    ALT 35 08/21/2019    AST 32 08/21/2019     08/21/2019    K 4.7 08/21/2019     08/21/2019    CREATININE 1.0 08/21/2019    BUN 22 08/21/2019    CO2 27 08/21/2019    INR 1.1 04/19/2018    HGBA1C 8.9 (H) 08/15/2019     Physical Examination:  Alert, oriented x3, follows commands well.  Feels much better today.  Does complain of bilateral thigh cramps in the evening.  Lungs are clear to auscultation.  Heart with regular rate and rhythm.  Extremities without any edema or calf tenderness noted.  Blood sugars and  Blood pressure stable.   Patient with history of spinal stimulator would go ahead and try some pain modalities to the low back.    Impression:  Status post left-sided " CVA with right hemiparesis.  Hypertension.  Insulin-dependent diabetes mellitus.  Hyperlipidemia.  Degenerative joint disease.  Left eye enucleation.    Recommendations:  Continue current PT, OT, speech therapy and nursing care.          Nicky Diamond MD  8/23/2019

## 2019-08-23 NOTE — PLAN OF CARE
Problem: Adult Inpatient Plan of Care  Goal: Plan of Care Review  A&OX4. Incontinent of bladder. Instructed to call for mobility assist. Remained free from falls. Standard precautions maintained.

## 2019-08-23 NOTE — PLAN OF CARE
Problem: SLP Goal  Goal: SLP Goal  1. Demonstrate understanding of speech subsystems, STBY A  2. Complete controlled exhalation tasks to enhance control of respiration for speech production, MOD A   3. Coordinate initiation of phonation with exhalation to improve naturalness of speech production, MOD A  4. Progressing from multisyllabic words to connected utterances, Pt will demonstrate control of speech subsystems in order to achieve appropriate coordination of speech subsystems for natural speech production, MAX A- pending goals 2 &3   5. Participate in assessment of functional problem solving    6. Demonstrate understanding of swallow safety precautions with 80% accuracy, min cues.  7. Pt will per form target oral exercises for improved labial/lingual strength for increased swallow efficiency with min cues, 70% accuracy, 5-10 reps.  8. Pt will perform articulation drills focusing on increasing strength of articulatory contacts in initial, medial, final positions of single-syllable and two-syllable words, given clinician model, 80% accuracy (for improved speech intelligibility)  9. Pt will perform target dysphagia exercises ( effortful swallows, Mendelsohn maneuver and second swallows with food/liquid); Mendelsohn maneuver, Ellyn task without food ) with model 80% accuracy, for increased swallow safety (based on results of prior modified barium swallow).   Outcome: Ongoing (interventions implemented as appropriate)  Educated pt regarding dysphagia exercises.    Pt treated/reassessed therapeutically during lunch meal.  See progress note for details.    Comments: Slightly better participation than yesterday.  Not complaining of discomfort.

## 2019-08-23 NOTE — PT/OT/SLP PROGRESS
Physical Therapy         Treatment        Deonna Montero   MRN: [id number]     PT Received On: 19  Total Time (min): 75        Billable Minutes:  Gait [id number], Therapeutic Exercise 30 and Train/Wheelchair Management 10  Total Minutes: 75    Treatment Type: Treatment  PT/PTA: PT     PTA Visit Number: 0       General Precautions: Standard, fall  Orthopedic Precautions: Orthopedic Precautions : N/A     Subjective:  Communicated with nurse prior to session.     Objective:  Patient found seated in chair, in NAD.       Functional Mobility:    Transfer Training:  Sit to stand:Minimal Assistance and Moderate Assistance with Rolling Walker  x 10 ( including 2 failed attempts)    Wheelchair Trainin' x 2 (slow) with min assist and cues     Gait Trainin' - 53' x 5, 65' x 2, 85' x 1 with RW with min assist and cues    Additional Treatment:  SEATED: hip adduction with ball, hip abduction with green tband, LAQ and hip flexion with 3#, x 30 reps each (abd x 2 sets)    Activity Tolerance:  Patient limited by fatigue    Patient left up in chair with call button in reach, chair alarm on and MHP per MD.    Rehab potential is good.    Activity tolerance: Fair    GOALS:   Multidisciplinary Problems     Physical Therapy Goals        Problem: Physical Therapy Goal    Goal Priority Disciplines Outcome Goal Variances Interventions   Physical Therapy Goal     PT, PT/OT Ongoing (interventions implemented as appropriate)     Description:  Goals to be met by: 2019    Patient will increase functional independence with mobility by performin. Supine to sit with Stand-by Assistance  2. Sit to supine with Stand-by Assistance  3. Sit to stand transfer with Stand-by Assistance  4. Bed to chair transfer with Supervision using Rolling Walker  5. Gait  x 150 feet with Supervision using Rolling Walker.   6. Wheelchair propulsion x150 feet with Modified [place] using bilateral uppper extremities                       PLAN:    Patient to be seen daily  to address the above listed problems via gait training, therapeutic activities, therapeutic exercises, neuromuscular re-education, wheelchair management/training  Plan of Care expires: 09/12/19  Plan of Care reviewed with: patient         Raman HANSEN David, PT 8/23/2019

## 2019-08-23 NOTE — PT/OT/SLP PROGRESS
"Speech Language Pathology Treatment          Deonna Montero   MRN: [id number]     Diet recommendations:   Diet Level: Mechanical soft IDDSI 5, Finely chopped/minced meat, vegetable and fruit, No Rice, No Bread (minced/well chopped food, easily mashable with tongue); no mixed consistencies  Liquid Diet Level: Thin liquid in single sips (NO STRAW)  Aspiration Precautions: 1 bite/sip at a time, Avoid talking while eating, Chin tuck, Double swallow with each bite/sip, Eliminate distractions, HOB to 90 degrees, Meds crushed in puree, Monitor for s/s of aspiration, No straws, Remain upright 30 minutes post meal, Small bites/sips and Strict aspiration precautions    SLP Treatment Date: 08/23/19     First session  Speech Start Time: 1140     Speech Stop Time: 1223(seen later for additional tx)     Minutes: 43 min      Second session  Start: 1259  Stop: 1315  Minutes:  16    Total Minutes: 59    TREATMENT BILLABLE MINUTES:  Treatment Swallowing Dysfunction 59    General Precautions: Standard, aspiration, fall(dysphagia)  Current Respiratory Status: room air       Subjective:  "I worked out today."    Objective:    Patient found with: (chair alarm).  Pt treated today for dysphagia. See plan of care and assessment sections for details.    Pain/Comfort  Pain Rating 1: 0/10    Assessment:  Deonna Montero is a 78 y.o. female with a SLP diagnosis of Dysphagia, Dysarthria and Cognitive-Linguistic Impairment. Swallow was reassessed with lunch tray due to improving strength/endurance/alertness and pt request (does not want to remain on pureed tray).      Items tested: mechanical soft solids (fish, vegetable), puree (mashed potato, yogurt) and thin liquids.  Pt demonstrated occasional cough with single straw sips (20% trials), tucking chin 80% of trials independently.  She did not tuck chin during swallow resulting in cough.    Clinician suspects that patient tucks chin less than 80% of opportunities when not directly " supervised. Pt was next trialed on thin liquids by single, self-regulated cup sip with chin tuck -- no cough.    Recommended to pt that she refrain from using straws. She agreed to do so.     Pt tolerated puree and small boli mechanical soft vegetables (although with extended mastication) and well chopped/moist fish.  Approximately 50-60 % of tray consumed, including most of Boost drink.     Diet recommendations:      As tolerated, IDDSI 5 Mechanical Soft (minced/well chopped/moist)  Liquid Diet Level: Thin(NO STRAW)  Aspiration Precautions: 1 bite/sip at a time, Avoid talking while eating, Chin tuck, Double swallow with each bite/sip, Eliminate distractions, HOB to 90 degrees, Meds crushed in puree, Monitor for s/s of aspiration, No straws, Remain upright 30 minutes post meal, Small bites/sips and Strict aspiration precautions    Clinician spoke with dietary staff  to confirm that food would be delivered minced/well chopped.  Discussed diet texture modification with nurse.    Discharge recommendations: Discharge Facility/Level of Care Needs: home health speech therapy     Goals:   Multidisciplinary Problems     SLP Goals        Problem: SLP Goal    Goal Priority Disciplines Outcome   SLP Goal     SLP Ongoing (interventions implemented as appropriate)   Description:  1. Demonstrate understanding of speech subsystems, STBY A  2. Complete controlled exhalation tasks to enhance control of respiration for speech production, MOD A   3. Coordinate initiation of phonation with exhalation to improve naturalness of speech production, MOD A  4. Progressing from multisyllabic words to connected utterances, Pt will demonstrate control of speech subsystems in order to achieve appropriate coordination of speech subsystems for natural speech production, MAX A- pending goals 2 &3   5. Participate in assessment of functional problem solving    6. Demonstrate understanding of swallow safety precautions with 80% accuracy, min  cues.  7. Pt will per form target oral exercises for improved labial/lingual strength for increased swallow efficiency with min cues, 70% accuracy, 5-10 reps.  8. Pt will perform articulation drills focusing on increasing strength of articulatory contacts in initial, medial, final positions of single-syllable and two-syllable words, given clinician model, 80% accuracy (for improved speech intelligibility)  9. Pt will perform target dysphagia exercises ( effortful swallows, Mendelsohn maneuver and second swallows with food/liquid); Mendelsohn maneuver, Ellyn task without food ) with model 80% accuracy, for increased swallow safety (based on results of prior modified barium swallow).                     Plan:   Patient to be seen Therapy Frequency: 5 x/week   Planned Interventions: Cognitive-Linguistic Therapy, Dysphagia Therapy, Motor Speech Therapy  Plan of Care Expires:    Plan of Care reviewed with: patient(discussed diet texture with nurse)  SLP Follow-up?: Yes  SLP - Next Visit Date: 08/24/19           Nellie Piña CCC-SLP 8/23/2019

## 2019-08-23 NOTE — PT/OT/SLP PROGRESS
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     OT Date of Treatment: 08/23/19   Total Time (min): 65 min    Billable Minutes:  Therapeutic Activity 65  Total Minutes: 65    General Precautions: Standard, fall  Orthopedic Precautions: N/A  Braces: N/A    Spiritual, Cultural Beliefs, Alevism Practices, Values that Affect Care: no    Subjective:  Communicated with nurse prior to session.  "I'm sorry I couldn't work with you the other day. That was the day I was sick."    Pain/Comfort  Pain Rating 1: (no pain reported)    Objective:  Patient found with: (chair alarm)    LE Dressing:  Pt don/doffed socks with close SBA using dressing stick and sock aid x 2 throughout session to facilitate carryover of use of AE to increase (I) with self care tasks  Pt provided with reacher and OTR providing education/instruction regarding use to increase independence with LB dressing tasks as well as safety and independence with IADL; pt threading feet into long pants with Min (A) to SBA using reacher with max verbal cues & OTR demonstration as needed; performing x 3 throughout session to facilitate carry-over; during 3rd trial, pt taking frequent breaks and requires increased VC as compared to 1st 2 trials    Additional Treatment:  - Pt provided with and instructed on use of reacher to increase independence with self care tasks as well as item retrieval; pt retrieving 4 items of graded size and type from the floor using reacher with SBA to facilitate coordination using reacher and eye-hand coordination  - Facilitation of R UE fine/gross motor coordination, pinch strengthening, and in hand manipulation using graded clothespins and [place] manual - pt requires occasional cue throughout to avoid compensatory use of L UE and to alternate lateral and 3 jaw [place] pinches during " removal/replacement of graded clothespins  - Wheelchair propulsion using bilateral UE with close SBA     Patient left up in chair with all lines intact, call button in reach, chair alarm on and nurse notified and RNTeri present    ASSESSMENT:  Deonna Montero is a 78 y.o. 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 Patient will continue to benefit from skilled interdisciplinary therapy services per est POC.     Rehab potential is good    Activity tolerance: Good    Discharge recommendations: home     Equipment recommendations: other (see comments)(TBD)     GOALS:   Multidisciplinary Problems     Occupational Therapy Goals        Problem: Occupational Therapy Goal    Goal Priority Disciplines Outcome Interventions   Occupational Therapy Goal     OT, PT/OT Ongoing (interventions implemented as appropriate)    Description:  Goals to be met by:    Patient will increase functional independence with ADLs by performing:    Feeding with Modified [place].  UE Dressing with Set-up Assistance.  LE Dressing with Stand-by Assistance with set up of clothes.  Grooming while seated with Modified [place].  Toileting from bedside commode over the toilet with Supervision for hygiene and clothing management.   Toilet transfer to bedside commode over the toilet with Contact Guard Assistance.  Bathing from  shower chair/bench with Minimal Assistanc.  Shower transfer to TTB with CGA and verbal instructions.                    Plan:  Patient to be seen 6 x/week to address the above listed problems via self-care/home management, community/work re-entry, therapeutic activities, therapeutic exercises, therapeutic groups, neuromuscular re-education, wheelchair management/training  Plan of Care expires:    Plan of Care reviewed with: patient    TRE Bray,  LOTR  08/23/2019

## 2019-08-24 LAB
POCT GLUCOSE: 130 MG/DL (ref 70–110)
POCT GLUCOSE: 187 MG/DL (ref 70–110)

## 2019-08-24 PROCEDURE — 92507 TX SP LANG VOICE COMM INDIV: CPT

## 2019-08-24 PROCEDURE — 97542 WHEELCHAIR MNGMENT TRAINING: CPT

## 2019-08-24 PROCEDURE — 97110 THERAPEUTIC EXERCISES: CPT

## 2019-08-24 PROCEDURE — 12800000 HC REHAB SEMI-PRIVATE ROOM

## 2019-08-24 PROCEDURE — 25000003 PHARM REV CODE 250: Performed by: PHYSICAL MEDICINE & REHABILITATION

## 2019-08-24 PROCEDURE — 63600175 PHARM REV CODE 636 W HCPCS: Performed by: PHYSICAL MEDICINE & REHABILITATION

## 2019-08-24 PROCEDURE — 97116 GAIT TRAINING THERAPY: CPT

## 2019-08-24 RX ADMIN — AMLODIPINE BESYLATE 10 MG: 5 TABLET ORAL at 08:08

## 2019-08-24 RX ADMIN — PANTOPRAZOLE SODIUM 40 MG: 40 TABLET, DELAYED RELEASE ORAL at 08:08

## 2019-08-24 RX ADMIN — METOPROLOL SUCCINATE 50 MG: 50 TABLET, EXTENDED RELEASE ORAL at 08:08

## 2019-08-24 RX ADMIN — ASPIRIN 81 MG CHEWABLE TABLET 81 MG: 81 TABLET CHEWABLE at 08:08

## 2019-08-24 RX ADMIN — DONEPEZIL HYDROCHLORIDE 10 MG: 5 TABLET, FILM COATED ORAL at 08:08

## 2019-08-24 RX ADMIN — HUMAN INSULIN 20 UNITS: 100 INJECTION, SUSPENSION SUBCUTANEOUS at 05:08

## 2019-08-24 RX ADMIN — METFORMIN HYDROCHLORIDE 500 MG: 500 TABLET ORAL at 05:08

## 2019-08-24 RX ADMIN — BRIMONIDINE TARTRATE AND TIMOLOL MALEATE 1 DROP: 2; 5 SOLUTION OPHTHALMIC at 09:08

## 2019-08-24 RX ADMIN — AMITRIPTYLINE HYDROCHLORIDE 10 MG: 10 TABLET, FILM COATED ORAL at 08:08

## 2019-08-24 RX ADMIN — LINACLOTIDE 145 MCG: 145 CAPSULE, GELATIN COATED ORAL at 08:08

## 2019-08-24 RX ADMIN — BRIMONIDINE TARTRATE AND TIMOLOL MALEATE 1 DROP: 2; 5 SOLUTION OPHTHALMIC at 08:08

## 2019-08-24 RX ADMIN — TRAVOPROST 1 DROP: 0.04 SOLUTION/ DROPS OPHTHALMIC at 09:08

## 2019-08-24 RX ADMIN — HUMAN INSULIN 18 UNITS: 100 INJECTION, SUSPENSION SUBCUTANEOUS at 06:08

## 2019-08-24 RX ADMIN — LISINOPRIL 10 MG: 10 TABLET ORAL at 08:08

## 2019-08-24 RX ADMIN — METFORMIN HYDROCHLORIDE 500 MG: 500 TABLET ORAL at 08:08

## 2019-08-24 RX ADMIN — ENOXAPARIN SODIUM 40 MG: 100 INJECTION SUBCUTANEOUS at 05:08

## 2019-08-24 NOTE — PLAN OF CARE
Problem: Occupational Therapy Goal  Goal: Occupational Therapy Goal  Goals to be met by:    Patient will increase functional independence with ADLs by performing:    Feeding with Modified [place].  UE Dressing with Set-up Assistance.  LE Dressing with Stand-by Assistance with set up of clothes.  Grooming while seated with Modified [place].  Toileting from bedside commode over the toilet with Supervision for hygiene and clothing management.   Toilet transfer to bedside commode over the toilet with Contact Guard Assistance.  Bathing from  shower chair/bench with Minimal Assistanc.  Shower transfer to TTB with CGA and verbal instructions.   Outcome: Ongoing (interventions implemented as appropriate)  OT goals remain appropriate.

## 2019-08-24 NOTE — PT/OT/SLP PROGRESS
Physical Therapy         Treatment        Deonna Montero   MRN: [id number]     PT Received On: 08/24/19  Total Time (min): 60        Billable Minutes:  Gait [id number], Therapeutic Exercise 30  and wheelchair management 15  Total Minutes: 60    Treatment Type: Treatment  PT/PTA: PTA     PTA Visit Number: 1       General Precautions: Standard, fall, aspiration(dysphagia)  Orthopedic Precautions: Orthopedic Precautions : N/A   Braces: Braces: N/A         Subjective:  Communicated with nurse Velazuqez prior to session.    Pain/Comfort  Pain Rating 1: (did not rate, express this is chronic)  Location 1: back  Pain Addressed 1: Reposition, Distraction, Cessation of Activity    Objective:  Patient found seated in w/c, with Patient found with: (chair alarm)    Functional Mobility:  Bed Mobility: did not occur    Balance:   Static Stand: FAIR: Maintains without assist but unable to take challenges  Dynamic stand: POOR+: Needs MIN (minimal ) assist during gait    Transfer Training:  Sit to stand:Minimal Assistance with Rolling Walker .    Wheelchair Training:  Pt propelled Standard wheelchair x 125' feet on Level tile with  Bilateral upper extremity and Bilateral lower extremity with Minimal Assistance and increased time to complete 2' slow pace.     Gait Training:  Patient gait trained FWB/WBAT: bilateral lower extremity 84  feet on level tile with Rolling Walker with Minimal Assistance.  Pt with demonstarting  decreased parmjit, increased time in double stance, decreased velocity of limb motion, decreased step length, decreased stride length, decreased toe-to-floor clearance and decreased weight-shifting ability.Impairments contributing to gait deviations include impaired balance, decreased flexibility, pain, impaired postural control, decreased ROM and decreased strength      Additional Treatment:  Seated BLE therex: ankle df/pf, LAQ and marching with 3# B; hip abd with green tb, hip abd ball squeeze 3 x 10 reps each  with rest breaks taken as needed.    Activity Tolerance:  Patient tolerated treatment well and Patient limited by pain    Patient left up in chair with all lines intact, call button in reach, chair alarm on and nurse Caroline notified.    Assessment:  Deonna Montero is a 78 y.o. female with a medical diagnosis of <principal problem not specified>. She presents with impaired functional mobility, pain, decreased LE and UE function, generalized weakness.    Rehab potential is good.    Activity tolerance: Good        GOALS:   Multidisciplinary Problems     Physical Therapy Goals        Problem: Physical Therapy Goal    Goal Priority Disciplines Outcome Goal Variances Interventions   Physical Therapy Goal     PT, PT/OT Ongoing (interventions implemented as appropriate)     Description:  Goals to be met by: 2019    Patient will increase functional independence with mobility by performin. Supine to sit with Stand-by Assistance  2. Sit to supine with Stand-by Assistance  3. Sit to stand transfer with Stand-by Assistance  4. Bed to chair transfer with Supervision using Rolling Walker  5. Gait  x 150 feet with Supervision using Rolling Walker.   6. Wheelchair propulsion x150 feet with Modified [place] using bilateral uppper extremities                      PLAN:    Patient to be seen daily  to address the above listed problems via gait training, therapeutic activities, therapeutic exercises, neuromuscular re-education, wheelchair management/training  Plan of Care expires: 19  Plan of Care reviewed with: patient         Samara Wallace, PTA 2019

## 2019-08-24 NOTE — PROGRESS NOTES
"REHAB FOLLOWUP NOTE    Deonna Montero is a 78 y.o. female patient.    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    History:  78-year-old  right-hand-dominant female was initially admitted to [facility] with left-sided CVA with right hemiparesis on 07/23/2019 was hospitalized till 07/26/2019 for neuro workup including CT scan showed no acute changes, carotids without any significant stenosis, MRI consistent with left pontine as well as occipital ischemic infarcts.  Patient has been medically stabilized and transferred to swing bed with steady improvement and is now transferred down to us for further rehabilitation.    Past Medical History:   Diagnosis Date    Back pain     Diabetes mellitus     Hypertension     Reflux     Short-term memory loss     Stroke      Temp: 97.4 °F (36.3 °C) (08/24/19 0735)  Pulse: 70 (08/24/19 0735)  Resp: 18 (08/24/19 0735)  BP: (!) 146/64 (08/24/19 0735)  SpO2: 97 % (08/24/19 0735)  Weight: 96.2 kg (212 lb 1.3 oz) (08/15/19 1334)  Height: 5' 8" (172.7 cm) (08/15/19 1339)    Lab Results   Component Value Date    WBC 3.62 (L) 08/21/2019    HGB 13.8 08/21/2019    HCT 42.8 08/21/2019     08/21/2019    ALT 35 08/21/2019    AST 32 08/21/2019     08/21/2019    K 4.7 08/21/2019     08/21/2019    CREATININE 1.0 08/21/2019    BUN 22 08/21/2019    CO2 27 08/21/2019    INR 1.1 04/19/2018    HGBA1C 8.9 (H) 08/15/2019     Physical Examination:  Alert, oriented x3, follows commands well.  Feels much better today.  Denies any leg cramps.  Had a good night rest.  Lungs are clear to auscultation.  Heart with regular rate and rhythm.  Extremities without any edema or calf tenderness noted.  Blood sugars elevated adjust insulin and  Blood pressure stable.   Patient with history of spinal stimulator would go ahead and try some pain modalities to the low back.    Impression:  Status " post left-sided CVA with right hemiparesis.  Hypertension.  Insulin-dependent diabetes mellitus.  Hyperlipidemia.  Degenerative joint disease.  Left eye enucleation.    Recommendations:  Continue current PT, OT, speech therapy and nursing care.          Nicky Diamond MD  8/24/2019

## 2019-08-24 NOTE — PLAN OF CARE
Problem: SLP Goal  Goal: SLP Goal  1. Demonstrate understanding of speech subsystems, STBY A  2. Complete controlled exhalation tasks to enhance control of respiration for speech production, MOD A   3. Coordinate initiation of phonation with exhalation to improve naturalness of speech production, MOD A  4. Progressing from multisyllabic words to connected utterances, Pt will demonstrate control of speech subsystems in order to achieve appropriate coordination of speech subsystems for natural speech production, MAX A- pending goals 2 &3   5. Participate in assessment of functional problem solving    6. Demonstrate understanding of swallow safety precautions with 80% accuracy, min cues.  7. Pt will per form target oral exercises for improved labial/lingual strength for increased swallow efficiency with min cues, 70% accuracy, 5-10 reps.  8. Pt will perform articulation drills focusing on increasing strength of articulatory contacts in initial, medial, final positions of single-syllable and two-syllable words, given clinician model, 80% accuracy (for improved speech intelligibility)  9. Pt will perform target dysphagia exercises ( effortful swallows, Mendelsohn maneuver and second swallows with food/liquid); Mendelsohn maneuver, Ellyn task without food ) with model 80% accuracy, for increased swallow safety (based on results of prior modified barium swallow).   Outcome: Ongoing (interventions implemented as appropriate)  Continue POC

## 2019-08-24 NOTE — PLAN OF CARE
Problem: Adult Inpatient Plan of Care  Goal: Plan of Care Review  A&OX4. Incontinent of bladder. Bed and chair alarms utilized at all times. Remained free from falls. Min/mod assist. Standard precautions maintained.

## 2019-08-24 NOTE — PLAN OF CARE
Problem: Physical Therapy Goal  Goal: Physical Therapy Goal  Goals to be met by: 2019    Patient will increase functional independence with mobility by performin. Supine to sit with Stand-by Assistance  2. Sit to supine with Stand-by Assistance  3. Sit to stand transfer with Stand-by Assistance  4. Bed to chair transfer with Supervision using Rolling Walker  5. Gait  x 150 feet with Supervision using Rolling Walker.   6. Wheelchair propulsion x150 feet with Modified [place] using bilateral uppper extremities     Outcome: Ongoing (interventions implemented as appropriate)  PT for functional mobility training, gait training, BLE therex

## 2019-08-24 NOTE — PT/OT/SLP PROGRESS
Occupational Therapy  Treatment    Deonna Montero   MRN: [id number]   Admitting Diagnosis: L CVA    OT Date of Treatment: 08/24/19   Total Time (min): 60 min      Billable Minutes:  Therapeutic Exercise 60  Total Minutes: 60    General Precautions: Standard, aspiration, fall  Orthopedic Precautions: N/A  Braces: N/A    Spiritual, Cultural Beliefs, Gnosticist Practices, Values that Affect Care: no    Subjective:  Communicated with nurse prior to session.    Pain/Comfort  Pain Rating 1: 0/10  Pain Rating Post-Intervention 1: 0/10    Objective:       Functional Mobility:  Bed Mobility:   Supine to sit: Supervision or Set-up Assistance   Sit to supine: Supervision or Set-up Assistance   Rolling: Supervision or Set-up Assistance   Scooting: Activity did not occur      Balance:   Static Sit: NORMAL: No deviations seen in posture held statically  Dynamic Sit:  NORMAL: No deviations seen in posture held dynamically  Additional Treatment:    OT treatment was completed in room. Pt sat EOB and completed wrist maze B UE 3x.  Used yellow therabar for hand and forearm: 3 sets 10 2 directions.  1# dumbell B UE bicep curls 3 sets 12 and triceps 3 sets 12.      Patient left HOB elevated with call button in reach    ASSESSMENT:  Deonna Montero is a 78 y.o. female with a medical diagnosis of L CVA and presents with   performance deficits of physical skills including impaired balance, mobility, strength, dexterity, fine motor coordination, gross motor coordination and endurance.  These performance deficits have resulted in activity limitations including, but not limited to: bed mobility, transfers, ambulating short distances, navigating around obstacles during ambulation, transitional movement patterns ( kneeling, bending, reaching), upper body dressing, lower body dressing, grooming, toileting, bathing and carrying objects.   Patient will benefit from skilled OT services to maximize level of independence with deficits  listed above..    Rehab potential is good    Activity tolerance: Good    Discharge recommendations: home with home health     Equipment recommendations: (tbd)     GOALS:   Multidisciplinary Problems     Occupational Therapy Goals        Problem: Occupational Therapy Goal    Goal Priority Disciplines Outcome Interventions   Occupational Therapy Goal     OT, PT/OT Ongoing (interventions implemented as appropriate)    Description:  Goals to be met by:    Patient will increase functional independence with ADLs by performing:    Feeding with Modified [place].  UE Dressing with Set-up Assistance.  LE Dressing with Stand-by Assistance with set up of clothes.  Grooming while seated with Modified [place].  Toileting from bedside commode over the toilet with Supervision for hygiene and clothing management.   Toilet transfer to bedside commode over the toilet with Contact Guard Assistance.  Bathing from  shower chair/bench with Minimal Assistanc.  Shower transfer to TTB with CGA and verbal instructions.                    Plan:  Patient to be seen 6 x/week to address the above listed problems via self-care/home management, community/work re-entry, neuromuscular re-education, therapeutic activities, therapeutic groups, therapeutic exercises, wheelchair management/training  Plan of Care expires:    Plan of Care reviewed with: patient         ERENDIRA Saldana  08/24/2019

## 2019-08-24 NOTE — PT/OT/SLP PROGRESS
"Speech Language Pathology Treatment          Deonna Montero   MRN: [id number]     Diet recommendations:   Diet Level: Mechanical soft IDDSI 5, Finely chopped/minced meat, vegetable and fruit, No Rice, No Bread (minced/well chopped food, easily mashable with tongue); no mixed consistencies  Liquid Diet Level: Thin liquid in single sips (NO STRAW)  Aspiration Precautions: 1 bite/sip at a time, Avoid talking while eating, Chin tuck, Double swallow with each bite/sip, Eliminate distractions, HOB to 90 degrees, Meds crushed in puree, Monitor for s/s of aspiration, No straws, Remain upright 30 minutes post meal, Small bites/sips and Strict aspiration precautions    SLP Treatment Date: 08/24/19  Speech Start Time: 1556     Speech Stop Time: 1656     Speech Total (min): 60 min       TREATMENT BILLABLE MINUTES:  Speech Therapy Individual 60    General Precautions: Standard, aspiration, fall(dysphagia)  Current Respiratory Status: room air       Subjective:  "You know that day I saw you I had to do all this bloodwork"    Objective:   Patient found upright in wheelchair w/ alarm and belt  Functional problem solving assessment initiated given Pt report of independence with finances and medications. Excellent functional recall appreciated. Reviewed task expectations and provided Pt with brief review of medication frequencies & pill management system. Allowed to complete ind'ly for baseline, Pt with profoundly impaired performance and no awareness of errors. Discussed errors observed with suspected further vision impairment in addition to suspected deficits s/p CVA. Pt demonstrated significant difficulty with acceptance of recommendations and remained fixated on variety of frequencies. Verbal education provided in addition to demonstration. Ultimately, Pt's fixation was unable to be redirected or abandoned resulting in barriers to therapeutic benefit of task. Simple task then introduced in order for Pt to achieve success and " cease frustrations.        Assessment:  Deonna Montero is a 78 y.o. female with a SLP diagnosis of Dysphagia, Dysarthria, Cognitive-Linguistic Impairment and Visio-Spatial Impairment.   Diet recommendations:          Discharge recommendations: Discharge Facility/Level of Care Needs: home health speech therapy     Goals:   Multidisciplinary Problems     SLP Goals        Problem: SLP Goal    Goal Priority Disciplines Outcome   SLP Goal     SLP Ongoing (interventions implemented as appropriate)   Description:  1. Demonstrate understanding of speech subsystems, STBY A  2. Complete controlled exhalation tasks to enhance control of respiration for speech production, MOD A   3. Coordinate initiation of phonation with exhalation to improve naturalness of speech production, MOD A  4. Progressing from multisyllabic words to connected utterances, Pt will demonstrate control of speech subsystems in order to achieve appropriate coordination of speech subsystems for natural speech production, MAX A- pending goals 2 &3   5. Participate in assessment of functional problem solving    6. Demonstrate understanding of swallow safety precautions with 80% accuracy, min cues.  7. Pt will per form target oral exercises for improved labial/lingual strength for increased swallow efficiency with min cues, 70% accuracy, 5-10 reps.  8. Pt will perform articulation drills focusing on increasing strength of articulatory contacts in initial, medial, final positions of single-syllable and two-syllable words, given clinician model, 80% accuracy (for improved speech intelligibility)  9. Pt will perform target dysphagia exercises ( effortful swallows, Mendelsohn maneuver and second swallows with food/liquid); Mendelsohn maneuver, Ellyn task without food ) with model 80% accuracy, for increased swallow safety (based on results of prior modified barium swallow).                     Plan:   Patient to be seen Therapy Frequency: 5 x/week    Planned Interventions: Cognitive-Linguistic Therapy, Dysphagia Therapy, Motor Speech Therapy  Plan of Care Expires:    Plan of Care reviewed with: patient  SLP Follow-up?: Yes  SLP - Next Visit Date: 08/26/19           Tao Washington CCC-SLP 8/24/2019

## 2019-08-24 NOTE — PLAN OF CARE
Problem: Adult Inpatient Plan of Care  Goal: Plan of Care Review  Outcome: Ongoing (interventions implemented as appropriate)  Patient awake/alert. No c/o pain noted this shift. Generalized weakness noted. Incontinence noted. Free from falls. Plan of care continued.

## 2019-08-25 LAB
POCT GLUCOSE: 148 MG/DL (ref 70–110)
POCT GLUCOSE: 158 MG/DL (ref 70–110)

## 2019-08-25 PROCEDURE — 25000003 PHARM REV CODE 250: Performed by: PHYSICAL MEDICINE & REHABILITATION

## 2019-08-25 PROCEDURE — 97110 THERAPEUTIC EXERCISES: CPT

## 2019-08-25 PROCEDURE — 63600175 PHARM REV CODE 636 W HCPCS: Performed by: PHYSICAL MEDICINE & REHABILITATION

## 2019-08-25 PROCEDURE — 97116 GAIT TRAINING THERAPY: CPT

## 2019-08-25 PROCEDURE — 12800000 HC REHAB SEMI-PRIVATE ROOM

## 2019-08-25 RX ORDER — INSULIN ASPART 100 [IU]/ML
0-5 INJECTION, SOLUTION INTRAVENOUS; SUBCUTANEOUS 2 TIMES DAILY PRN
Status: DISCONTINUED | OUTPATIENT
Start: 2019-08-25 | End: 2019-09-05 | Stop reason: HOSPADM

## 2019-08-25 RX ADMIN — TRAVOPROST 1 DROP: 0.04 SOLUTION/ DROPS OPHTHALMIC at 08:08

## 2019-08-25 RX ADMIN — LISINOPRIL 10 MG: 10 TABLET ORAL at 08:08

## 2019-08-25 RX ADMIN — LINACLOTIDE 145 MCG: 145 CAPSULE, GELATIN COATED ORAL at 08:08

## 2019-08-25 RX ADMIN — AMLODIPINE BESYLATE 10 MG: 5 TABLET ORAL at 08:08

## 2019-08-25 RX ADMIN — HUMAN INSULIN 20 UNITS: 100 INJECTION, SUSPENSION SUBCUTANEOUS at 04:08

## 2019-08-25 RX ADMIN — LACTULOSE 20 G: 20 SOLUTION ORAL at 11:08

## 2019-08-25 RX ADMIN — BRIMONIDINE TARTRATE AND TIMOLOL MALEATE 1 DROP: 2; 5 SOLUTION OPHTHALMIC at 08:08

## 2019-08-25 RX ADMIN — DONEPEZIL HYDROCHLORIDE 10 MG: 5 TABLET, FILM COATED ORAL at 08:08

## 2019-08-25 RX ADMIN — PANTOPRAZOLE SODIUM 40 MG: 40 TABLET, DELAYED RELEASE ORAL at 08:08

## 2019-08-25 RX ADMIN — METFORMIN HYDROCHLORIDE 500 MG: 500 TABLET ORAL at 08:08

## 2019-08-25 RX ADMIN — ENOXAPARIN SODIUM 40 MG: 100 INJECTION SUBCUTANEOUS at 04:08

## 2019-08-25 RX ADMIN — ASPIRIN 81 MG CHEWABLE TABLET 81 MG: 81 TABLET CHEWABLE at 08:08

## 2019-08-25 RX ADMIN — HUMAN INSULIN 20 UNITS: 100 INJECTION, SUSPENSION SUBCUTANEOUS at 06:08

## 2019-08-25 RX ADMIN — METFORMIN HYDROCHLORIDE 500 MG: 500 TABLET ORAL at 04:08

## 2019-08-25 RX ADMIN — RAMELTEON 8 MG: 8 TABLET, FILM COATED ORAL at 10:08

## 2019-08-25 RX ADMIN — METOPROLOL SUCCINATE 50 MG: 50 TABLET, EXTENDED RELEASE ORAL at 08:08

## 2019-08-25 RX ADMIN — AMITRIPTYLINE HYDROCHLORIDE 10 MG: 10 TABLET, FILM COATED ORAL at 08:08

## 2019-08-25 NOTE — PLAN OF CARE
Problem: Adult Inpatient Plan of Care  Goal: Plan of Care Review  Outcome: Ongoing (interventions implemented as appropriate)  Patient awake/alert. No c/o pain noted this shift. Incontinence noted this shift. Generalized weakness present. Free from falls. Plan of care continued.

## 2019-08-25 NOTE — PLAN OF CARE
Problem: Adult Inpatient Plan of Care  Goal: Plan of Care Review  A&OX4. Incontinent of bladder. Min/mod assist. Bed and chair alarms utilized at all times. Remained free from falls. Standard precautions maintained.

## 2019-08-25 NOTE — PT/OT/SLP PROGRESS
Physical Therapy Treatment    Patient Name:  Deonna Montero   MRN:  [id number]    Recommendations:     Discharge Recommendations:  home with home health   Discharge Equipment Recommendations: none   Barriers to discharge: None    Assessment:     Deonna Montero is a 78 y.o. female admitted with a medical diagnosis of CVA.  She presents with the following impairments/functional limitations:    gait abnormality, assymmetric strength, decreased balance and functional mobility    Rehab Prognosis: Good; patient would benefit from acute skilled PT services to address these deficits and reach maximum level of function.    Recent Surgery: * No surgery found *      Plan:     During this hospitalization, patient to be seen daily to address the identified rehab impairments via gait training, therapeutic activities, therapeutic exercises, neuromuscular re-education, wheelchair management/training and progress toward the following goals:    · Plan of Care Expires:  09/12/19    Subjective     Chief Complaint: can't sleep good at night  Patient/Family Comments/goals: to go home.  Pain/Comfort:  · Pain Rating 1: 0/10      Objective:     Communicated with nurse prior to session.  Patient found supine with bed alarm upon PT entry to room.     General Precautions: Standard, fall, aspiration   Orthopedic Precautions:N/A   Braces: N/A     Functional Mobility:  · Bed Mobility:     · Rolling Right: supervision  · Supine to Sit: supervision  · Transfers:     · Sit to Stand:  supervision with rolling walker  · Bed to Chair: supervision with  rolling walker  using  Step Transfer  · Gait:  ft wioth CGA/vc. Accelerated left leg swing. Decreased stance to right LE..  · Balance: good sitting balance, standing Fair(-)        Therapeutic Activities and Exercises:  Bed mobility, transfers, sitting/standing balance and gait training using RW.    Patient left up in chair with nurse notified and family present..    GOALS:    Multidisciplinary Problems     Physical Therapy Goals        Problem: Physical Therapy Goal    Goal Priority Disciplines Outcome Goal Variances Interventions   Physical Therapy Goal     PT, PT/OT Ongoing (interventions implemented as appropriate)     Description:  Goals to be met by: 2019    Patient will increase functional independence with mobility by performin. Supine to sit with Stand-by Assistance  2. Sit to supine with Stand-by Assistance  3. Sit to stand transfer with Stand-by Assistance  4. Bed to chair transfer with Supervision using Rolling Walker  5. Gait  x 150 feet with Supervision using Rolling Walker.   6. Wheelchair propulsion x150 feet with Modified [place] using bilateral uppper extremities                      Time Tracking:     PT Received On: 19  PT Start Time:       PT Stop Time:    PT Total Time (min):       Billable Minutes: Gait Training 15 and Therapeutic Exercise 15    Treatment Type: Treatment  PT/PTA: PT     PTA Visit Number: 0     Mike García, PT  2019

## 2019-08-25 NOTE — PLAN OF CARE
Problem: Physical Therapy Goal  Goal: Physical Therapy Goal  Goals to be met by: 2019    Patient will increase functional independence with mobility by performin. Supine to sit with Stand-by Assistance  2. Sit to supine with Stand-by Assistance  3. Sit to stand transfer with Stand-by Assistance  4. Bed to chair transfer with Supervision using Rolling Walker  5. Gait  x 150 feet with Supervision using Rolling Walker.   6. Wheelchair propulsion x150 feet with Modified [place] using bilateral uppper extremities     Outcome: Ongoing (interventions implemented as appropriate)  Patient seen for gait training, balancing and thera Ex to facilitate improved functional mobility.

## 2019-08-26 LAB
POCT GLUCOSE: 112 MG/DL (ref 70–110)
POCT GLUCOSE: 93 MG/DL (ref 70–110)

## 2019-08-26 PROCEDURE — 97803 MED NUTRITION INDIV SUBSEQ: CPT

## 2019-08-26 PROCEDURE — 63600175 PHARM REV CODE 636 W HCPCS: Performed by: PHYSICAL MEDICINE & REHABILITATION

## 2019-08-26 PROCEDURE — 97535 SELF CARE MNGMENT TRAINING: CPT

## 2019-08-26 PROCEDURE — 99232 SBSQ HOSP IP/OBS MODERATE 35: CPT | Mod: ,,, | Performed by: PHYSICAL MEDICINE & REHABILITATION

## 2019-08-26 PROCEDURE — 25000003 PHARM REV CODE 250: Performed by: PHYSICAL MEDICINE & REHABILITATION

## 2019-08-26 PROCEDURE — 92507 TX SP LANG VOICE COMM INDIV: CPT

## 2019-08-26 PROCEDURE — 97530 THERAPEUTIC ACTIVITIES: CPT

## 2019-08-26 PROCEDURE — 12800000 HC REHAB SEMI-PRIVATE ROOM

## 2019-08-26 PROCEDURE — 97110 THERAPEUTIC EXERCISES: CPT

## 2019-08-26 PROCEDURE — 92526 ORAL FUNCTION THERAPY: CPT

## 2019-08-26 PROCEDURE — 99232 PR SUBSEQUENT HOSPITAL CARE,LEVL II: ICD-10-PCS | Mod: ,,, | Performed by: PHYSICAL MEDICINE & REHABILITATION

## 2019-08-26 PROCEDURE — 97116 GAIT TRAINING THERAPY: CPT

## 2019-08-26 RX ADMIN — AMITRIPTYLINE HYDROCHLORIDE 10 MG: 10 TABLET, FILM COATED ORAL at 08:08

## 2019-08-26 RX ADMIN — METOPROLOL SUCCINATE 50 MG: 50 TABLET, EXTENDED RELEASE ORAL at 09:08

## 2019-08-26 RX ADMIN — METFORMIN HYDROCHLORIDE 500 MG: 500 TABLET ORAL at 09:08

## 2019-08-26 RX ADMIN — HUMAN INSULIN 20 UNITS: 100 INJECTION, SUSPENSION SUBCUTANEOUS at 06:08

## 2019-08-26 RX ADMIN — METFORMIN HYDROCHLORIDE 500 MG: 500 TABLET ORAL at 04:08

## 2019-08-26 RX ADMIN — PANTOPRAZOLE SODIUM 40 MG: 40 TABLET, DELAYED RELEASE ORAL at 09:08

## 2019-08-26 RX ADMIN — BRIMONIDINE TARTRATE AND TIMOLOL MALEATE 1 DROP: 2; 5 SOLUTION OPHTHALMIC at 08:08

## 2019-08-26 RX ADMIN — ENOXAPARIN SODIUM 40 MG: 100 INJECTION SUBCUTANEOUS at 04:08

## 2019-08-26 RX ADMIN — DONEPEZIL HYDROCHLORIDE 10 MG: 5 TABLET, FILM COATED ORAL at 08:08

## 2019-08-26 RX ADMIN — TRAVOPROST 1 DROP: 0.04 SOLUTION/ DROPS OPHTHALMIC at 08:08

## 2019-08-26 RX ADMIN — BRIMONIDINE TARTRATE AND TIMOLOL MALEATE 1 DROP: 2; 5 SOLUTION OPHTHALMIC at 09:08

## 2019-08-26 RX ADMIN — AMLODIPINE BESYLATE 10 MG: 5 TABLET ORAL at 09:08

## 2019-08-26 RX ADMIN — LINACLOTIDE 145 MCG: 145 CAPSULE, GELATIN COATED ORAL at 09:08

## 2019-08-26 RX ADMIN — ASPIRIN 81 MG CHEWABLE TABLET 81 MG: 81 TABLET CHEWABLE at 09:08

## 2019-08-26 RX ADMIN — HUMAN INSULIN 20 UNITS: 100 INJECTION, SUSPENSION SUBCUTANEOUS at 04:08

## 2019-08-26 NOTE — PLAN OF CARE
Problem: Occupational Therapy Goal  Goal: Occupational Therapy Goal  Goals to be met by:    Patient will increase functional independence with ADLs by performing:    Feeding with Modified [place].  UE Dressing with Set-up Assistance.  LE Dressing with Stand-by Assistance with set up of clothes.  Grooming while seated with Modified [place].  Toileting from bedside commode over the toilet with Supervision for hygiene and clothing management.   Toilet transfer to bedside commode over the toilet with Contact Guard Assistance.  Bathing from  shower chair/bench with Minimal Assistanc.  Shower transfer to TTB with CGA and verbal instructions.   Outcome: Ongoing (interventions implemented as appropriate)  OT treatment for self care, functional and safety education.

## 2019-08-26 NOTE — PROGRESS NOTES
"[facility]  Adult Nutrition  Progress Note    SUMMARY    Intervention: Nutrition supplement therapy-commercial beverage, texture modified diet, and nutrition education    Recommendation:   1) Continue consistent carb diet, 3-4 carbohydrate servings/meal, texture per SLP and pt prefs as able- cardiac once appetite improves   2) Continue Boost glucose control BID   3) Weigh pt weekly     Goals: 1) PO intakes > 50% of meals and supplements at f/u - 2) PO intake >=85% EEN during admit   Nutrition Goal Status: 1) met 2) new  Communication of RD Recs: (POC, sticky note)    Reason for Assessment    Reason For Assessment: RD follow up  Diagnosis: (CVA)  Relevant Medical History: HTN, DM2, GERD, dementia, CVA  Interdisciplinary Rounds: did not attend  General Information Comments: 79 y/o female admitted to rehab s/p CVA. Pt with a fair appetite today, states she knows she was,"not eating as much as she should have been," PTA. Discussed heart healthy/diabetic, mechanical soft diet with pt and left menu/handouts in room for pt and family as pt with dementia. Agreeable to supplements. NFPE done 8/15/19, no wasting seen. Insignificant wt loss noted in chart.  8/19/19:  PO intake good, getting and using supplements.   8/26/19: Pt alert and reports her appetite is good. However, she is getting tired of some of the foods. Meal intake records indicate PO intake declining. Notes  comes and takes her preferences.  Diet is limited.  Additional prefs noted.      Nutrition Discharge Planning: To be determined DM diet ( 3-4 carb servings/meal), no salt added, texture per SLP 9 cardiac if apppetite improves)    Nutrition Risk Screen    Nutrition Risk Screen: no indicators present    Nutrition/Diet History    Patient Reported Diet/Restrictions/Preferences: diabetic diet  Spiritual, Cultural Beliefs, Congregation Practices, Values that Affect Care: no  Factors Affecting Nutritional Intake: decreased " "appetite, difficulty/impaired swallowing    Anthropometrics    Temp: 97 °F (36.1 °C)  Height Method: Measured  Height: 5' 8"  Height (inches): 68 in  Weight Method: Bed Scale  Weight: 96.2 kg (212 lb 1.3 oz)  Weight (lb): 212.08 lb  Ideal Body Weight (IBW), Female: 140 lb  % Ideal Body Weight, Female (lb): 151.49 lb  BMI (Calculated): 32.3  BMI Grade: 30 - 34.9- obesity - grade I  Usual Body Weight (UBW), k.6 kg(18)  % Usual Body Weight: 95.83  % Weight Change From Usual Weight: -4.37 %       Lab/Procedures/Meds    Pertinent Labs Reviewed: reviewed  BMP  Lab Results   Component Value Date     2019    K 4.7 2019     2019    CO2 27 2019    BUN 22 2019    CREATININE 1.0 2019    CALCIUM 10.0 2019    ANIONGAP 11 2019    ESTGFRAFRICA >60 2019    EGFRNONAA 54 (A) 2019     POCT Glucose   Date Value Ref Range Status   2019 148 (H) 70 - 110 mg/dL Final   2019 158 (H) 70 - 110 mg/dL Final   2019 187 (H) 70 - 110 mg/dL Final   2019 130 (H) 70 - 110 mg/dL Final   2019 227 (H) 70 - 110 mg/dL Final   2019 206 (H) 70 - 110 mg/dL Final     Lab Results   Component Value Date    HGBA1C 8.9 (H) 08/15/2019       Pertinent Medications Reviewed: reviewed  Pertinent Medications Comments: lactulose, insulin, metformin, zofran    Estimated/Assessed Needs    Weight Used For Calorie Calculations: 96.2 kg (212 lb 1.3 oz)  Energy Calorie Requirements (kcal): Parke St Jeor ( no activity factor obesity) = 1495 kcal  Energy Need Method: Parke-St Jeor  Protein Requirements: 0.8 g protein/kg ( 77 g protein)  Weight Used For Protein Calculations: 96.2 kg (212 lb 1.3 oz)  Fluid Requirements (mL): 25 ml/kg = 2400 ml  Estimated Fluid Requirement Method: other (see comments)  CHO Requirement: 45-50%      Nutrition Prescription Ordered    Current Diet Order: mechanical Soft + Boost Glucose Control, BID strawberry    Evaluation of " Received Nutrient/Fluid Intake    Energy Calories Required:  meeting needs  Protein Required:  meeting needs  Fluid Required: not meeting needs    Intake/Output Summary (Last 24 hours) at 8/26/2019 1337  Last data filed at 8/26/2019 0500  Gross per 24 hour   Intake 240 ml   Output --   Net 240 ml     Tolerance: tolerating  % Intake of Estimated Energy Needs: 75->100% (drinking 2 Boost GC daily, 250 calories each)  % Meal Intake:  Averages 54% of last 7 documented meals  Nutrition Risk    Level of Risk/Frequency of Follow-up: moderate 1 x weekly    Assessment and Plan     Inadequate energy intake  R/t decreased appetite  As evidenced by PO intakes < 50% of EEN x 1 day  resolved    Monitor and Evaluation    Food and Nutrient Intake: energy intake, food and beverage intake  Food and Nutrient Adminstration: diet order  Anthropometric Measurements: weight  Biochemical Data, Medical Tests and Procedures: electrolyte and renal panel, glucose/endocrine profile  Nutrition-Focused Physical Findings: overall appearance     Malnutrition Assessment     Skin (Micronutrient): (Aníbal = 16)  Teeth (Micronutrient): (With teeth)   Micronutrient Evaluation: no deficiencies               Edema (Fluid Accumulation): 0-->no edema present   Subcutaneous Fat Loss (Final Summary): well nourished  Muscle Loss Evaluation (Final Summary): well nourished         Nutrition Follow-Up    RD Follow-up?: Yes

## 2019-08-26 NOTE — PLAN OF CARE
Problem: Oral Intake Inadequate  Goal: Improved Oral Intake  Goals to be met by:     Disregard  Patient will increase functional independence with ADLs by performing:    Feeding with Modified [place].  UE Dressing with Set-up Assistance.  LE Dressing with Stand-by Assistance.  Grooming while standing with Supervision.  Toileting from toilet with Assistive Devices as needed for hygiene and clothing management.   Toilet transfer to toilet with Contact Guard Assistance.  Bathing from  shower chair/bench with CGA.  Shower/tub transfer with CGA     Nutrition Care Plan  Intervention: Promote and Optimize Oral Intake   Intervention: Nutrition supplement therapy-commercial beverage, texture modified diet, and nutrition education    Recommendation:   1) Continue consistent carb diet, 3-4 carbohydrate servings/meal, texture per SLP and pt prefs as able- cardiac once appetite improves   2) Continue Boost glucose control BID   3) Weigh pt weekly     Goals: 1) PO intakes > 50% of meals and supplements at f/u - 2) PO intake >=85% EEN during admit   Nutrition Goal Status: 1) met 2) new  Communication of RD Recs: (POC, sticky note)

## 2019-08-26 NOTE — PLAN OF CARE
Problem: Adult Inpatient Plan of Care  Goal: Plan of Care Review  Outcome: Ongoing (interventions implemented as appropriate)  PRN Rozerem administered per pt request.  Slept well during the night.  Incontinent of bladder.  No c/o pain or discomfort.  Pt dressed and setup to sink in room.  Pt performing oral care with supervision.      ACBID @ 0600-112  Administered 20U NPH per order.

## 2019-08-26 NOTE — ASSESSMENT & PLAN NOTE
Contributing Nutrition Diagnosis  Altered nutrition related lab values    Related to (etiology):   Inconsistent carbohydrate intake    Signs and Symptoms (as evidenced by):   Elevated glucose    Interventions/Recommendations (treatment strategy):  DM diet    Nutrition Diagnosis Status:   continues

## 2019-08-26 NOTE — PT/OT/SLP PROGRESS
Physical Therapy         Treatment        Deonna Montero   MRN: [id number]     PT Received On: 08/26/19  Total Time (min): 78        Billable Minutes:  Gait [id number], Therapeutic Activity 15 and Therapeutic Exercise 45  Total Minutes: 78      Treatment Type: Treatment  PT/PTA: PT     PTA Visit Number: 0       General Precautions: Standard, fall, aspiration, vision impaired  Orthopedic Precautions: Orthopedic Precautions : N/A   Braces: Braces: N/A         Subjective:  Communicated with CYNDEE Thomas prior to session.  Pt reported she's no longer having cramps in her joints.    Pain/Comfort  Pain Rating 1: 0/10  Pain Rating Post-Intervention 1: 0/10    Objective:  Patient found sitting up in w/c in room.  Patient found with: (w/c alarm)    Functional Mobility:  Transfer Training:  Sit to stand:Contact Guard Assistance, Minimal Assistance and Moderate Assistance with Rolling Walker & max vc for technique due to pt having difficulty transferring COM forward.    Gait Training:  Gait training with Rolling Walker x 150', 130' and 100' with CGa and vc for upright posture within RW base.    Additional Treatment:  Pt performed B LE there ex standing in // bars 30 reps with vc/tc for upright posture and proper execution: calf raises, marching, hip abd, hip ext, ham curls, mini squats.    Pt performed B LE there ex sitting x 30 reps with 3# wt and blue TB, with vc for proper execution and joint protection: ap, laq, marching, hip abd/add, ham curls.    SciFit stepper x 2 trials of 5 min each.       Activity Tolerance:  Patient tolerated treatment well and Patient limited by fatigue    Patient left up in chair with call button in reach, chair alarm on and RN notified and ST present.    Assessment:  Deonna Montero is a 78 y.o. female with a medical diagnosis of CVA. She presents with impaired functional mobility, balance, activity tolerance, strength, vision with gait instability. Pt somewhat somnolent throughout  session.      Rehab potential is fair.    Activity tolerance: Fair    Discharge recommendations:       Equipment recommendations:       GOALS:   Multidisciplinary Problems     Physical Therapy Goals        Problem: Physical Therapy Goal    Goal Priority Disciplines Outcome Goal Variances Interventions   Physical Therapy Goal     PT, PT/OT Ongoing (interventions implemented as appropriate)     Description:  Goals to be met by: 2019    Patient will increase functional independence with mobility by performin. Supine to sit with Stand-by Assistance  2. Sit to supine with Stand-by Assistance  3. Sit to stand transfer with Stand-by Assistance  4. Bed to chair transfer with Supervision using Rolling Walker  5. Gait  x 150 feet with Supervision using Rolling Walker.   6. Wheelchair propulsion x150 feet with Modified [place] using bilateral uppper extremities                      PLAN:    Patient to be seen daily  to address the above listed problems via gait training, therapeutic activities, therapeutic exercises, therapeutic groups, neuromuscular re-education, wheelchair management/training  Plan of Care expires: 19  Plan of Care reviewed with: patient         Mara Roy, PT 2019

## 2019-08-26 NOTE — PT/OT/SLP PROGRESS
"Speech Language Pathology Treatment          Deonna Montero   MRN: [id number]     Diet recommendations: Solid Diet Level: (mechanical soft IDDSI 5; well-chopped, minced; denture in place)     Liquid Diet Level: Thin(no straw)   Aspiration Precautions:  1 bite/sip at a time, distant supervision with meals, Avoid talking while eating, Chin tuck during swallows, Double swallow with each bite/sip, Eliminate distractions, HOB to 90 degrees (preferably up in chair for meals), Crushable Meds crushed in puree; otherwise individual meds in pudding/applesauce; Monitor for s/s of aspiration, No straws, Remain upright 30 minutes post meal and Small bites/sips.    SLP Treatment Date: 08/26/19  Speech Start Time: 0935     Speech Stop Time: 1049     Speech Total (min): 74 min       TREATMENT BILLABLE MINUTES:  Speech Therapy Individual 64 and Treatment Swallowing Dysfunction 10    General Precautions: Standard, diabetic, fall, aspiration(no straw)  Current Respiratory Status: room air       Subjective:  "No straw" (pt reports better swallowing results without use of straw)    Objective:    Patient found with: (chair alarm).  Pt treated for dysphagia and dysarthria today.  Pt relayed her swallow safety precautions, noting that she is not coughing when she does not use straw (eliminated use of straw last Friday).  Pt reports tolerance of diet over weekend and this morning.  Pt educated regarding need to cut fruit/vegetables in to smaller pieces to assist mastication if fruit/vegetables do not arrive well chopped on tray. Pt verbalized understanding.  Encouraged sips of water to avoid dehydration; pt refused water when with clinician for session.    Pt imitated complex sentences, pacing speech/breaths in imitation of clinician (new breath every 3-4 words) in order to maintain volume through end of sentences with 90% accuracy.  She produced her own sentences, taking breaths as needed to maintain volume, independently with 80% " accuracy during tx tasks.  During informal conversation, however, pt did not maintain appropriate volume by pacing breaths during sentence production.      Pt lost track of task goal during meds management problem solving task.  She ultimately placed correct number of mock pills into pill organizer for a mock script calling for one pill twice daily; however she needed reminder of task goal.    Pain/Comfort  Pain Rating 1: 0/10    Assessment:  Deonna Montero is a 78 y.o. female with a SLP diagnosis of Dysphagia, Dysarthria and Cognitive-Linguistic Impairment. Today she is moderately dysarthric but, during tx tasks, was able to make complex sentences clear/fully intelligible when using breath pacing and over-articulation strategy.  She does not consistently use her best intelligibility strategies when not participating in tx tasks.  She is able to identify swallow safety precautions.    Diet recommendations:      Solid Diet Level: (mechanical soft IDDSI 5; well-chopped, minced; denture in place)     Liquid Diet Level: Thin(no straw)   Aspiration Precautions:  1 bite/sip at a time, distant supervision with meals, Avoid talking while eating, Chin tuck during swallows, Double swallow with each bite/sip, Eliminate distractions, HOB to 90 degrees (preferably up in chair for meals), Crushable Meds crushed in puree; otherwise individual meds in pudding/applesauce; Monitor for s/s of aspiration, No straws, Remain upright 30 minutes post meal and Small bites/sips.    Discharge recommendations: Discharge Facility/Level of Care Needs: home health speech therapy     Goals:   Multidisciplinary Problems     SLP Goals        Problem: SLP Goal    Goal Priority Disciplines Outcome   SLP Goal     SLP Ongoing (interventions implemented as appropriate)   Description:  1. Demonstrate understanding of speech subsystems, JAYDEN PEPPER  2. MET -- Complete controlled exhalation tasks to enhance control of respiration for speech production,  MOD A   3. MET -- Coordinate initiation of phonation with exhalation to improve naturalness of speech production, MOD A  4. MET (with min-mod cues)-- Progressing from multisyllabic words to connected utterances, Pt will demonstrate control of speech subsystems in order to achieve appropriate coordination of speech subsystems for natural speech production, MAX A- pending goals 2 &3   5. ONGOING (IMPAIRED) -- Participate in assessment of functional problem solving    6. MET (zero to min cues) Demonstrate understanding of swallow safety precautions with 80% accuracy, min cues.  7. Pt will per form target oral exercises for improved labial/lingual strength for increased swallow efficiency with min cues, 70% accuracy, 5-10 reps.  8. Pt will perform articulation drills focusing on increasing strength of articulatory contacts in initial, medial, final positions of single-syllable and two-syllable words, given clinician model, 80% accuracy (for improved speech intelligibility)  9. Pt will perform target dysphagia exercises ( effortful swallows, Mendelsohn maneuver and second swallows with food/liquid); Mendelsohn maneuver, Ellyn task without food ) with model 80% accuracy, for increased swallow safety (based on results of prior modified barium swallow).                      Plan:   Patient to be seen Therapy Frequency: 5 x/week   Planned Interventions: Dysphagia Therapy, Cognitive-Linguistic Therapy, Motor Speech Therapy  Plan of Care Expires:    Plan of Care reviewed with: patient  SLP Follow-up?: Yes  SLP - Next Visit Date: 08/27/19           Nellie Piña CCC-SLP 8/26/2019

## 2019-08-26 NOTE — PROGRESS NOTES
"REHAB FOLLOWUP NOTE    Deonna Montero is a 78 y.o. female patient.    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    History:  78-year-old  right-hand-dominant female was initially admitted to [facility] with left-sided CVA with right hemiparesis on 07/23/2019 was hospitalized till 07/26/2019 for neuro workup including CT scan showed no acute changes, carotids without any significant stenosis, MRI consistent with left pontine as well as occipital ischemic infarcts.  Patient has been medically stabilized and transferred to swing bed with steady improvement and is now transferred down to us for further rehabilitation.    Past Medical History:   Diagnosis Date    Back pain     Diabetes mellitus     Hypertension     Reflux     Short-term memory loss     Stroke      Temp: 97 °F (36.1 °C) (08/26/19 0724)  Pulse: 65 (08/26/19 0724)  Resp: 18 (08/26/19 0724)  BP: 137/62 (08/26/19 0724)  SpO2: 97 % (08/26/19 0724)  Weight: 96.2 kg (212 lb 1.3 oz) (08/15/19 1334)  Height: 5' 8" (172.7 cm) (08/15/19 1339)    Lab Results   Component Value Date    WBC 3.62 (L) 08/21/2019    HGB 13.8 08/21/2019    HCT 42.8 08/21/2019     08/21/2019    ALT 35 08/21/2019    AST 32 08/21/2019     08/21/2019    K 4.7 08/21/2019     08/21/2019    CREATININE 1.0 08/21/2019    BUN 22 08/21/2019    CO2 27 08/21/2019    INR 1.1 04/19/2018    HGBA1C 8.9 (H) 08/15/2019     Physical Examination:  Alert, oriented x3, follows commands well.  Feels better.  Denies any leg cramps.  Had a good night rest.  Lungs are clear to auscultation.  Heart with regular rate and rhythm.  Extremities without any edema or calf tenderness noted.  Blood pressure and blood sugars are well controlled.  Steady progress with therapy.  Continent of bladder and bowel.    Impression:  Status post left-sided CVA with right " hemiparesis.  Hypertension.  Insulin-dependent diabetes mellitus.  Hyperlipidemia.  Degenerative joint disease.  Left eye enucleation.    Recommendations:  Continue current PT, OT, speech therapy and nursing care.          Nicky Diamond MD  8/26/2019

## 2019-08-26 NOTE — PLAN OF CARE
Problem: Physical Therapy Goal  Goal: Physical Therapy Goal  Goals to be met by: 2019    Patient will increase functional independence with mobility by performin. Supine to sit with Stand-by Assistance  2. Sit to supine with Stand-by Assistance  3. Sit to stand transfer with Stand-by Assistance  4. Bed to chair transfer with Supervision using Rolling Walker  5. Gait  x 150 feet with Supervision using Rolling Walker.   6. Wheelchair propulsion x150 feet with Modified [place] using bilateral uppper extremities     Outcome: Ongoing (interventions implemented as appropriate)    PT for gait, there ex and activity.

## 2019-08-26 NOTE — PLAN OF CARE
Problem: Adult Inpatient Plan of Care  Goal: Plan of Care Review  A&OX4. Min/mod assist. Incontinent of bladder. Continent of bowel. Bed and chair alarms utilized at all times. Remained free from falls. Standard precautions maintained.

## 2019-08-26 NOTE — PLAN OF CARE
Problem: SLP Goal  Goal: SLP Goal  1. Demonstrate understanding of speech subsystems, STBY A  2. MET -- Complete controlled exhalation tasks to enhance control of respiration for speech production, MOD A   3. MET -- Coordinate initiation of phonation with exhalation to improve naturalness of speech production, MOD A  4. MET (with min-mod cues)-- Progressing from multisyllabic words to connected utterances, Pt will demonstrate control of speech subsystems in order to achieve appropriate coordination of speech subsystems for natural speech production, MAX A- pending goals 2 &3   5. ONGOING (IMPAIRED) -- Participate in assessment of functional problem solving    6. MET (zero to min cues) Demonstrate understanding of swallow safety precautions with 80% accuracy, min cues.  7. Pt will per form target oral exercises for improved labial/lingual strength for increased swallow efficiency with min cues, 70% accuracy, 5-10 reps.  8. Pt will perform articulation drills focusing on increasing strength of articulatory contacts in initial, medial, final positions of single-syllable and two-syllable words, given clinician model, 80% accuracy (for improved speech intelligibility)  9. Pt will perform target dysphagia exercises ( effortful swallows, Mendelsohn maneuver and second swallows with food/liquid); Mendelsohn maneuver, Ellyn task without food ) with model 80% accuracy, for increased swallow safety (based on results of prior modified barium swallow).   Outcome: Ongoing (interventions implemented as appropriate)  Pt demonstrated fair control during complex sentence imitation tasks, pacing breaths in imitation of clinician.    Pt wanted to work on problem solving for meds management; difficulty initiating task, choosing appropriate meds box.  Will need help for meds management at home.  Part of her difficulty stems from motor control issues (dropping pills); other part of issue relates to losing track of task.    Comments:  Cooperative and pleasant.

## 2019-08-26 NOTE — PT/OT/SLP PROGRESS
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     OT Date of Treatment: 08/26/19   Total Time (min): 95 min      Billable Minutes:  Self Care/Home Management 80 and Therapeutic Activity 15  Total Minutes: 95    General Precautions: Standard, aspiration, diabetic, fall, vision impaired(left prosthetic eye.)  Orthopedic Precautions: N/A  Braces: N/A         Subjective:  Communicated with nurse prior to session.    Pain Rating 1: 0/10                   Objective:  Patient found with: (w/c alarm)    Functional Mobility:   Ambulated bedroom to and from the bathroom with CGA/Beto and SBA of second person.    Transfer Training:  RW<> toilet with use of 3n1 over the toilet with mod/Beto.  RW<>TTB in shower with RW management,  side stepping and use of wall bar with modA    Bathing:  Beto and with use of TTB with cut out, wall bar, extended shower hose and long handle sponge for bathing and drying.    UE Dressing:  Set up    LE Dressing:  CGA and with extended time and use of dressing stick and RW and verbal instructions for brief and elastic waist pants.  Pt required 3 rest periods to sit in her w/c during the task.      Toilet Training:  modA with pt managing clothing up and down and pt requiring assist for thorough self cleaning.    Balance:   Static Sit: SBA /Supervision sitting supported.  Dynamic Sit: SBA   Static Stand: CGA  Dynamic stand:Beto      Additional Treatment:  Pt instructed in safe rolling walker use for performance of self care tasks including self alignment to walker and walker to environment when    performing self care and functional mobility tasks.  Pt instructed in safe sit<>stand sequences for body position and hand placement sequence.  Pt reinstructed in techniques for use of assistive equipment for performance of self care  tasks.          Patient left up in chair with call button in reach and chair alarm on.      ASSESSMENT:  Deonna Montero requires verbal and tactile assist to walk within her walker correctly.      Rehab potential is good    Activity tolerance: Good with rest.    Discharge recommendations: home, home health OT     Equipment recommendations: (TBD)     GOALS:   Multidisciplinary Problems     Occupational Therapy Goals        Problem: Occupational Therapy Goal    Goal Priority Disciplines Outcome Interventions   Occupational Therapy Goal     OT, PT/OT Ongoing (interventions implemented as appropriate)    Description:  Goals to be met by:    Patient will increase functional independence with ADLs by performing:    Feeding with Modified [place].  UE Dressing with Set-up Assistance.  LE Dressing with Stand-by Assistance with set up of clothes.  Grooming while seated with Modified [place].  Toileting from bedside commode over the toilet with Supervision for hygiene and clothing management.   Toilet transfer to bedside commode over the toilet with Contact Guard Assistance.  Bathing from  shower chair/bench with Minimal Assistanc.  Shower transfer to TTB with CGA and verbal instructions.                    Plan:  Patient to be seen 6 x/week to address the above listed problems via self-care/home management, community/work re-entry, therapeutic exercises, therapeutic groups, wheelchair management/training, neuromuscular re-education  Plan of Care expires: 09/05/20  Plan of Care reviewed with: patient         Sonya Hamilton, OT  08/26/2019

## 2019-08-27 LAB — POCT GLUCOSE: 116 MG/DL (ref 70–110)

## 2019-08-27 PROCEDURE — 63600175 PHARM REV CODE 636 W HCPCS: Performed by: PHYSICAL MEDICINE & REHABILITATION

## 2019-08-27 PROCEDURE — 97530 THERAPEUTIC ACTIVITIES: CPT

## 2019-08-27 PROCEDURE — 25000003 PHARM REV CODE 250: Performed by: PHYSICAL MEDICINE & REHABILITATION

## 2019-08-27 PROCEDURE — 97542 WHEELCHAIR MNGMENT TRAINING: CPT

## 2019-08-27 PROCEDURE — 97110 THERAPEUTIC EXERCISES: CPT

## 2019-08-27 PROCEDURE — 97127 HC THERAPEUTIC INTVTN, COGN FUNCTION - ST: CPT

## 2019-08-27 PROCEDURE — 99231 SBSQ HOSP IP/OBS SF/LOW 25: CPT | Mod: ,,, | Performed by: PHYSICAL MEDICINE & REHABILITATION

## 2019-08-27 PROCEDURE — 97116 GAIT TRAINING THERAPY: CPT

## 2019-08-27 PROCEDURE — 12800000 HC REHAB SEMI-PRIVATE ROOM

## 2019-08-27 PROCEDURE — 99231 PR SUBSEQUENT HOSPITAL CARE,LEVL I: ICD-10-PCS | Mod: ,,, | Performed by: PHYSICAL MEDICINE & REHABILITATION

## 2019-08-27 PROCEDURE — 97112 NEUROMUSCULAR REEDUCATION: CPT

## 2019-08-27 PROCEDURE — 97535 SELF CARE MNGMENT TRAINING: CPT

## 2019-08-27 RX ADMIN — ENOXAPARIN SODIUM 40 MG: 100 INJECTION SUBCUTANEOUS at 05:08

## 2019-08-27 RX ADMIN — DONEPEZIL HYDROCHLORIDE 10 MG: 5 TABLET, FILM COATED ORAL at 08:08

## 2019-08-27 RX ADMIN — METFORMIN HYDROCHLORIDE 500 MG: 500 TABLET ORAL at 05:08

## 2019-08-27 RX ADMIN — METFORMIN HYDROCHLORIDE 500 MG: 500 TABLET ORAL at 08:08

## 2019-08-27 RX ADMIN — HUMAN INSULIN 20 UNITS: 100 INJECTION, SUSPENSION SUBCUTANEOUS at 05:08

## 2019-08-27 RX ADMIN — LINACLOTIDE 145 MCG: 145 CAPSULE, GELATIN COATED ORAL at 08:08

## 2019-08-27 RX ADMIN — AMITRIPTYLINE HYDROCHLORIDE 10 MG: 10 TABLET, FILM COATED ORAL at 08:08

## 2019-08-27 RX ADMIN — BRIMONIDINE TARTRATE AND TIMOLOL MALEATE 1 DROP: 2; 5 SOLUTION OPHTHALMIC at 08:08

## 2019-08-27 RX ADMIN — PANTOPRAZOLE SODIUM 40 MG: 40 TABLET, DELAYED RELEASE ORAL at 08:08

## 2019-08-27 RX ADMIN — AMLODIPINE BESYLATE 10 MG: 5 TABLET ORAL at 08:08

## 2019-08-27 RX ADMIN — TRAVOPROST 1 DROP: 0.04 SOLUTION/ DROPS OPHTHALMIC at 08:08

## 2019-08-27 RX ADMIN — LISINOPRIL 10 MG: 10 TABLET ORAL at 08:08

## 2019-08-27 RX ADMIN — METOPROLOL SUCCINATE 50 MG: 50 TABLET, EXTENDED RELEASE ORAL at 08:08

## 2019-08-27 RX ADMIN — ASPIRIN 81 MG CHEWABLE TABLET 81 MG: 81 TABLET CHEWABLE at 08:08

## 2019-08-27 NOTE — PLAN OF CARE
Problem: SLP Goal  Goal: SLP Goal  1. Demonstrate understanding of speech subsystems, STBY A  2. MET -- Complete controlled exhalation tasks to enhance control of respiration for speech production, MOD A   3. MET -- Coordinate initiation of phonation with exhalation to improve naturalness of speech production, MOD A  4. MET (with min-mod cues)-- Progressing from multisyllabic words to connected utterances, Pt will demonstrate control of speech subsystems in order to achieve appropriate coordination of speech subsystems for natural speech production, MAX A- pending goals 2 &3   5. ONGOING (IMPAIRED) -- Participate in assessment of functional problem solving    6. MET (zero to min cues) Demonstrate understanding of swallow safety precautions with 80% accuracy, min cues.  7. Pt will per form target oral exercises for improved labial/lingual strength for increased swallow efficiency with min cues, 70% accuracy, 5-10 reps.  8. Pt will perform articulation drills focusing on increasing strength of articulatory contacts in initial, medial, final positions of single-syllable and two-syllable words, given clinician model, 80% accuracy (for improved speech intelligibility)  9. Pt will perform target dysphagia exercises ( effortful swallows, Mendelsohn maneuver and second swallows with food/liquid); Mendelsohn maneuver, Ellyn task without food ) with model 80% accuracy, for increased swallow safety (based on results of prior modified barium swallow).      MOCA administered with pt achieving goal of 14/30. No change when compared to MOCA 8/21/19. Continue POC

## 2019-08-27 NOTE — PLAN OF CARE
Problem: Adult Inpatient Plan of Care  Goal: Plan of Care Review  Outcome: Ongoing (interventions implemented as appropriate)  Patient awake/alert.No c/o pain noted this shift. Incontinence noted. Free from falls. Plan of care continued.

## 2019-08-27 NOTE — PT/OT/SLP PROGRESS
"Speech Language Pathology Treatment          Deonna Montero   MRN: [id number]     Diet recommendations: Solid Diet Level: (mechanical soft IDDSI 5; denture in place)  Liquid Diet Level: Thin(no straw)   Aspiration Precautions:  1 bite/sip at a time, distant supervision with meals, Avoid talking while eating, Chin tuck during swallows, Double swallow with each bite/sip, Eliminate distractions, HOB to 90 degrees (preferably up in chair for meals), Crushable Meds crushed in puree; otherwise individual meds in pudding/applesauce; Monitor for s/s of aspiration, No straws, Remain upright 30 minutes post meal and Small bites/sips    SLP Treatment Date: 08/27/19  Speech Start Time: 1450     Speech Stop Time: 1520     Speech Total (min): 30 min       TREATMENT BILLABLE MINUTES:  Speech Therapy Individual 30 and Total Time 30    General Precautions: Standard, diabetic, fall, aspiration(no straw)  Current Respiratory Status: room air       Subjective:  "Thank you, Riri."  "I've been up since 5:30 and its been back to back."  C/o fatigue    Objective:   Patient found asleep in bed. Opens eyes to voice. C/o fatigue and not sleeping well. MOCA administered with pt achieving score of 14/30 indicating no improvement when compared to MOCA administered 8/21/19. She continues with deficits in the areas of executive functions, attention (serial subtractions), language and delayed recall. She was oriented to month, year and place but disoriented to date and ROB. She was instructed to use white board in room to facilitate orientation. Pt DEP for Masako and Mendelsohn today.          Assessment:  Deonna Montero is a 78 y.o. female with a SLP diagnosis of Dysphagia, Dysarthria and Cognitive-Linguistic Impairment. She present with good effort despite being visibly fatigued. No improvement on MOCA when compared to MOCA administered on 8/21/19.    Discharge recommendations: Discharge Facility/Level of Care Needs: home " health speech therapy     Goals:   Multidisciplinary Problems     SLP Goals        Problem: SLP Goal    Goal Priority Disciplines Outcome   SLP Goal     SLP Ongoing (interventions implemented as appropriate)   Description:  1. Demonstrate understanding of speech subsystems, STBY A  2. MET -- Complete controlled exhalation tasks to enhance control of respiration for speech production, MOD A   3. MET -- Coordinate initiation of phonation with exhalation to improve naturalness of speech production, MOD A  4. MET (with min-mod cues)-- Progressing from multisyllabic words to connected utterances, Pt will demonstrate control of speech subsystems in order to achieve appropriate coordination of speech subsystems for natural speech production, MAX A- pending goals 2 &3   5. ONGOING (IMPAIRED) -- Participate in assessment of functional problem solving    6. MET (zero to min cues) Demonstrate understanding of swallow safety precautions with 80% accuracy, min cues.  7. Pt will per form target oral exercises for improved labial/lingual strength for increased swallow efficiency with min cues, 70% accuracy, 5-10 reps.  8. Pt will perform articulation drills focusing on increasing strength of articulatory contacts in initial, medial, final positions of single-syllable and two-syllable words, given clinician model, 80% accuracy (for improved speech intelligibility)  9. Pt will perform target dysphagia exercises ( effortful swallows, Mendelsohn maneuver and second swallows with food/liquid); Mendelsohn maneuver, Ellyn task without food ) with model 80% accuracy, for increased swallow safety (based on results of prior modified barium swallow).                      Plan:   Patient to be seen Therapy Frequency: 5 x/week   Planned Interventions: Cognitive-Linguistic Therapy, Dysphagia Therapy, Motor Speech Therapy  Plan of Care Expires:    Plan of Care reviewed with: patient  SLP Follow-up?: Yes  SLP - Next Visit Date: 08/28/19            Riri Upton, CCC-SLP 8/27/2019

## 2019-08-27 NOTE — PT/OT/SLP PROGRESS
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     OT Date of Treatment: 08/27/19   Total Time (min): 75 min      Billable Minutes:  Self Care/Home Management 30 and Neuromuscular Re-education 45  Total Minutes: 75      General Precautions: Standard, aspiration, diabetic, fall, vision impaired(left eye prosthesis)  Orthopedic Precautions: N/A  Braces: N/A         Subjective:  Communicated with nurse prior to session.  Pt reports that her saliva control issues are really bothersome to her.    Pain Rating 1: 0/10                   Objective:  Patient found with: (w/c alarm)    Functional Mobility:  Feeding:  Pt reports that she has difficulty with utensil management during eating.  She was observed taking a couple of bites of pudding and displayed minimal coordination problems.    LE Dressing:  Pt reinstructed in use of a dressing stick and was SBA to doff shoes.  She reports her family took home her slip on shoes and left partially coverring heel shoes here.  She demonstrated difficulty donning to keep the back up.  She required reinstruction in technique to place sock onto cone in alignment and was then able to place over her feet with SBA.    Additional Treatment:  Pt engaged in performance of bilateral hand coordination and R hand coordination therapeutic exercises and instructed that these are tasks she can perform also at home.    Patient left up in chair with call button in reach and chair alarm on.    ASSESSMENT:  Deonna Montero states she asked family to bring in a pair of tennis shoes for her.  She required assist to recall technique to apply sock to sock cone.  Pt noted to perform tasks with the R elbow supported and this limits her hand function ability for some tasks.  When pointed out to her, she was able to move the arm more freely  and with greater accuracy in placement and some hand manipulative tasks.  She demonstrated difficulty with supporting the LUE and hand in static position to facilitate RUE and hand movement as primary user and L as stabilizer.    Rehab potential is good    Activity tolerance: Good    Discharge recommendations: home, home health OT     Equipment recommendations: (TBD)     GOALS:   Multidisciplinary Problems     Occupational Therapy Goals        Problem: Occupational Therapy Goal    Goal Priority Disciplines Outcome Interventions   Occupational Therapy Goal     OT, PT/OT Ongoing (interventions implemented as appropriate)    Description:  Goals to be met by:    Patient will increase functional independence with ADLs by performing:    Feeding with Modified [place].  UE Dressing with Set-up Assistance.  LE Dressing with Stand-by Assistance with set up of clothes.  Grooming while seated with Modified [place].  Toileting from bedside commode over the toilet with Supervision for hygiene and clothing management.   Toilet transfer to bedside commode over the toilet with Contact Guard Assistance.  Bathing from  shower chair/bench with Minimal Assistanc.  Shower transfer to TTB with CGA and verbal instructions.                    Plan:  Patient to be seen 6 x/week to address the above listed problems via self-care/home management, therapeutic activities, therapeutic exercises, therapeutic groups, neuromuscular re-education, wheelchair management/training  Plan of Care expires: 09/05/19  Plan of Care reviewed with: patient         Sonya Hamilton, OT  08/27/2019

## 2019-08-27 NOTE — PLAN OF CARE
Problem: Occupational Therapy Goal  Goal: Occupational Therapy Goal  Goals to be met by:    Patient will increase functional independence with ADLs by performing:    Feeding with Modified [place].  UE Dressing with Set-up Assistance.  LE Dressing with Stand-by Assistance with set up of clothes.  Grooming while seated with Modified [place].  Toileting from bedside commode over the toilet with Supervision for hygiene and clothing management.   Toilet transfer to bedside commode over the toilet with Contact Guard Assistance.  Bathing from  shower chair/bench with Minimal Assistanc.  Shower transfer to TTB with CGA and verbal instructions.   Outcome: Ongoing (interventions implemented as appropriate)  OT tx for NME

## 2019-08-27 NOTE — PLAN OF CARE
Problem: Occupational Therapy Goal  Goal: Occupational Therapy Goal  Goals to be met by:    Patient will increase functional independence with ADLs by performing:    Feeding with Modified [place].  UE Dressing with Set-up Assistance.  LE Dressing with Stand-by Assistance with set up of clothes.  Grooming while seated with Modified [place].  Toileting from bedside commode over the toilet with Supervision for hygiene and clothing management.   Toilet transfer to bedside commode over the toilet with Contact Guard Assistance.  Bathing from  shower chair/bench with Minimal Assistanc.  Shower transfer to TTB with CGA and verbal instructions.   Outcome: Ongoing (interventions implemented as appropriate)  OT tx for NME and self care.

## 2019-08-27 NOTE — PT/OT/SLP PROGRESS
Physical Therapy         Treatment        Deonna Montero   MRN: [id number]     PT Received On: 08/27/19  Total Time (min): 75        Billable Minutes:  Gait [id number], Therapeutic Activity 15, Therapeutic Exercise 30 and Train/Wheelchair Management 15  Total Minutes: 75    Treatment Type: Treatment  PT/PTA: PT     PTA Visit Number: 0       General Precautions: Standard, aspiration, fall, vision impaired  Orthopedic Precautions: Orthopedic Precautions : N/A   Braces: Braces: N/A         Subjective:  Communicated with CYNDEE Silva prior to session.    Pain/Comfort  Pain Rating 1: 10/10  Location - Side 1: Bilateral  Location - Orientation 1: lower  Location 1: back  Pain Addressed 1: Reposition, Cessation of Activity(Pt reported pain in her back during standing/gait only. No pain when sitting.)  Pain Rating Post-Intervention 1: 0/10    Objective:  Patient found sitting up in w/c in room.   Patient found with: (w/c alarm)    Functional Mobility:  Transfer Training:  Sit to stand:Moderate Assistance with Rolling Walker & max vc for technique    Wheelchair Training:  Pt propelled Standard wheelchair x 150 feet on Level tile with  Bilateral upper extremity and Bilateral lower extremity with Moderate Assistance.     Gait Training:  Gait training on level tile with Rolling Walker x 88' & 85' with Min A > CGa for safety/to steady and with vc for upright posture and remaining inside RW base.    Additional Treatment:  Pt performed B LE there ex sitting x 30-40 reps with 3# wt and blue/green TB, with vc for proper execution and joint protection: ap, laq, marching, hip abd/add, ham curls.    SciFit stepper x 12 min level 2 using B LEs only.      Activity Tolerance:  Patient tolerated treatment well and Patient limited by fatigue    Patient left up in chair with call button in reach, chair alarm on and RN notified.    Assessment:  Deonna Montero is a 78 y.o. female with a medical diagnosis of CVA. She presents with  impaired functional mobility, balance, activity tolerance, strength, vision with gait instability. Pt persists with somnolence and bradykinesia/slow processing.        Rehab potential is fair.    Activity tolerance: Fair    Discharge recommendations:       Equipment recommendations:       GOALS:   Multidisciplinary Problems     Physical Therapy Goals        Problem: Physical Therapy Goal    Goal Priority Disciplines Outcome Goal Variances Interventions   Physical Therapy Goal     PT, PT/OT Ongoing (interventions implemented as appropriate)     Description:  Goals to be met by: 2019    Patient will increase functional independence with mobility by performin. Supine to sit with Stand-by Assistance  2. Sit to supine with Stand-by Assistance  3. Sit to stand transfer with Stand-by Assistance  4. Bed to chair transfer with Supervision using Rolling Walker  5. Gait  x 150 feet with Supervision using Rolling Walker.   6. Wheelchair propulsion x150 feet with Modified [place] using bilateral uppper extremities                      PLAN:    Patient to be seen daily  to address the above listed problems via gait training, therapeutic activities, therapeutic exercises, therapeutic groups, neuromuscular re-education, wheelchair management/training  Plan of Care expires: 19  Plan of Care reviewed with: patient         Mara Roy, PT 2019

## 2019-08-27 NOTE — PLAN OF CARE
Problem: Adult Inpatient Plan of Care  Goal: Plan of Care Review  Alert, oriented, appetite is fair to good, fall prevention ongoing bed and chair alarm in use at all times. incont of urine 90% of time. Max assist with ilir care. bp and bs management ongoing

## 2019-08-27 NOTE — PLAN OF CARE
Problem: Physical Therapy Goal  Goal: Physical Therapy Goal  Goals to be met by: 2019    Patient will increase functional independence with mobility by performin. Supine to sit with Stand-by Assistance  2. Sit to supine with Stand-by Assistance  3. Sit to stand transfer with Stand-by Assistance  4. Bed to chair transfer with Supervision using Rolling Walker  5. Gait  x 150 feet with Supervision using Rolling Walker.   6. Wheelchair propulsion x150 feet with Modified [place] using bilateral uppper extremities     Outcome: Ongoing (interventions implemented as appropriate)    PT for there ex, gait, activity and w/c.

## 2019-08-27 NOTE — PROGRESS NOTES
"REHAB FOLLOWUP NOTE    Deonna Montero is a 78 y.o. female patient.    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    History:  78-year-old  right-hand-dominant female was initially admitted to [facility] with left-sided CVA with right hemiparesis on 07/23/2019 was hospitalized till 07/26/2019 for neuro workup including CT scan showed no acute changes, carotids without any significant stenosis, MRI consistent with left pontine as well as occipital ischemic infarcts.  Patient has been medically stabilized and transferred to swing bed with steady improvement and is now transferred down to us for further rehabilitation.    Past Medical History:   Diagnosis Date    Back pain     Diabetes mellitus     Hypertension     Reflux     Short-term memory loss     Stroke      Temp: 96.8 °F (36 °C) (08/27/19 0712)  Pulse: 70 (08/27/19 0712)  Resp: 17 (08/27/19 0712)  BP: (!) 150/66 (08/27/19 0712)  SpO2: 97 % (08/27/19 0712)  Weight: 96.2 kg (212 lb 1.3 oz) (08/15/19 1334)  Height: 5' 8" (172.7 cm) (08/15/19 1339)    Lab Results   Component Value Date    WBC 3.62 (L) 08/21/2019    HGB 13.8 08/21/2019    HCT 42.8 08/21/2019     08/21/2019    ALT 35 08/21/2019    AST 32 08/21/2019     08/21/2019    K 4.7 08/21/2019     08/21/2019    CREATININE 1.0 08/21/2019    BUN 22 08/21/2019    CO2 27 08/21/2019    INR 1.1 04/19/2018    HGBA1C 8.9 (H) 08/15/2019     Physical Examination:  Alert, oriented x3, follows commands well.  Feels better.Had a good night rest.  Lungs are clear to auscultation.  Heart with regular rate and rhythm.  Extremities without any edema or calf tenderness noted.  Blood pressure and blood sugars are well controlled.  Steady progress with therapy.  Continent of bladder and bowel.    Impression:  Status post left-sided CVA with right hemiparesis.  Hypertension.  Insulin-dependent diabetes " mellitus.  Hyperlipidemia.  Degenerative joint disease.  Left eye enucleation.    Recommendations:  Continue current PT, OT, speech therapy and nursing care.          Nicky Diamond MD  8/27/2019

## 2019-08-28 LAB
POCT GLUCOSE: 108 MG/DL (ref 70–110)
POCT GLUCOSE: 155 MG/DL (ref 70–110)
POCT GLUCOSE: 165 MG/DL (ref 70–110)

## 2019-08-28 PROCEDURE — 97116 GAIT TRAINING THERAPY: CPT

## 2019-08-28 PROCEDURE — 25000003 PHARM REV CODE 250: Performed by: PHYSICAL MEDICINE & REHABILITATION

## 2019-08-28 PROCEDURE — 63600175 PHARM REV CODE 636 W HCPCS: Performed by: PHYSICAL MEDICINE & REHABILITATION

## 2019-08-28 PROCEDURE — 97535 SELF CARE MNGMENT TRAINING: CPT

## 2019-08-28 PROCEDURE — 97530 THERAPEUTIC ACTIVITIES: CPT

## 2019-08-28 PROCEDURE — 92526 ORAL FUNCTION THERAPY: CPT

## 2019-08-28 PROCEDURE — 97110 THERAPEUTIC EXERCISES: CPT

## 2019-08-28 PROCEDURE — 12800000 HC REHAB SEMI-PRIVATE ROOM

## 2019-08-28 RX ADMIN — AMLODIPINE BESYLATE 10 MG: 5 TABLET ORAL at 09:08

## 2019-08-28 RX ADMIN — BRIMONIDINE TARTRATE AND TIMOLOL MALEATE 1 DROP: 2; 5 SOLUTION OPHTHALMIC at 08:08

## 2019-08-28 RX ADMIN — BRIMONIDINE TARTRATE AND TIMOLOL MALEATE 1 DROP: 2; 5 SOLUTION OPHTHALMIC at 09:08

## 2019-08-28 RX ADMIN — ACETAMINOPHEN 650 MG: 325 TABLET ORAL at 10:08

## 2019-08-28 RX ADMIN — DONEPEZIL HYDROCHLORIDE 10 MG: 5 TABLET, FILM COATED ORAL at 08:08

## 2019-08-28 RX ADMIN — LISINOPRIL 10 MG: 10 TABLET ORAL at 08:08

## 2019-08-28 RX ADMIN — PANTOPRAZOLE SODIUM 40 MG: 40 TABLET, DELAYED RELEASE ORAL at 09:08

## 2019-08-28 RX ADMIN — LINACLOTIDE 145 MCG: 145 CAPSULE, GELATIN COATED ORAL at 09:08

## 2019-08-28 RX ADMIN — TRAVOPROST 1 DROP: 0.04 SOLUTION/ DROPS OPHTHALMIC at 08:08

## 2019-08-28 RX ADMIN — ASPIRIN 81 MG CHEWABLE TABLET 81 MG: 81 TABLET CHEWABLE at 09:08

## 2019-08-28 RX ADMIN — ENOXAPARIN SODIUM 40 MG: 100 INJECTION SUBCUTANEOUS at 04:08

## 2019-08-28 RX ADMIN — METFORMIN HYDROCHLORIDE 500 MG: 500 TABLET ORAL at 04:08

## 2019-08-28 RX ADMIN — AMITRIPTYLINE HYDROCHLORIDE 10 MG: 10 TABLET, FILM COATED ORAL at 08:08

## 2019-08-28 RX ADMIN — RAMELTEON 8 MG: 8 TABLET, FILM COATED ORAL at 10:08

## 2019-08-28 RX ADMIN — HUMAN INSULIN 20 UNITS: 100 INJECTION, SUSPENSION SUBCUTANEOUS at 04:08

## 2019-08-28 RX ADMIN — ACETAMINOPHEN 650 MG: 325 TABLET ORAL at 08:08

## 2019-08-28 RX ADMIN — METFORMIN HYDROCHLORIDE 500 MG: 500 TABLET ORAL at 09:08

## 2019-08-28 RX ADMIN — METOPROLOL SUCCINATE 50 MG: 50 TABLET, EXTENDED RELEASE ORAL at 09:08

## 2019-08-28 NOTE — PT/OT/SLP PROGRESS
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     OT Date of Treatment: 08/28/19   Total Time (min): 75 min      Billable Minutes:  Self Care/Home Management 45 and Therapeutic Activity 30  Total Minutes: 75    General Precautions: Standard, aspiration, diabetic, fall, vision impaired(Left prosthetic eye.)  Orthopedic Precautions: N/A  Braces: N/A         Subjective:  Communicated with nurse prior to session, during and after session.  Pt stated yesterday that she had difficulty in managing her utensil for self feeding.      Pain Rating 1: 0/10                   Objective:  Patient found with: (w/c alarm)      Feeding:  Pt's meal was presented to her on the tray.  She received assistance to open the nutritional drink.  She attempted, but was unable to open it.  She attempted to open her pudding and was unable to do so with bilateral hands alone.  She then engaged use of a spoon and with increased time and effort was able to open the container.   Pt assessed in ability to feed herself.  She held the spoon with an overhand method and this resulted in not optimal alignment with the mouth. She also maintained support on her elbow during feeding.  She was instructed in using a lateral tripod pinch and to lift her elbow from the armrest with each bite to bring the bowl of the spoon into alignment with her mouth.  Pt also required instruction and re-instruction to follow safety rules of bite size, chewing and swallowing prior to next bite at times and chin tuck with swallow.      Additional Treatment:  Pt provided education regarding the position of head in neutral upright position to remove food from the utensil, chin tuck position to swallow food and taking small bites.  CGA provided to give tactile input as needed to adhere to safety precautions.  Pt's  upper denture noted to be loose while she was eating.  She was asked if she had adhesive in them.  She said no stating that the adhesive made her saliva harder to manage and she was able to function better without it.      Patient left up in chair with call button in reach, chair alarm on and nurse notified.    ASSESSMENT:  Deonna Montero experienced coughing which she was able to clear and resume eating several times.  Pt at one time moved her neck into hyperextension after taking a drink of liquid.  She was immediately instructed to flex her head forward to a neutral position.  She reported that she felt more comfortable with swallowing with her head in hyperextended position.  She was told that hyperextension was an unsafe position and she attempted 2 times again to do so during the session and required re-instruction and tactile assist to not perform the movement.  Her bite sizes were usually appropriate but she did attempt a second bite prior to chewing and swallowing the first bite and required re-instruction to complete one bite prior to the next a couple of times.  Pt experienced a severe choking episode during the session with her having difficulty in coughing, turning very red in the face, becoming still and not speaking,  clear fluid running from nose.  OTR immediately called for help while instructing pt to cough and OTR provided 2 frontal abdominal thrusts and positioned self for heimlich maneuver and pt began to cough and expelled small pieces of food and saliva without manuever being performed.  After pt was able to talk, her loose upper denture was removed by OTR.   After pt returned to pre choking status, OTR offered to put in adhesive in a minimal amount and pt declined to have it placed onto her denture.  Also following the episode, pt expressed dismay that the episode occurred the day before her staffing meeting and she was upset that the episode would delay her discharge.  Pt also  "expressed dislike that OTR had called in other people to "see me in that condition and take my teeth out in front of them."  Pt was assured that OTR called for help as pt was in a potentially serious condition and that medical assistance may be needed.  She was assured that  her safety is always the number one factor in determining all actions, recommendations and plans.  OTR consulted with speech therapist and nursing following the episode.  OTR recommended to nursing that pt be supervised in the dining room for all meals.  OTR also reported to nurse that pt states she extends her head back when she is in bed at times to self manage her saliva.  It was recommended that pt have the head of the bed raised whenever in the bed.    Rehab potential is good    Activity tolerance: Fair today for eating the lunch meal.    Discharge recommendations: home, home health OT     Equipment recommendations: (TBD)     GOALS:   Multidisciplinary Problems     Occupational Therapy Goals        Problem: Occupational Therapy Goal    Goal Priority Disciplines Outcome Interventions   Occupational Therapy Goal     OT, PT/OT Ongoing (interventions implemented as appropriate)    Description:  Goals to be met by:    Patient will increase functional independence with ADLs by performing:    Feeding with Modified [place].  UE Dressing with Set-up Assistance.  LE Dressing with Stand-by Assistance with set up of clothes.  Grooming while seated with Modified [place].  Toileting from bedside commode over the toilet with Supervision for hygiene and clothing management.   Toilet transfer to bedside commode over the toilet with Contact Guard Assistance.  Bathing from  shower chair/bench with Minimal Assistanc.  Shower transfer to TTB with CGA and verbal instructions.                    Plan:  Patient to be seen 6 x/week to address the above listed problems via self-care/home management, community/work re-entry, therapeutic activities, " therapeutic exercises, therapeutic groups, neuromuscular re-education, wheelchair management/training  Plan of Care expires: 09/05/19  Plan of Care reviewed with: patient         Sonya Alfonso, OT  08/28/2019

## 2019-08-28 NOTE — PROGRESS NOTES
[facility]  Physical Medicine & Rehab  Progress Note    Patient Name: Deonna Montero  MRN: [id number]  Patient Class: IP- Rehab   Admission Date: 8/14/2019  Length of Stay: 14 days  Attending Physician: Nicky Diamond MD    Subjective:     Principal Problem:  CVA   Interval History: 8/28/2019:  Patient is seen for follow-up rehab evaluation and recommendations: pt is 78 y.o female with dx of CVA, participating with therapy    HPI, Past Medical, Family, and Social History remains the same as documented in the initial encounter.    Scheduled Medications:    amitriptyline  10 mg Oral QHS    amLODIPine  10 mg Oral Daily    aspirin  81 mg Oral Daily    brimonidine-timolol  1 drop Right Eye BID    donepezil  10 mg Oral QHS    enoxaparin  40 mg Subcutaneous Daily    insulin NPH  20 Units Subcutaneous BID AC    linaCLOtide  145 mcg Oral Daily    lisinopril  10 mg Oral QHS    metformin  500 mg Oral BID WM    metoprolol succinate  50 mg Oral Daily    pantoprazole  40 mg Oral Daily    travoprost  1 drop Both Eyes QHS       PRN Medications: acetaminophen, cloNIDine, dextrose 50%, dextrose 50%, glucagon (human recombinant), glucose, glucose, insulin aspart U-100, lactulose, ondansetron, polyethylene glycol, ramelteon    Review of Systems   Constitutional: Negative.    HENT: Negative.    Eyes: Negative.    Respiratory: Negative.    Cardiovascular: Negative.    Gastrointestinal: Negative.    Endocrine: Negative.    Genitourinary: Negative.    Musculoskeletal: Negative.    Skin: Negative.    Allergic/Immunologic: Negative.    Neurological: Negative.    Hematological: Negative.    Psychiatric/Behavioral: Negative.      Objective:     Vital Signs (Most Recent):  Temp: 97 °F (36.1 °C) (08/28/19 0721)  Pulse: 71 (08/28/19 0721)  Resp: 17 (08/28/19 0721)  BP: 139/64 (08/28/19 0721)  SpO2: 96 % (08/28/19 0721)    Vital Signs (24h Range):  Temp:  [97 °F (36.1 °C)-98.5 °F (36.9 °C)] 97 °F (36.1  °C)  Pulse:  [71-75] 71  Resp:  [17-20] 17  SpO2:  [96 %-98 %] 96 %  BP: (134-139)/(63-64) 139/64     Physical Exam   Constitutional: She is oriented to person, place, and time. She appears well-developed and well-nourished. No distress.   HENT:   Head: Normocephalic and atraumatic.   Right Ear: External ear normal.   Left Ear: External ear normal.   Nose: Nose normal.   Mouth/Throat: Oropharynx is clear and moist. No oropharyngeal exudate.   Eyes: Pupils are equal, round, and reactive to light. Conjunctivae and EOM are normal. Right eye exhibits no discharge. Left eye exhibits no discharge. No scleral icterus.   Neck: Normal range of motion. Neck supple. No JVD present. No tracheal deviation present. No thyromegaly present.   Cardiovascular: Normal rate, regular rhythm, normal heart sounds and intact distal pulses. Exam reveals no gallop and no friction rub.   No murmur heard.  Pulmonary/Chest: Effort normal and breath sounds normal. No stridor. No respiratory distress. She has no wheezes. She has no rales. She exhibits no tenderness.   Abdominal: Soft. Bowel sounds are normal. She exhibits no distension. There is no tenderness. There is no rebound and no guarding.   Genitourinary:   Genitourinary Comments: deferred   Musculoskeletal: Normal range of motion. She exhibits no edema, tenderness or deformity.   Lymphadenopathy:     She has no cervical adenopathy.   Neurological: She is alert and oriented to person, place, and time. No cranial nerve deficit. She exhibits normal muscle tone. Coordination normal.   Skin: No rash noted. She is not diaphoretic. No erythema. No pallor.   Psychiatric: She has a normal mood and affect. Her behavior is normal.     NEUROLOGICAL EXAMINATION:     MENTAL STATUS   Oriented to person, place, and time.     CRANIAL NERVES     CN III, IV, VI   Pupils are equal, round, and reactive to light.  Extraocular motions are normal.       Assessment/Plan:      Deonna Montero is a 78 y.o.  female admitted to inpatient rehabilitation on 8/14/2019 for <principal problem not specified> with impaired mobility and ADLs. Patient remains appropriate for PT, OT, and as required Speech therapy. Patient continues to require 24 hour nursing care as well as daily Physician assessment.    Active Diagnoses:    Diagnosis Date Noted POA    CVA (cerebral vascular accident) [I63.9] 08/14/2019 Yes    Type 2 diabetes mellitus without complication [E11.9] 05/02/2018 Yes      Problems Resolved During this Admission:     CVA, cont therapy  HTN, vitals noted, cont current med  DVT prophylaxis, cont sq Lovenox  DM, accu check noted, cont current med  Anticoagulation, cont asa     DISCHARGE PLANNING:  Tentative Discharge Date:     Future Appointments   Date Time Provider Department Center   9/10/2019 12:40 PM Sanjana Zambrano MD [facility] UROLOGY Crawford Raghu Joy MD  Department of Physical Medicine & Rehab  [facility]

## 2019-08-28 NOTE — PLAN OF CARE
Problem: Adult Inpatient Plan of Care  Goal: Plan of Care Review  Alert, oriented, appetite is fair to good. bp and bs management in progress. incont of urine, max assist with ilir care. Transfers to bed one person max assist.

## 2019-08-28 NOTE — PT/OT/SLP PROGRESS
Physical Therapy         Treatment        Deonna Montero   MRN: [id number]     PT Received On: 08/28/19  Total Time (min): 75        Billable Minutes:  Gait Training 15, Therapeutic Activity 15 and Therapeutic Exercise 45  Total Minutes: 75    Treatment Type: Treatment  PT/PTA: PT     PTA Visit Number: 0       General Precautions: Standard, aspiration, fall, vision impaired  Orthopedic Precautions: Orthopedic Precautions : N/A   Braces: Braces: N/A         Subjective:  Communicated with CYNDEE Silva prior to session.    Pain/Comfort  Pain Rating 1: 8/10  Location - Orientation 1: lower  Location 1: back  Pain Addressed 1: Reposition, Distraction, Nurse notified(During activity. Rn brought Tylenol for pt)  Pain Rating Post-Intervention 1: 6/10    Objective:  Patient found sitting up in w/c in room.   Patient found with: (w/c alarm)    Functional Mobility:  Transfer Training:  Sit to stand:Minimal Assistance and Moderate Assistance with Rolling Walker & vc    Gait Training:  Gait training with Rolling Walker x 75' and 85' with Minimal Assistance for balance due to pt in non-skid socks instead of shoes.    Additional Treatment:  Pt performed B LE there ex sitting x 30 reps with 3# wt and green/blue TB, with vc for proper execution and joint protection: ap, laq, marching, hip abd/add, ham curls.    SciFit stepper x 12 min level 2 using LEs only.      Activity Tolerance:  Patient limited by fatigue    Patient left up in chair with call button in reach, chair alarm on and nurse notified.    Assessment:  Deonna Montero is a 78 y.o. female with a medical diagnosis of CVA. She presents with impaired functional mobility, balance, activity tolerance, strength, vision with gait instability. Pt with pain in back (which is pre-morbid) during gait. Pt again very somnolent during there ex and stepper performance needing occasional re-direction.      Rehab potential is fair.    Activity tolerance: Fair    Discharge  recommendations:       Equipment recommendations:       GOALS:   Multidisciplinary Problems     Physical Therapy Goals        Problem: Physical Therapy Goal    Goal Priority Disciplines Outcome Goal Variances Interventions   Physical Therapy Goal     PT, PT/OT Ongoing (interventions implemented as appropriate)     Description:  Goals to be met by: 2019    Patient will increase functional independence with mobility by performin. Supine to sit with Stand-by Assistance  2. Sit to supine with Stand-by Assistance  3. Sit to stand transfer with Stand-by Assistance  4. Bed to chair transfer with Supervision using Rolling Walker  5. Gait  x 150 feet with Supervision using Rolling Walker.   6. Wheelchair propulsion x150 feet with Modified [place] using bilateral uppper extremities                      PLAN:    Patient to be seen daily  to address the above listed problems via gait training, therapeutic activities, therapeutic exercises, therapeutic groups, neuromuscular re-education, wheelchair management/training  Plan of Care expires: 19  Plan of Care reviewed with: patient         Mara Kirill, PT 2019

## 2019-08-28 NOTE — PLAN OF CARE
Problem: Physical Therapy Goal  Goal: Physical Therapy Goal  Goals to be met by: 2019    Patient will increase functional independence with mobility by performin. Supine to sit with Stand-by Assistance  2. Sit to supine with Stand-by Assistance  3. Sit to stand transfer with Stand-by Assistance  4. Bed to chair transfer with Supervision using Rolling Walker  5. Gait  x 150 feet with Supervision using Rolling Walker.   6. Wheelchair propulsion x150 feet with Modified [place] using bilateral uppper extremities     Outcome: Ongoing (interventions implemented as appropriate)    PT for there ex, gait and activity.

## 2019-08-28 NOTE — PLAN OF CARE
Problem: Adult Inpatient Plan of Care  Goal: Plan of Care Review  Outcome: Ongoing (interventions implemented as appropriate)  Patient awake/alert. Incontinent of bladder. Generalized weakness noted. Free from falls. Plan of care continued.

## 2019-08-28 NOTE — PLAN OF CARE
Problem: Occupational Therapy Goal  Goal: Occupational Therapy Goal  Goals to be met by:    Patient will increase functional independence with ADLs by performing:    Feeding with Modified [place].  UE Dressing with Set-up Assistance.  LE Dressing with Stand-by Assistance with set up of clothes.  Grooming while seated with Modified [place].  Toileting from bedside commode over the toilet with Supervision for hygiene and clothing management.   Toilet transfer to bedside commode over the toilet with Contact Guard Assistance.  Bathing from  shower chair/bench with Minimal Assistanc.  Shower transfer to TTB with CGA and verbal instructions.   Outcome: Ongoing (interventions implemented as appropriate)  OT tx for self care and pt education.

## 2019-08-28 NOTE — NURSING
Coughing and choking episode on solid food at lunch. Instructed the need to change consistency to puree and patient agreed, diet changed

## 2019-08-29 LAB
ALBUMIN SERPL BCP-MCNC: 3.2 G/DL (ref 3.5–5.2)
ALP SERPL-CCNC: 102 U/L (ref 55–135)
ALT SERPL W/O P-5'-P-CCNC: 29 U/L (ref 10–44)
ANION GAP SERPL CALC-SCNC: 9 MMOL/L (ref 8–16)
AST SERPL-CCNC: 22 U/L (ref 10–40)
BASOPHILS # BLD AUTO: 0.02 K/UL (ref 0–0.2)
BASOPHILS NFR BLD: 0.7 % (ref 0–1.9)
BILIRUB SERPL-MCNC: 0.3 MG/DL (ref 0.1–1)
BUN SERPL-MCNC: 21 MG/DL (ref 8–23)
CALCIUM SERPL-MCNC: 9.3 MG/DL (ref 8.7–10.5)
CHLORIDE SERPL-SCNC: 103 MMOL/L (ref 95–110)
CO2 SERPL-SCNC: 29 MMOL/L (ref 23–29)
CREAT SERPL-MCNC: 0.8 MG/DL (ref 0.5–1.4)
DIFFERENTIAL METHOD: ABNORMAL
EOSINOPHIL # BLD AUTO: 0.1 K/UL (ref 0–0.5)
EOSINOPHIL NFR BLD: 2.8 % (ref 0–8)
ERYTHROCYTE [DISTWIDTH] IN BLOOD BY AUTOMATED COUNT: 13.6 % (ref 11.5–14.5)
EST. GFR  (AFRICAN AMERICAN): >60 ML/MIN/1.73 M^2
EST. GFR  (NON AFRICAN AMERICAN): >60 ML/MIN/1.73 M^2
GLUCOSE SERPL-MCNC: 148 MG/DL (ref 70–110)
HCT VFR BLD AUTO: 39.5 % (ref 37–48.5)
HGB BLD-MCNC: 12.5 G/DL (ref 12–16)
IMM GRANULOCYTES # BLD AUTO: 0 K/UL (ref 0–0.04)
LYMPHOCYTES # BLD AUTO: 1.5 K/UL (ref 1–4.8)
LYMPHOCYTES NFR BLD: 52.2 % (ref 18–48)
MCH RBC QN AUTO: 28.7 PG (ref 27–31)
MCHC RBC AUTO-ENTMCNC: 31.6 G/DL (ref 32–36)
MCV RBC AUTO: 91 FL (ref 82–98)
MONOCYTES # BLD AUTO: 0.3 K/UL (ref 0.3–1)
MONOCYTES NFR BLD: 10.7 % (ref 4–15)
NEUTROPHILS # BLD AUTO: 1 K/UL (ref 1.8–7.7)
NEUTROPHILS NFR BLD: 33.6 % (ref 38–73)
NRBC BLD-RTO: 0 /100 WBC
PLATELET # BLD AUTO: 174 K/UL (ref 150–350)
PMV BLD AUTO: 11.7 FL (ref 9.2–12.9)
POCT GLUCOSE: 144 MG/DL (ref 70–110)
POCT GLUCOSE: 153 MG/DL (ref 70–110)
POTASSIUM SERPL-SCNC: 4.2 MMOL/L (ref 3.5–5.1)
PROT SERPL-MCNC: 6.8 G/DL (ref 6–8.4)
RBC # BLD AUTO: 4.35 M/UL (ref 4–5.4)
SODIUM SERPL-SCNC: 141 MMOL/L (ref 136–145)
WBC # BLD AUTO: 2.89 K/UL (ref 3.9–12.7)

## 2019-08-29 PROCEDURE — 97110 THERAPEUTIC EXERCISES: CPT

## 2019-08-29 PROCEDURE — 92507 TX SP LANG VOICE COMM INDIV: CPT

## 2019-08-29 PROCEDURE — 97116 GAIT TRAINING THERAPY: CPT

## 2019-08-29 PROCEDURE — 80053 COMPREHEN METABOLIC PANEL: CPT

## 2019-08-29 PROCEDURE — 97535 SELF CARE MNGMENT TRAINING: CPT

## 2019-08-29 PROCEDURE — 25000003 PHARM REV CODE 250: Performed by: PHYSICAL MEDICINE & REHABILITATION

## 2019-08-29 PROCEDURE — 94799 UNLISTED PULMONARY SVC/PX: CPT

## 2019-08-29 PROCEDURE — 63600175 PHARM REV CODE 636 W HCPCS: Performed by: PHYSICAL MEDICINE & REHABILITATION

## 2019-08-29 PROCEDURE — 85025 COMPLETE CBC W/AUTO DIFF WBC: CPT

## 2019-08-29 PROCEDURE — 36415 COLL VENOUS BLD VENIPUNCTURE: CPT

## 2019-08-29 PROCEDURE — 25000242 PHARM REV CODE 250 ALT 637 W/ HCPCS: Performed by: PHYSICAL MEDICINE & REHABILITATION

## 2019-08-29 PROCEDURE — 92526 ORAL FUNCTION THERAPY: CPT

## 2019-08-29 PROCEDURE — 94640 AIRWAY INHALATION TREATMENT: CPT

## 2019-08-29 PROCEDURE — 97530 THERAPEUTIC ACTIVITIES: CPT

## 2019-08-29 PROCEDURE — 99231 SBSQ HOSP IP/OBS SF/LOW 25: CPT | Mod: ,,, | Performed by: PHYSICAL MEDICINE & REHABILITATION

## 2019-08-29 PROCEDURE — 12800000 HC REHAB SEMI-PRIVATE ROOM

## 2019-08-29 PROCEDURE — 99231 PR SUBSEQUENT HOSPITAL CARE,LEVL I: ICD-10-PCS | Mod: ,,, | Performed by: PHYSICAL MEDICINE & REHABILITATION

## 2019-08-29 PROCEDURE — 94761 N-INVAS EAR/PLS OXIMETRY MLT: CPT

## 2019-08-29 RX ORDER — GUAIFENESIN 600 MG/1
600 TABLET, EXTENDED RELEASE ORAL 2 TIMES DAILY
Status: DISCONTINUED | OUTPATIENT
Start: 2019-08-29 | End: 2019-09-03

## 2019-08-29 RX ORDER — IPRATROPIUM BROMIDE AND ALBUTEROL SULFATE 2.5; .5 MG/3ML; MG/3ML
3 SOLUTION RESPIRATORY (INHALATION) EVERY 6 HOURS
Status: DISCONTINUED | OUTPATIENT
Start: 2019-08-29 | End: 2019-09-02

## 2019-08-29 RX ORDER — GUAIFENESIN 100 MG/5ML
200 SOLUTION ORAL EVERY 4 HOURS PRN
Status: DISCONTINUED | OUTPATIENT
Start: 2019-08-29 | End: 2019-08-29

## 2019-08-29 RX ORDER — MELOXICAM 7.5 MG/1
7.5 TABLET ORAL DAILY
Status: DISCONTINUED | OUTPATIENT
Start: 2019-08-29 | End: 2019-09-05 | Stop reason: HOSPADM

## 2019-08-29 RX ADMIN — LINACLOTIDE 145 MCG: 145 CAPSULE, GELATIN COATED ORAL at 08:08

## 2019-08-29 RX ADMIN — METFORMIN HYDROCHLORIDE 500 MG: 500 TABLET ORAL at 08:08

## 2019-08-29 RX ADMIN — DONEPEZIL HYDROCHLORIDE 10 MG: 5 TABLET, FILM COATED ORAL at 08:08

## 2019-08-29 RX ADMIN — METOPROLOL SUCCINATE 50 MG: 50 TABLET, EXTENDED RELEASE ORAL at 08:08

## 2019-08-29 RX ADMIN — GUAIFENESIN 600 MG: 600 TABLET, EXTENDED RELEASE ORAL at 08:08

## 2019-08-29 RX ADMIN — IPRATROPIUM BROMIDE AND ALBUTEROL SULFATE 3 ML: .5; 3 SOLUTION RESPIRATORY (INHALATION) at 12:08

## 2019-08-29 RX ADMIN — HUMAN INSULIN 20 UNITS: 100 INJECTION, SUSPENSION SUBCUTANEOUS at 04:08

## 2019-08-29 RX ADMIN — TRAVOPROST 1 DROP: 0.04 SOLUTION/ DROPS OPHTHALMIC at 08:08

## 2019-08-29 RX ADMIN — IPRATROPIUM BROMIDE AND ALBUTEROL SULFATE 3 ML: .5; 3 SOLUTION RESPIRATORY (INHALATION) at 06:08

## 2019-08-29 RX ADMIN — BRIMONIDINE TARTRATE AND TIMOLOL MALEATE 1 DROP: 2; 5 SOLUTION OPHTHALMIC at 08:08

## 2019-08-29 RX ADMIN — ENOXAPARIN SODIUM 40 MG: 100 INJECTION SUBCUTANEOUS at 04:08

## 2019-08-29 RX ADMIN — ASPIRIN 81 MG CHEWABLE TABLET 81 MG: 81 TABLET CHEWABLE at 08:08

## 2019-08-29 RX ADMIN — PANTOPRAZOLE SODIUM 40 MG: 40 TABLET, DELAYED RELEASE ORAL at 08:08

## 2019-08-29 RX ADMIN — LISINOPRIL 10 MG: 10 TABLET ORAL at 08:08

## 2019-08-29 RX ADMIN — HUMAN INSULIN 20 UNITS: 100 INJECTION, SUSPENSION SUBCUTANEOUS at 06:08

## 2019-08-29 RX ADMIN — MELOXICAM 7.5 MG: 7.5 TABLET ORAL at 04:08

## 2019-08-29 RX ADMIN — BRIMONIDINE TARTRATE AND TIMOLOL MALEATE 1 DROP: 2; 5 SOLUTION OPHTHALMIC at 09:08

## 2019-08-29 RX ADMIN — METFORMIN HYDROCHLORIDE 500 MG: 500 TABLET ORAL at 04:08

## 2019-08-29 RX ADMIN — AMLODIPINE BESYLATE 10 MG: 5 TABLET ORAL at 08:08

## 2019-08-29 NOTE — PLAN OF CARE
Problem: Occupational Therapy Goal  Goal: Occupational Therapy Goal  Goals to be met by:    Patient will increase functional independence with ADLs by performing:    Feeding with Modified [place].  UE Dressing with Set-up Assistance.  LE Dressing with Stand-by Assistance with set up of clothes.  Grooming while seated with Modified [place].  Toileting from bedside commode over the toilet with Supervision for hygiene and clothing management.   Toilet transfer to bedside commode over the toilet with Contact Guard Assistance.  Bathing from  shower chair/bench with Minimal Assistanc.  Shower transfer to TTB with CGA and verbal instructions.   Outcome: Ongoing (interventions implemented as appropriate)  OT tx for self care, functional mobility and safety.

## 2019-08-29 NOTE — PLAN OF CARE
Problem: Physical Therapy Goal  Goal: Physical Therapy Goal  Goals to be met by: 2019    Patient will increase functional independence with mobility by performin. Supine to sit with Stand-by Assistance  2. Sit to supine with Stand-by Assistance  3. Sit to stand transfer with Stand-by Assistance  4. Bed to chair transfer with Supervision using Rolling Walker  5. Gait  x 150 feet with Supervision using Rolling Walker.   6. Wheelchair propulsion x150 feet with Modified [place] using bilateral uppper extremities     Outcome: Ongoing (interventions implemented as appropriate)    PT for there ex, gait, activity

## 2019-08-29 NOTE — PATIENT CARE CONFERENCE
Weekly Staffing Report      Date Admitted: 8/14/2019 :   Staffing Date: 8/29/2019     Patient Active Problem List   Diagnosis    DDD (degenerative disc disease)    Postlaminectomy syndrome of lumbar region    Lumbar radiculopathy    SI (sacroiliac) joint dysfunction    DDD (degenerative disc disease), lumbar    Benign essential HTN    Constipation    Recurrent UTI    Primary osteoarthritis of hips, bilateral    Back pain of lumbar region with sciatica    Primary osteoarthritis of left hip    Type 2 diabetes mellitus without complication    Short-term memory loss    CVA (cerebral vascular accident)          Team Members Present:  Physician Team Member: Dr Diamond  Nursing Team Member: Ge Lai RN  Case Management Team Member: Teri Sloan RN  PT Team Member: Mara Roy PT  OT Team Member: Sonya aHmilton OT  SLP Team Member: Nellie VIVEROS  Other (Discipline and Name): Ayana Toth RD    Nursing  Skin: Intact  Bowel: Continent  Bladder:continent  Diet: diabetic, 1800 calorie Pureed and nector thick.  Appetite: good   Pain Level: 10/10    Physical Therapy  Supine to Sit:  minimal assist  Sit to Stand: moderate assist  Gait:  feet minimal assist Rolling walker  Wheelchair Mobility: 125-150 feet minimal assist  Stairs: N/A      Occupational Therapy  Feeding: contact guard  Grooming:standy by assistance  UED: set up  LED: moderate assist  Bathing:minimal assist   Toileting:minimal assist  Toilet Transfer:  minimal assist  Tub Transfer:  moderate assist      Speech Therapy  Swallow: Dysphagia  MMS:14/30  Memory: minimal assist  Receptive Language: supervision  Expressive Language: minimal assist  Problem solving: moderate assist            Goals:  CGA to SBA.      Tolerates 3 hours of therapy: Yes    Discharge Destination: Home with out pt.    Estimated Length of Stay: 9/5/19.

## 2019-08-29 NOTE — PLAN OF CARE
Problem: SLP Goal  Goal: SLP Goal  1. Demonstrate understanding of speech subsystems, STBY A  2. MET -- Complete controlled exhalation tasks to enhance control of respiration for speech production, MOD A   3. MET -- Coordinate initiation of phonation with exhalation to improve naturalness of speech production, MOD A  4. MET (with min-mod cues)-- Progressing from multisyllabic words to connected utterances, Pt will demonstrate control of speech subsystems in order to achieve appropriate coordination of speech subsystems for natural speech production, MAX A- pending goals 2 &3   5. ONGOING (IMPAIRED) -- Participate in assessment of functional problem solving    6. MET (zero to min cues) Demonstrate understanding of swallow safety precautions with 80% accuracy, min cues.  7. Pt will per form target oral exercises for improved labial/lingual strength for increased swallow efficiency with min cues, 70% accuracy, 5-10 reps.  8. Pt will perform articulation drills focusing on increasing strength of articulatory contacts in initial, medial, final positions of single-syllable and two-syllable words, given clinician model, 80% accuracy (for improved speech intelligibility)  9. Pt will perform target dysphagia exercises ( effortful swallows, Mendelsohn maneuver and second swallows with food/liquid); Mendelsohn maneuver, Ellyn task without food ) with model 80% accuracy, for increased swallow safety (based on results of prior modified barium swallow).    10. Tolerate puree with nectar thick liquid, swallowing purees and nectar (by cup sip only) using second swallows, chin down, without sign of aspiration 80% accuracy.   Outcome: Ongoing (interventions implemented as appropriate)  8.  Picture description task -- mild dysarthria, 100% intelligible.  10.  Intake 80%+ from puree/nectar dinner tray.  Pt treated in dining room.  Cough x 2.  MBSS tomorrow.    Comments: Alert, feeling better.

## 2019-08-29 NOTE — PROGRESS NOTES
"REHAB FOLLOWUP NOTE    Deonna Montero is a 78 y.o. female patient.    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    History:  78-year-old  right-hand-dominant female was initially admitted to [facility] with left-sided CVA with right hemiparesis on 07/23/2019 was hospitalized till 07/26/2019 for neuro workup including CT scan showed no acute changes, carotids without any significant stenosis, MRI consistent with left pontine as well as occipital ischemic infarcts.  Patient has been medically stabilized and transferred to swing bed with steady improvement and is now transferred down to us for further rehabilitation.    Past Medical History:   Diagnosis Date    Back pain     Diabetes mellitus     Hypertension     Reflux     Short-term memory loss     Stroke      Temp: 96.7 °F (35.9 °C) (08/29/19 0712)  Pulse: 71 (08/29/19 0712)  Resp: 18 (08/29/19 0712)  BP: (!) 151/95 (08/29/19 0712)  SpO2: 96 % (08/29/19 0712)  Weight: 96.2 kg (212 lb 1.3 oz) (08/15/19 1334)  Height: 5' 8" (172.7 cm) (08/15/19 1339)    Lab Results   Component Value Date    WBC 2.89 (L) 08/29/2019    HGB 12.5 08/29/2019    HCT 39.5 08/29/2019     08/29/2019    ALT 29 08/29/2019    AST 22 08/29/2019     08/29/2019    K 4.2 08/29/2019     08/29/2019    CREATININE 0.8 08/29/2019    BUN 21 08/29/2019    CO2 29 08/29/2019    INR 1.1 04/19/2018    HGBA1C 8.9 (H) 08/15/2019     Physical Examination:  Alert, oriented x3, follows commands well.  Feels better.Had a good night rest.  Had an episode of bad cough and choking yesterday.  Lungs are clear to auscultation.  Heart with regular rate and rhythm.  Extremities without any edema or calf tenderness noted.  Blood pressure and blood sugars are well controlled.  Steady progress with therapy.  Continent of bladder and bowel.  Will order a chest x-ray, respiratory treatments and " incentive spirometer.  Labs reviewed.    Impression:  Status post left-sided CVA with right hemiparesis.  Hypertension.  Insulin-dependent diabetes mellitus.  Hyperlipidemia.  Degenerative joint disease.  Left eye enucleation.    Recommendations:  Continue current PT, OT, speech therapy and nursing care.          Nicky Diamond MD  8/29/2019

## 2019-08-29 NOTE — CARE UPDATE
08/29/19 1226   Patient Assessment/Suction   Level of Consciousness (AVPU) alert   Respiratory Effort Normal;Unlabored   All Lung Fields Breath Sounds clear   PRE-TX-O2   O2 Device (Oxygen Therapy) room air   SpO2 97 %   Pulse Oximetry Type Intermittent   Pulse 75   Resp 18   Aerosol Therapy   $ Aerosol Therapy Charges Aerosol Treatment   Respiratory Treatment Status (SVN) given   Treatment Route (SVN) mask   Patient Position (SVN) sitting in chair   Post Treatment Assessment (SVN) breath sounds unchanged   Signs of Intolerance (SVN) none   Breath Sounds Post-Respiratory Treatment   Throughout All Fields Post-Treatment All Fields   Throughout All Fields Post-Treatment no change   Post-treatment Heart Rate (beats/min) 77   Post-treatment Resp Rate (breaths/min) 18   Incentive Spirometer   $ Incentive Spirometer Charges done with encouragement   Incentive Spirometer Predicted Level (mL) 750   Administration (IS) instruction provided, initial   Number of Repetitions (IS) 10   Level Incentive Spirometer (mL) 750   Patient Tolerance (IS) good

## 2019-08-29 NOTE — PLAN OF CARE
Problem: SLP Goal  Goal: SLP Goal  1. Demonstrate understanding of speech subsystems, STBY A  2. MET -- Complete controlled exhalation tasks to enhance control of respiration for speech production, MOD A   3. MET -- Coordinate initiation of phonation with exhalation to improve naturalness of speech production, MOD A  4. MET (with min-mod cues)-- Progressing from multisyllabic words to connected utterances, Pt will demonstrate control of speech subsystems in order to achieve appropriate coordination of speech subsystems for natural speech production, MAX A- pending goals 2 &3   5. ONGOING (IMPAIRED) -- Participate in assessment of functional problem solving    6. MET (zero to min cues) Demonstrate understanding of swallow safety precautions with 80% accuracy, min cues.  7. Pt will per form target oral exercises for improved labial/lingual strength for increased swallow efficiency with min cues, 70% accuracy, 5-10 reps.  8. Pt will perform articulation drills focusing on increasing strength of articulatory contacts in initial, medial, final positions of single-syllable and two-syllable words, given clinician model, 80% accuracy (for improved speech intelligibility)  9. Pt will perform target dysphagia exercises ( effortful swallows, Mendelsohn maneuver and second swallows with food/liquid); Mendelsohn maneuver, Ellyn task without food ) with model 80% accuracy, for increased swallow safety (based on results of prior modified barium swallow).    10. Tolerate puree with nectar thick liquid, swallowing purees and nectar (by cup sip only) using second swallows, chin down, without sign of aspiration 80% accuracy.  Outcome: Ongoing (interventions implemented as appropriate)  Diet downgraded to puree and nectar thick liquid today after reports of increased difficulty including choking event reported by OT during lunch.  Clinician worked with pt at dinner this evening -- increased tolerance of puree/nectar thick  liquid.    Comments: Initially refusing return to puree consistency; accepting of puree consistency by evening.

## 2019-08-29 NOTE — PLAN OF CARE
Problem: Adult Inpatient Plan of Care  Goal: Plan of Care Review  Alert, oriented, appetite is good. satinder pureed diet with nectar liquids, no coughing noted. incont of urine at times.. Mod assist with transfers at times.

## 2019-08-29 NOTE — PLAN OF CARE
Problem: Adult Inpatient Plan of Care  Goal: Plan of Care Review  Outcome: Ongoing (interventions implemented as appropriate)  Pain managed to bilateral lower back with PRN Tylenol x 1.  Med crushed and placed in Boost pudding.  Takes small sips of nectar thick H2O after med administration and performs chin/ tuck.  Tolerated well.  Requested to use bathroom before going to bed.  Pt min-mod assist transfer to toilet.  Voided continently.  Brief adjusted and pericare per pt.  Instructed to call if needing assistance to BR during the night.  Incontinent while asleep.  Brief changed/perineum cleansed.  Requested/adminstered Rozerem @ 2200.  Slept well during the night.

## 2019-08-29 NOTE — PT/OT/SLP PROGRESS
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     OT Date of Treatment: 08/29/19   Total Time (min): 90 min      Billable Minutes:  Self Care/Home Management 90  Total Minutes: 90    General Precautions: Standard, aspiration, diabetic, fall, vision impaired(Left prosthetic eye.)  Orthopedic Precautions: N/A  Braces: N/A         Subjective:  Communicated with nurse prior to session.  Pt's status reported and discussed in weekly team staffing.  Discharge date continues at 09/05/2019.  Nurse and  informed of pt's reaction to use of aerosol deodorant.    Pain Rating 1: 0/10                   Objective:  Patient found with: (w/c alarm)    Functional Mobility:  Transfer Training:  W/c<>TTB in shower with Agnes with verbal step by step instructions and use of wall bar.    Bathing:  Agnes with use of the wall bar for standing and for self support in leaning forward.  Pt sat on TTB with cutout and used LHS and extended shower hose.    UE Dressing:  Set up in sitting.    LE Dressing:  Pt required modA for standing balance as she wanted to pull her clothing up using Joni UEs.  In sitting she started pull up brief and pants over her feet with use of reacher and was able to pull them to above the knees with SBA.  She donned slip resistant socks with sock cone with set up due to time restraints.    Balance:   Static Sit: SBA /Supervision sitting supported.  Dynamic Sit:SBA   Static Stand: Agnes with use of wall bar and with RW.  Dynamic stand: ModA with engagement of joni. UEs in functional task performance.        Additional Treatment:  .Pt instructed in safe sit<>stand sequences for body position and hand placement sequence.  She required tactile guidance to flex forward and instruction to look toward her heels when pushing up to stand and returning to sit  for increased body control.  Re-instruction in use of the reacher for optimal placement on the garment required.  Pt used spray deodorant which elicited a coughing episode.          Patient left up in chair with call button in reach, chair alarm on, nurse notified and a friend was present.  Pt and her friend instructed that pt should flex her head to swallow after taking a swallow of thickened liquid and to not speak while drinking or talking.  They were both instructed to use the call button for nurse if needed for any reason.    ASSESSMENT:  Deonna Montero experienced a coughing episode with redness in the face and watering of the eye and increased saliva.  She was able to self manage the cough within a few seconds and had a couple of more to lesser degree.    Rehab potential is good    Activity tolerance: Good    Discharge recommendations: home, outpatient OT     Equipment recommendations: (TBD)     GOALS:   Multidisciplinary Problems     Occupational Therapy Goals        Problem: Occupational Therapy Goal    Goal Priority Disciplines Outcome Interventions   Occupational Therapy Goal     OT, PT/OT Ongoing (interventions implemented as appropriate)    Description:  Goals to be met by:    Patient will increase functional independence with ADLs by performing:    Feeding with Modified [place].  UE Dressing with Set-up Assistance.  LE Dressing with Stand-by Assistance with set up of clothes.  Grooming while seated with Modified [place].  Toileting from bedside commode over the toilet with Supervision for hygiene and clothing management.   Toilet transfer to bedside commode over the toilet with Contact Guard Assistance.  Bathing from  shower chair/bench with Minimal Assistanc.  Shower transfer to TTB with CGA and verbal instructions.                    Plan:  Patient to be seen 6 x/week to address the above listed problems via self-care/home management, community/work re-entry, therapeutic  activities, therapeutic exercises, therapeutic groups, neuromuscular re-education, wheelchair management/training  Plan of Care expires: 09/05/19  Plan of Care reviewed with: patient         Sonya Hamilton, OT  08/29/2019

## 2019-08-29 NOTE — PT/OT/SLP PROGRESS
Physical Therapy         Treatment        Deonna Montero   MRN: [id number]     PT Received On: 08/29/19  Total Time (min): 65        Billable Minutes:  Gait Training 15, Therapeutic Activity 15 and Therapeutic Exercise 35  Total Minutes: 65    Treatment Type: Treatment  PT/PTA: PT     PTA Visit Number: 0       General Precautions: Standard, aspiration, diabetic, fall, vision impaired  Orthopedic Precautions: Orthopedic Precautions : N/A   Braces: Braces: N/A         Subjective:  Communicated with nurse prior to session.    Pain/Comfort  Pain Rating 1: 10/10(during gait)  Location - Orientation 1: lower  Location 1: back  Pain Addressed 1: Distraction, Reposition, Cessation of Activity  Pain Rating Post-Intervention 1: 0/10    Objective:  Patient found sitting up in w/c in room.   Patient found with: (w/c alarm)    Functional Mobility:  Transfer Training:  Sit to stand:Minimal Assistance and Moderate Assistance with Rolling Walker & vc     Gait Training:  Gait training with Rolling Walker x 2 trials of 65 feet with Minimal Assistance for balance due to pt in non-skid socks instead of shoes.     Additional Treatment:  Pt performed B LE there ex sitting x 30 reps with 3# wt and green/blue TB, with vc for proper execution and joint protection: ap, laq, marching, hip abd/add, ham curls.     SciFit stepper x 12 min level 2 using LEs only.     Activity Tolerance:  Patient limited by fatigue    Patient left up in chair with call button in reach, chair alarm on and nurse notified.    Assessment:  Deonna Montero is a 78 y.o. female with a medical diagnosis of CVA. She presents with impaired functional mobility, balance, activity tolerance, strength, vision with gait instability.       Rehab potential is fair.    Activity tolerance: Fair    Discharge recommendations:       Equipment recommendations:       GOALS:   Multidisciplinary Problems     Physical Therapy Goals        Problem: Physical Therapy Goal    Goal  Priority Disciplines Outcome Goal Variances Interventions   Physical Therapy Goal     PT, PT/OT Ongoing (interventions implemented as appropriate)     Description:  Goals to be met by: 2019    Patient will increase functional independence with mobility by performin. Supine to sit with Stand-by Assistance  2. Sit to supine with Stand-by Assistance  3. Sit to stand transfer with Stand-by Assistance  4. Bed to chair transfer with Supervision using Rolling Walker  5. Gait  x 150 feet with Supervision using Rolling Walker.   6. Wheelchair propulsion x150 feet with Modified [place] using bilateral uppper extremities                      PLAN:    Patient to be seen daily  to address the above listed problems via gait training, therapeutic activities, therapeutic exercises, therapeutic groups, neuromuscular re-education, wheelchair management/training  Plan of Care expires: 19  Plan of Care reviewed with: patient         Mara Roy, PT 2019

## 2019-08-29 NOTE — PT/OT/SLP PROGRESS
"Speech Language Pathology Treatment          Deonna Montero   MRN: [id number]     Diet recommendations: Solid Diet Level: Puree  Liquid Diet Level: Nectar Thick 1 bite/sip at a time, Alternating bites/sips, Assistance with meals and Assistance with thickening liquids, Avoid talking while eating, Chin tuck, Double swallow with each bite/sip, Eliminate distractions, HOB to 90 degrees, Meds crushed in puree, Monitor for s/s of aspiration, Small bites/sips and Strict aspiration precautions    SLP Treatment Date: 08/29/19  Speech Start Time: 1620     Speech Stop Time: 1715     Speech Total (min): 55 min       TREATMENT BILLABLE MINUTES:  Speech Therapy Individual 15 and Treatment Swallowing Dysfunction 40    General Precautions: Standard, aspiration, fall, pureed diet, nectar thick  Current Respiratory Status: room air       Subjective:  "I talked to Dr. Diamond."    Objective:    Patient found with: (chair alarm).  Pt treated today for dysarthria and dysphagia.  She created sentences to describe pictures with 100% intelligibility, overarticulating for increased clarity, zero to min cues.  She tolerated puree/nectar liquid dinner tray with good intake; cough x 2 after puree swallows.  She needed verbal cues to reduce size of puree bolus 30% of trials.  She tilted head back slightly x1 to get remainder of nectar drink at bottom of cup -- clinician cautioned pt against head tipping due to risk of aspiration.  She used chin tuck during 80% of swallows independently.    Pain/Comfort  Pain Rating 1: 0/10    Assessment:  Deonna Montero is a 78 y.o. female with a SLP diagnosis of dysphagia and dysarthria; mild cognitive impairment.  She demonstrates increased speech clarity, voice volume and calm demeanor today.  Smiling more.  Reduced coughing with nectar liquid.  Modified barium swallow tomorrow.  Pt not able to thicken her own liquids -- needs nursing/staff assistance.    Diet recommendations: Puree food and " nectar thick liquid as tolerated.     1 bite/sip at a time, Alternating bites/sips, Assistance/supervision with meals and Assistance with thickening liquids, Avoid talking while eating, Chin tuck, Double swallow with each bite/sip, Eliminate distractions, HOB to 90 degrees, Meds crushed in puree, Monitor for s/s of aspiration, Small bites/sips and Strict aspiration precautions      Discharge recommendations: Discharge Facility/Level of Care Needs: (to be determined)     Goals:   Multidisciplinary Problems     SLP Goals        Problem: SLP Goal    Goal Priority Disciplines Outcome   SLP Goal     SLP Ongoing (interventions implemented as appropriate)   Description:  1. Demonstrate understanding of speech subsystems, STBY A  2. MET -- Complete controlled exhalation tasks to enhance control of respiration for speech production, MOD A   3. MET -- Coordinate initiation of phonation with exhalation to improve naturalness of speech production, MOD A  4. MET (with min-mod cues)-- Progressing from multisyllabic words to connected utterances, Pt will demonstrate control of speech subsystems in order to achieve appropriate coordination of speech subsystems for natural speech production, MAX A- pending goals 2 &3   5. ONGOING (IMPAIRED) -- Participate in assessment of functional problem solving    6. MET (zero to min cues) Demonstrate understanding of swallow safety precautions with 80% accuracy, min cues.  7. Pt will per form target oral exercises for improved labial/lingual strength for increased swallow efficiency with min cues, 70% accuracy, 5-10 reps.  8. Pt will perform articulation drills focusing on increasing strength of articulatory contacts in initial, medial, final positions of single-syllable and two-syllable words, given clinician model, 80% accuracy (for improved speech intelligibility)  9. Pt will perform target dysphagia exercises ( effortful swallows, Mendelsohn maneuver and second swallows with food/liquid);  Mendelsohn maneuver, Ellyn task without food ) with model 80% accuracy, for increased swallow safety (based on results of prior modified barium swallow).    10. Tolerate puree with nectar thick liquid, swallowing purees and nectar (by cup sip only) using second swallows, chin down, without sign of aspiration 80% accuracy.                     Plan:   Patient to be seen Therapy Frequency: 5 x/week   Planned Interventions: Cognitive-Linguistic Therapy, Dysphagia Therapy, Motor Speech Therapy  Plan of Care Expires:    Plan of Care reviewed with: patient(discussed with MD at [facility] this morning)  SLP Follow-up?: Yes  SLP - Next Visit Date: 08/30/19           Nellie Piña CCC-SLP 8/29/2019

## 2019-08-29 NOTE — PT/OT/SLP PROGRESS
"Speech Language Pathology Treatment          Deonna Montero   MRN: [id number]     Diet recommendations:    Diet Level: Puree(IDDSI 4)    Liquid Diet Level: Nectar Thick(no straw)   Aspiration precautions: 1 bite/sip at a time, Assistance/supervision with meals, Assistance with thickening liquids, Avoid talking while eating, Chin tuck, Double swallow with each bite/sip, Eliminate distractions, Feed only when awake/alert, HOB to 90 degrees (perferably up in chair for meals), Meds crushed in puree, Monitor for s/s of aspiration, No straws, Remain upright 30 minutes post meal, Small bites/sips and Strict aspiration precautions    SLP Treatment Date: 08/28/19  Speech Start Time: 1650     Speech Stop Time: 1800     Speech Total (min): 70 min       TREATMENT BILLABLE MINUTES:  Treatment Swallowing Dysfunction 70    General Precautions: Standard, aspiration, fall  Current Respiratory Status: room air       Subjective:  "Something is wrong." (referring to decline in swallow efficiency,coughing)    Objective:    Patient found with: (chair alarm).  Pt treated for dysphagia.  Pt had coughing/choking episode during lunch believed to be related to small piece of spaghetti (diet consistency was as ordered -- minced -- however pt not tolerating today.  Diet downgraded to puree.  Pt initially resistant; later accepting.    Pt treated with dinner tray (puree). Increased episodes of coughing noted with liquid today (thin), even though pt not using straw.  Clinician trialed pt on nectar liquid -- no cough.  70% intake of puree/nectar liquid meal.  Results discussed with pt, nurse, OT.  Recommend puree diet with nectar liquid as tolerated (no straw, single sips, chin tuck; consider modified barium swallow).    Pain/Comfort  Pain Rating 1: 0/10    Assessment:  Deonna Montero is a 78 y.o. female with a SLP diagnosis of Dysphagia, Dysarthria and Cognitive-Linguistic Impairment. Decreased tolerance of mechanical soft/thin " liquid diet noted today.  Diet downgraded to puree and nectar liquid -- which pt tolerated and liked at dinner meal.   Recommend  -- Modified Barium Swallow Study to further assess pharyngeal stage of swallow.    Diet recommendations:      Diet Level: Puree(IDDSI 4)    Liquid Diet Level: Nectar Thick(no straw)   Aspiration precautions: 1 bite/sip at a time, Assistance/supervision with meals, Assistance with thickening liquids, Avoid talking while eating, Chin tuck, Double swallow with each bite/sip, Eliminate distractions, Feed only when awake/alert, HOB to 90 degrees (perferably up in chair for meals), Meds crushed in puree, Monitor for s/s of aspiration, No straws, Remain upright 30 minutes post meal, Small bites/sips and Strict aspiration precautions      Discharge recommendations: Discharge Facility/Level of Care Needs: (to be determined)     Goals:   Multidisciplinary Problems     SLP Goals        Problem: SLP Goal    Goal Priority Disciplines Outcome   SLP Goal     SLP Ongoing (interventions implemented as appropriate)   Description:  1. Demonstrate understanding of speech subsystems, STBY A  2. MET -- Complete controlled exhalation tasks to enhance control of respiration for speech production, MOD A   3. MET -- Coordinate initiation of phonation with exhalation to improve naturalness of speech production, MOD A  4. MET (with min-mod cues)-- Progressing from multisyllabic words to connected utterances, Pt will demonstrate control of speech subsystems in order to achieve appropriate coordination of speech subsystems for natural speech production, MAX A- pending goals 2 &3   5. ONGOING (IMPAIRED) -- Participate in assessment of functional problem solving    6. MET (zero to min cues) Demonstrate understanding of swallow safety precautions with 80% accuracy, min cues.  7. Pt will per form target oral exercises for improved labial/lingual strength for increased swallow efficiency with min cues, 70% accuracy, 5-10  reps.  8. Pt will perform articulation drills focusing on increasing strength of articulatory contacts in initial, medial, final positions of single-syllable and two-syllable words, given clinician model, 80% accuracy (for improved speech intelligibility)  9. Pt will perform target dysphagia exercises ( effortful swallows, Mendelsohn maneuver and second swallows with food/liquid); Mendelsohn maneuver, Ellyn task without food ) with model 80% accuracy, for increased swallow safety (based on results of prior modified barium swallow).    10. Tolerate puree with nectar thick liquid, swallowing purees and nectar (by cup sip only) using second swallows, chin down, without sign of aspiration 80% accuracy.                     Plan:   Patient to be seen Therapy Frequency: 5 x/week   Planned Interventions: Cognitive-Linguistic Therapy, Dysphagia Therapy, Motor Speech Therapy  Plan of Care Expires:    Plan of Care reviewed with: patient(nurse, OT)  SLP Follow-up?: Yes  SLP - Next Visit Date: 08/29/19           Nellie Piña CCC-SLP 8/28/2019

## 2019-08-29 NOTE — PROGRESS NOTES
Team conference attended and patient progress discussed.  Update provided to patient on progress and ELOS.  Spoke with patient's niece Marianne and provided her with update.  Informed her Dr. Diamond is recommending OP therapy and more than likely patient will need PT/OT/ST.  Marianne stated she would let me know their preference.  Also discussed family training with Marianne.  Marianne stated she would follow up on day for family training after she talks with patient's son.  Attempted to reach patient's son, no answer, no ability to leave VM.  CM will assist with discharge planning and needs.

## 2019-08-30 LAB
POCT GLUCOSE: 112 MG/DL (ref 70–110)
POCT GLUCOSE: 158 MG/DL (ref 70–110)
POCT GLUCOSE: 186 MG/DL (ref 70–110)

## 2019-08-30 PROCEDURE — 97116 GAIT TRAINING THERAPY: CPT

## 2019-08-30 PROCEDURE — 25000003 PHARM REV CODE 250: Performed by: PHYSICAL MEDICINE & REHABILITATION

## 2019-08-30 PROCEDURE — 97110 THERAPEUTIC EXERCISES: CPT

## 2019-08-30 PROCEDURE — 99900035 HC TECH TIME PER 15 MIN (STAT)

## 2019-08-30 PROCEDURE — 94761 N-INVAS EAR/PLS OXIMETRY MLT: CPT

## 2019-08-30 PROCEDURE — 97535 SELF CARE MNGMENT TRAINING: CPT

## 2019-08-30 PROCEDURE — 94799 UNLISTED PULMONARY SVC/PX: CPT

## 2019-08-30 PROCEDURE — 97112 NEUROMUSCULAR REEDUCATION: CPT

## 2019-08-30 PROCEDURE — 92526 ORAL FUNCTION THERAPY: CPT

## 2019-08-30 PROCEDURE — 92611 MOTION FLUOROSCOPY/SWALLOW: CPT

## 2019-08-30 PROCEDURE — 25000242 PHARM REV CODE 250 ALT 637 W/ HCPCS: Performed by: PHYSICAL MEDICINE & REHABILITATION

## 2019-08-30 PROCEDURE — 94640 AIRWAY INHALATION TREATMENT: CPT

## 2019-08-30 PROCEDURE — 63600175 PHARM REV CODE 636 W HCPCS: Performed by: PHYSICAL MEDICINE & REHABILITATION

## 2019-08-30 PROCEDURE — 12800000 HC REHAB SEMI-PRIVATE ROOM

## 2019-08-30 PROCEDURE — 97530 THERAPEUTIC ACTIVITIES: CPT

## 2019-08-30 RX ADMIN — BRIMONIDINE TARTRATE AND TIMOLOL MALEATE 1 DROP: 2; 5 SOLUTION OPHTHALMIC at 09:08

## 2019-08-30 RX ADMIN — MELOXICAM 7.5 MG: 7.5 TABLET ORAL at 10:08

## 2019-08-30 RX ADMIN — LINACLOTIDE 145 MCG: 145 CAPSULE, GELATIN COATED ORAL at 10:08

## 2019-08-30 RX ADMIN — METFORMIN HYDROCHLORIDE 500 MG: 500 TABLET ORAL at 10:08

## 2019-08-30 RX ADMIN — METFORMIN HYDROCHLORIDE 500 MG: 500 TABLET ORAL at 04:08

## 2019-08-30 RX ADMIN — IPRATROPIUM BROMIDE AND ALBUTEROL SULFATE 3 ML: .5; 3 SOLUTION RESPIRATORY (INHALATION) at 12:08

## 2019-08-30 RX ADMIN — ENOXAPARIN SODIUM 40 MG: 100 INJECTION SUBCUTANEOUS at 04:08

## 2019-08-30 RX ADMIN — IPRATROPIUM BROMIDE AND ALBUTEROL SULFATE 3 ML: .5; 3 SOLUTION RESPIRATORY (INHALATION) at 06:08

## 2019-08-30 RX ADMIN — ASPIRIN 81 MG CHEWABLE TABLET 81 MG: 81 TABLET CHEWABLE at 10:08

## 2019-08-30 RX ADMIN — METOPROLOL SUCCINATE 50 MG: 50 TABLET, EXTENDED RELEASE ORAL at 10:08

## 2019-08-30 RX ADMIN — GUAIFENESIN 600 MG: 600 TABLET, EXTENDED RELEASE ORAL at 10:08

## 2019-08-30 RX ADMIN — TRAVOPROST 1 DROP: 0.04 SOLUTION/ DROPS OPHTHALMIC at 08:08

## 2019-08-30 RX ADMIN — HUMAN INSULIN 20 UNITS: 100 INJECTION, SUSPENSION SUBCUTANEOUS at 04:08

## 2019-08-30 RX ADMIN — HUMAN INSULIN 20 UNITS: 100 INJECTION, SUSPENSION SUBCUTANEOUS at 06:08

## 2019-08-30 RX ADMIN — AMLODIPINE BESYLATE 10 MG: 5 TABLET ORAL at 10:08

## 2019-08-30 RX ADMIN — DONEPEZIL HYDROCHLORIDE 10 MG: 5 TABLET, FILM COATED ORAL at 08:08

## 2019-08-30 RX ADMIN — GUAIFENESIN 600 MG: 600 TABLET, EXTENDED RELEASE ORAL at 08:08

## 2019-08-30 RX ADMIN — LISINOPRIL 10 MG: 10 TABLET ORAL at 08:08

## 2019-08-30 RX ADMIN — PANTOPRAZOLE SODIUM 40 MG: 40 TABLET, DELAYED RELEASE ORAL at 10:08

## 2019-08-30 NOTE — PT/OT/SLP PROGRESS
Physical Therapy         Treatment        Deonna Montero   MRN: [id number]     PT Received On: 08/30/19  Total Time (min): 65        Billable Minutes:  Gait [id number], Therapeutic Activity 10, Therapeutic Exercise 35 and Train/Wheelchair Management 5  Total Minutes: 65    Treatment Type: Treatment  PT/PTA: PT     PTA Visit Number: 0       General Precautions: Standard, aspiration, fall  Orthopedic Precautions: Orthopedic Precautions : N/A   Braces: Braces: N/A         Subjective:  Communicated with nurse Caldwell prior to session.    Pain/Comfort  Pain Rating 1: (10/10 with gait training, especially with upright posutre)  Pain Rating Post-Intervention 1: 0/10    Objective:  Patient found sitting up in w/c in room.   Patient found with: (w/c alarm)    Functional Mobility:  Transfer Training:  Sit to stand:Minimal Assistance with Rolling Walker & vc    Wheelchair Training:  Pt propelled standard w/c x 150 feet with SBA and max vc for technique and safety.     Gait Training:  Gait training with Rolling Walker x 70, 50 & 55 feet with Minimal Assistance for balance due to pt in non-skid socks instead of shoes.     Additional Treatment:  Pt performed B LE there ex sitting x 30 reps with 3# wt and green/blue TB, with vc for proper execution and joint protection: ap, laq, marching, hip abd/add, ham curls.     SciFit stepper x 10 min level 2 using LEs only.    Activity Tolerance:  Patient tolerated treatment well    Patient left up in chair with call button in reach, chair alarm on and nurse notified.    Assessment:  Deonna Montero is a 78 y.o. female with a medical diagnosis of CVA. She presents with impaired functional mobility, balance, activity tolerance, strength, vision with gait instability. Pt more alert today during session. Back pain is definitely affecting pt's gait quality as she in flexed forward to assist with pain.      Rehab potential is fair.    Activity tolerance: Fair    Discharge  recommendations:       Equipment recommendations:       GOALS:   Multidisciplinary Problems     Physical Therapy Goals        Problem: Physical Therapy Goal    Goal Priority Disciplines Outcome Goal Variances Interventions   Physical Therapy Goal     PT, PT/OT Ongoing (interventions implemented as appropriate)     Description:  Goals to be met by: 2019    Patient will increase functional independence with mobility by performin. Supine to sit with Stand-by Assistance  2. Sit to supine with Stand-by Assistance  3. Sit to stand transfer with Stand-by Assistance  4. Bed to chair transfer with Supervision using Rolling Walker  5. Gait  x 150 feet with Supervision using Rolling Walker.   6. Wheelchair propulsion x150 feet with Modified [place] using bilateral uppper extremities                      PLAN:    Patient to be seen daily  to address the above listed problems via gait training, therapeutic activities, therapeutic exercises, therapeutic groups, neuromuscular re-education, wheelchair management/training  Plan of Care expires: 19  Plan of Care reviewed with: patient         Mara Kirill, PT 2019

## 2019-08-30 NOTE — PROCEDURES
Modified Barium Swallow    Patient Name:  Deonna Montero   MRN:  [id number]      Recommendations:     Recommendations:                General Recommendations:  Dysphagia therapy  Diet recommendations:  Puree, Nectar Thick Liquid (no straw)  Chin tuck with liquids  Aspiration Precautions: 1 bite/sip at a time, constant direct supervision/Assistance with meals, Assistance with meals and Assistance with thickening liquids, Avoid talking while eating, Chin tuck during liquid (nectar) swallows, Double swallow with each bite/sip, Eliminate distractions, Feed only when awake/alert, HOB to 90 degrees (preferably up in chair for meals) Meds crushed in puree, Monitor for s/s of aspiration, No straws, Remain upright 30 minutes post meal, Small bites/sips and Strict aspiration precautions   General Precautions: Standard, aspiration, fall  Communication strategies:  eye glasses, provide increased time to answer and repetition and reminders    Referral     Reason for Referral  Patient was referred for a Modified Barium Swallow Study to assess the efficiency of his/her swallow function, rule out aspiration and make recommendations regarding safe dietary consistencies, effective compensatory strategies, and safe eating environment.     Diagnosis: stroke; coughing with thin liquid and mech soft diet (downgraded recently to puree and nectar liquid)      History:     Past Medical History:   Diagnosis Date    Back pain     Diabetes mellitus     Hypertension     Reflux     Short-term memory loss     Stroke        Objective:     Current Respiratory Status: 08/30/19    Alert: inconsistent; better now than last week; alert enough for study (verbal)    Cooperative: yes    Follows Directions: yes but requires reminders/repetition for carryover; supervision with meals needed    Visualization  · Patient was seen in the lateral view  · Patient was seen in the anterior view    Oral Peripheral Examination  · Oral Musculature: general  weakness  · Dentition: upper dentures  · Mandibular Strength and Mobility: WFL  · Lingual Strength and Mobility: impaired strength  · Buccal Strength and Mobility: WFL  · Volitional Cough: present  · Volitional Swallow: delayed  · Voice Prior to PO Intake: dry  · Oral Musculature Comments: bilateral weakness (mild-moderate)      08/30/19 1348   Speech Time Calculation   Speech Start Time 1239   Speech Stop Time 1256   Speech Total (min) 17 min   General Information   SLP Treatment Date 08/30/19   Current Respiratory Status room air   General Precautions aspiration;fall   Current Diet   Current Diet Textures Puree   Current Liquid Consistencies Nectar Thick   Oral Musculature Evaluation   Oral Musculature general weakness   Dentition upper dentures   Mandibular Strength and Mobility WFL   Lingual Strength and Mobility impaired strength   Buccal Strength and Mobility WFL   Volitional Cough present   Volitional Swallow delayed   Voice Prior to PO Intake dry        MBS Eval: Thin Liquid Trial   Mode of Presentation, Thin Liquid cup;spoon;straw;self fed;fed by clinician   Oral Prep/Phase, Thin Liquid bolus holding;delayed anterior/posterior movement   Pharyngeal Phase, Thin Liquid delayed pharyngeal swallow;premature spillage;pyriform sinuses pooling;vallecular pooling   Rosenbeck's 8-Point Penetration-Aspiration Scale, Thin Liquids (2) Material enters the airway, remains above the vocal folds, and is ejected from the airway.   Penetration/Aspiration, Thin Liquid no visible aspiration but penetration even with chin down   Esophageal Phase, Thin    (unremarkable)   Successful Strategies Trialed During Procedure, Thin Liquid multiple swallows        MBS Eval: Nectar Thick Liquid Trial   Mode of Presentation, Nectar cup;spoon;straw;self fed;fed by clinician   Oral Prep/Phase, Nectar delayed anterior/posterior movement;delayed initiation of oral swallow;slow oral transit time   Pharyngeal Phase, Nectar impaired;delayed  pharyngeal swallow;pyriform sinuses pooling;vallecular pooling   Rosenbeck's 8-Point Penetration-Aspiration Scale, Nectar Thick Liquids (2) Material enters the airway, remains above the vocal folds, and is ejected from the airway.   Penetration/Aspiration, Nectar penetration during swallow with straw  (penetration with nectar with straw only)   Esophageal Phase, Nectar    (unremarkable)   Successful Strategies Trialed During Procedure, Nectar chin tuck;multiple swallows;other (see comments)  (small sips; no straw)        MBS Eval: Pureed Trial   Mode of Presentation, Puree spoon;self fed;fed by clinician   Oral Prep/Phase, Puree WFL;delayed initiation of oral swallow   Pharyngeal Phase, Puree WFL;delayed pharyngeal swallow   Rosenbeck's 8-Point Penetration-Aspiration Scale, Pureed (1) Material does not enter airway.   Penetration/Aspiration, Pureed none visible   Esophageal Phase, Pureed    (unremarkable)   Successful Strategies Trialed During Procedure, Puree    (chin tuck not helpful)        MBS Eval: Soft Solid Trial   Mode of Presentation, Semisolid fed by clinician   Oral Prep/Phase, Semisolid delayed anterior/posterior movement;decreased bolus control;delayed initiation of oral swallow;impaired rotational chew;prolonged chewing   Oral Residue, Semisolid    (trace)   Pharyngeal Phase, Semisolid impaired;delayed pharyngeal swallow;multiple spontaneous swallows   Rosenbeck's 8-Point Penetration-Aspiration Scale, Semisolid (2) Material enters the airway, remains above the vocal folds, and is ejected from the airway.   Penetration/Aspiration, Semisolid penetration during swallow due to mult swallows and extended chewing; very slow   Esophageal Phase, Semisolid    (unremarkable)   Successful Strategies Trialed During Procedure, Semisolid    (too slow to be safe; piecemeal swallows)   Recommendations   Solid Diet Level Puree   Liquid Diet Level Nectar Thick   Additional Recommendations no straw; supervision; swallow  x 2; crush meds   SLP Follow-up   SLP Follow-up? Yes   SLP - Next Visit Date 09/02/19   Treatment/Billable Minutes   Motion Fluoro Swallow, Cine/Vid 17   Total Time 17              Assessment:     Impressions  ·   Patient demonstrates moderate  Oral dysphagia characterized by delayed initiation of oral swallow; piecemeal mastication; decreased speed of transit.   She demonstrates mild-mod pharyngeal dysphagia characterized primarily by delay in laryngeal elevation; pooling noted in valleculae and pyriform prior to swallows of thin and nectar liquid was cleared by swallows.  Pt demonstrated cough/gag x1 when no contrast was visible in laryngeal area.  Not safe with straw.  Needs meds crushed into pudding.    Prognosis:  Good for tolerance of puree and nectar liquid diet with supervision    Barriers:  · Fatigue  · need for supervision and reminders to use safety strategies    Plan  See plan of care    Education  Results were discussed with patient.    Goals:   Multidisciplinary Problems     SLP Goals        Problem: SLP Goal    Goal Priority Disciplines Outcome   SLP Goal     SLP Ongoing (interventions implemented as appropriate)   Description:  1. Demonstrate understanding of speech subsystems, STBY A  2. MET -- Complete controlled exhalation tasks to enhance control of respiration for speech production, MOD A   3. MET -- Coordinate initiation of phonation with exhalation to improve naturalness of speech production, MOD A  4. MET (with min-mod cues)-- Progressing from multisyllabic words to connected utterances, Pt will demonstrate control of speech subsystems in order to achieve appropriate coordination of speech subsystems for natural speech production, MAX A- pending goals 2 &3   5. ONGOING (IMPAIRED) -- Participate in assessment of functional problem solving    6. MET (zero to min cues) Demonstrate understanding of swallow safety precautions with 80% accuracy, min cues.  7. Pt will per form target oral exercises  for improved labial/lingual strength for increased swallow efficiency with min cues, 70% accuracy, 5-10 reps.  8. Pt will perform articulation drills focusing on increasing strength of articulatory contacts in initial, medial, final positions of single-syllable and two-syllable words, given clinician model, 80% accuracy (for improved speech intelligibility)  9. Pt will perform target dysphagia exercises ( effortful swallows, Mendelsohn maneuver and second swallows with food/liquid); Mendelsohn maneuver, Ellyn task without food ) with model 80% accuracy, for increased swallow safety (based on results of prior modified barium swallow).    10. Modified -- Tolerate puree with nectar thick liquid, swallowing purees and nectar (by cup sip only) using second swallows, chin down for nectar liquid swallows, without sign of aspiration 80% accuracy.                     Plan:   · Patient to be seen:  Therapy Frequency: 5 x/week   · Plan of Care expires:     · Plan of Care reviewed with:  patient(nurse by phone)        Discharge recommendations:  (see modified barium swallow study report; outpt tx vs homehealth)   Barriers to Discharge:  as long as a family member is trained to provide appropriate consistency and will give direct supervision at home, pt can be discharged to home vis-a-vis swallow issues      Time Tracking:   SLP Treatment Date:   08/30/19  Speech Start Time:  1239  Speech Stop Time:  1256     Speech Total Time (min):  17 min    Nellie Piña CCC-SLP  08/30/2019

## 2019-08-30 NOTE — CARE UPDATE
08/30/19 0658   Patient Assessment/Suction   Level of Consciousness (AVPU) alert   Respiratory Effort Normal;Unlabored   All Lung Fields Breath Sounds diminished   PRE-TX-O2   O2 Device (Oxygen Therapy) room air   SpO2 (!) 92 %   Pulse Oximetry Type Intermittent   $ Pulse Oximetry - Multiple Charge Pulse Oximetry - Multiple   Pulse 78   Resp 18   Aerosol Therapy   $ Aerosol Therapy Charges Aerosol Treatment   Respiratory Treatment Status (SVN) given   Treatment Route (SVN) mask   Patient Position (SVN) semi-Milian's   Post Treatment Assessment (SVN) breath sounds unchanged   Signs of Intolerance (SVN) none   Breath Sounds Post-Respiratory Treatment   Throughout All Fields Post-Treatment All Fields   Throughout All Fields Post-Treatment no change   Post-treatment Heart Rate (beats/min) 81   Post-treatment Resp Rate (breaths/min) 18   Incentive Spirometer   $ Incentive Spirometer Charges done with encouragement   Administration (IS) instruction provided, follow-up   Number of Repetitions (IS) 10   Level Incentive Spirometer (mL) 500   Patient Tolerance (IS) good

## 2019-08-30 NOTE — PLAN OF CARE
Problem: Occupational Therapy Goal  Goal: Occupational Therapy Goal  Goals to be met by:    Patient will increase functional independence with ADLs by performing:    Feeding with Modified [place].  UE Dressing with Set-up Assistance.  LE Dressing with Stand-by Assistance with set up of clothes.  Grooming while seated with Modified [place].  Toileting from bedside commode over the toilet with Supervision for hygiene and clothing management.   Toilet transfer to bedside commode over the toilet with Contact Guard Assistance.  Bathing from  shower chair/bench with Minimal Assistanc.  Shower transfer to TTB with CGA and verbal instructions.   Outcome: Ongoing (interventions implemented as appropriate)  OT tx for self care and NME.

## 2019-08-30 NOTE — PROGRESS NOTES
[facility]  Physical Medicine & Rehab  Progress Note    Patient Name: Deonna Montero  MRN: [id number]  Patient Class: IP- Rehab   Admission Date: 8/14/2019  Length of Stay: 16 days  Attending Physician: Nicky Diamond MD    Subjective:     Principal Problem:  CVA     Interval History: 8/30/2019:  Patient is seen for follow-up rehab evaluation and recommendations: pt is 78 y.o female  With dx of CVA, participating with therapy     HPI, Past Medical, Family, and Social History remains the same as documented in the initial encounter.    Scheduled Medications:    albuterol-ipratropium  3 mL Nebulization Q6H    amLODIPine  10 mg Oral Daily    aspirin  81 mg Oral Daily    brimonidine-timolol  1 drop Right Eye BID    donepezil  10 mg Oral QHS    enoxaparin  40 mg Subcutaneous Daily    guaiFENesin  600 mg Oral BID    insulin NPH  20 Units Subcutaneous BID AC    linaCLOtide  145 mcg Oral Daily    lisinopril  10 mg Oral QHS    meloxicam  7.5 mg Oral Daily    metformin  500 mg Oral BID WM    metoprolol succinate  50 mg Oral Daily    pantoprazole  40 mg Oral Daily    travoprost  1 drop Both Eyes QHS       PRN Medications: acetaminophen, cloNIDine, dextrose 50%, dextrose 50%, glucagon (human recombinant), glucose, glucose, insulin aspart U-100, lactulose, ondansetron, polyethylene glycol, ramelteon    Review of Systems   Constitutional: Negative.    HENT: Negative.    Eyes: Negative.    Respiratory: Negative.    Cardiovascular: Negative.    Gastrointestinal: Negative.    Endocrine: Negative.    Genitourinary: Negative.    Musculoskeletal: Negative.    Skin: Negative.    Allergic/Immunologic: Negative.    Neurological: Negative.    Hematological: Negative.    Psychiatric/Behavioral: Negative.      Objective:     Vital Signs (Most Recent):  Temp: 96.9 °F (36.1 °C) (08/30/19 0813)  Pulse: 85 (08/30/19 0813)  Resp: 17 (08/30/19 0813)  BP: 119/74 (08/30/19 0813)  SpO2: (!) 92 % (08/30/19  0658)    Vital Signs (24h Range):  Temp:  [96.9 °F (36.1 °C)-98.1 °F (36.7 °C)] 96.9 °F (36.1 °C)  Pulse:  [75-85] 85  Resp:  [14-20] 17  SpO2:  [92 %-98 %] 92 %  BP: (119-145)/(74-81) 119/74     Physical Exam   Constitutional: She is oriented to person, place, and time. She appears well-developed and well-nourished. No distress.   HENT:   Head: Normocephalic and atraumatic.   Right Ear: External ear normal.   Left Ear: External ear normal.   Nose: Nose normal.   Mouth/Throat: Oropharynx is clear and moist. No oropharyngeal exudate.   Eyes: Pupils are equal, round, and reactive to light. Conjunctivae and EOM are normal. Right eye exhibits no discharge. Left eye exhibits no discharge. No scleral icterus.   Neck: Normal range of motion. Neck supple. No JVD present. No tracheal deviation present. No thyromegaly present.   Cardiovascular: Normal rate, regular rhythm, normal heart sounds and intact distal pulses. Exam reveals no gallop and no friction rub.   No murmur heard.  Pulmonary/Chest: Effort normal and breath sounds normal. No stridor. No respiratory distress. She has no wheezes. She has no rales. She exhibits no tenderness.   Abdominal: Soft. Bowel sounds are normal. She exhibits no distension. There is no tenderness. There is no rebound and no guarding.   Genitourinary:   Genitourinary Comments: deferred   Musculoskeletal: Normal range of motion. She exhibits no edema, tenderness or deformity.   Lymphadenopathy:     She has no cervical adenopathy.   Neurological: She is alert and oriented to person, place, and time. No cranial nerve deficit. She exhibits normal muscle tone. Coordination normal.   Skin: No rash noted. She is not diaphoretic. No erythema. No pallor.   Psychiatric: She has a normal mood and affect. Her behavior is normal.     NEUROLOGICAL EXAMINATION:     MENTAL STATUS   Oriented to person, place, and time.     CRANIAL NERVES     CN III, IV, VI   Pupils are equal, round, and reactive to  light.  Extraocular motions are normal.       Assessment/Plan:      Deonna Montero is a 78 y.o. female admitted to inpatient rehabilitation on 8/14/2019 for <principal problem not specified> with impaired mobility and ADLs. Patient remains appropriate for PT, OT, and as required Speech therapy. Patient continues to require 24 hour nursing care as well as daily Physician assessment.    Active Diagnoses:    Diagnosis Date Noted POA    CVA (cerebral vascular accident) [I63.9] 08/14/2019 Yes    Type 2 diabetes mellitus without complication [E11.9] 05/02/2018 Yes      Problems Resolved During this Admission:     CVA, cont therapy  Leukopenia, consult hematology   HTN, vitals noted, cont current med  DVT prophylaxis, cont sq Lovenox  DM, accu check noted, cont current tmed    DISCHARGE PLANNING:  Tentative Discharge Date:     Future Appointments   Date Time Provider Department Center   9/10/2019 12:40 PM Sanjana Zambrano MD [facility] UROLOGY [place] Raghu Joy MD  Department of Physical Medicine & Rehab  [facility]

## 2019-08-30 NOTE — PLAN OF CARE
Problem: SLP Goal  Goal: SLP Goal  1. Demonstrate understanding of speech subsystems, STBY A  2. MET -- Complete controlled exhalation tasks to enhance control of respiration for speech production, MOD A   3. MET -- Coordinate initiation of phonation with exhalation to improve naturalness of speech production, MOD A  4. MET (with min-mod cues)-- Progressing from multisyllabic words to connected utterances, Pt will demonstrate control of speech subsystems in order to achieve appropriate coordination of speech subsystems for natural speech production, MAX A- pending goals 2 &3   5. ONGOING (IMPAIRED) -- Participate in assessment of functional problem solving    6. MET (zero to min cues) Demonstrate understanding of swallow safety precautions with 80% accuracy, min cues.  7. Pt will per form target oral exercises for improved labial/lingual strength for increased swallow efficiency with min cues, 70% accuracy, 5-10 reps.  8. Pt will perform articulation drills focusing on increasing strength of articulatory contacts in initial, medial, final positions of single-syllable and two-syllable words, given clinician model, 80% accuracy (for improved speech intelligibility)  9. Pt will perform target dysphagia exercises ( effortful swallows, Mendelsohn maneuver and second swallows with food/liquid); Mendelsohn maneuver, Ellyn task without food ) with model 80% accuracy, for increased swallow safety (based on results of prior modified barium swallow).    10. Modified -- Tolerate puree with nectar thick liquid, swallowing purees and nectar (by cup sip only) using second swallows, chin down for nectar liquid swallows, without sign of aspiration 80% accuracy.   Outcome: Ongoing (interventions implemented as appropriate)  MBSS completed today.  See report.  No aspiration during study but some penetration into airway.    Comments: Alert, verbal and cooperative

## 2019-08-30 NOTE — PLAN OF CARE
Problem: Physical Therapy Goal  Goal: Physical Therapy Goal  Goals to be met by: 2019    Patient will increase functional independence with mobility by performin. Supine to sit with Stand-by Assistance  2. Sit to supine with Stand-by Assistance  3. Sit to stand transfer with Stand-by Assistance  4. Bed to chair transfer with Supervision using Rolling Walker  5. Gait  x 150 feet with Supervision using Rolling Walker.   6. Wheelchair propulsion x150 feet with Modified [place] using bilateral uppper extremities     Outcome: Ongoing (interventions implemented as appropriate)    PT for there ex, gait, activity and w/c.

## 2019-08-30 NOTE — PT/OT/SLP PROGRESS
"Speech Language Pathology Treatment          Deonna Montero   MRN: [id number]     Diet recommendations: Solid Diet Level: Puree  Liquid Diet Level: Nectar Thick     Aspiration Precautions:   1 bite/sip at a time, Alternating bites/sips, constant supervision/Assistance with meals, Assistance with thickening liquids,  Avoid talking while eating, Chin tuck with liquid, Double swallow with each bite/sip, Eliminate distractions, Frequent oral care, HOB to 90 degrees (preferably up in chair in dining area for meals), Meds crushed in puree, Remain upright 30 minutes post meal and Strict aspiration precautions    SLP Treatment Date: 08/30/19     Modified Barium Swallow (first contact)  Speech Start Time: 1239     Speech Stop Time: 1256(pt treated for additional minutes later in day)     Speech Total (min): 17 min       Second Visit/contact  Start: 1441  Stop: 1517  Minutes: 36    Eval Minutes:17  TREATMENT BILLABLE MINUTES: 36  Treatment Swallowing Dysfunction 36 and Motion Fluoro Swallow, Cine/Vid 17    General Precautions: Standard, aspiration, fall  Current Respiratory Status: room air       Subjective:  "I'm going to a party."    Objective:    Patient found with: (in wheelchair with belt about to be transferred to swallow study chair) for modified barium swallow.  Treated later in day in room with alarm on wheelchair.    See Modified Barium Swallow study report of today for details of MBSS. Penetration during swallows of thin liquid by cup and straw and nectar liquid by straw.  Severely extended mastication with soft solids.  Upper denture and lower partials in place for study.  Penetration into airway while chewing and swallowing solids - multiple swallows needed -- penetration occurred after first swallow while pt chewing remainder of food.  Functional with puree.    Moderate oral dysphagia; mild-mod pharyngeal dysphagia.    Pt and nurse educated re: results of study.  Pt participated in exercises for lingual " strength/range in her room this afternoon.  She required model of each exercise.  Lingual range and endurance laterally and posterior-laterally are impaired.  She required cues to continue each task (ex in list in her room) and to complete ten reps.    Copy of swallow safety precaution list given to nursing for family outing on Sunday.  Clinician spoke with two family members regarding diet consistency and precautions (see separate note).    Pain/Comfort  Pain Rating 1: 0/10    Assessment:  Deonna Montero is a 78 y.o. female with a SLP diagnosis of Dysphagia, Dysarthria and Cognitive-Linguistic Impairment. She participated well in modified barium Swallow and followed instructions for lingual ex with clinician model/encouragement and rest breaks.    Diet recommendations: as stated above      Discharge recommendations: Discharge Facility/Level of Care Needs: (see modified barium swallow study report; outpt tx vs homehealth)     Goals:   Multidisciplinary Problems     SLP Goals        Problem: SLP Goal    Goal Priority Disciplines Outcome   SLP Goal     SLP Ongoing (interventions implemented as appropriate)   Description:  1. Demonstrate understanding of speech subsystems, STBY A  2. MET -- Complete controlled exhalation tasks to enhance control of respiration for speech production, MOD A   3. MET -- Coordinate initiation of phonation with exhalation to improve naturalness of speech production, MOD A  4. MET (with min-mod cues)-- Progressing from multisyllabic words to connected utterances, Pt will demonstrate control of speech subsystems in order to achieve appropriate coordination of speech subsystems for natural speech production, MAX A- pending goals 2 &3   5. ONGOING (IMPAIRED) -- Participate in assessment of functional problem solving    6. MET (zero to min cues) Demonstrate understanding of swallow safety precautions with 80% accuracy, min cues.  7. Pt will per form target oral exercises for improved  labial/lingual strength for increased swallow efficiency with min cues, 70% accuracy, 5-10 reps.  8. Pt will perform articulation drills focusing on increasing strength of articulatory contacts in initial, medial, final positions of single-syllable and two-syllable words, given clinician model, 80% accuracy (for improved speech intelligibility)  9. Pt will perform target dysphagia exercises ( effortful swallows, Mendelsohn maneuver and second swallows with food/liquid); Mendelsohn maneuver, Ellyn task without food ) with model 80% accuracy, for increased swallow safety (based on results of prior modified barium swallow).    10. Modified -- Tolerate puree with nectar thick liquid, swallowing purees and nectar (by cup sip only) using second swallows, chin down for nectar liquid swallows, without sign of aspiration 80% accuracy.                      Plan:   Patient to be seen Therapy Frequency: 5 x/week   Planned Interventions: Cognitive-Linguistic Therapy, Dysphagia Therapy, Motor Speech Therapy  Plan of Care Expires:    Plan of Care reviewed with: patient(nurse by phone)  SLP Follow-up?: Yes  SLP - Next Visit Date: 09/02/19           Nellie Piña CCC-SLP 8/30/2019

## 2019-08-30 NOTE — PT/OT/SLP PROGRESS
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     OT Date of Treatment: 08/30/19   Total Time (min): 85 min      Billable Minutes:  Self Care/Home Management 45 and Neuromuscular Re-education 40  Total Minutes: 85      General Precautions: Standard, aspiration, diabetic, fall, vision impaired(left orthopedic eye.)  Orthopedic Precautions: N/A  Braces: N/A         Subjective:  Communicated with nurse prior to session.  OTR consulted with ST who stated pt is able to have nectar thick water unsupervised in her room.  Pt did undergo a swallow study today and ST reported pt is to have puree food and nectar thick liquids only.  Liquids only with a cup and swallow to be performed with chin tuck and NO STRAWS.  Pt no longer has to perform chin tuck to swallow puree food.    Pain Rating 1: 0/10                   Objective:  Patient found with: (w/c alarm.)    Functional Mobility:  Transfer Training:  W/c<>3n1 over toilet and bed>w/c to the left side with modA for balance and instructions for hand placement sequence.      Feeding:  Pt set up for feeding at the table and nectar thick drink prepared for her.  Nurse informed that she was in the dining room and ready to eat her breakfast with supervision.    Grooming:  SBA in sitting in the w/c at the sink.  Sink lined with a cloth as she dropped her dentures while brushing them.    UE Dressing:  Set up for a pull over top.    LE Dressing:  ModA in standing for balance.  Pt instructed to support self on walker with one UE and engage the opposite in clothing management.  Pt initiated task in this manner requiring Beto and converted to engaging Bilateral UEs and loss balance posteriorly occurred requiring ModA with verbal instructions to realign self to neutral.  Pt then provided tactile and verbal instructions to  alternate UEs for LEclothing management.      Toilet Training:  Pt initiates scooting forward on toilet and attempts self cleaning post urination in sitting and has difficulty reaching ilir area.  She is able to push to standing and lean forward and reach to clean self in standing with Agnes for balance while wiping.  Pt required CGA for balance with her in forward  position to push clothing down and modA for standing balance in pulling clothing up.      Balance:   Static Sit: SBA /Supervision sitting supported.  Dynamic Sit:SBA   Static Stand: Agnes   Dynamic stand: ModA/Agnes depending on the task.        Additional Treatment:  Pt engaged in functional manipulation task engaging the RUE in performance of tripod pinch and release tasks.  This task also combined with graded shoulder flexion to ~90 degrees in placement tasks and with shoulder horizontal adduction.  Pt engaged in bilateral hand task of shuffling cards and was unable to perform the method of bilateral simultaneous shuffle, but was able to perform with some awkwardness the hold with one hand and drop the cards from the opposite hand actively into the holding hand. She dropped a few cards with each method.   She was also able to deal the cards one at a time into piles on the table without dropping any. She was able to place the cards into a deck and secure with a rubber band with Steven UEs/hands.    Patient left up in chair with call button in reach, chair alarm on and water thickened to nectar in a cup.    ASSESSMENT:  Deonna Montero attempts to use the wall bar at toilet in performing transfer and requires reinstruction to use the 3n1 armrests for support.  Pt is unable to maintain neck and trunk flexion in moving to sitting from standing and therefore requires assist to prevent unsafe movement.  She also requires assist in standing for balance as she tends to lose balance backward upon standing and this increases when she engages Steven UEs in LE  management.      Rehab potential is good    Activity tolerance: Good       Discharge recommendations: home, outpatient OT     Equipment recommendations: (TBD)     GOALS:   Multidisciplinary Problems     Occupational Therapy Goals        Problem: Occupational Therapy Goal    Goal Priority Disciplines Outcome Interventions   Occupational Therapy Goal     OT, PT/OT Ongoing (interventions implemented as appropriate)    Description:  Goals to be met by:    Patient will increase functional independence with ADLs by performing:    Feeding with Modified Elliott.  UE Dressing with Set-up Assistance.  LE Dressing with Stand-by Assistance with set up of clothes.  Grooming while seated with Modified Elliott.  Toileting from bedside commode over the toilet with Supervision for hygiene and clothing management.   Toilet transfer to bedside commode over the toilet with Contact Guard Assistance.  Bathing from  shower chair/bench with Minimal Assistanc.  Shower transfer to TTB with CGA and verbal instructions.                    Plan:  Patient to be seen 6 x/week to address the above listed problems via self-care/home management, community/work re-entry, therapeutic activities, therapeutic exercises, therapeutic groups, neuromuscular re-education, wheelchair management/training.  Pt will go on a therapeutic pass on Sunday 09/01/2019.  Plan of Care expires: 09/05/19  Plan of Care reviewed with: patient         Sonya Hamilton, OT  08/30/2019

## 2019-08-30 NOTE — PLAN OF CARE
Problem: Adult Inpatient Plan of Care  Goal: Plan of Care Review  Outcome: Ongoing (interventions implemented as appropriate)  Pt calling to request BR during shift.  Voided continently x 3 with 1 incontinent episode.  Requires cueing for transfers, i.e, stand up straight, lock chair before transferring, do not fall back into chair.  Refused Rozerem.  States she would like to see if she can sleep without.  Slept well during the night.  Coughing infrequently thin, clear sputum.  Denies pain.  Tolerates meds crushed and administered in applesauce with sips of nectar thick liquids.  ACBID

## 2019-08-30 NOTE — CARE UPDATE
08/29/19 1828   Patient Assessment/Suction   Level of Consciousness (AVPU) alert   Respiratory Effort Normal;Unlabored   Expansion/Accessory Muscles/Retractions no use of accessory muscles   All Lung Fields Breath Sounds diminished   Rhythm/Pattern, Respiratory depth regular;pattern regular;unlabored   Cough Frequency infrequent   PRE-TX-O2   O2 Device (Oxygen Therapy) room air   SpO2 98 %   Pulse Oximetry Type Intermittent   $ Pulse Oximetry - Multiple Charge Pulse Oximetry - Multiple   Pulse 78   Resp 14   Aerosol Therapy   $ Aerosol Therapy Charges Aerosol Treatment   Daily Review of Necessity (SVN) completed   Respiratory Treatment Status (SVN) given   Treatment Route (SVN) mask   Patient Position (SVN) semi-Milian's   Post Treatment Assessment (SVN) breath sounds improved   Signs of Intolerance (SVN) none   Breath Sounds Post-Respiratory Treatment   Post-treatment Heart Rate (beats/min) 78   Post-treatment Resp Rate (breaths/min) 14   Incentive Spirometer   $ Incentive Spirometer Charges done with encouragement   Incentive Spirometer Predicted Level (mL) 1000   Administration (IS) instruction provided, follow-up   Number of Repetitions (IS) 10   Level Incentive Spirometer (mL) 500   Patient Tolerance (IS) good

## 2019-08-30 NOTE — PROGRESS NOTES
"Clinician was informed that pt may be going out on day pass on Sunday 9/1/19.   Discussed with  and nursing -- pt is currently on puree diet with nectar thick liquid.  Obtained phone numbers from  to call family to confirm their understanding of texture restrictions.  No answer on son "SHIRLEY"s phone [phone] (voice mail not set up).  Spoke with both nieces:  Marianne [phone] and Angela [phone].  Explained that pt is on pureed foods and nectar thick liquid for airway protection purposes.  Explained how to use Resource ThickenUp Clear packets (which they can obtain from nursing) to create nectar thick liquid.  One packet in 4 ounces liquid -- put powder in cup FIRST, then pour liquid over powder and stir briskly 30-60 seconds.  Flora verbalized understanding.    Rec:  Nursing demonstrate use of thickener with family in person before family leaves with pt on Sunday.      "

## 2019-08-31 LAB
POCT GLUCOSE: 126 MG/DL (ref 70–110)
POCT GLUCOSE: 196 MG/DL (ref 70–110)

## 2019-08-31 PROCEDURE — 94640 AIRWAY INHALATION TREATMENT: CPT

## 2019-08-31 PROCEDURE — 94761 N-INVAS EAR/PLS OXIMETRY MLT: CPT

## 2019-08-31 PROCEDURE — 97110 THERAPEUTIC EXERCISES: CPT

## 2019-08-31 PROCEDURE — 12800000 HC REHAB SEMI-PRIVATE ROOM

## 2019-08-31 PROCEDURE — 25000242 PHARM REV CODE 250 ALT 637 W/ HCPCS: Performed by: PHYSICAL MEDICINE & REHABILITATION

## 2019-08-31 PROCEDURE — 94799 UNLISTED PULMONARY SVC/PX: CPT

## 2019-08-31 PROCEDURE — 97535 SELF CARE MNGMENT TRAINING: CPT

## 2019-08-31 PROCEDURE — 97116 GAIT TRAINING THERAPY: CPT

## 2019-08-31 PROCEDURE — 25000003 PHARM REV CODE 250: Performed by: PHYSICAL MEDICINE & REHABILITATION

## 2019-08-31 PROCEDURE — 97530 THERAPEUTIC ACTIVITIES: CPT

## 2019-08-31 PROCEDURE — 63600175 PHARM REV CODE 636 W HCPCS: Performed by: PHYSICAL MEDICINE & REHABILITATION

## 2019-08-31 RX ADMIN — IPRATROPIUM BROMIDE AND ALBUTEROL SULFATE 3 ML: .5; 3 SOLUTION RESPIRATORY (INHALATION) at 12:08

## 2019-08-31 RX ADMIN — BRIMONIDINE TARTRATE AND TIMOLOL MALEATE 1 DROP: 2; 5 SOLUTION OPHTHALMIC at 09:08

## 2019-08-31 RX ADMIN — HUMAN INSULIN 20 UNITS: 100 INJECTION, SUSPENSION SUBCUTANEOUS at 05:08

## 2019-08-31 RX ADMIN — AMLODIPINE BESYLATE 10 MG: 5 TABLET ORAL at 09:08

## 2019-08-31 RX ADMIN — LINACLOTIDE 145 MCG: 145 CAPSULE, GELATIN COATED ORAL at 09:08

## 2019-08-31 RX ADMIN — MELOXICAM 7.5 MG: 7.5 TABLET ORAL at 09:08

## 2019-08-31 RX ADMIN — RAMELTEON 8 MG: 8 TABLET, FILM COATED ORAL at 11:08

## 2019-08-31 RX ADMIN — ENOXAPARIN SODIUM 40 MG: 100 INJECTION SUBCUTANEOUS at 05:08

## 2019-08-31 RX ADMIN — IPRATROPIUM BROMIDE AND ALBUTEROL SULFATE 3 ML: .5; 3 SOLUTION RESPIRATORY (INHALATION) at 07:08

## 2019-08-31 RX ADMIN — METFORMIN HYDROCHLORIDE 500 MG: 500 TABLET ORAL at 05:08

## 2019-08-31 RX ADMIN — DONEPEZIL HYDROCHLORIDE 10 MG: 5 TABLET, FILM COATED ORAL at 09:08

## 2019-08-31 RX ADMIN — LISINOPRIL 10 MG: 10 TABLET ORAL at 09:08

## 2019-08-31 RX ADMIN — GUAIFENESIN 600 MG: 600 TABLET, EXTENDED RELEASE ORAL at 09:08

## 2019-08-31 RX ADMIN — IPRATROPIUM BROMIDE AND ALBUTEROL SULFATE 3 ML: .5; 3 SOLUTION RESPIRATORY (INHALATION) at 06:08

## 2019-08-31 RX ADMIN — METOPROLOL SUCCINATE 50 MG: 50 TABLET, EXTENDED RELEASE ORAL at 09:08

## 2019-08-31 RX ADMIN — ASPIRIN 81 MG CHEWABLE TABLET 81 MG: 81 TABLET CHEWABLE at 09:08

## 2019-08-31 RX ADMIN — TRAVOPROST 1 DROP: 0.04 SOLUTION/ DROPS OPHTHALMIC at 09:08

## 2019-08-31 RX ADMIN — PANTOPRAZOLE SODIUM 40 MG: 40 TABLET, DELAYED RELEASE ORAL at 09:08

## 2019-08-31 RX ADMIN — HUMAN INSULIN 20 UNITS: 100 INJECTION, SUSPENSION SUBCUTANEOUS at 06:08

## 2019-08-31 RX ADMIN — METFORMIN HYDROCHLORIDE 500 MG: 500 TABLET ORAL at 09:08

## 2019-08-31 NOTE — PT/OT/SLP PROGRESS
Occupational Therapy  Treatment    Deonna Montero   MRN: [id number]   Admitting Diagnosis: CVA    OT Date of Treatment: 08/31/19   Total Time (min): 50 min    Billable Minutes:  Self Care/Home Management 15, Therapeutic Activity 20 and Therapeutic Exercise 15  Total Minutes: 50    General Precautions: Standard, aphasia, diabetic, fall, vision impaired  Orthopedic Precautions: N/A  Braces: N/A    Spiritual, Cultural Beliefs, Cheondoism Practices, Values that Affect Care: no    Subjective:  Communicated with nsg prior to session.    Pain/Comfort  Pain Rating 1: 0/10  Pain Rating Post-Intervention 1: 0/10    Objective:  Patient found with: (w/c alarm)    Additional Treatment:  Pt seen from w/c. Completed joni scap retraction AROM w/ tactile facilitation for R side - 2x5 at start of tx, 2x10 at end of tx. AAROM R shoulder flexion w/ facilitation of scapular rotation 2x5.    Played MERRITT from w/c at table. Pt positioned so that she had to sit up tall/ lean forward for each play to increase trunk control, R UE forward reaching. She became tearful at end of game because she had difficulty handling cards and remembering rules of game.    OT provided supervision during lunch with cues to pt to take smaller bites and fully swallow prior to eating another bite. She independently and consistently implemented chin tuck. She did require assist to open pudding and juice.    Patient left up in chair with call button in reach, chair alarm on and nsg (Javi) notified    ASSESSMENT:  Deonna Montero is a 78 y.o. female with a medical diagnosis of CVA w/ R hemiparesis and presents with decreased R UE control, decreased sitting/standing balance, transfer status and independence with ADLs. She can continue to benefit from skilled OT to increase independence and safety with all activities.    Rehab potential is good    Activity tolerance: Fair    Discharge recommendations: outpatient OT, home     Equipment recommendations: (TBD)      GOALS:   Multidisciplinary Problems     Occupational Therapy Goals        Problem: Occupational Therapy Goal    Goal Priority Disciplines Outcome Interventions   Occupational Therapy Goal     OT, PT/OT Ongoing (interventions implemented as appropriate)    Description:  Goals to be met by:    Patient will increase functional independence with ADLs by performing:    Feeding with Modified [place].  UE Dressing with Set-up Assistance.  LE Dressing with Stand-by Assistance with set up of clothes.  Grooming while seated with Modified [place].  Toileting from bedside commode over the toilet with Supervision for hygiene and clothing management.   Toilet transfer to bedside commode over the toilet with Contact Guard Assistance.  Bathing from  shower chair/bench with Minimal Assistanc.  Shower transfer to TTB with CGA and verbal instructions.                    Plan:  Patient to be seen 6 x/week to address the above listed problems via self-care/home management, community/work re-entry, therapeutic activities, therapeutic exercises, neuromuscular re-education, therapeutic groups, cognitive retraining, wheelchair management/training  Plan of Care expires: 09/05/19  Plan of Care reviewed with: patient         Tuyet Itz, OT  08/31/2019

## 2019-08-31 NOTE — PLAN OF CARE
Problem: Adult Inpatient Plan of Care  Goal: Plan of Care Review  Outcome: Ongoing (interventions implemented as appropriate)  Pt AAO, VSS, Pt afebrile, in bed resting. Tolerating therapy well. Bed locked and low, call light in reach. Will monitor and continue with plan of care.

## 2019-08-31 NOTE — PLAN OF CARE
08/31/19 0717   Patient Assessment/Suction   Level of Consciousness (AVPU) alert   Respiratory Effort Unlabored   All Lung Fields Breath Sounds clear   PRE-TX-O2   O2 Device (Oxygen Therapy) room air   SpO2 (!) 94 %   Pulse Oximetry Type Intermittent   Pulse 84   Resp 18   Aerosol Therapy   $ Aerosol Therapy Charges Aerosol Treatment   Respiratory Treatment Status (SVN) given   Treatment Route (SVN) mask   Patient Position (SVN) semi-Milian's   Post Treatment Assessment (SVN) breath sounds unchanged   Signs of Intolerance (SVN) none   Breath Sounds Post-Respiratory Treatment   Throughout All Fields Post-Treatment All Fields   Throughout All Fields Post-Treatment no change   Post-treatment Heart Rate (beats/min) 84   Post-treatment Resp Rate (breaths/min) 16   Incentive Spirometer   $ Incentive Spirometer Charges done with encouragement   Administration (IS) proper technique demonstrated   Number of Repetitions (IS) 10   Level Incentive Spirometer (mL) 750   Patient Tolerance (IS) good

## 2019-08-31 NOTE — CARE UPDATE
08/30/19 1847   Patient Assessment/Suction   Level of Consciousness (AVPU) alert   Respiratory Effort Normal;Unlabored   Expansion/Accessory Muscles/Retractions no use of accessory muscles   All Lung Fields Breath Sounds clear   Rhythm/Pattern, Respiratory depth regular;pattern regular;unlabored   Cough Frequency infrequent   PRE-TX-O2   O2 Device (Oxygen Therapy) room air   SpO2 95 %   Pulse Oximetry Type Intermittent   $ Pulse Oximetry - Multiple Charge Pulse Oximetry - Multiple   Pulse 72   Resp 14   Aerosol Therapy   $ Aerosol Therapy Charges Aerosol Treatment   Daily Review of Necessity (SVN) completed   Respiratory Treatment Status (SVN) given   Treatment Route (SVN) mask   Patient Position (SVN) semi-Milian's   Post Treatment Assessment (SVN) breath sounds improved   Signs of Intolerance (SVN) none   Breath Sounds Post-Respiratory Treatment   Post-treatment Heart Rate (beats/min) 80   Post-treatment Resp Rate (breaths/min) 20

## 2019-08-31 NOTE — PROGRESS NOTES
[facility]  Physical Medicine & Rehab  Progress Note    Patient Name: Deonna Montero  MRN: [id number]  Patient Class: IP- Rehab   Admission Date: 8/14/2019  Length of Stay: 17 days  Attending Physician: Nicky Diamond MD    Subjective:     Principal Problem:  CVA   Interval History: 8/31/2019:  Patient is seen for follow-up rehab evaluation and recommendations: pt is 78 y.o female with dx of CVA, participating with therapy    HPI, Past Medical, Family, and Social History remains the same as documented in the initial encounter.    Scheduled Medications:    albuterol-ipratropium  3 mL Nebulization Q6H    amLODIPine  10 mg Oral Daily    aspirin  81 mg Oral Daily    brimonidine-timolol  1 drop Right Eye BID    donepezil  10 mg Oral QHS    enoxaparin  40 mg Subcutaneous Daily    guaiFENesin  600 mg Oral BID    insulin NPH  20 Units Subcutaneous BID AC    linaCLOtide  145 mcg Oral Daily    lisinopril  10 mg Oral QHS    meloxicam  7.5 mg Oral Daily    metformin  500 mg Oral BID WM    metoprolol succinate  50 mg Oral Daily    pantoprazole  40 mg Oral Daily    travoprost  1 drop Both Eyes QHS       PRN Medications: acetaminophen, cloNIDine, dextrose 50%, dextrose 50%, glucagon (human recombinant), glucose, glucose, insulin aspart U-100, lactulose, ondansetron, polyethylene glycol, ramelteon    Review of Systems   Constitutional: Negative.    HENT: Negative.    Eyes: Negative.    Respiratory: Negative.    Cardiovascular: Negative.    Gastrointestinal: Negative.    Endocrine: Negative.    Genitourinary: Negative.    Musculoskeletal: Negative.    Skin: Negative.    Allergic/Immunologic: Negative.    Neurological: Negative.    Hematological: Negative.    Psychiatric/Behavioral: Negative.      Objective:     Vital Signs (Most Recent):  Temp: 97.1 °F (36.2 °C) (08/31/19 0904)  Pulse: 78 (08/31/19 0904)  Resp: 17 (08/31/19 0904)  BP: 138/63 (08/31/19 0904)  SpO2: (!) 94 % (08/31/19  0904)    Vital Signs (24h Range):  Temp:  [97.1 °F (36.2 °C)-97.6 °F (36.4 °C)] 97.1 °F (36.2 °C)  Pulse:  [68-84] 78  Resp:  [14-20] 17  SpO2:  [94 %-96 %] 94 %  BP: (129-138)/(63-67) 138/63     Physical Exam   Constitutional: She is oriented to person, place, and time. She appears well-developed and well-nourished. No distress.   HENT:   Head: Normocephalic and atraumatic.   Right Ear: External ear normal.   Left Ear: External ear normal.   Nose: Nose normal.   Mouth/Throat: Oropharynx is clear and moist. No oropharyngeal exudate.   Eyes: Pupils are equal, round, and reactive to light. Conjunctivae and EOM are normal. Right eye exhibits no discharge. Left eye exhibits no discharge. No scleral icterus.   Neck: Normal range of motion. Neck supple. No JVD present. No tracheal deviation present. No thyromegaly present.   Cardiovascular: Normal rate, regular rhythm, normal heart sounds and intact distal pulses. Exam reveals no gallop and no friction rub.   No murmur heard.  Pulmonary/Chest: Effort normal and breath sounds normal. No stridor. No respiratory distress. She has no wheezes. She has no rales. She exhibits no tenderness.   Abdominal: Soft. Bowel sounds are normal. She exhibits no distension. There is no tenderness. There is no rebound and no guarding.   Genitourinary:   Genitourinary Comments: deferred   Musculoskeletal: Normal range of motion. She exhibits no edema, tenderness or deformity.   Lymphadenopathy:     She has no cervical adenopathy.   Neurological: She is alert and oriented to person, place, and time. No cranial nerve deficit. She exhibits normal muscle tone. Coordination normal.   Skin: No rash noted. She is not diaphoretic. No erythema. No pallor.   Psychiatric: She has a normal mood and affect. Her behavior is normal.     NEUROLOGICAL EXAMINATION:     MENTAL STATUS   Oriented to person, place, and time.     CRANIAL NERVES     CN III, IV, VI   Pupils are equal, round, and reactive to  light.  Extraocular motions are normal.       Assessment/Plan:      Deonna Montero is a 78 y.o. female admitted to inpatient rehabilitation on 8/14/2019 for <principal problem not specified> with impaired mobility and ADLs. Patient remains appropriate for PT, OT, and as required Speech therapy. Patient continues to require 24 hour nursing care as well as daily Physician assessment.    Active Diagnoses:    Diagnosis Date Noted POA    CVA (cerebral vascular accident) [I63.9] 08/14/2019 Yes    Type 2 diabetes mellitus without complication [E11.9] 05/02/2018 Yes      Problems Resolved During this Admission:     CVA, cont therapy  HTN, vitals noted, cont current med  DVT prophylaxis, cont sq Lovenox  DM, accu check noted, cont current med  Anticoagulation, cont asa     DISCHARGE PLANNING:  Tentative Discharge Date:     Future Appointments   Date Time Provider Department Center   9/10/2019 12:40 PM Sanjana Zambrano MD [facility] UROLOGY Antonito Raghu Joy MD  Department of Physical Medicine & Rehab  [facility]

## 2019-08-31 NOTE — PT/OT/SLP PROGRESS
Physical Therapy         Treatment        Deonna Montero   MRN: [id number]     PT Received On: 08/31/19  Total Time (min): 35        Billable Minutes:  Gait [id number] and Therapeutic Exercise 25  Total Minutes: 35    Treatment Type: Treatment  PT/PTA: PTA     PTA Visit Number: 1       General Precautions: Standard, aspiration, diabetic, fall, vision impaired  Orthopedic Precautions: Orthopedic Precautions : N/A   Braces:           Subjective:  Communicated with nurse prior to session.         Objective:  Patient found sitting up in w/c,     Functional Mobility:  Bed Mobility:   Supine to sit: Activity did not occur   Sit to supine: Activity did not occur   Rolling: Activity did not occur   Scooting: Standby Assistance    Transfer Training:  Sit to stand:Minimal Assistance with Rolling Walker verbal cues to reah for w/c prior to sitting and to lower self down slowly.    Wheelchair Training:  Pt propelled Standard wheelchair x 50 feet on Level tile with  Left upper extremity and Left lower extremity with Minimal Assistance.     Gait Training:  Patient gait trained FWB/WBAT: bilateral upper and lower extremity 36' and 72'  feet on level tile with Rolling Walker with Minimal Assistance.  Pt with demonstarting a  swing-to gait with decreased parmjit, increased time in double stance, decreased velocity of limb motion, decreased step length, decreased stride length, decreased swing-to-stance ratio, decreased toe-to-floor clearance and decreased weight-shifting ability.Impairments contributing to gait deviations include impaired balance, impaired coordination, decreased flexibility, impaired motor control, impaired postural control, decreased ROM and decreased strength        Additional Treatment:  scifit attempted but pt getting stomach cramps  Seated there ex 2# B LE x 30 reps    Activity Tolerance:  Patient tolerated treatment well    Patient left up in chair with call button in reach and chair alarm  on.    Assessment:  Deonna Montero is a 78 y.o. female with a medical diagnosis of <principal problem not specified>. She presents with .    Rehab potential is good.    Activity tolerance: Good    Discharge recommendations:       Equipment recommendations:       GOALS:   Multidisciplinary Problems     Physical Therapy Goals        Problem: Physical Therapy Goal    Goal Priority Disciplines Outcome Goal Variances Interventions   Physical Therapy Goal     PT, PT/OT Ongoing (interventions implemented as appropriate)     Description:  Goals to be met by: 2019    Patient will increase functional independence with mobility by performin. Supine to sit with Stand-by Assistance  2. Sit to supine with Stand-by Assistance  3. Sit to stand transfer with Stand-by Assistance  4. Bed to chair transfer with Supervision using Rolling Walker  5. Gait  x 150 feet with Supervision using Rolling Walker.   6. Wheelchair propulsion x150 feet with Modified [place] using bilateral uppper extremities                      PLAN:    Patient to be seen daily  to address the above listed problems via gait training, therapeutic activities, therapeutic exercises, therapeutic groups, wheelchair management/training, neuromuscular re-education  Plan of Care expires: 19  Plan of Care reviewed with: patient         Elizabethjoyce RomeroAndreas, PTA 2019

## 2019-08-31 NOTE — CARE UPDATE
08/31/19 1840   Patient Assessment/Suction   Level of Consciousness (AVPU) alert   Respiratory Effort Normal;Unlabored   Expansion/Accessory Muscles/Retractions no use of accessory muscles   All Lung Fields Breath Sounds clear   Rhythm/Pattern, Respiratory no shortness of breath reported   Cough Frequency infrequent   PRE-TX-O2   O2 Device (Oxygen Therapy) room air   SpO2 95 %   Pulse Oximetry Type Intermittent   $ Pulse Oximetry - Multiple Charge Pulse Oximetry - Multiple   Pulse 70   Resp 16   Aerosol Therapy   $ Aerosol Therapy Charges Aerosol Treatment   Daily Review of Necessity (SVN) completed   Respiratory Treatment Status (SVN) given   Treatment Route (SVN) mask   Patient Position (SVN) semi-Milian's   Post Treatment Assessment (SVN) breath sounds improved   Signs of Intolerance (SVN) none   Breath Sounds Post-Respiratory Treatment   Post-treatment Heart Rate (beats/min) 76   Post-treatment Resp Rate (breaths/min) 16   Incentive Spirometer   $ Incentive Spirometer Charges done with encouragement   Incentive Spirometer Predicted Level (mL) 1000   Administration (IS) proper technique demonstrated   Number of Repetitions (IS) 10   Level Incentive Spirometer (mL) 600   Patient Tolerance (IS) good

## 2019-08-31 NOTE — PLAN OF CARE
Problem: Physical Therapy Goal  Goal: Physical Therapy Goal  Goals to be met by: 2019    Patient will increase functional independence with mobility by performin. Supine to sit with Stand-by Assistance  2. Sit to supine with Stand-by Assistance  3. Sit to stand transfer with Stand-by Assistance  4. Bed to chair transfer with Supervision using Rolling Walker  5. Gait  x 150 feet with Supervision using Rolling Walker.   6. Wheelchair propulsion x150 feet with Modified [place] using bilateral uppper extremities     Outcome: Ongoing (interventions implemented as appropriate)  Continue with there ex, gait training, transfer training, w/c mobility per POC

## 2019-08-31 NOTE — PLAN OF CARE
Problem: Adult Inpatient Plan of Care  Goal: Plan of Care Review  Outcome: Ongoing (interventions implemented as appropriate)  AAO tolerating therapy well no acute distress noted at this time

## 2019-09-01 LAB
POCT GLUCOSE: 180 MG/DL (ref 70–110)
POCT GLUCOSE: 84 MG/DL (ref 70–110)

## 2019-09-01 PROCEDURE — 99900035 HC TECH TIME PER 15 MIN (STAT)

## 2019-09-01 PROCEDURE — 25000003 PHARM REV CODE 250: Performed by: PHYSICAL MEDICINE & REHABILITATION

## 2019-09-01 PROCEDURE — 94799 UNLISTED PULMONARY SVC/PX: CPT

## 2019-09-01 PROCEDURE — 12800000 HC REHAB SEMI-PRIVATE ROOM

## 2019-09-01 PROCEDURE — 25000242 PHARM REV CODE 250 ALT 637 W/ HCPCS: Performed by: PHYSICAL MEDICINE & REHABILITATION

## 2019-09-01 PROCEDURE — 63600175 PHARM REV CODE 636 W HCPCS: Performed by: PHYSICAL MEDICINE & REHABILITATION

## 2019-09-01 PROCEDURE — 94640 AIRWAY INHALATION TREATMENT: CPT

## 2019-09-01 RX ADMIN — METFORMIN HYDROCHLORIDE 500 MG: 500 TABLET ORAL at 09:09

## 2019-09-01 RX ADMIN — LISINOPRIL 10 MG: 10 TABLET ORAL at 09:09

## 2019-09-01 RX ADMIN — ASPIRIN 81 MG CHEWABLE TABLET 81 MG: 81 TABLET CHEWABLE at 09:09

## 2019-09-01 RX ADMIN — DONEPEZIL HYDROCHLORIDE 10 MG: 5 TABLET, FILM COATED ORAL at 09:09

## 2019-09-01 RX ADMIN — METOPROLOL SUCCINATE 50 MG: 50 TABLET, EXTENDED RELEASE ORAL at 09:09

## 2019-09-01 RX ADMIN — LINACLOTIDE 145 MCG: 145 CAPSULE, GELATIN COATED ORAL at 09:09

## 2019-09-01 RX ADMIN — GUAIFENESIN 600 MG: 600 TABLET, EXTENDED RELEASE ORAL at 09:09

## 2019-09-01 RX ADMIN — ENOXAPARIN SODIUM 40 MG: 100 INJECTION SUBCUTANEOUS at 09:09

## 2019-09-01 RX ADMIN — RAMELTEON 8 MG: 8 TABLET, FILM COATED ORAL at 09:09

## 2019-09-01 RX ADMIN — IPRATROPIUM BROMIDE AND ALBUTEROL SULFATE 3 ML: .5; 3 SOLUTION RESPIRATORY (INHALATION) at 06:09

## 2019-09-01 RX ADMIN — TRAVOPROST 1 DROP: 0.04 SOLUTION/ DROPS OPHTHALMIC at 09:09

## 2019-09-01 RX ADMIN — IPRATROPIUM BROMIDE AND ALBUTEROL SULFATE 3 ML: .5; 3 SOLUTION RESPIRATORY (INHALATION) at 12:09

## 2019-09-01 RX ADMIN — PANTOPRAZOLE SODIUM 40 MG: 40 TABLET, DELAYED RELEASE ORAL at 09:09

## 2019-09-01 RX ADMIN — BRIMONIDINE TARTRATE AND TIMOLOL MALEATE 1 DROP: 2; 5 SOLUTION OPHTHALMIC at 09:09

## 2019-09-01 RX ADMIN — AMLODIPINE BESYLATE 10 MG: 5 TABLET ORAL at 09:09

## 2019-09-01 RX ADMIN — HUMAN INSULIN 20 UNITS: 100 INJECTION, SUSPENSION SUBCUTANEOUS at 09:09

## 2019-09-01 RX ADMIN — MELOXICAM 7.5 MG: 7.5 TABLET ORAL at 09:09

## 2019-09-01 NOTE — PLAN OF CARE
Problem: Adult Inpatient Plan of Care  Goal: Plan of Care Review  Outcome: Ongoing (interventions implemented as appropriate)  Plan of care reviewed with patient. No complaints during night. Right sided weakness noted. Remains free from falls/injury. Instructed to call for assistance as needed during night. Verbalized understanding. Call light in reach, bed alarm in use.

## 2019-09-01 NOTE — PLAN OF CARE
Problem: Adult Inpatient Plan of Care  Goal: Plan of Care Review  Outcome: Ongoing (interventions implemented as appropriate)  Swallow precautions reviewed with son off unit at this time for theraputic pass

## 2019-09-01 NOTE — NURSING
Leaving unit at this time with son for theraputic pass PT assist with car transfers wheel chair and gait belt taken

## 2019-09-01 NOTE — PROGRESS NOTES
[facility]  Physical Medicine & Rehab  Progress Note    Patient Name: Deonna Montero  MRN: [id number]  Patient Class: IP- Rehab   Admission Date: 8/14/2019  Length of Stay: 18 days  Attending Physician: Nicky Diamond MD    Subjective:     Principal Problem: CVA     Interval History: 9/1/2019:  Patient is seen for follow-up rehab evaluation and recommendations: pt is 78 y.o female with dx of CVA, participating with therapy    HPI, Past Medical, Family, and Social History remains the same as documented in the initial encounter.    Scheduled Medications:    albuterol-ipratropium  3 mL Nebulization Q6H    amLODIPine  10 mg Oral Daily    aspirin  81 mg Oral Daily    brimonidine-timolol  1 drop Right Eye BID    donepezil  10 mg Oral QHS    enoxaparin  40 mg Subcutaneous Daily    guaiFENesin  600 mg Oral BID    insulin NPH  20 Units Subcutaneous BID AC    linaCLOtide  145 mcg Oral Daily    lisinopril  10 mg Oral QHS    meloxicam  7.5 mg Oral Daily    metformin  500 mg Oral BID WM    metoprolol succinate  50 mg Oral Daily    pantoprazole  40 mg Oral Daily    travoprost  1 drop Both Eyes QHS       PRN Medications: acetaminophen, cloNIDine, dextrose 50%, dextrose 50%, glucagon (human recombinant), glucose, glucose, insulin aspart U-100, lactulose, ondansetron, polyethylene glycol, ramelteon    Review of Systems   Constitutional: Negative.    HENT: Negative.    Eyes: Negative.    Respiratory: Negative.    Cardiovascular: Negative.    Gastrointestinal: Negative.    Endocrine: Negative.    Genitourinary: Negative.    Musculoskeletal: Negative.    Skin: Negative.    Allergic/Immunologic: Negative.    Neurological: Negative.    Hematological: Negative.    Psychiatric/Behavioral: Negative.      Objective:     Vital Signs (Most Recent):  Temp: 96.9 °F (36.1 °C) (09/01/19 0903)  Pulse: 80 (09/01/19 0903)  Resp: 18 (09/01/19 0903)  BP: 125/60 (09/01/19 0903)  SpO2: (!) 93 % (09/01/19  0903)    Vital Signs (24h Range):  Temp:  [96.9 °F (36.1 °C)-98.2 °F (36.8 °C)] 96.9 °F (36.1 °C)  Pulse:  [69-83] 80  Resp:  [12-18] 18  SpO2:  [93 %-95 %] 93 %  BP: (125-142)/(60) 125/60     Physical Exam   Constitutional: She is oriented to person, place, and time. She appears well-developed and well-nourished. No distress.   HENT:   Head: Normocephalic and atraumatic.   Right Ear: External ear normal.   Left Ear: External ear normal.   Nose: Nose normal.   Mouth/Throat: Oropharynx is clear and moist. No oropharyngeal exudate.   Eyes: Pupils are equal, round, and reactive to light. Conjunctivae and EOM are normal. Right eye exhibits no discharge. Left eye exhibits no discharge. No scleral icterus.   Neck: Normal range of motion. Neck supple. No JVD present. No tracheal deviation present. No thyromegaly present.   Cardiovascular: Normal rate, regular rhythm, normal heart sounds and intact distal pulses. Exam reveals no gallop and no friction rub.   No murmur heard.  Pulmonary/Chest: Effort normal and breath sounds normal. No stridor. No respiratory distress. She has no wheezes. She has no rales. She exhibits no tenderness.   Abdominal: Soft. Bowel sounds are normal. She exhibits no distension. There is no tenderness. There is no rebound and no guarding.   Genitourinary:   Genitourinary Comments: deferred   Musculoskeletal: Normal range of motion. She exhibits no edema, tenderness or deformity.   Lymphadenopathy:     She has no cervical adenopathy.   Neurological: She is alert and oriented to person, place, and time. No cranial nerve deficit or sensory deficit. She exhibits normal muscle tone. Coordination normal.   Skin: No rash noted. She is not diaphoretic. No erythema. No pallor.   Psychiatric: She has a normal mood and affect. Her behavior is normal.     NEUROLOGICAL EXAMINATION:     MENTAL STATUS   Oriented to person, place, and time.     CRANIAL NERVES     CN III, IV, VI   Pupils are equal, round, and  reactive to light.  Extraocular motions are normal.       Assessment/Plan:      Deonna Montero is a 78 y.o. female admitted to inpatient rehabilitation on 8/14/2019 for <principal problem not specified> with impaired mobility and ADLs. Patient remains appropriate for PT, OT, and as required Speech therapy. Patient continues to require 24 hour nursing care as well as daily Physician assessment.    Active Diagnoses:    Diagnosis Date Noted POA    CVA (cerebral vascular accident) [I63.9] 08/14/2019 Yes    Type 2 diabetes mellitus without complication [E11.9] 05/02/2018 Yes      Problems Resolved During this Admission:     CVA , cont therapy  HTN, vitals nted, cont current med  DM, accu check  Noted, cont current med  DVT  Prophylaxis, cont sq Lovenox  Anticoagulation, cont asa       DISCHARGE PLANNING:  Tentative Discharge Date:     Future Appointments   Date Time Provider Department Center   9/10/2019 12:40 PM Sanjana Zambrano MD [facility] UROLOGY [place] Raghu Joy MD  Department of Physical Medicine & Rehab  [facility]

## 2019-09-01 NOTE — NURSING
Family on unit for  for theraputic pass Suraj Montero son here. Instructed on swallowing precautions instructed pureed diet to be given with nectar thick liquids only. Instructed that patient to be monitored while in bathroom and assistance to be required for all transfers.

## 2019-09-02 LAB
BASOPHILS # BLD AUTO: 0.02 K/UL (ref 0–0.2)
BASOPHILS NFR BLD: 0.6 % (ref 0–1.9)
DIFFERENTIAL METHOD: ABNORMAL
EOSINOPHIL # BLD AUTO: 0.1 K/UL (ref 0–0.5)
EOSINOPHIL NFR BLD: 2.1 % (ref 0–8)
ERYTHROCYTE [DISTWIDTH] IN BLOOD BY AUTOMATED COUNT: 13.7 % (ref 11.5–14.5)
HCT VFR BLD AUTO: 37.1 % (ref 37–48.5)
HGB BLD-MCNC: 11.8 G/DL (ref 12–16)
IMM GRANULOCYTES # BLD AUTO: 0 K/UL (ref 0–0.04)
LYMPHOCYTES # BLD AUTO: 1.4 K/UL (ref 1–4.8)
LYMPHOCYTES NFR BLD: 42.4 % (ref 18–48)
MCH RBC QN AUTO: 28.2 PG (ref 27–31)
MCHC RBC AUTO-ENTMCNC: 31.8 G/DL (ref 32–36)
MCV RBC AUTO: 89 FL (ref 82–98)
MONOCYTES # BLD AUTO: 0.4 K/UL (ref 0.3–1)
MONOCYTES NFR BLD: 12.2 % (ref 4–15)
NEUTROPHILS # BLD AUTO: 1.4 K/UL (ref 1.8–7.7)
NEUTROPHILS NFR BLD: 42.7 % (ref 38–73)
NRBC BLD-RTO: 0 /100 WBC
PLATELET # BLD AUTO: 187 K/UL (ref 150–350)
PMV BLD AUTO: 12 FL (ref 9.2–12.9)
POCT GLUCOSE: 145 MG/DL (ref 70–110)
POCT GLUCOSE: 207 MG/DL (ref 70–110)
RBC # BLD AUTO: 4.19 M/UL (ref 4–5.4)
VIT B12 SERPL-MCNC: 666 PG/ML (ref 210–950)
WBC # BLD AUTO: 3.28 K/UL (ref 3.9–12.7)

## 2019-09-02 PROCEDURE — 99232 SBSQ HOSP IP/OBS MODERATE 35: CPT | Mod: ,,, | Performed by: PHYSICAL MEDICINE & REHABILITATION

## 2019-09-02 PROCEDURE — 97803 MED NUTRITION INDIV SUBSEQ: CPT

## 2019-09-02 PROCEDURE — 97116 GAIT TRAINING THERAPY: CPT

## 2019-09-02 PROCEDURE — 94761 N-INVAS EAR/PLS OXIMETRY MLT: CPT

## 2019-09-02 PROCEDURE — 25000003 PHARM REV CODE 250: Performed by: PHYSICAL MEDICINE & REHABILITATION

## 2019-09-02 PROCEDURE — 85025 COMPLETE CBC W/AUTO DIFF WBC: CPT

## 2019-09-02 PROCEDURE — 12800000 HC REHAB SEMI-PRIVATE ROOM

## 2019-09-02 PROCEDURE — 36415 COLL VENOUS BLD VENIPUNCTURE: CPT

## 2019-09-02 PROCEDURE — 97530 THERAPEUTIC ACTIVITIES: CPT

## 2019-09-02 PROCEDURE — 84165 PROTEIN E-PHORESIS SERUM: CPT

## 2019-09-02 PROCEDURE — 94799 UNLISTED PULMONARY SVC/PX: CPT

## 2019-09-02 PROCEDURE — 97110 THERAPEUTIC EXERCISES: CPT

## 2019-09-02 PROCEDURE — 97535 SELF CARE MNGMENT TRAINING: CPT

## 2019-09-02 PROCEDURE — 25000242 PHARM REV CODE 250 ALT 637 W/ HCPCS: Performed by: PHYSICAL MEDICINE & REHABILITATION

## 2019-09-02 PROCEDURE — 63600175 PHARM REV CODE 636 W HCPCS: Performed by: PHYSICAL MEDICINE & REHABILITATION

## 2019-09-02 PROCEDURE — 84165 PROTEIN E-PHORESIS SERUM: CPT | Mod: 26,,, | Performed by: PATHOLOGY

## 2019-09-02 PROCEDURE — 82747 ASSAY OF FOLIC ACID RBC: CPT

## 2019-09-02 PROCEDURE — 97542 WHEELCHAIR MNGMENT TRAINING: CPT

## 2019-09-02 PROCEDURE — 99232 PR SUBSEQUENT HOSPITAL CARE,LEVL II: ICD-10-PCS | Mod: ,,, | Performed by: PHYSICAL MEDICINE & REHABILITATION

## 2019-09-02 PROCEDURE — 99900035 HC TECH TIME PER 15 MIN (STAT)

## 2019-09-02 PROCEDURE — 92507 TX SP LANG VOICE COMM INDIV: CPT

## 2019-09-02 PROCEDURE — 84165 PATHOLOGIST INTERPRETATION SPE: ICD-10-PCS | Mod: 26,,, | Performed by: PATHOLOGY

## 2019-09-02 PROCEDURE — 82607 VITAMIN B-12: CPT

## 2019-09-02 PROCEDURE — 94640 AIRWAY INHALATION TREATMENT: CPT

## 2019-09-02 RX ORDER — IPRATROPIUM BROMIDE AND ALBUTEROL SULFATE 2.5; .5 MG/3ML; MG/3ML
3 SOLUTION RESPIRATORY (INHALATION) EVERY 6 HOURS PRN
Status: DISCONTINUED | OUTPATIENT
Start: 2019-09-02 | End: 2019-09-05 | Stop reason: HOSPADM

## 2019-09-02 RX ORDER — POLYETHYLENE GLYCOL 3350 17 G/17G
17 POWDER, FOR SOLUTION ORAL DAILY
Status: DISCONTINUED | OUTPATIENT
Start: 2019-09-02 | End: 2019-09-05 | Stop reason: HOSPADM

## 2019-09-02 RX ADMIN — GUAIFENESIN 600 MG: 600 TABLET, EXTENDED RELEASE ORAL at 09:09

## 2019-09-02 RX ADMIN — METOPROLOL SUCCINATE 50 MG: 50 TABLET, EXTENDED RELEASE ORAL at 09:09

## 2019-09-02 RX ADMIN — ENOXAPARIN SODIUM 40 MG: 100 INJECTION SUBCUTANEOUS at 04:09

## 2019-09-02 RX ADMIN — IPRATROPIUM BROMIDE AND ALBUTEROL SULFATE 3 ML: .5; 3 SOLUTION RESPIRATORY (INHALATION) at 07:09

## 2019-09-02 RX ADMIN — LISINOPRIL 10 MG: 10 TABLET ORAL at 08:09

## 2019-09-02 RX ADMIN — RAMELTEON 8 MG: 8 TABLET, FILM COATED ORAL at 08:09

## 2019-09-02 RX ADMIN — PANTOPRAZOLE SODIUM 40 MG: 40 TABLET, DELAYED RELEASE ORAL at 09:09

## 2019-09-02 RX ADMIN — METFORMIN HYDROCHLORIDE 500 MG: 500 TABLET ORAL at 09:09

## 2019-09-02 RX ADMIN — METFORMIN HYDROCHLORIDE 500 MG: 500 TABLET ORAL at 04:09

## 2019-09-02 RX ADMIN — BRIMONIDINE TARTRATE AND TIMOLOL MALEATE 1 DROP: 2; 5 SOLUTION OPHTHALMIC at 08:09

## 2019-09-02 RX ADMIN — MELOXICAM 7.5 MG: 7.5 TABLET ORAL at 09:09

## 2019-09-02 RX ADMIN — GUAIFENESIN 600 MG: 600 TABLET, EXTENDED RELEASE ORAL at 08:09

## 2019-09-02 RX ADMIN — HUMAN INSULIN 20 UNITS: 100 INJECTION, SUSPENSION SUBCUTANEOUS at 06:09

## 2019-09-02 RX ADMIN — TRAVOPROST 1 DROP: 0.04 SOLUTION/ DROPS OPHTHALMIC at 08:09

## 2019-09-02 RX ADMIN — BRIMONIDINE TARTRATE AND TIMOLOL MALEATE 1 DROP: 2; 5 SOLUTION OPHTHALMIC at 01:09

## 2019-09-02 RX ADMIN — HUMAN INSULIN 20 UNITS: 100 INJECTION, SUSPENSION SUBCUTANEOUS at 04:09

## 2019-09-02 RX ADMIN — ASPIRIN 81 MG CHEWABLE TABLET 81 MG: 81 TABLET CHEWABLE at 09:09

## 2019-09-02 RX ADMIN — DONEPEZIL HYDROCHLORIDE 10 MG: 5 TABLET, FILM COATED ORAL at 08:09

## 2019-09-02 RX ADMIN — IPRATROPIUM BROMIDE AND ALBUTEROL SULFATE 3 ML: .5; 3 SOLUTION RESPIRATORY (INHALATION) at 12:09

## 2019-09-02 RX ADMIN — POLYETHYLENE GLYCOL 3350 17 G: 17 POWDER, FOR SOLUTION ORAL at 09:09

## 2019-09-02 NOTE — PT/OT/SLP PROGRESS
Occupational Therapy  Treatment    Deonna Montero   MRN: [id number]   Admitting Diagnosis: <principal problem not specified>    OT Date of Treatment: 09/02/19   Total Time (min): 65 min      Billable Minutes:  Self Care/Home Management 50 and Therapeutic Activity 15  Total Minutes: 65    General Precautions: Standard, aphasia, diabetic, fall, nectar thick, pureed diet, vision impaired, aspiration  Orthopedic Precautions: N/A  Braces: N/A    Spiritual, Cultural Beliefs, Episcopal Practices, Values that Affect Care: no    Subjective:  Communicated with nurse Javi prior to session.    Pain/Comfort  Pain Rating 1: 0/10  Pain Rating Post-Intervention 1: 0/10    Objective:  Patient found with: (chair alarm)    Functional Mobility:  Bed Mobility:   Supine to sit: Activity did not occur   Sit to supine: Activity did not occur   Rolling: Activity did not occur   Scooting: Activity did not occur    Transfer Training:   Sit to stand:Contact Guard Assistance and cues for hand placement from wheelchair at shower with Grab bars    Patient performed shower transfer Stand Pivot with Contact Guard Assistance with Grab bars.    Grooming:  Patient performed face washing with Supervision or Set-up Assistance at seated in shower.  Patient performed oral hygeine with Supervision or Set-up Assistance at sitting at sink.  Patient performe hair grooming with Supervision or Set-up Assistance at sitting at sink.    Bathing:  Patient performed bathing with Supervision for all UB and fronts of B legs and B feet and minimal assistance in standing to wash and dry buttocks and backs of upper legs with shower chair at Shower with good use of grab bar for safety.    UE Dressing:  Patient performed UE Dressing with Supervision or Set-up Assistance with   at Wheelchair.    LE Dressing:  Patient don/doffed socks with Contact Guard Assistance and cues  with sock aid, Patient don/doffed shoes with Supervision or Set-up Assistance, Patient  performed don/doffed adult brief with Moderate Assistance and cues for use of dressing stick and to pull over hips standing and Patient performed don/doffed pants with Minimal Assistance. All tasks performed seated in or standing in front of wheelchair at bedside, using raised bed rail to steady herself.      Balance:   Static Sit: GOOD: Takes MODERATE challenges from all directions  Dynamic Sit:  GOOD: Maintains balance through MODERATE excursions of active trunk movement  Static Stand: FAIR: Maintains without assist but unable to take challenges  Dynamic stand: FAIR: Needs CONTACT GUARD during gait    Additional Treatment:  Pt propelled wheelchair from room to nursing station with SBA to CGA at slow pace.    Patient left up in chair with nurse Javi  present and at dining room table for lunch    ASSESSMENT:  Deonna Montero is a 78 y.o. female with a medical diagnosis of <principal problem not specified> and presents with decreased standing balance and problem solving skills with a couple of emotional outbursts when interrupted while performing ADL tasks. However she displayed good safety awareness with all tasks. Pt put forth good effort with all treatment activities. Continue OT treatment to maximize safety & independence with ADLs.    Rehab potential is good    Activity tolerance: Good    Discharge recommendations: home health OT, home     Equipment recommendations: (TBD)     GOALS:   Multidisciplinary Problems     Occupational Therapy Goals        Problem: Occupational Therapy Goal    Goal Priority Disciplines Outcome Interventions   Occupational Therapy Goal     OT, PT/OT Ongoing (interventions implemented as appropriate)    Description:  Goals to be met by:    Patient will increase functional independence with ADLs by performing:    Feeding with Modified [place].  UE Dressing with Set-up Assistance.  LE Dressing with Stand-by Assistance with set up of clothes.  Grooming while seated with  Modified [place].  Toileting from bedside commode over the toilet with Supervision for hygiene and clothing management.   Toilet transfer to bedside commode over the toilet with Contact Guard Assistance.  Bathing from  shower chair/bench with Minimal Assistanc.  Shower transfer to TTB with CGA and verbal instructions.                    Plan:  Patient to be seen 6 x/week to address the above listed problems via self-care/home management, community/work re-entry, therapeutic activities, therapeutic exercises, cognitive retraining, therapeutic groups, neuromuscular re-education, wheelchair management/training  Plan of Care expires: 09/05/19  Plan of Care reviewed with: patient         Fabi MYA Dempsey, ERENDIRA  09/02/2019

## 2019-09-02 NOTE — PLAN OF CARE
Problem: Physical Therapy Goal  Goal: Physical Therapy Goal  Goals to be met by: 2019    Patient will increase functional independence with mobility by performin. Supine to sit with Stand-by Assistance  2. Sit to supine with Stand-by Assistance  3. Sit to stand transfer with Stand-by Assistance  4. Bed to chair transfer with Supervision using Rolling Walker  5. Gait  x 150 feet with Supervision using Rolling Walker.   6. Wheelchair propulsion x150 feet with Modified [place] using bilateral uppper extremities     Outcome: Ongoing (interventions implemented as appropriate)  Patient may benefit from continued skilled PT to address noted impairments and improve functional mobility.

## 2019-09-02 NOTE — PLAN OF CARE
Problem: Adult Inpatient Plan of Care  Goal: Plan of Care Review  Outcome: Ongoing (interventions implemented as appropriate)  Pain is well controlled, slept well and safety maintained. No overnight issues or events.

## 2019-09-02 NOTE — PLAN OF CARE
Problem: SLP Goal  Goal: SLP Goal  1. Demonstrate understanding of speech subsystems, STBY A  2. MET -- Complete controlled exhalation tasks to enhance control of respiration for speech production, MOD A   3. MET -- Coordinate initiation of phonation with exhalation to improve naturalness of speech production, MOD A  4. MET (with min-mod cues)-- Progressing from multisyllabic words to connected utterances, Pt will demonstrate control of speech subsystems in order to achieve appropriate coordination of speech subsystems for natural speech production, MAX A- pending goals 2 &3   5. ONGOING (IMPAIRED) -- Participate in assessment of functional problem solving    6. MET (zero to min cues) Demonstrate understanding of swallow safety precautions with 80% accuracy, min cues.  7. Pt will per form target oral exercises for improved labial/lingual strength for increased swallow efficiency with min cues, 70% accuracy, 5-10 reps.  8. Pt will perform articulation drills focusing on increasing strength of articulatory contacts in initial, medial, final positions of single-syllable and two-syllable words, given clinician model, 80% accuracy (for improved speech intelligibility)  9. Pt will perform target dysphagia exercises ( effortful swallows, Mendelsohn maneuver and second swallows with food/liquid); Mendelsohn maneuver, Ellyn task without food ) with model 80% accuracy, for increased swallow safety (based on results of prior modified barium swallow).    10. Modified -- Tolerate puree with nectar thick liquid, swallowing purees and nectar (by cup sip only) using second swallows, chin down for nectar liquid swallows, without sign of aspiration 80% accuracy.    Outcome: Ongoing (interventions implemented as appropriate)  Continue POC

## 2019-09-02 NOTE — PT/OT/SLP PROGRESS
Physical Therapy         Treatment        Deonna Montero   MRN: [id number]     PT Received On: 09/02/19  Total Time (min): 75        Billable Minutes:  Gait [id number], Therapeutic Exercise 45 and Train/Wheelchair Management 10  Total Minutes: 75    Treatment Type: Treatment  PT/PTA: PT         General Precautions: Standard, fall, aphasia, aspiration, vision impaired, nectar thick, pureed diet, diabetic  Orthopedic Precautions: Orthopedic Precautions : N/A   Braces: Braces: N/A         Subjective:  Communicated with primary nurse Javi prior to session.    Pain/Comfort  Pain Rating 1: 0/10    Objective:  Patient found seated in w/c     Functional Mobility:    Transfer Training:  Sit to stand:Minimal Assistance with Rolling Walker and cues for proper technique    Wheelchair Training:  Pt propelled Standard wheelchair x 200, 75 feet on Level tile with  Bilateral upper extremity and Bilateral lower extremity with Stand-by Assistance and mod/max cueing    Gait Training:  Ambulated 100 ft x 2 c/ RW at min A/CGA with cues    Additional Treatment:    Seated therex: x 30   Ankle pumps   LAQ 3#   Hip marches 3#   Hamstring curls BTB    Hip adduction ball squeezes    Hip abduction clamshells GTB    Scifit stepper x 10 min    Activity Tolerance:  Patient tolerated treatment well    Patient left up in chair with call button in reach, chair alarm on and primary nurse notified.    Assessment:  Deonna Montero is a 78 y.o. female with a medical diagnosis of CVA. She presents with weakness, decreased endurance, impaired balance, decreased activity tolerance, visual impairment, gait instability, and impaired functional mobility. Patient may benefit from continued skilled PT to address noted impairments and improve functional mobility.    Rehab potential is good.    Activity tolerance: Good    Discharge recommendations:       Equipment recommendations:       GOALS:   Multidisciplinary Problems     Physical Therapy Goals         Problem: Physical Therapy Goal    Goal Priority Disciplines Outcome Goal Variances Interventions   Physical Therapy Goal     PT, PT/OT Ongoing (interventions implemented as appropriate)     Description:  Goals to be met by: 2019    Patient will increase functional independence with mobility by performin. Supine to sit with Stand-by Assistance  2. Sit to supine with Stand-by Assistance  3. Sit to stand transfer with Stand-by Assistance  4. Bed to chair transfer with Supervision using Rolling Walker  5. Gait  x 150 feet with Supervision using Rolling Walker.   6. Wheelchair propulsion x150 feet with Modified [place] using bilateral uppper extremities                      PLAN:    Patient to be seen daily  to address the above listed problems via gait training, therapeutic activities, therapeutic exercises, therapeutic groups, canalith reposition procedure, wheelchair management/training, neuromuscular re-education  Plan of Care expires: 19  Plan of Care reviewed with: patient         Tuyet Danilo, PT 2019

## 2019-09-02 NOTE — PT/OT/SLP PROGRESS
"Speech Language Pathology Treatment    Patient Name:  Deonna Montero   MRN:  [id number]  Admitting Diagnosis: <principal problem not specified>    Recommendations:                 General Recommendations:  Dysphagia therapy, Speech/language therapy and Cognitive-linguistic therapy  Diet recommendations:  Puree, Liquid Diet Level: Nectar Thick   Aspiration Precautions: Aspiration Precautions:   1 bite/sip at a time, Alternating bites/sips, constant supervision/Assistance with meals, Assistance with thickening liquids,  Avoid talking while eating, Chin tuck with liquid, Double swallow with each bite/sip, Eliminate distractions, Frequent oral care, HOB to 90 degrees (preferably up in chair in dining area for meals), Meds crushed in puree, Remain upright 30 minutes post meal and Strict aspiration precautions   General Precautions: Standard, aspiration, fall  Communication strategies:  None     Subjective     "Are we gonna practice my pills?"  "I'll be long gone but you all will still remember me. I've been a disruption today."  Patient goals: improve independence      Pain/Comfort:  ·      Objective:     Has the patient been evaluated by SLP for swallowing?   Yes  Keep patient NPO? No   Current Respiratory Status: room air      Pill management task revisited per Pt request. Given Pt's significant difficulty with task previously, task skills were introduced and completed in simple context with Pt achieving 90% acc in identification of medication frequencies, doses, and other pertinent information. Once accuracy was achieved, carry over attempted with Pt completing tangible pill sequencing of reviewed medications. Consistent with previous attempt, Pt became fixated on specific pill frequency and was easily angered. With attempts to redirect and terminate task, Pt increasingly more agitated. Assistance from NSG requested to enhance understanding of task expectations. Ultimately, Pt was not receptive to information and " continued to perseverate on event upon termination of session even requesting clinician to provide task to visitors in order for them to demonstrate. Visitors exhibited performance consistent with NSG and SLP. Pt then appearing apologetic.     Assessment:     Deonna Montero is a 78 y.o. female with an SLP diagnosis of Dysphagia, Dysarthria and Cognitive-Linguistic Impairment.      Goals:   Multidisciplinary Problems     SLP Goals        Problem: SLP Goal    Goal Priority Disciplines Outcome   SLP Goal     SLP Ongoing (interventions implemented as appropriate)   Description:  1. Demonstrate understanding of speech subsystems, STBY A  2. MET -- Complete controlled exhalation tasks to enhance control of respiration for speech production, MOD A   3. MET -- Coordinate initiation of phonation with exhalation to improve naturalness of speech production, MOD A  4. MET (with min-mod cues)-- Progressing from multisyllabic words to connected utterances, Pt will demonstrate control of speech subsystems in order to achieve appropriate coordination of speech subsystems for natural speech production, MAX A- pending goals 2 &3   5. ONGOING (IMPAIRED) -- Participate in assessment of functional problem solving    6. MET (zero to min cues) Demonstrate understanding of swallow safety precautions with 80% accuracy, min cues.  7. Pt will per form target oral exercises for improved labial/lingual strength for increased swallow efficiency with min cues, 70% accuracy, 5-10 reps.  8. Pt will perform articulation drills focusing on increasing strength of articulatory contacts in initial, medial, final positions of single-syllable and two-syllable words, given clinician model, 80% accuracy (for improved speech intelligibility)  9. Pt will perform target dysphagia exercises ( effortful swallows, Mendelsohn maneuver and second swallows with food/liquid); Mendelsohn maneuver, Ellyn task without food ) with model 80% accuracy, for  increased swallow safety (based on results of prior modified barium swallow).    10. Modified -- Tolerate puree with nectar thick liquid, swallowing purees and nectar (by cup sip only) using second swallows, chin down for nectar liquid swallows, without sign of aspiration 80% accuracy.                     Plan:     · Patient to be seen:  5 x/week   · Plan of Care expires:     · Plan of Care reviewed with:  patient   · SLP Follow-Up:  Yes       Discharge recommendations:  home health speech therapy   Barriers to Discharge:     Time Tracking:     SLP Treatment Date:   09/02/19  Speech Start Time:  1355  Speech Stop Time:  1500     Speech Total Time (min):  65 min    Billable Minutes: Speech Therapy Individual 40 and Seld Care/Home Management Training 25    Tao Washington CCC-SLP  09/02/2019

## 2019-09-02 NOTE — PLAN OF CARE
Problem: Adult Inpatient Plan of Care  Goal: Plan of Care Review  Outcome: Ongoing (interventions implemented as appropriate)  AAOx3 vss denies pain no acute distress noted safety precautions maintained this shift

## 2019-09-02 NOTE — PLAN OF CARE
Problem: Adult Inpatient Plan of Care  Goal: Plan of Care Review  Outcome: Ongoing (interventions implemented as appropriate)  Patient receiving aerosol tx via duoneb now and q6hr.  Hr 89 and 02 saturation 96% on room air.  Pt. Averaging 1000ml on IS.

## 2019-09-02 NOTE — PLAN OF CARE
Problem: Oral Intake Inadequate  Goal: Improved Oral Intake  Goals to be met by:       Intervention: Promote and Optimize Oral Intake   Intervention: Nutrition supplement therapy-commercial beverage, texture modified diet, and nutrition education     Recommendation:   1) Continue consistent carb diet, 3-4 carbohydrate servings/meal, texture per SLP and pt prefs as able- cardiac once appetite improves   2) Continue Boost glucose control BID   3) Weigh pt weekly      Goals: 1) PO intakes > 50% of meals and supplements at f/u - 2) PO intake >=85% EEN during admit   Nutrition Goal Status: 1) met 2) new  Communication of RD Recs: (POC, sticky note)

## 2019-09-02 NOTE — PROGRESS NOTES
"REHAB FOLLOWUP NOTE    Deonna Montero is a 78 y.o. female patient.    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    History:  78-year-old  right-hand-dominant female was initially admitted to [facility] with left-sided CVA with right hemiparesis on 07/23/2019 was hospitalized till 07/26/2019 for neuro workup including CT scan showed no acute changes, carotids without any significant stenosis, MRI consistent with left pontine as well as occipital ischemic infarcts.  Patient has been medically stabilized and transferred to swing bed with steady improvement and is now transferred down to us for further rehabilitation.    Past Medical History:   Diagnosis Date    Back pain     Diabetes mellitus     Hypertension     Reflux     Short-term memory loss     Stroke      Temp: 96.3 °F (35.7 °C) (09/02/19 0839)  Pulse: 84 (09/02/19 0839)  Resp: 18 (09/02/19 0839)  BP: 129/62 (09/02/19 0839)  SpO2: (!) 92 % (09/02/19 0839)  Weight: 96.2 kg (212 lb 1.3 oz) (08/15/19 1334)  Height: 5' 8" (172.7 cm) (08/15/19 1339)    Lab Results   Component Value Date    WBC 3.28 (L) 09/02/2019    HGB 11.8 (L) 09/02/2019    HCT 37.1 09/02/2019     09/02/2019    ALT 29 08/29/2019    AST 22 08/29/2019     08/29/2019    K 4.2 08/29/2019     08/29/2019    CREATININE 0.8 08/29/2019    BUN 21 08/29/2019    CO2 29 08/29/2019    INR 1.1 04/19/2018    HGBA1C 8.9 (H) 08/15/2019     Physical Examination:  Alert, oriented x3, follows commands well.  Therapeutic pass without problems.  Complaints of gastrocolic reflex.  Would go ahead and discontinue Linzess.  Lungs are clear to auscultation.  Heart with regular rate and rhythm.  Extremities without any edema or calf tenderness noted.  Blood pressure and blood sugars are well controlled.  Steady progress with therapy.  Continent of bladder and bowel.    Impression:  Status post " left-sided CVA with right hemiparesis.  Hypertension.  Insulin-dependent diabetes mellitus.  Hyperlipidemia.  Degenerative joint disease.  Left eye enucleation.    Recommendations:  Continue current PT, OT, speech therapy and nursing care.          Nicky Diamond MD  9/2/2019

## 2019-09-02 NOTE — CONSULTS
[facility]  Hematology/Oncology  Consult Note    Patient Name: Deonna Montero  MRN: [id number]  Admission Date: 8/14/2019  Hospital Length of Stay: 18 days  Code Status: No Order   Attending Provider: Nicky Diamond MD  Consulting Provider: Ran Bonilla MD  Primary Care Physician: Bubba Tompkins MD  Principal Problem:<principal problem not specified>    Consults  Subjective:     HPI: Pt with recent pontine CVA, admitted for rehab to [facility] in [place]. Consulted because of leukopenia, which has been presnt throughout this past month. No fever, night sweats, weight loss, palpable nodes. Progressing well through rehab, as far as I can determine.     Oncology Treatment Plan:   [No treatment plan]    Medications:  Continuous Infusions:  Scheduled Meds:   albuterol-ipratropium  3 mL Nebulization Q6H    amLODIPine  10 mg Oral Daily    aspirin  81 mg Oral Daily    brimonidine-timolol  1 drop Right Eye BID    donepezil  10 mg Oral QHS    enoxaparin  40 mg Subcutaneous Daily    guaiFENesin  600 mg Oral BID    insulin NPH  20 Units Subcutaneous BID AC    linaCLOtide  145 mcg Oral Daily    lisinopril  10 mg Oral QHS    meloxicam  7.5 mg Oral Daily    metformin  500 mg Oral BID WM    metoprolol succinate  50 mg Oral Daily    pantoprazole  40 mg Oral Daily    travoprost  1 drop Both Eyes QHS     PRN Meds:acetaminophen, cloNIDine, dextrose 50%, dextrose 50%, glucagon (human recombinant), glucose, glucose, insulin aspart U-100, lactulose, ondansetron, polyethylene glycol, ramelteon     Review of patient's allergies indicates:   Allergen Reactions    Codeine Anaphylaxis and Itching    Morphine Itching    Morphine (pf) Itching        Past Medical History:   Diagnosis Date    Back pain     Diabetes mellitus     Hypertension     Reflux     Short-term memory loss     Stroke      Past Surgical History:   Procedure Laterality Date    ARTHROPLASTY-HIP Left 5/2/2018    Per[facility]    BACK SURGERY      with tea    CHOLECYSTECTOMY      CYSTOSCOPY N/A 10/23/2017    Performed by [facility]brano [facility] at [facility] OR    CYSTOSCOPY N/A 10/16/2017    Performed by [facility]jana Zambrano MD at [facility] OR    napoleon      2 years ago    HYSTERECTOMY      INJECTION-STEROID-EPIDURAL-CAUDAL N/A 10/17/2017    Performed by Justen Farley MD at [facility] OR    INJECTION-STEROID-EPIDURAL-TRANSFORAMINAL Bilateral 10/16/2015    Performed by Justen Farley MD at [facility] OR    INJECTION-STEROID-EPIDURAL-TRANSFORAMINAL Left 3/13/2014    Performed by Justen Farley MD at [facility] OR    INTRAOCULAR PROSTHESES INSERTION Left     SPINAL CORD STIMULATOR IMPLANT      3-4 years ago-nonfuncitioning    TOTAL HIP ARTHROPLASTY       Family History     Problem Relation (Age of Onset)    Cancer Father, Son    Diabetes Mother, Father    Hypertension Father        Tobacco Use    Smoking status: Former Smoker     Last attempt to quit: 10/15/2010     Years since quittin.8    Smokeless tobacco: Former User   Substance and Sexual Activity    Alcohol use: No    Drug use: No    Sexual activity: Not Currently       Review of Systems   L eye blindness  Dysarthria  Fatigue  Otherwise negative    Objective:     Vital Signs (Most Recent):  Temp: 96.9 °F (36.1 °C) (19)  Pulse: 80 (19)  Resp: 18 (19)  BP: 125/60 (19)  SpO2: (!) 93 % (19) Vital Signs (24h Range):  Temp:  [96.9 °F (36.1 °C)] 96.9 °F (36.1 °C)  Pulse:  [80-83] 80  Resp:  [12-18] 18  SpO2:  [93 %-95 %] 93 %  BP: (125)/(60) 125/60     Weight: 96.2 kg (212 lb 1.3 oz)  Body mass index is 32.25 kg/m².  Body surface area is 2.15 meters squared.      Intake/Output Summary (Last 24 hours) at 2019 4395  Last data filed at 2019 0545  Gross per 24 hour   Intake 100 ml   Output --   Net 100 ml       Physical Exam   Alert and oriented x 3  Lungs clear to ausc and perc bilaterally  RRR s  murmur  Bowel sounds present, NT/ND  No CCE  L eye blindness    Significant Labs:   CBC: No results for input(s): WBC, HGB, HCT, PLT in the last 48 hours.        Assessment/Plan:     Active Diagnoses:    Diagnosis Date Noted POA    CVA (cerebral vascular accident) [I63.9] 08/14/2019 Yes    Type 2 diabetes mellitus without complication [E11.9] 05/02/2018 Yes      Problems Resolved During this Admission:       Leukopenia  CVA    Pt has an ANC of 1000. Although this is rather low, she has other issues right now with recovery from her CVA. I would not recommend an aggressive workup, until she stabilizes and can follow as an outpatient. For now, we can check B12, folate and SPEP. Dr Macario or Fitz will follow next week.    Ran Bonilla MD  Hematology/Oncology  [facility]

## 2019-09-02 NOTE — PROGRESS NOTES
"[facility]  Adult Nutrition  Progress Note    SUMMARY    Intervention: Nutrition supplement therapy-commercial beverage, texture modified diet, and nutrition education    Recommendation:   1) Continue consistent carb diet, 3-4 carbohydrate servings/meal, texture per SLP and pt prefs as able- cardiac once appetite improves   2) Continue Boost puddingl BID.  Discontinue Boost Glucose Control (too thin for nectar thick liquids)   3) Weigh pt weekly     Goals: 1) PO intakes > 50% of meals and supplements at f/u - 2) PO intake >=85% EEN during admit   Nutrition Goal Status: 1) met 2)   Communication of RD Recs: (POC, sticky note)    Reason for Assessment    Reason For Assessment: RD follow up  Diagnosis: (CVA)  Relevant Medical History: HTN, DM2, GERD, dementia, CVA  Interdisciplinary Rounds: did not attend  General Information Comments: 79 y/o female admitted to rehab s/p CVA. Pt with a fair appetite today, states she knows she was,"not eating as much as she should have been," PTA. Discussed heart healthy/diabetic, mechanical soft diet with pt and left menu/handouts in room for pt and family as pt with dementia. Agreeable to supplements. NFPE done 8/15/19, no wasting seen. Insignificant wt loss noted in chart.  8/19/19:  PO intake good, getting and using supplements.   8/26/19: Pt alert and reports her appetite is good. However, she is getting tired of some of the foods. Meal intake records indicate PO intake declining. Notes  comes and takes her preferences.  Diet is limited.  Additional prefs noted.      Nutrition Discharge Planning: To be determined DM diet ( 3-4 carb servings/meal), no salt added, texture per SLP 9 cardiac if apppetite improves)    Nutrition Risk Screen    Nutrition Risk Screen: no indicators present    Nutrition/Diet History    Patient Reported Diet/Restrictions/Preferences: diabetic diet  Spiritual, Cultural Beliefs, Sabianist Practices, Values that Affect " "Care: no  Factors Affecting Nutritional Intake: decreased appetite, difficulty/impaired swallowing    Anthropometrics    Temp: 96.3 °F (35.7 °C)  Height Method: Measured  Height: 5' 8"  Height (inches): 68 in  Weight Method: Bed Scale  Weight: 96.2 kg (212 lb 1.3 oz)  Weight (lb): 212.08 lb  Ideal Body Weight (IBW), Female: 140 lb  % Ideal Body Weight, Female (lb): 151.49 lb  BMI (Calculated): 32.3  BMI Grade: 30 - 34.9- obesity - grade I  Usual Body Weight (UBW), k.6 kg(18)  % Usual Body Weight: 95.83  % Weight Change From Usual Weight: -4.37 %       Lab/Procedures/Meds    Pertinent Labs Reviewed: reviewed  BMP  Lab Results   Component Value Date     2019    K 4.2 2019     2019    CO2 29 2019    BUN 21 2019    CREATININE 0.8 2019    CALCIUM 9.3 2019    ANIONGAP 9 2019    ESTGFRAFRICA >60 2019    EGFRNONAA >60 2019     POCT Glucose   Date Value Ref Range Status   2019 145 (H) 70 - 110 mg/dL Final   2019 180 (H) 70 - 110 mg/dL Final   2019 84 70 - 110 mg/dL Final   2019 196 (H) 70 - 110 mg/dL Final   2019 126 (H) 70 - 110 mg/dL Final   2019 186 (H) 70 - 110 mg/dL Final   2019 158 (H) 70 - 110 mg/dL Final     Lab Results   Component Value Date    HGBA1C 8.9 (H) 08/15/2019       Pertinent Medications Reviewed: reviewed  Pertinent Medications Comments: lactulose, insulin, metformin, zofran    Estimated/Assessed Needs    Weight Used For Calorie Calculations: 96.2 kg (212 lb 1.3 oz)  Energy Calorie Requirements (kcal): [place] St Jeor ( no activity factor obesity) = 1495 kcal  Energy Need Method: [place]-St Jeor  Protein Requirements: 0.8 g protein/kg ( 77 g protein)  Weight Used For Protein Calculations: 96.2 kg (212 lb 1.3 oz)  Fluid Requirements (mL): 25 ml/kg = 2400 ml  Estimated Fluid Requirement Method: other (see comments)  CHO Requirement: 45-50%      Nutrition Prescription Ordered    Current " Diet Order: mechanical Soft + Boost Pudding BID    Evaluation of Received Nutrient/Fluid Intake    Energy Calories Required:  meeting needs  Protein Required:  meeting needs  Fluid Required: not meeting needs    Intake/Output Summary (Last 24 hours) at 9/2/2019 1451  Last data filed at 9/1/2019 2000  Gross per 24 hour   Intake 240 ml   Output --   Net 240 ml     Tolerance: tolerating  % Intake of Estimated Energy Needs: %    % Meal Intake:  75%  Nutrition Risk    Level of Risk/Frequency of Follow-up: moderate 1 x weekly    Assessment and Plan     Inadequate energy intake  R/t decreased appetite  As evidenced by PO intakes < 50% of EEN x 1 day  resolved    Monitor and Evaluation    Food and Nutrient Intake: energy intake, food and beverage intake  Food and Nutrient Adminstration: diet order  Anthropometric Measurements: weight  Biochemical Data, Medical Tests and Procedures: electrolyte and renal panel, glucose/endocrine profile  Nutrition-Focused Physical Findings: overall appearance     Malnutrition Assessment     Skin (Micronutrient): (Aníbal = 16)  Teeth (Micronutrient): (With teeth)   Micronutrient Evaluation: no deficiencies               Edema (Fluid Accumulation): 0-->no edema present   Subcutaneous Fat Loss (Final Summary): well nourished  Muscle Loss Evaluation (Final Summary): well nourished         Nutrition Follow-Up    RD Follow-up?: Yes

## 2019-09-02 NOTE — PLAN OF CARE
Problem: Oral Intake Inadequate  Goal: Improved Oral Intake  Goals to be met by:      Intervention: Nutrition supplement therapy-commercial beverage, texture modified diet, and nutrition education    Recommendation:   1) Continue consistent carb diet, 3-4 carbohydrate servings/meal, texture per SLP and pt prefs as able- cardiac once appetite improves   2) Continue Boost puddingl BID.  Discontinue Boost Glucose Control (too thin for nectar thick liquids)   3) Weigh pt weekly     Goals: 1) PO intakes > 50% of meals and supplements at f/u - 2) PO intake >=85% EEN during admit   Nutrition Goal Status: 1) met 2)   Communication of RD Recs: (POC, sticky note)

## 2019-09-02 NOTE — PLAN OF CARE
Problem: Occupational Therapy Goal  Goal: Occupational Therapy Goal  Goals to be met by:    Patient will increase functional independence with ADLs by performing:    Feeding with Modified [place].  UE Dressing with Set-up Assistance.  LE Dressing with Stand-by Assistance with set up of clothes.  Grooming while seated with Modified [place].  Toileting from bedside commode over the toilet with Supervision for hygiene and clothing management.   Toilet transfer to bedside commode over the toilet with Contact Guard Assistance.  Bathing from  shower chair/bench with Minimal Assistanc.  Shower transfer to TTB with CGA and verbal instructions.   Outcome: Ongoing (interventions implemented as appropriate)  Patient is making progress. Goals remain appropriate. Continue OT plan of care.  ERENDIRA Aaron  9/2/2019

## 2019-09-02 NOTE — CARE UPDATE
09/02/19 0031   Patient Assessment/Suction   Level of Consciousness (AVPU) alert   Respiratory Effort Normal;Unlabored   Expansion/Accessory Muscles/Retractions no use of accessory muscles   All Lung Fields Breath Sounds clear   Rhythm/Pattern, Respiratory depth regular;pattern regular;unlabored   Cough Frequency infrequent   PRE-TX-O2   O2 Device (Oxygen Therapy) room air   SpO2 97 %   Pulse 87   Resp 16   Aerosol Therapy   $ Aerosol Therapy Charges Aerosol Treatment   Daily Review of Necessity (SVN) completed   Respiratory Treatment Status (SVN) given   Treatment Route (SVN) mask   Patient Position (SVN) semi-Milian's   Post Treatment Assessment (SVN) breath sounds improved   Signs of Intolerance (SVN) none   Breath Sounds Post-Respiratory Treatment   Post-treatment Heart Rate (beats/min) 88   Post-treatment Resp Rate (breaths/min) 20   Incentive Spirometer   $ Incentive Spirometer Charges done with encouragement   Incentive Spirometer Predicted Level (mL) 750   Administration (IS) proper technique demonstrated   Number of Repetitions (IS) 10   Level Incentive Spirometer (mL) 500   Patient Tolerance (IS) fair

## 2019-09-03 LAB
ALBUMIN SERPL ELPH-MCNC: 3.08 G/DL (ref 3.35–5.55)
ALPHA1 GLOB SERPL ELPH-MCNC: 0.3 G/DL (ref 0.17–0.41)
ALPHA2 GLOB SERPL ELPH-MCNC: 0.86 G/DL (ref 0.43–0.99)
B-GLOBULIN SERPL ELPH-MCNC: 0.81 G/DL (ref 0.5–1.1)
GAMMA GLOB SERPL ELPH-MCNC: 1.17 G/DL (ref 0.67–1.58)
PATHOLOGIST INTERPRETATION SPE: NORMAL
POCT GLUCOSE: 112 MG/DL (ref 70–110)
POCT GLUCOSE: 176 MG/DL (ref 70–110)
PROT SERPL-MCNC: 6.2 G/DL (ref 6–8.4)

## 2019-09-03 PROCEDURE — 97110 THERAPEUTIC EXERCISES: CPT

## 2019-09-03 PROCEDURE — 99232 SBSQ HOSP IP/OBS MODERATE 35: CPT | Mod: ,,, | Performed by: PHYSICAL MEDICINE & REHABILITATION

## 2019-09-03 PROCEDURE — 12800000 HC REHAB SEMI-PRIVATE ROOM

## 2019-09-03 PROCEDURE — 97116 GAIT TRAINING THERAPY: CPT

## 2019-09-03 PROCEDURE — 97530 THERAPEUTIC ACTIVITIES: CPT

## 2019-09-03 PROCEDURE — 97542 WHEELCHAIR MNGMENT TRAINING: CPT

## 2019-09-03 PROCEDURE — 63600175 PHARM REV CODE 636 W HCPCS: Performed by: PHYSICAL MEDICINE & REHABILITATION

## 2019-09-03 PROCEDURE — 99232 PR SUBSEQUENT HOSPITAL CARE,LEVL II: ICD-10-PCS | Mod: ,,, | Performed by: PHYSICAL MEDICINE & REHABILITATION

## 2019-09-03 PROCEDURE — 99900035 HC TECH TIME PER 15 MIN (STAT)

## 2019-09-03 PROCEDURE — 97535 SELF CARE MNGMENT TRAINING: CPT

## 2019-09-03 PROCEDURE — 92526 ORAL FUNCTION THERAPY: CPT

## 2019-09-03 PROCEDURE — 25000003 PHARM REV CODE 250: Performed by: PHYSICAL MEDICINE & REHABILITATION

## 2019-09-03 RX ORDER — LISINOPRIL 10 MG/1
20 TABLET ORAL NIGHTLY
Status: DISCONTINUED | OUTPATIENT
Start: 2019-09-03 | End: 2019-09-05 | Stop reason: HOSPADM

## 2019-09-03 RX ORDER — GUAIFENESIN/DEXTROMETHORPHAN 100-10MG/5
5 SYRUP ORAL EVERY 6 HOURS PRN
Status: DISCONTINUED | OUTPATIENT
Start: 2019-09-03 | End: 2019-09-05 | Stop reason: HOSPADM

## 2019-09-03 RX ADMIN — METFORMIN HYDROCHLORIDE 500 MG: 500 TABLET ORAL at 08:09

## 2019-09-03 RX ADMIN — ASPIRIN 81 MG CHEWABLE TABLET 81 MG: 81 TABLET CHEWABLE at 08:09

## 2019-09-03 RX ADMIN — LISINOPRIL 20 MG: 10 TABLET ORAL at 08:09

## 2019-09-03 RX ADMIN — TRAVOPROST 1 DROP: 0.04 SOLUTION/ DROPS OPHTHALMIC at 08:09

## 2019-09-03 RX ADMIN — DONEPEZIL HYDROCHLORIDE 10 MG: 5 TABLET, FILM COATED ORAL at 08:09

## 2019-09-03 RX ADMIN — BRIMONIDINE TARTRATE AND TIMOLOL MALEATE 1 DROP: 2; 5 SOLUTION OPHTHALMIC at 08:09

## 2019-09-03 RX ADMIN — HUMAN INSULIN 20 UNITS: 100 INJECTION, SUSPENSION SUBCUTANEOUS at 04:09

## 2019-09-03 RX ADMIN — POLYETHYLENE GLYCOL 3350 17 G: 17 POWDER, FOR SOLUTION ORAL at 08:09

## 2019-09-03 RX ADMIN — RAMELTEON 8 MG: 8 TABLET, FILM COATED ORAL at 08:09

## 2019-09-03 RX ADMIN — METOPROLOL SUCCINATE 50 MG: 50 TABLET, EXTENDED RELEASE ORAL at 08:09

## 2019-09-03 RX ADMIN — ENOXAPARIN SODIUM 40 MG: 100 INJECTION SUBCUTANEOUS at 04:09

## 2019-09-03 RX ADMIN — PANTOPRAZOLE SODIUM 40 MG: 40 TABLET, DELAYED RELEASE ORAL at 08:09

## 2019-09-03 RX ADMIN — AMLODIPINE BESYLATE 10 MG: 5 TABLET ORAL at 08:09

## 2019-09-03 RX ADMIN — METFORMIN HYDROCHLORIDE 500 MG: 500 TABLET ORAL at 04:09

## 2019-09-03 RX ADMIN — MELOXICAM 7.5 MG: 7.5 TABLET ORAL at 08:09

## 2019-09-03 NOTE — PLAN OF CARE
Problem: SLP Goal  Goal: SLP Goal  1. Demonstrate understanding of speech subsystems, STBY A  2. MET -- Complete controlled exhalation tasks to enhance control of respiration for speech production, MOD A   3. MET -- Coordinate initiation of phonation with exhalation to improve naturalness of speech production, MOD A  4. MET (with min-mod cues)-- Progressing from multisyllabic words to connected utterances, Pt will demonstrate control of speech subsystems in order to achieve appropriate coordination of speech subsystems for natural speech production, MAX A- pending goals 2 &3   5. ONGOING (IMPAIRED) -- Participate in assessment of functional problem solving    6. MET (zero to min cues) Demonstrate understanding of swallow safety precautions with 80% accuracy, min cues.  7. Pt will per form target oral exercises for improved labial/lingual strength for increased swallow efficiency with min cues, 70% accuracy, 5-10 reps.  8. Pt will perform articulation drills focusing on increasing strength of articulatory contacts in initial, medial, final positions of single-syllable and two-syllable words, given clinician model, 80% accuracy (for improved speech intelligibility)  9. Pt will perform target dysphagia exercises ( effortful swallows, Mendelsohn maneuver and second swallows with food/liquid); Mendelsohn maneuver, Ellyn task without food ) with model 80% accuracy, for increased swallow safety (based on results of prior modified barium swallow).    10. Modified -- Tolerate puree with nectar thick liquid, swallowing purees and nectar (by cup sip only) using second swallows, chin down for nectar liquid swallows, without sign of aspiration 80% accuracy.    Outcome: Ongoing (interventions implemented as appropriate)  Pt worked with clinician on oral exercises -- lingual tasks for endurance and range of motion.  Family training done with garrick Lopes and patient    Comments: Alert, moderate dysarthria.

## 2019-09-03 NOTE — PLAN OF CARE
Problem: Occupational Therapy Goal  Goal: Occupational Therapy Goal  Goals to be met by:    Patient will increase functional independence with ADLs by performing:    Feeding with Modified [place].  UE Dressing with Set-up Assistance.  LE Dressing with Stand-by Assistance with set up of clothes.  Grooming while seated with Modified [place].  Toileting from bedside commode over the toilet with Supervision for hygiene and clothing management.   Toilet transfer to bedside commode over the toilet with Contact Guard Assistance.  Bathing from  shower chair/bench with Minimal Assistanc.  Shower transfer to TTB with CGA and verbal instructions.   Outcome: Ongoing (interventions implemented as appropriate)  OT treatment for family training with pt and her niece.

## 2019-09-03 NOTE — PROGRESS NOTES
"REHAB FOLLOWUP NOTE    Deonna Montero is a 78 y.o. female patient.    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    History:  78-year-old  right-hand-dominant female was initially admitted to [facility] with left-sided CVA with right hemiparesis on 07/23/2019 was hospitalized till 07/26/2019 for neuro workup including CT scan showed no acute changes, carotids without any significant stenosis, MRI consistent with left pontine as well as occipital ischemic infarcts.  Patient has been medically stabilized and transferred to swing bed with steady improvement and is now transferred down to us for further rehabilitation.    Past Medical History:   Diagnosis Date    Back pain     Diabetes mellitus     Hypertension     Reflux     Short-term memory loss     Stroke      Temp: 96.7 °F (35.9 °C) (09/03/19 0814)  Pulse: 91 (09/03/19 0814)  Resp: 16 (09/03/19 0814)  BP: (!) 149/68 (09/03/19 0814)  SpO2: (!) 93 % (09/03/19 0814)  Weight: 98.4 kg (217 lb) (09/02/19 1800)  Height: 5' 8" (172.7 cm) (08/15/19 1339)    Lab Results   Component Value Date    WBC 3.28 (L) 09/02/2019    HGB 11.8 (L) 09/02/2019    HCT 37.1 09/02/2019     09/02/2019    ALT 29 08/29/2019    AST 22 08/29/2019     08/29/2019    K 4.2 08/29/2019     08/29/2019    CREATININE 0.8 08/29/2019    BUN 21 08/29/2019    CO2 29 08/29/2019    INR 1.1 04/19/2018    HGBA1C 8.9 (H) 08/15/2019     Physical Examination:  Alert, oriented x3, follows commands well.  Complaints of occasional leg cramps during night.    Lungs are clear to auscultation.  Heart with regular rate and rhythm.  Extremities without any edema or calf tenderness noted.  Blood sugars are well controlled.  Blood pressure elevated adjust medication.  Steady progress with therapy.  Continent of bladder and bowel.    Impression:  Status post left-sided CVA with right " hemiparesis.  Hypertension.  Insulin-dependent diabetes mellitus.  Hyperlipidemia.  Degenerative joint disease.  Left eye enucleation.    Recommendations:  Continue current PT, OT, speech therapy and nursing care.          Nicky Diamond MD  9/3/2019

## 2019-09-03 NOTE — PLAN OF CARE
09/02/19 2014   Patient Assessment/Suction   Level of Consciousness (AVPU) alert   Respiratory Effort Unlabored   Expansion/Accessory Muscles/Retractions no use of accessory muscles   PRE-TX-O2   O2 Device (Oxygen Therapy) room air   SpO2 96 %   Pulse Oximetry Type Intermittent   $ Pulse Oximetry - Multiple Charge Pulse Oximetry - Multiple   Pulse 71   Resp 18   Aerosol Therapy   $ Aerosol Therapy Charges PRN treatment not required   Respiratory Treatment Status (SVN) PRN treatment not required

## 2019-09-03 NOTE — PLAN OF CARE
Problem: Physical Therapy Goal  Goal: Physical Therapy Goal  Goals to be met by: 2019    Patient will increase functional independence with mobility by performin. Supine to sit with Stand-by Assistance  2. Sit to supine with Stand-by Assistance  3. Sit to stand transfer with Stand-by Assistance  4. Bed to chair transfer with Supervision using Rolling Walker  5. Gait  x 150 feet with Supervision using Rolling Walker.   6. Wheelchair propulsion x150 feet with Modified [place] using bilateral uppper extremities     Outcome: Ongoing (interventions implemented as appropriate)    PT for family training including transfers, gait, there ex and activity.

## 2019-09-03 NOTE — PLAN OF CARE
09/03/19 0750   Patient Assessment/Suction   Level of Consciousness (AVPU) alert   Respiratory Effort Unlabored   All Lung Fields Breath Sounds clear   Rhythm/Pattern, Respiratory no shortness of breath reported   PRE-TX-O2   O2 Device (Oxygen Therapy) room air   SpO2 (!) 92 %   Pulse 90   Resp 16   Aerosol Therapy   $ Aerosol Therapy Charges PRN treatment not required   Daily Review of Necessity (SVN) completed   Incentive Spirometer   $ Incentive Spirometer Charges unable to perform  (Pt. eating breakfast at this time.)   Duoneb Q6PRN

## 2019-09-03 NOTE — PT/OT/SLP PROGRESS
"Speech Language Pathology Treatment          Deonna Montero   MRN: [id number]     Diet recommendations:  Diet Level: Puree(IDDSI 4 as tolerated)  Liquid Diet Level: Nectar Thick   Aspiration Precautions:  1 bite/sip at a time, Supervision with meals/Assistance with thickening liquids, Avoid talking while eating, Chin tuck during swallows of nectar liquid, Double swallow with each bite/sip, Eliminate distractions, Feed only when awake/alert, oral care 2-3x/day, denture cleaning daily; dentures out at night and returned to mouth in morning, up in chair at 90 degrees for meals, crushable Meds crushed in puree (check with nurse/MD/pharmacist as to which meds can be crushed, Monitor for s/s of aspiration, No straws, Remain upright 30 minutes post meal and Small bites/sips.    SLP Treatment Date: 09/03/19  Speech Start Time: 0905     Speech Stop Time: 0950     Speech Total (min): 45 min       TREATMENT BILLABLE MINUTES:  Treatment Swallowing Dysfunction 45    General Precautions: Standard, aspiration, fall  Current Respiratory Status: room air       Subjective:  "I had no problem." (regarding food intake during visit with family on pass this past Sunday)    Objective:    Patient found with: (chair alarm). Pt treated for dysphagia; family training completed with pt and garrick Lopes.  Pt participated in lingual range and endurance tasks.  Needed model and repeated cues to complete 190 reps of target tasks including tongue sweeps.  Pt's lateral lingual range is limited; significant difficulty ranging tongue to back teeth.      Pt/garrick Lopes educated regarding swallow safety precautions. Marianne educated regarding how to thicken liquid to nectar consistency.  Garrick verbalized understanding of swallow safety precautions and diet consistency (nectar thick liquid and puree diet).  Marianne verbalized understanding that pt needs family member to manage meds and crush crushable meds for her.  Discussed with " pt/niece that possible future modifications of diet could be addressed with home health therapist if pt's strength/status/swallow changes.    Pain/Comfort  Pain Rating 1: 0/10    Assessment:  Deonna Montero is a 78 y.o. female with a SLP diagnosis of Dysphagia, Dysarthria and Cognitive-Linguistic Impairment. Today she is moderately dysarthric but fully intelligible.  She demonstrates some cognitive confusion, insisting that her family training was not happening today (until niece arrived).  She understands use of chin tuck with nectar liquids.  She demonstrates moderate lingual weakness and reduced range.  She requires model and repeated cues to complete ten reps of target oral exercises.    Diet recommendations: as above        Discharge recommendations: Discharge Facility/Level of Care Needs: (outpt ST vs home health ST)     Goals:   Multidisciplinary Problems     SLP Goals        Problem: SLP Goal    Goal Priority Disciplines Outcome   SLP Goal     SLP Ongoing (interventions implemented as appropriate)   Description:  1. Demonstrate understanding of speech subsystems, STBY A  2. MET -- Complete controlled exhalation tasks to enhance control of respiration for speech production, MOD A   3. MET -- Coordinate initiation of phonation with exhalation to improve naturalness of speech production, MOD A  4. MET (with min-mod cues)-- Progressing from multisyllabic words to connected utterances, Pt will demonstrate control of speech subsystems in order to achieve appropriate coordination of speech subsystems for natural speech production, MAX A- pending goals 2 &3   5. ONGOING (IMPAIRED) -- Participate in assessment of functional problem solving    6. MET (zero to min cues) Demonstrate understanding of swallow safety precautions with 80% accuracy, min cues.  7. Pt will per form target oral exercises for improved labial/lingual strength for increased swallow efficiency with min cues, 70% accuracy, 5-10 reps.  8. Pt  will perform articulation drills focusing on increasing strength of articulatory contacts in initial, medial, final positions of single-syllable and two-syllable words, given clinician model, 80% accuracy (for improved speech intelligibility)  9. Pt will perform target dysphagia exercises ( effortful swallows, Mendelsohn maneuver and second swallows with food/liquid); Mendelsohn maneuver, Ellyn task without food ) with model 80% accuracy, for increased swallow safety (based on results of prior modified barium swallow).    10. Modified -- Tolerate puree with nectar thick liquid, swallowing purees and nectar (by cup sip only) using second swallows, chin down for nectar liquid swallows, without sign of aspiration 80% accuracy.                      Plan:   Patient to be seen Therapy Frequency: 5 x/week   Planned Interventions: Cognitive-Linguistic Therapy, Dysphagia Therapy, Motor Speech Therapy  Plan of Care Expires:    Plan of Care reviewed with: patient, other (see comments)(garrick Lopes--here for family training)  SLP Follow-up?: Yes  SLP - Next Visit Date: 09/04/19           Nellie Piña CCC-SLP 9/3/2019

## 2019-09-03 NOTE — PT/OT/SLP PROGRESS
Physical Therapy         Treatment        Deonna Montero   MRN: [id number]     PT Received On: 09/03/19  Total Time (min): 75        Billable Minutes:  Gait Training 10, Therapeutic Activity 35, Therapeutic Exercise 20 and Train/Wheelchair Management 10  Total Minutes: 75    Treatment Type: Treatment  PT/PTA: PT     PTA Visit Number: 1       General Precautions: Standard, aspiration, diabetic, fall, nectar thick, pureed diet  Orthopedic Precautions: Orthopedic Precautions : N/A   Braces: Braces: N/A         Subjective:  Communicated with ORVILLE Hassan prior to session.    Pain/Comfort  Pain Rating 1: 8/10(back pain during gait)  Pain Rating Post-Intervention 1: 0/10    Objective:  Patient found sitting up in w/c with her niece present for family training session.   Patient found with: (w/c alarm)    Functional Mobility:  Bed Mobility:   Supine to sit: Supervision or Set-up Assistance   Sit to supine: Supervision or Set-up Assistance   Rolling: Supervision or Set-up Assistance   Scooting: Supervision or Set-up Assistance    Transfer Training:  Sit to stand:Minimal Assistance with Rolling Walker  & vc  Bed <> Chair:  Step Transfer with Contact Guard Assistance and Minimal Assistance with Rolling Walker & vc    Wheelchair Training:  Pt propelled Standard wheelchair x 100 feet on Level tile with  Bilateral upper extremity and Bilateral lower extremity with Minimal Assistance.     Gait Training:  Gait training on level tile with Rolling Walker x 2 trials of 50 feet each with CGA for safety      Additional Treatment:  Pt performed B LE there ex sitting x 30 reps with 3# wt and blue TB, with vc for proper execution and joint protection: ap, laq, marching, hip abd/add, ham curls.    SciFit stepper x 10 min level 2    PT discussed pt's progress and safety issues during rehab stay and recommended pt have 24/7 Supervision and CGA > SBA at all times during transfers and gait with RW (not rollator).       Activity  Tolerance:  Patient tolerated treatment well and Patient limited by fatigue    Patient left up in chair with call button in reach, chair alarm on and nurse notified.    Assessment:  Deonna Montero is a 78 y.o. female with a medical diagnosis of CVA. She presents with impaired functional mobility, balance, activity tolerance, strength, vision with gait instability. Pt has progressed well with transfers with vc needed, but limited in gait distance due to pre-morbid chronic back pain with inability to stand upright without significant increase in pain.   .    Rehab potential is fair.    Activity tolerance: Fair    Discharge recommendations:       Equipment recommendations:       GOALS:   Multidisciplinary Problems     Physical Therapy Goals        Problem: Physical Therapy Goal    Goal Priority Disciplines Outcome Goal Variances Interventions   Physical Therapy Goal     PT, PT/OT Ongoing (interventions implemented as appropriate)     Description:  Goals to be met by: 2019    Patient will increase functional independence with mobility by performin. Supine to sit with Stand-by Assistance  2. Sit to supine with Stand-by Assistance  3. Sit to stand transfer with Stand-by Assistance  4. Bed to chair transfer with Supervision using Rolling Walker  5. Gait  x 150 feet with Supervision using Rolling Walker.   6. Wheelchair propulsion x150 feet with Modified [place] using bilateral uppper extremities                      PLAN:    Patient to be seen daily  to address the above listed problems via gait training, therapeutic activities, therapeutic exercises, therapeutic groups, neuromuscular re-education, wheelchair management/training  Plan of Care expires: 19  Plan of Care reviewed with: patient, other (see comments)(pt's niece)         Mara Roy, PT 9/3/2019

## 2019-09-03 NOTE — PT/OT/SLP PROGRESS
Occupational Therapy  Treatment    Deonna Montero   MRN: [id number]   Diagnosis:   Status post new onset left pontine and occipital ischemic infarct with dysarthria, balance coordination problems.     OT Date of Treatment: 09/03/19   Total Time (min): 80 min      Billable Minutes:  Self Care/Home Management 60 and Therapeutic Activity 20  Total Minutes: 80      General Precautions: Standard, aspiration, diabetic, fall, nectar thick  Orthopedic Precautions: N/A  Braces: N/A         Subjective:  Communicated with nurse prior to session.    Pain Rating 1: 0/10                   Objective:  Patient found with: (W/C alarm and niece with her for family training.)    Functional Mobility:  Pt ambulated with CGA to and from the bathroom with verbal instructions.    Transfer Training:  RW<>toilet with CGA  RW<>shower with ModA for management of the RW   Sits<>stand with CGA and verbal instructions.    Bathing:  Beto for assist with completion of drying of the feet for safety.    UE Dressing:  Set up    LE Dressing:  Beto for donning of the shoes over the heels.    Toilet Training:  Beto for clothing management and balance with pt standing to clean self as unable to reach in sitting.    Balance:   Static Sit: SBA /Supervision sitting supported on toilet and TTB.  Dynamic Sit:SBA   Static Stand: Beto with use of wall bar and with RW.  Dynamic stand: Beto/CGA today with engagement of joni. UEs in functional task performance      Additional Treatment:  Family training completed today with pt and her niece, Marianne.  Pt's niece was an active participant and assisted pt in walking with the RW and in performing toileting and bathroom transfers.  She observed pt in performing dressing tasks.  She was instructed in guidelines to verbally remind pt of for self alignment to RW and RW to environment when walking and performing transfers.  She was also instructed in guidelines for safe sit<>stand transitional movements.Patient  left up in chair with niece and PT present.    ASSESSMENT:  Deonna Montero's niece demonstrated ability to safely assist her aunt.  Pt demonstrated improved standing balance today in engaging joni. UEs to manage LE clothing up to the waist.  She transitioned between sit and stand, after verbal instruction with increased control of movement also.        Rehab potential is good    Activity tolerance: Good    Discharge recommendations: home, home health OT     Equipment recommendations: commode     GOALS:   Multidisciplinary Problems     Occupational Therapy Goals        Problem: Occupational Therapy Goal    Goal Priority Disciplines Outcome Interventions   Occupational Therapy Goal     OT, PT/OT Ongoing (interventions implemented as appropriate)    Description:  Goals to be met by:    Patient will increase functional independence with ADLs by performing:    Feeding with Modified [place].  UE Dressing with Set-up Assistance.  LE Dressing with Stand-by Assistance with set up of clothes.  Grooming while seated with Modified [place].  Toileting from bedside commode over the toilet with Supervision for hygiene and clothing management.   Toilet transfer to bedside commode over the toilet with Contact Guard Assistance.  Bathing from  shower chair/bench with Minimal Assistanc.  Shower transfer to TTB with CGA and verbal instructions.                    Plan:  Patient to be seen 6 x/week to address the above listed problems via self-care/home management, community/work re-entry, therapeutic activities, therapeutic exercises, therapeutic groups, neuromuscular re-education, wheelchair management/training  Plan of Care expires: 09/05/19  Plan of Care reviewed with: patient, other (see comments)(niece)         Sonya Hamilton OT  09/03/2019

## 2019-09-04 LAB
FOLATE RBC-MCNC: 558 NG/ML (ref 280–791)
POCT GLUCOSE: 113 MG/DL (ref 70–110)
POCT GLUCOSE: 172 MG/DL (ref 70–110)

## 2019-09-04 PROCEDURE — 97542 WHEELCHAIR MNGMENT TRAINING: CPT

## 2019-09-04 PROCEDURE — 97535 SELF CARE MNGMENT TRAINING: CPT

## 2019-09-04 PROCEDURE — 99232 PR SUBSEQUENT HOSPITAL CARE,LEVL II: ICD-10-PCS | Mod: ,,, | Performed by: PHYSICAL MEDICINE & REHABILITATION

## 2019-09-04 PROCEDURE — 12800000 HC REHAB SEMI-PRIVATE ROOM

## 2019-09-04 PROCEDURE — 99232 SBSQ HOSP IP/OBS MODERATE 35: CPT | Mod: ,,, | Performed by: PHYSICAL MEDICINE & REHABILITATION

## 2019-09-04 PROCEDURE — 97112 NEUROMUSCULAR REEDUCATION: CPT

## 2019-09-04 PROCEDURE — 63600175 PHARM REV CODE 636 W HCPCS: Performed by: PHYSICAL MEDICINE & REHABILITATION

## 2019-09-04 PROCEDURE — 92526 ORAL FUNCTION THERAPY: CPT

## 2019-09-04 PROCEDURE — 97110 THERAPEUTIC EXERCISES: CPT

## 2019-09-04 PROCEDURE — 94799 UNLISTED PULMONARY SVC/PX: CPT

## 2019-09-04 PROCEDURE — 97116 GAIT TRAINING THERAPY: CPT

## 2019-09-04 PROCEDURE — 99900035 HC TECH TIME PER 15 MIN (STAT)

## 2019-09-04 PROCEDURE — 97127 HC THERAPEUTIC INTVTN, COGN FUNCTION - ST: CPT

## 2019-09-04 PROCEDURE — 94761 N-INVAS EAR/PLS OXIMETRY MLT: CPT

## 2019-09-04 PROCEDURE — 97530 THERAPEUTIC ACTIVITIES: CPT

## 2019-09-04 PROCEDURE — 25000003 PHARM REV CODE 250: Performed by: PHYSICAL MEDICINE & REHABILITATION

## 2019-09-04 RX ORDER — LISINOPRIL 20 MG/1
20 TABLET ORAL NIGHTLY
Qty: 30 TABLET | Refills: 0 | Status: ON HOLD | OUTPATIENT
Start: 2019-09-04 | End: 2022-11-25

## 2019-09-04 RX ORDER — ACETAMINOPHEN 325 MG/1
650 TABLET ORAL EVERY 6 HOURS PRN
Refills: 0 | Status: ON HOLD | COMMUNITY
Start: 2019-09-04 | End: 2022-11-25

## 2019-09-04 RX ORDER — PANTOPRAZOLE SODIUM 40 MG/1
40 TABLET, DELAYED RELEASE ORAL DAILY
Qty: 30 TABLET | Refills: 0 | Status: ON HOLD | OUTPATIENT
Start: 2019-09-05 | End: 2022-11-25

## 2019-09-04 RX ORDER — NAPROXEN SODIUM 220 MG/1
81 TABLET, FILM COATED ORAL DAILY
Refills: 0 | Status: ON HOLD | COMMUNITY
Start: 2019-09-05 | End: 2022-11-25

## 2019-09-04 RX ORDER — AMLODIPINE BESYLATE 10 MG/1
10 TABLET ORAL DAILY
Qty: 30 TABLET | Refills: 0 | Status: ON HOLD | OUTPATIENT
Start: 2019-09-05 | End: 2022-11-25

## 2019-09-04 RX ORDER — MELOXICAM 7.5 MG/1
7.5 TABLET ORAL DAILY
Qty: 30 TABLET | Refills: 0 | Status: SHIPPED | OUTPATIENT
Start: 2019-09-05 | End: 2022-11-16

## 2019-09-04 RX ORDER — METFORMIN HYDROCHLORIDE 500 MG/1
500 TABLET ORAL 2 TIMES DAILY WITH MEALS
Qty: 60 TABLET | Refills: 0 | Status: ON HOLD | OUTPATIENT
Start: 2019-09-04 | End: 2022-11-25

## 2019-09-04 RX ADMIN — HUMAN INSULIN 15 UNITS: 100 INJECTION, SUSPENSION SUBCUTANEOUS at 08:09

## 2019-09-04 RX ADMIN — METOPROLOL SUCCINATE 50 MG: 50 TABLET, EXTENDED RELEASE ORAL at 08:09

## 2019-09-04 RX ADMIN — TRAVOPROST 1 DROP: 0.04 SOLUTION/ DROPS OPHTHALMIC at 08:09

## 2019-09-04 RX ADMIN — BRIMONIDINE TARTRATE AND TIMOLOL MALEATE 1 DROP: 2; 5 SOLUTION OPHTHALMIC at 08:09

## 2019-09-04 RX ADMIN — AMLODIPINE BESYLATE 10 MG: 5 TABLET ORAL at 08:09

## 2019-09-04 RX ADMIN — METFORMIN HYDROCHLORIDE 500 MG: 500 TABLET ORAL at 08:09

## 2019-09-04 RX ADMIN — POLYETHYLENE GLYCOL 3350 17 G: 17 POWDER, FOR SOLUTION ORAL at 08:09

## 2019-09-04 RX ADMIN — MELOXICAM 7.5 MG: 7.5 TABLET ORAL at 08:09

## 2019-09-04 RX ADMIN — RAMELTEON 8 MG: 8 TABLET, FILM COATED ORAL at 08:09

## 2019-09-04 RX ADMIN — METFORMIN HYDROCHLORIDE 500 MG: 500 TABLET ORAL at 05:09

## 2019-09-04 RX ADMIN — LISINOPRIL 20 MG: 10 TABLET ORAL at 08:09

## 2019-09-04 RX ADMIN — DONEPEZIL HYDROCHLORIDE 10 MG: 5 TABLET, FILM COATED ORAL at 08:09

## 2019-09-04 RX ADMIN — ENOXAPARIN SODIUM 40 MG: 100 INJECTION SUBCUTANEOUS at 05:09

## 2019-09-04 RX ADMIN — ACETAMINOPHEN 650 MG: 325 TABLET ORAL at 10:09

## 2019-09-04 RX ADMIN — ASPIRIN 81 MG CHEWABLE TABLET 81 MG: 81 TABLET CHEWABLE at 08:09

## 2019-09-04 RX ADMIN — PANTOPRAZOLE SODIUM 40 MG: 40 TABLET, DELAYED RELEASE ORAL at 08:09

## 2019-09-04 NOTE — PROGRESS NOTES
Referral for HH sent to [facility] per patient's preference and DME orders for RW and BSC sent to [facility].  CM will follow and assist with discharge planning.

## 2019-09-04 NOTE — PLAN OF CARE
Problem: Adult Inpatient Plan of Care  Goal: Plan of Care Review  Outcome: Ongoing (interventions implemented as appropriate)  Patient awake/alert. No c/o pain noted this shift. Generalized weakness noted. Free from falls. Plan of care continued.

## 2019-09-04 NOTE — PT/OT/SLP PROGRESS
Occupational Therapy  Treatment    Deonna Montero   MRN: [id number]   Admitting Diagnosis: Status post new onset left pontine and occipital ischemic infarct with dysarthria, balance coordination problems.     OT Date of Treatment: 09/04/19   Total Time (min): 75 min      Billable Minutes:  Self Care/Home Management 40 and Neuromuscular Re-education 35  Total Minutes: 75      General Precautions: Standard, aspiration, diabetic, fall, pureed diet, nectar thick, vision impaired(Left prosthetic eye)  Orthopedic Precautions: N/A  Braces: N/A         Subjective:  Communicated with nurse prior to session.    Pain Rating 1: 0/10                   Objective:  Patient found with: (w/c alarm)    Functional Mobility:  Pt ambulated bedroom<>bathroom with CGA and verbal instructions.      Transfer Training:  RW>toilet with CGA and verbal instructions.  Sit<>stand with CGA and verbal instructions.    UE Dressing:  Set up    LE Dressing:  Verbal instructions for donning of brief with use of dressing stick and pants without use of equipment.  Min A with each back of shoe (due to the lack of stability) of the back up as pt pushed the feet into the shoes.  CGA in standing to pull the brief and pants to the waist.  Pt initiated placing her upper body in greater degree of forward flexion and used Bilateral UEs simultaneously part of the time and alternating UEs part of the time to pull clothing to the waist.      Toilet Training:  CGA in standing for reach to ilir as she is unsuccessful in attempts to adequately reach and wipe in sitting.    Balance:   Static Sit: Independent sitting supported in w/c.  Dynamic Sit:  SBA/supervision at toilet and in w/c.  Static Stand: CGA with use of RW  Dynamic stand: CGA with engagement of joni. UEs in functional task performance today.      Additional Treatment:  Pt instructed in safe sit<>stand sequences for body position and hand placement sequence.  Due to the LLE slipping forward and pt  "moving to sit in the w/c with posterior loss of balance, she was instructed to move the left foot forward slightly prior to sitting as it was felt that perhaps she was having difficulty with L knee flexion during the transitional movement.  Upon performance 2x she reported she felt unsteady with the technique, but she did flex forward to greater degree and demonstrated increased control.  She acknowledged recognition of this.  She then was instructed to place steven feet wherever she felt comfortable and to concentrate on flexing forward, looking at her heels and tell the left foot to remain in place until she sat down.  She was able to follow the instructions and sat safely 2xs at the w/c level.  In performance of toilet transfers she was instructed prior to going into the bathroom to not use the wall bar and to only use the side rails of the 3n1 to perform transitional sit<>stand movements and she was able to perform correctly today without further instruction at the toilet.  Pt engaged in performance of Right hand thumb opposition to 2>5 fingertips alternately in picking up and releasing pennies and did so without difficulty.  She was instructed in using right hand gross grasp to  disks 1 and 1/4" diameter and manipulate them with the R hand only to release one at a time into a container.  She was ~ 75% of time able to  a handful without dropping and to release one at a time.  She performed  placement of rings on graduated heights to move the R shoulder to 100 degrees of flexion and horizontally adduct the shoulder half to full available range.      Patient left up in chair with chair alarm on and PT present.    ASSESSMENT:  Deonna Montero is unable to successfully use the long handle metal shoe horn provided by rehab as it is too long.  Pt was better able to maintain standing posture during LB dressing in standing and did not lose balance backwards today.  This was also true with engaging Steven " UEs.      Rehab potential is good    Activity tolerance: Good    Discharge recommendations: home, home health OT     Equipment recommendations: commode     GOALS:   Multidisciplinary Problems     Occupational Therapy Goals        Problem: Occupational Therapy Goal    Goal Priority Disciplines Outcome Interventions   Occupational Therapy Goal     OT, PT/OT Ongoing (interventions implemented as appropriate)    Description:  Goals to be met by: 09/05/2019    Patient will increase functional independence with ADLs by performing:    Feeding with Modified [place].  UE Dressing with Set-up Assistance.  LE Dressing with Stand-by Assistance with set up of clothes.  Grooming while seated with Modified [place].  Toileting from bedside commode over the toilet with Supervision for hygiene and clothing management.   Toilet transfer to bedside commode over the toilet with Contact Guard Assistance.  Bathing from  shower chair/bench with Minimal Assistanc.  Shower transfer to TTB with CGA and verbal instructions.                     Plan:  Patient to be seen 6 x/week to address the above listed problems via self-care/home management, community/work re-entry, therapeutic activities, therapeutic exercises, therapeutic groups, neuromuscular re-education, wheelchair management/training  Plan of Care expires: 09/05/19  Plan of Care reviewed with: patient         Sonya Hamilton, OT  09/04/2019

## 2019-09-04 NOTE — PT/OT/SLP PROGRESS
"Speech Language Pathology Treatment          Deonna Montero   MRN: [id number]     Diet recommendations:   Diet Level: Puree IDDSI 4  Liquid Level: Nectar Thick (chin tuck during Liquid swallows)    Aspiration Precautions: 1 bite/sip at a time, Alternating bites/sips, Assistance/supervision with meals and Assistance with thickening liquids, Avoid talking while eating, Chin tuck during liquid swallows, Double swallow with each bite/sip, Eliminate distractions, HOB to 90 degrees, Meds crushed in puree, No straws, Remain upright 30 minutes post meal and Small bites/sips    SLP Treatment Date: 09/04/19     Speech Start Time: 1254     Speech Stop Time: 1330     Speech Total (min): 36 min       TREATMENT BILLABLE MINUTES:  Speech Therapy Individual 15 and Treatment Swallowing Dysfunction 21    General Precautions: Standard, aspiration, fall, nectar thick, pureed diet  Current Respiratory Status: room air       Subjective:  "I'm doing alright."    Objective:    Patient found with: (chair alarm).  Pt participated in readministration of Blair Cognitive Assessment (MOCA), achieving 15 of 30 possible points.  Deficits were noted in executive function, attention to task, word fluency, delayed recall.      Pt was further educated regarding her swallow safety precautions.  Clinician educated pt further regarding importance of mouthcare, recommending mouth care at least 3x/day; dentures and partials to be in place for eating but removed at night for cleaning.  Clinician educated pt that when pt is stronger (under close guidance of outpt or home health swallowing therapist), it is possible that thin water could be utilized between meals following mouth care (continuing nectar thick liquid during meals).  Pt is not appropriate for a thin water trial today (too sleepy at this time).  Pt verbalized understanding.  Pt was educated that family will need to purchase thickener for use at home.  Pt verbalized " understanding.    Pain/Comfort  Pain Rating 1: 0/10    Assessment:  Deonna Montero is a 78 y.o. female with  Hx of recent stroke and an SLP diagnosis of Dysphagia, Dysarthria and Cognitive-Linguistic Impairment. During today's session she was moderately dysarthria, sleepy.  She demonstrated continued cognitive deficits on readministration of the MOCA.  She should continue on puree and nectar liquid diet with frequent mouth care.      Diet recommendations:  As above       Discharge recommendations: Discharge Facility/Level of Care Needs: (home health vs outpt ST)     Goals:   Multidisciplinary Problems     SLP Goals        Problem: SLP Goal    Goal Priority Disciplines Outcome   SLP Goal     SLP Ongoing (interventions implemented as appropriate)   Description:  1. MET - Demonstrate understanding of speech subsystems, STBY A  2. MET -- Complete controlled exhalation tasks to enhance control of respiration for speech production, MOD A   3. MET -- Coordinate initiation of phonation with exhalation to improve naturalness of speech production, MOD A  4. MET (with min-mod cues)-- Progressing from multisyllabic words to connected utterances, Pt will demonstrate control of speech subsystems in order to achieve appropriate coordination of speech subsystems for natural speech production, MAX A- pending goals 2 &3   5. MET -- Participate in assessment of functional problem solving    6. MET (zero to min cues) Demonstrate understanding of swallow safety precautions with 80% accuracy, min cues.  7. NOT MET (level of cues needed varies) Pt will per form target oral exercises for improved labial/lingual strength for increased swallow efficiency with min cues, 70% accuracy, 5-10 reps.  8. DEFERRED -- Pt will perform articulation drills focusing on increasing strength of articulatory contacts in initial, medial, final positions of single-syllable and two-syllable words, given clinician model, 80% accuracy (for improved  speech intelligibility)  9. NOT FULLY ACHIEVED (cooperative but not fully able to perform Mendelsohn; does perform effortful swallows functionally; performs second swallows when cued and as needed; inconsistent with Ellyn task) Pt will perform target dysphagia exercises ( effortful swallows, Mendelsohn maneuver and second swallows with food/liquid); Mendelsohn maneuver, Ellyn task without food ) with model 80% accuracy, for increased swallow safety (based on results of prior modified barium swallow).    10. MET -- Tolerate puree with nectar thick liquid, swallowing purees and nectar (by cup sip only) using second swallows, chin down for nectar liquid swallows, without sign of aspiration 80% accuracy.                       Plan:   Patient to be seen Therapy Frequency: (discharge to home tomorrow (will be followed by home health or outpt tx for ST))   Planned Interventions: (to be followed in outpt or home health for swallow and cognition/communication)  Plan of Care Expires:    Plan of Care reviewed with: patient  SLP Follow-up?: (n/a -- discharge to outpt or home health)  SLP - Next Visit Date: 09/04/19           Nellie Piña CCC-SLP 9/4/2019

## 2019-09-04 NOTE — PLAN OF CARE
Problem: Physical Therapy Goal  Goal: Physical Therapy Goal  Goals to be met by: 2019    Patient will increase functional independence with mobility by performin. Supine to sit with Stand-by Assistance  2. Sit to supine with Stand-by Assistance  3. Sit to stand transfer with Stand-by Assistance  4. Bed to chair transfer with Supervision using Rolling Walker  5. Gait  x 150 feet with Supervision using Rolling Walker.   6. Wheelchair propulsion x150 feet with Modified [place] using bilateral uppper extremities     Outcome: Ongoing (interventions implemented as appropriate)    PT for there ex, gait, activity and w/c.

## 2019-09-04 NOTE — PROGRESS NOTES
Spoke with [facility] referral received and informed them patient will be discharged 9/5/19.    Spoke with Idalia at [facility], orders received.  Patient does not qualify for RW due to receiving one in 2016.  Attempted to reach son and niarin Lopes unable to reach them or leave , call placed and spoke to garrick Miller.  Informed Angela patient stated she may have one in [facility], Angela stated she will check and/or she will get one for patient if needed.

## 2019-09-04 NOTE — PLAN OF CARE
Problem: Adult Inpatient Plan of Care  Goal: Plan of Care Review  A&OX4. Min assist. Instructed to call for mobility assist. Bed and chair alarms utilized at all times. Remained free from falls. Continent of bladder and bowel. Standard precautions maintained.

## 2019-09-04 NOTE — PLAN OF CARE
Problem: SLP Goal  Goal: SLP Goal  1. MET - Demonstrate understanding of speech subsystems, STBY A  2. MET -- Complete controlled exhalation tasks to enhance control of respiration for speech production, MOD A   3. MET -- Coordinate initiation of phonation with exhalation to improve naturalness of speech production, MOD A  4. MET (with min-mod cues)-- Progressing from multisyllabic words to connected utterances, Pt will demonstrate control of speech subsystems in order to achieve appropriate coordination of speech subsystems for natural speech production, MAX A- pending goals 2 &3   5. MET -- Participate in assessment of functional problem solving    6. MET (zero to min cues) Demonstrate understanding of swallow safety precautions with 80% accuracy, min cues.  7. NOT MET (level of cues needed varies) Pt will per form target oral exercises for improved labial/lingual strength for increased swallow efficiency with min cues, 70% accuracy, 5-10 reps.  8. DEFERRED -- Pt will perform articulation drills focusing on increasing strength of articulatory contacts in initial, medial, final positions of single-syllable and two-syllable words, given clinician model, 80% accuracy (for improved speech intelligibility)  9. NOT FULLY ACHIEVED (cooperative but not fully able to perform Mendelsohn; does perform effortful swallows functionally; performs second swallows when cued and as needed; inconsistent with Ellyn task) Pt will perform target dysphagia exercises ( effortful swallows, Mendelsohn maneuver and second swallows with food/liquid); Mendelsohn maneuver, Ellyn task without food ) with model 80% accuracy, for increased swallow safety (based on results of prior modified barium swallow).    10. MET -- Tolerate puree with nectar thick liquid, swallowing purees and nectar (by cup sip only) using second swallows, chin down for nectar liquid swallows, without sign of aspiration 80% accuracy.    Outcome: Ongoing (interventions  implemented as appropriate)  Pt participated in readministration of Blair Cognitive Assessment and and swallow safety education.    Comments: Sleepy; moderate dysarthria.  Cooperative.

## 2019-09-04 NOTE — PROGRESS NOTES
"REHAB FOLLOWUP NOTE    Deonna Montero is a 78 y.o. female patient.    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    History:  78-year-old  right-hand-dominant female was initially admitted to [facility] with left-sided CVA with right hemiparesis on 07/23/2019 was hospitalized till 07/26/2019 for neuro workup including CT scan showed no acute changes, carotids without any significant stenosis, MRI consistent with left pontine as well as occipital ischemic infarcts.  Patient has been medically stabilized and transferred to swing bed with steady improvement and is now transferred down to us for further rehabilitation.    Past Medical History:   Diagnosis Date    Back pain     Diabetes mellitus     Hypertension     Reflux     Short-term memory loss     Stroke      Temp: 96.5 °F (35.8 °C) (09/04/19 0727)  Pulse: 79 (09/04/19 0727)  Resp: 18 (09/04/19 0727)  BP: (!) 146/65 (09/04/19 0727)  SpO2: 98 % (09/04/19 0727)  Weight: 98.4 kg (217 lb) (09/02/19 1800)  Height: 5' 8" (172.7 cm) (08/15/19 1339)    Lab Results   Component Value Date    WBC 3.28 (L) 09/02/2019    HGB 11.8 (L) 09/02/2019    HCT 37.1 09/02/2019     09/02/2019    ALT 29 08/29/2019    AST 22 08/29/2019     08/29/2019    K 4.2 08/29/2019     08/29/2019    CREATININE 0.8 08/29/2019    BUN 21 08/29/2019    CO2 29 08/29/2019    INR 1.1 04/19/2018    HGBA1C 8.9 (H) 08/15/2019     Physical Examination:  Alert, oriented x3, follows commands well.    Lungs are clear to auscultation.  Heart with regular rate and rhythm.  Extremities without any edema or calf tenderness noted.  Blood sugars are well controlled.  Blood pressure controlled.  Steady progress with therapy.  Continent of bladder and bowel.    Impression:  Status post left-sided CVA with right hemiparesis.  Hypertension.  Insulin-dependent diabetes " mellitus.  Hyperlipidemia.  Degenerative joint disease.  Left eye enucleation.    Recommendations:  Continue current PT, OT, speech therapy and nursing care.  DC home tomorrow with family.        Nicky Diamond MD  9/4/2019

## 2019-09-04 NOTE — PT/OT/SLP PROGRESS
Physical Therapy         Treatment        Deonna Montero   MRN: [id number]     PT Received On: 09/04/19  Total Time (min): 75        Billable Minutes:  Gait Training 10, Therapeutic Activity 20, Therapeutic Exercise 35 and Train/Wheelchair Management 10  Total Minutes: 75    Treatment Type: Treatment  PT/PTA: PT     PTA Visit Number: 1       General Precautions: Standard, aspiration, fall, diabetic, vision impaired, pureed diet, nectar thick  Orthopedic Precautions: Orthopedic Precautions : N/A   Braces: Braces: N/A         Subjective:  Communicated with OT Sonya prior to session.    Pain/Comfort  Pain Rating 1: (only during gait)  Pain Rating Post-Intervention 1: 0/10    Objective:  Patient found sitting up in w/c in gym after OT session.   Patient found with: (w/c alarm)    Functional Mobility:  Transfer Training:  Sit to stand:Contact Guard Assistance and Minimal Assistance with Rolling Walker      Wheelchair Training:  Pt propelled Standard wheelchair x 100 feet on Level tile with  Bilateral upper extremity with Minimal Assistance.     Gait Training:  Gait training on level tile with Rolling Walker x 92 feet with CGA for safety    Additional Treatment:  Pt performed B LE there ex sitting x 30 reps with 3# wt and blue TB, with vc for proper execution and joint protection: ap, laq, marching, hip abd/add, ham curls.    SciFit stepper x 15 min level 2.5 with one rest break.      Activity Tolerance:  Patient tolerated treatment well and Patient limited by fatigue    Patient left up in chair with call button in reach, chair alarm on and nurse notified.    Assessment:  Deonna Montero is a 78 y.o. female with a medical diagnosis of CVA. She presents with impaired functional mobility, balance, activity tolerance, strength, vision with gait instability. Pt's speech not as clear today and pt more sleepy today than the last few days.      Rehab potential is fair.    Activity tolerance: Fair    Discharge  recommendations:       Equipment recommendations:       GOALS:   Multidisciplinary Problems     Physical Therapy Goals        Problem: Physical Therapy Goal    Goal Priority Disciplines Outcome Goal Variances Interventions   Physical Therapy Goal     PT, PT/OT Ongoing (interventions implemented as appropriate)     Description:  Goals to be met by: 2019    Patient will increase functional independence with mobility by performin. Supine to sit with Stand-by Assistance  2. Sit to supine with Stand-by Assistance  3. Sit to stand transfer with Stand-by Assistance  4. Bed to chair transfer with Supervision using Rolling Walker  5. Gait  x 150 feet with Supervision using Rolling Walker.   6. Wheelchair propulsion x150 feet with Modified [place] using bilateral uppper extremities                      PLAN:    Patient to be seen daily  to address the above listed problems via gait training, therapeutic activities, therapeutic exercises, therapeutic groups, neuromuscular re-education, wheelchair management/training  Plan of Care expires: 19  Plan of Care reviewed with: patient         Mara Roy, PT 2019

## 2019-09-05 VITALS
HEART RATE: 80 BPM | HEIGHT: 68 IN | BODY MASS INDEX: 32.89 KG/M2 | WEIGHT: 217 LBS | DIASTOLIC BLOOD PRESSURE: 65 MMHG | TEMPERATURE: 97 F | RESPIRATION RATE: 16 BRPM | OXYGEN SATURATION: 95 % | SYSTOLIC BLOOD PRESSURE: 153 MMHG

## 2019-09-05 LAB — POCT GLUCOSE: 143 MG/DL (ref 70–110)

## 2019-09-05 PROCEDURE — 94799 UNLISTED PULMONARY SVC/PX: CPT

## 2019-09-05 PROCEDURE — 99900035 HC TECH TIME PER 15 MIN (STAT)

## 2019-09-05 PROCEDURE — 97535 SELF CARE MNGMENT TRAINING: CPT

## 2019-09-05 PROCEDURE — 63600175 PHARM REV CODE 636 W HCPCS: Performed by: PHYSICAL MEDICINE & REHABILITATION

## 2019-09-05 PROCEDURE — 99238 PR HOSPITAL DISCHARGE DAY,<30 MIN: ICD-10-PCS | Mod: ,,, | Performed by: PHYSICAL MEDICINE & REHABILITATION

## 2019-09-05 PROCEDURE — 92526 ORAL FUNCTION THERAPY: CPT

## 2019-09-05 PROCEDURE — 25000003 PHARM REV CODE 250: Performed by: PHYSICAL MEDICINE & REHABILITATION

## 2019-09-05 PROCEDURE — 99238 HOSP IP/OBS DSCHRG MGMT 30/<: CPT | Mod: ,,, | Performed by: PHYSICAL MEDICINE & REHABILITATION

## 2019-09-05 RX ADMIN — AMLODIPINE BESYLATE 10 MG: 5 TABLET ORAL at 08:09

## 2019-09-05 RX ADMIN — HUMAN INSULIN 20 UNITS: 100 INJECTION, SUSPENSION SUBCUTANEOUS at 09:09

## 2019-09-05 RX ADMIN — BRIMONIDINE TARTRATE AND TIMOLOL MALEATE 1 DROP: 2; 5 SOLUTION OPHTHALMIC at 08:09

## 2019-09-05 RX ADMIN — METOPROLOL SUCCINATE 50 MG: 50 TABLET, EXTENDED RELEASE ORAL at 08:09

## 2019-09-05 RX ADMIN — MELOXICAM 7.5 MG: 7.5 TABLET ORAL at 08:09

## 2019-09-05 RX ADMIN — PANTOPRAZOLE SODIUM 40 MG: 40 TABLET, DELAYED RELEASE ORAL at 08:09

## 2019-09-05 RX ADMIN — METFORMIN HYDROCHLORIDE 500 MG: 500 TABLET ORAL at 08:09

## 2019-09-05 RX ADMIN — ASPIRIN 81 MG CHEWABLE TABLET 81 MG: 81 TABLET CHEWABLE at 08:09

## 2019-09-05 RX ADMIN — POLYETHYLENE GLYCOL 3350 17 G: 17 POWDER, FOR SOLUTION ORAL at 08:09

## 2019-09-05 NOTE — PATIENT CARE CONFERENCE
Weekly Staffing Report      Date Admitted: 8/14/2019 :   Staffing Date: 9/5/2019     Patient Active Problem List   Diagnosis    DDD (degenerative disc disease)    Postlaminectomy syndrome of lumbar region    Lumbar radiculopathy    SI (sacroiliac) joint dysfunction    DDD (degenerative disc disease), lumbar    Benign essential HTN    Constipation    Recurrent UTI    Primary osteoarthritis of hips, bilateral    Back pain of lumbar region with sciatica    Primary osteoarthritis of left hip    Type 2 diabetes mellitus without complication    Short-term memory loss    CVA (cerebral vascular accident)          Team Members Present:  Physician Team Member: Dr Diamond  Nursing Team Member: Ge Lai RN  Case Management Team Member: Teri Sloan RN  PT Team Member: Mara Roy PT  OT Team Member: Sonya Hamilton OT  SLP Team Member: Nellie VIVEROS  Other (Discipline and Name): Ayana Toth RD    Nursing  Skin: Intact  Bowel: Continent  Bladder:continent  Diet: diabetic, 1800 calorie  Appetite: good   Pain Level: 0/10    Physical Therapy  Supine to Sit:  supervision  Sit to Stand: minimal assist  Gait:  feet contact guard Rolling walker  Wheelchair Mobility: 125-150 feet minimal assist  Stairs: N/A      Occupational Therapy  Feeding: standy by assistance  Grooming:standy by assistance  UED: set up  LED: minimal assist  Bathing:minimal assist   Toileting:contact guard  Toilet Transfer:  contact guard  Tub Transfer:  moderate assist      Speech Therapy  Swallow: Oropharyngeal dysphagia.  MMS: 15/30  Memory: minimal assist  Receptive Language: supervision  Expressive Language: supervision  Problem solving: minimal assist            Goals:  CGA to min.      Tolerates 3 hours of therapy: Yes    Discharge Destination: Home with H/H.    Estimated Length of Stay: 9/5/19.

## 2019-09-05 NOTE — PT/OT/SLP DISCHARGE
Physical Therapy Discharge Summary    Name: Deonna Montero  MRN: [id number]   Principal Problem: CVA.     Patient Discharged from acute Physical Therapy on 2019.  Please refer to prior PT noted date on 2019 for functional status.     Assessment:     Goals partially met. Patient appropriate for care in another setting.    Objective:     Pt declined to work with PT today prior to discharge home.    GOALS:   Multidisciplinary Problems     Physical Therapy Goals        Problem: Physical Therapy Goal    Goal Priority Disciplines Outcome Goal Variances Interventions   Physical Therapy Goal     PT, PT/OT Ongoing (interventions implemented as appropriate)     Description:  Goals to be met by: 2019    Patient will increase functional independence with mobility by performin. Supine to sit with Stand-by Assistance  2. Sit to supine with Stand-by Assistance  3. Sit to stand transfer with Stand-by Assistance  4. Bed to chair transfer with Supervision using Rolling Walker  5. Gait  x 150 feet with Supervision using Rolling Walker.   6. Wheelchair propulsion x150 feet with Modified [place] using bilateral uppper extremities                      Reasons for Discontinuation of Therapy Services  Transfer to alternate level of care. and Satisfactory goal achievement.      Plan:     Patient Discharged to: Home with Home Health Service.    Mara Roy, PT  2019

## 2019-09-05 NOTE — PLAN OF CARE
Problem: Adult Inpatient Plan of Care  Goal: Plan of Care Review  Outcome: Ongoing (interventions implemented as appropriate)  Patient alert and oriented.  Complains of LBP early in shift, relieved by Tylenol.  Min to mod assist to turn and toilet.  Turns and readjusts self during shift.  NAD noted, free of falls.

## 2019-09-05 NOTE — PROGRESS NOTES
Team conference attended this am.  Spoke with pateint and son at bedside to review discharge information.  Informed patient and son Adama home health referral was sent to [facility] for PT/OT/ST and they will call patient to arrange time of first visit. Son verbalized he had RW at home for patient to use.  ST reviewed swallow precautions, diet consistency and demonstrated thickening of liquids to nectar consistency with son and patient.  Son instructed by ST and CM where he can purchase thickening powder.  Also instructed son he can purchase pill  at local pharmacy.  Son verbalized understanding of above.  Instructed son to call CM if he has questions.

## 2019-09-05 NOTE — PLAN OF CARE
Problem: SLP Goal  Goal: SLP Goal  1. MET - Demonstrate understanding of speech subsystems, STBY A  2. MET -- Complete controlled exhalation tasks to enhance control of respiration for speech production, MOD A   3. MET -- Coordinate initiation of phonation with exhalation to improve naturalness of speech production, MOD A  4. MET (with min-mod cues)-- Progressing from multisyllabic words to connected utterances, Pt will demonstrate control of speech subsystems in order to achieve appropriate coordination of speech subsystems for natural speech production, MAX A- pending goals 2 &3   5. MET -- Participate in assessment of functional problem solving    6. MET (zero to min cues) Demonstrate understanding of swallow safety precautions with 80% accuracy, min cues.  7. NOT MET (level of cues needed varies) Pt will per form target oral exercises for improved labial/lingual strength for increased swallow efficiency with min cues, 70% accuracy, 5-10 reps.  8. DEFERRED -- Pt will perform articulation drills focusing on increasing strength of articulatory contacts in initial, medial, final positions of single-syllable and two-syllable words, given clinician model, 80% accuracy (for improved speech intelligibility)  9. NOT FULLY ACHIEVED (cooperative but not fully able to perform Mendelsohn; does perform effortful swallows functionally; performs second swallows when cued and as needed; inconsistent with Ellyn task) Pt will perform target dysphagia exercises ( effortful swallows, Mendelsohn maneuver and second swallows with food/liquid); Mendelsohn maneuver, Ellyn task without food ) with model 80% accuracy, for increased swallow safety (based on results of prior modified barium swallow).    10. MET -- Tolerate puree with nectar thick liquid, swallowing purees and nectar (by cup sip only) using second swallows, chin down for nectar liquid swallows, without sign of aspiration 80% accuracy.     Outcome: Ongoing (interventions  implemented as appropriate)  Ongoing pt/family member education regarding use of thickener/dysphagia issues.    Comments: Pt/son listened actively.  Son asked some questions re: where to purchase thickener, items thickener is used with -- all questions answered.  Samples of thicker provided.  Written instructions previously provided.

## 2019-09-05 NOTE — CARE UPDATE
09/05/19 0825   Patient Assessment/Suction   Level of Consciousness (AVPU) alert   Respiratory Effort Normal;Unlabored   Expansion/Accessory Muscles/Retractions no use of accessory muscles;no retractions;expansion symmetric   PRE-TX-O2   O2 Device (Oxygen Therapy) room air   SpO2 95 %   Pulse Oximetry Type Intermittent   Resp 16   Incentive Spirometer   $ Incentive Spirometer Charges done with encouragement;proper technique demonstrated   Administration (IS) mouthpiece   Number of Repetitions (IS) 10   Level Incentive Spirometer (mL) 1000

## 2019-09-05 NOTE — DISCHARGE INSTRUCTIONS
YOU HAVE BEEN REFERRED TO [facility] FOR HOME PHYSICAL, OCCUPATIONAL AND SPEECH THERAPY.  THEY WILL CONTACT YOU TO SCHEDULE THEIR FIRST VISIT.  THEIR CONTACT NUMBER [phone].    [facility] WILL PROVIDE THE BEDSIDE COMMODE.  THEIR CONTACT NUMBER [phone] FOR ANY CONCERNS WITH EQUIPMENT.    THERAPY ALSO RECOMMMENDS A ROLLING WALKER THAT YOUR FAMILY WILL PROVIDE FOR YOU.

## 2019-09-05 NOTE — PROGRESS NOTES
Spoke with [facility] Huyen concerning order to add ST to Hh referral, she stated order was received and therapy service added.

## 2019-09-05 NOTE — PT/OT/SLP DISCHARGE
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     OT Date of Treatment: 09/05/19   Total Time (min): 25 min        Billable Minutes:  Self Care/Home Management 25 minutes  Total Minutes: 25    OT treatment for self care.  Pt instructed in use of a short shoe horn for donning of soft sided shoes.  She required modA to joryd them as she has difficulty placing the device and maintaining heel elevated until the device was placed.  She was able to follow verbal instructions for hand placement sequence to transfer from the toilet to the w/c.  She followed instructions to move to sitting with minimal movement of the LLE at the very end of the transition to sitting.  Pt required Agnes for standing balance to clean self after urination as she is unable to reach adequately in sitting.    Patient Discharged from acute Occupational Therapy on 09/05/2019.  Please refer to prior OT note dated 09/03/2019 and 09/04/2019 for functional status.    Assessment:      Goals partially met.  Pt improved in 5/8 areas and reached 4/8 LTGs.    Objective:     GOALS:   Multidisciplinary Problems     Occupational Therapy Goals        Problem: Occupational Therapy Goal    Goal Priority Disciplines Outcome Interventions   Occupational Therapy Goal     OT, PT/OT Ongoing (interventions implemented as appropriate)    Description:  Goals to be met by: 09/05/2019    Patient will increase functional independence with ADLs by performing:    Feeding with Modified [place].  UE Dressing with Set-up Assistance.  LE Dressing with Stand-by Assistance with set up of clothes.  Grooming while seated with Modified [place].  Toileting from bedside commode over the toilet with Supervision for hygiene and clothing management.   Toilet transfer to bedside commode over the toilet with Contact  Guard Assistance.  Bathing from  shower chair/bench with Minimal Assistanc.  Shower transfer to TTB with CGA and verbal instructions.                     Reasons for Discontinuation of Therapy Services  Transfer to alternate level of care. and Satisfactory goal achievement.      Plan:     Patient Discharged to: Home with Home Health Service for OT, PT and ST. Sonya Hamilton OT  9/5/2019

## 2019-09-05 NOTE — NURSING
Family here. Orders reviewed with family and pt. Prescriptions sent to pharmacy. Equipment delivered. V/S wnl. Pt escorted to car via w/c. Min assist with transfers. Pt d/c home

## 2019-09-05 NOTE — PT/OT/SLP PROGRESS
"Speech Language Pathology Treatment          Deonna Montero   MRN: [id number]     Diet recommendations:   Diet Level: Puree   Liquid Level: Nectar Thick(chin tuck)    Aspiration Precautions: 1 bite/sip at a time, Alternating bites/sips, Assistance/supervision with meals and Assistance with thickening liquids, Avoid talking while eating, Chin tuck during nectar liquid swallows, Double swallow with each bite/sip, Eliminate distractions, eat meals in straight back chair at 90 degrees, Meds crushed in puree, No straws, Remain upright 30 minutes post meal and Small bites/sips    SLP Treatment Date: 09/05/19  Speech Start Time: 1142     Speech Stop Time: 1204     Speech Total (min): 22 min       TREATMENT BILLABLE MINUTES:  Treatment Swallowing Dysfunction 22    General Precautions: Standard, aspiration, fall, pureed diet, nectar thick  Current Respiratory Status: room air       Subjective:  "No ice." (pt explaining to son that ice may reduced thickness of liquid, making a nectar consistency liquid more thin than would be appropriate for her)    Objective:    Patient found with: (chair alarm).  Pt/son educated regarding discharge recommendations.  Son/pt further educated regarding use of thickener in liquid; where to purchase.  Sheets containing photos and references to various brands of thickener were provided to patient/son. Son provided with several samples of liquid thickener.  Clinician demonstrated use of Resource Clear thickener to make water nectar consistency.  Diet consistency (puree) was reiterated.   Need for home health ST was discussed (orders placed).    Pain/Comfort  Pain Rating 1: 0/10    Assessment:  Deonna Montero is a 78 y.o. female with a SLP diagnosis of Dysphagia, Dysarthria and Cognitive-Linguistic Impairment. Today level of dysarthria judged as mild to mild-moderate (improved from yesterday afternoon).  Patient and family present today understand need for nectar thick liquid and " puree diet; understand how to thicken liquid.    Diet recommendations: as above    Discharge recommendations: Discharge Facility/Level of Care Needs: (home health ST for dysphagia, cognitive-linguistic deficit) ; aspiration precautions as discussed above    Goals:   Multidisciplinary Problems     SLP Goals        Problem: SLP Goal    Goal Priority Disciplines Outcome   SLP Goal     SLP Ongoing (interventions implemented as appropriate)   Description:  1. MET - Demonstrate understanding of speech subsystems, STBY A  2. MET -- Complete controlled exhalation tasks to enhance control of respiration for speech production, MOD A   3. MET -- Coordinate initiation of phonation with exhalation to improve naturalness of speech production, MOD A  4. MET (with min-mod cues)-- Progressing from multisyllabic words to connected utterances, Pt will demonstrate control of speech subsystems in order to achieve appropriate coordination of speech subsystems for natural speech production, MAX A- pending goals 2 &3   5. MET -- Participate in assessment of functional problem solving    6. MET (zero to min cues) Demonstrate understanding of swallow safety precautions with 80% accuracy, min cues.  7. NOT MET (level of cues needed varies) Pt will per form target oral exercises for improved labial/lingual strength for increased swallow efficiency with min cues, 70% accuracy, 5-10 reps.  8. DEFERRED -- Pt will perform articulation drills focusing on increasing strength of articulatory contacts in initial, medial, final positions of single-syllable and two-syllable words, given clinician model, 80% accuracy (for improved speech intelligibility)  9. NOT FULLY ACHIEVED (cooperative but not fully able to perform Mendelsohn; does perform effortful swallows functionally; performs second swallows when cued and as needed; inconsistent with Ellyn task) Pt will perform target dysphagia exercises ( effortful swallows, Mendelsohn maneuver and second  swallows with food/liquid); Mendelsohn maneuver, Ellyn task without food ) with model 80% accuracy, for increased swallow safety (based on results of prior modified barium swallow).    10. MET -- Tolerate puree with nectar thick liquid, swallowing purees and nectar (by cup sip only) using second swallows, chin down for nectar liquid swallows, without sign of aspiration 80% accuracy.                        Plan:   Patient to be seen Therapy Frequency: (home health)   Planned Interventions: (home health ST for dysphagia and cognitive-linguistic deficits)  Plan of Care Expires:    Plan of Care reviewed with: patient, son(son educated re: use of thickener; niarin Lopes present for part of visit)  SLP Follow-up?: (discharging to home today with  home health therapies)  SLP - Next Visit Date: (n/a)           Nellie Piña CCC-SLP 9/5/2019

## 2021-01-13 NOTE — TELEPHONE ENCOUNTER
----- Message from Lexie Bone sent at 3/10/2017  4:39 PM CST -----  Contact: PAtient  Patient needs you to call the pharmacy for the antibiotics. There is no refill on the ones you gave her before. Please send prescription to [facility] in [place].   
Spoke with patient informed her new prescription was sent to the pharmacy today by bradley olvera. Pt verbally voiced understanding.  
83

## 2021-05-27 ENCOUNTER — TELEPHONE (OUTPATIENT)
Dept: UROLOGY | Facility: CLINIC | Age: 81
End: 2021-05-27

## 2021-06-01 ENCOUNTER — OFFICE VISIT (OUTPATIENT)
Dept: UROLOGY | Facility: CLINIC | Age: 81
End: 2021-06-01
Payer: MEDICARE

## 2021-06-01 VITALS — BODY MASS INDEX: 32.99 KG/M2 | HEIGHT: 68 IN | RESPIRATION RATE: 18 BRPM

## 2021-06-01 DIAGNOSIS — R32 URINARY INCONTINENCE, UNSPECIFIED TYPE: Primary | ICD-10-CM

## 2021-06-01 DIAGNOSIS — N32.81 OVERACTIVE BLADDER: ICD-10-CM

## 2021-06-01 PROCEDURE — 99215 OFFICE O/P EST HI 40 MIN: CPT | Mod: PBBFAC,PN | Performed by: NURSE PRACTITIONER

## 2021-06-01 PROCEDURE — 99213 PR OFFICE/OUTPT VISIT, EST, LEVL III, 20-29 MIN: ICD-10-PCS | Mod: S$PBB,,, | Performed by: NURSE PRACTITIONER

## 2021-06-01 PROCEDURE — 99999 PR PBB SHADOW E&M-EST. PATIENT-LVL V: CPT | Mod: PBBFAC,,, | Performed by: NURSE PRACTITIONER

## 2021-06-01 PROCEDURE — 99213 OFFICE O/P EST LOW 20 MIN: CPT | Mod: S$PBB,,, | Performed by: NURSE PRACTITIONER

## 2021-06-01 PROCEDURE — 99999 PR PBB SHADOW E&M-EST. PATIENT-LVL V: ICD-10-PCS | Mod: PBBFAC,,, | Performed by: NURSE PRACTITIONER

## 2021-06-01 RX ORDER — MIRABEGRON 50 MG/1
50 TABLET, FILM COATED, EXTENDED RELEASE ORAL DAILY
Qty: 30 TABLET | Refills: 11 | Status: ON HOLD | OUTPATIENT
Start: 2021-06-01 | End: 2022-11-25

## 2021-06-01 RX ORDER — SOLIFENACIN SUCCINATE 5 MG/1
5 TABLET, FILM COATED ORAL DAILY
Qty: 30 TABLET | Refills: 12 | Status: SHIPPED | OUTPATIENT
Start: 2021-06-01 | End: 2021-07-01

## 2021-06-01 RX ORDER — SOLIFENACIN SUCCINATE 5 MG/1
5 TABLET, FILM COATED ORAL DAILY
Qty: 30 TABLET | Refills: 12 | Status: ON HOLD | OUTPATIENT
Start: 2021-06-01 | End: 2022-11-25

## 2022-10-25 NOTE — PLAN OF CARE
Problem: Sensorimotor Impairment (Stroke, Ischemic/Transient Ischemic Attack)  Goal: Improved Sensorimotor Function  Outcome: Ongoing (interventions implemented as appropriate)  Pt alert and oriented. Transfers with min assist. Weakness to right side. Pt in bed. Call light in reach.        Nasal Turnover Hinge Flap Text: The defect edges were debeveled with a #15 scalpel blade.  Given the size, depth, location of the defect and the defect being full thickness a nasal turnover hinge flap was deemed most appropriate.  Using a sterile surgical marker, an appropriate hinge flap was drawn incorporating the defect. The area thus outlined was incised with a #15 scalpel blade. The flap was designed to recreate the nasal mucosal lining and the alar rim. The skin margins were undermined to an appropriate distance in all directions utilizing iris scissors.

## 2022-11-11 NOTE — TELEPHONE ENCOUNTER
Pre-Operative Instructions    Patient name: Chuck Rahman         We have scheduled you today for a GI procedure that will be performed sometime within the next few months. Because we realize that there may be some changes in the maintenance of your health after today, but prior to your procedure, we ask that you note the followin. If you have any additional medications that are added to the current medications you are taking, please notify our office so that we can properly instruct you in how to take this around the time of your procedure. For example,  if you started on any type of blood thinner, any type of anti inflammatory medication, or any type of iron supplement or vitamin, these medications will need to be stopped, depending on what they are, several days BEFORE the procedure. If you are given pain medications or any new type of heart medication or blood pressure pills or are a newly diagnoses diabetic put on blood sugar medication, we may also need to make adjustments regarding these.  2. If you have any type of device implanted (pacemaker, defibrillator, implanted pain device or stimulator, or have any kind of joint replacement) please let us know, as special arrangements may need to be made in order for the procedure to be performed.  3. If you develop new allergies, such as Latex or Versed, special arrangements may need to be made.  4. We will also need to be made aware of any changes in your insurance as we want to be sure that you receive the full benefit of your plan.      We understand that situations may arise causing you to cancel and/or reschedule your procedure date. We would appreciate at least two weeks notice. Thank you for your cooperation.    The staff of the [facility] offers these suggestions if you are scheduled for surgery.    1. Plan for unforeseen changes in surgery time. Although we try to maintain a set surgery schedule, there are times when a  ----- Message from Twin Pollack sent at 1/10/2018 10:09 AM CST -----  Contact: self   Patient wants to know how long should she continue taking rx  solifenacin (VESICARE) 10 MG tablet, estradiol (ESTRACE) 0.01 % (0.1 mg/gram) vaginal cream. Please call back at [phone] (home)                 surgery case may take longer than expected. This may result in your being in the surgery center longer than originally planned.      2. Arrange for an adult to stay with you at the surgery center. It is very important that you have an adult stay with you at the surgery center during your procedure. The medications you receive can make you sleepy and forgetful. For this reason, the doctor may want to discuss your surgery and post-operative care with an adult friend or family member. Additionally, an adult will be needed to drive you home. An adult must stay with you for the first 24 hours after your surgery. IF YOU DO  NOT HAVE AN ADULT TO DRIVE YOU HOME, YOUR SURGERY WILL BE CANCELLED.     3. Remember to follow pre-operative dietary restrictions. An empty stomach is imperative to prevent nausea or vomiting during and after your procedure.      4. Things to bring with you:  · a list of your current medications (including \"over the counter\" and herbal medications)  · important legal documents such as Power of , Guardianship papers  · any assistive device you are currently using (crutches, walker, hearing aid, etc.)    5. Limit visitors while you are at the surgery center. The time spent at the surgery center is very brief and will be limited to preparing you for surgery and assisting you to recover afterwards. Surgery center rooms are very small, which requires us to limit the number of visitors allowed in at one time.      6. Avoid alcoholic beverages. Because you will be asked to \"fast\" before your procedure, your body will be in a naturally dehydrated state. Alcohol will add to this dehydration making you more prone to problems with blood pressure during and after your procedure You should avoid alcoholic beverages for at least 24 hours prior to your surgery.    7. Wear loose comfortable clothes. Clothes that are easy to put on and take off are best. Sweat pants, shirts with buttons, and slip-on shoes are  wise choices. Avoid tight-fitting clothing such as blue jeans and turtlenecks.    8. Patients on Anticoagulation Medications:   IF YOU HAVE NOT RECEIVED INSTRUCTIONS REGARDING YOUR BLOOD THINNING MEDICATIONS WITHIN 7 DAYS OF YOUR SURGERY, PLEASE CALL THE GI DOCTOR'S OFFICE. FAILURE TO DO SO MAY RESULT IN CANCELLATION OF YOUR SURGERY.     9. Ask questions and insist on answers. Surgery can be a stressful time for anyone. If you have any questions or are unsure about any information given to you, be sure to ask for clarification. A patient can never ask too many questions. If there is something on your mind, let us know. We always want you to feel safe and confident in the care you are receiving.     TO ALL [facility] AMBULATORY SURGERY PATIENTS    You can decide today about the care you will receive in the future. [facility] respects your rights and will support your decisions to the fullest extent permitted by law, including your right to refuse or accept medical or surgical treatment. Please be advised that the [facility] prohibits ambulatory surgery centers from upholding Advanced Directives during surgical procedures. For this reason, any Advanced Directive will be null and void during your stay in the [facility].     Although not honored in the [facility], you are still entitled to be informed about your rights surrounding Advanced Directives. Advanced Directives are legal documents that enable you to specify what forms of treatment you want performed or withheld should you become unable to make or communicate these decisions on your own.  Although this is not a common decision made by outpatient surgery patients, we would like to let you know that an information booklet, \"A Personal Decision\" is available upon your request.      How do you know if an Advanced Directive is right  for you?  It is important to realize your rights as an individual, what the nature of consent for treatment implies, what a durable power of  for health care is and what a living will is.      After reviewing the booklet, \"A Personal Decision,\" should you have any further questions, please call us at [phone].      Thank you.     PATIENT’S RIGHTS    I. To be treated with respect, consideration and dignity, free from all forms of abuse, harassment and discrimination.     II. To receive quality care and high professional standards in a safe environment.    II. To be provided with appropriate privacy.    III. To expect that all disclosures and records are treated confidentially and, except when required by law, to be given the opportunity to approve or refuse their release.    IV. To be provided, to the degree known, complete information concerning their diagnosis, treatment and prognosis. When appropriate, the information is provided to a person designated by the patient to be a legally authorized person.    V. To review the records pertaining to his/her medical care and to have the information explained or interpreted as necessary except when restricted law.    VI. To expect that we will communicate with you in a matter that you can understand.     VII. To be given the names of all practitioners and health care personnel participating in his/her care.    VIII. To make decisions about the plan of care prior to and during the course of treatment.  IX. To refuse a recommended treatment or plan of care to the extent permitted by law and to be informed of the medical consequences of this action.     X. To expect that your treatment preferences will be responded to as delineated in your Advanced Directive, within the limits of the [facility] bylaws regarding resuscitation    XI. To be informed as to:  A. Rights and Responsibilities.  B. Services available in the organization.  C. Provisions for after-hours and  emergency care.  D.  The charges for services and available payment options.   E. The right to consent or decline participation in proposed research  studies.  F.  The available resources for resolving disputes, grievances, and conflicts.      XII. To expect emergency procedures to be implemented without unnecessary delay.    XIII. To expect a safe hospital transfer with appropriate medical records when necessary.     XIV. To know that all patients rights extend to the patient’s legal representatives.     XV. Complaints regarding Patients Rights or any issue surrounding care received during your stay can be made by contacting any of the following organizations:  i. Medicare patient: Visit the Office of Medicare Beneficiary Ombudsman at www.medicare.gov on the web.  Or call 1-800-Medicare (1-727.131.4287).     TTY users should call 1-511.888.2262.  ii. Non-Medicare patients can contact the [facility] at [phone]; fax [phone], TTY [phone].  iii. Any patient can also contact the [facility] at [phone] (toll free 8:30 am to 5:00 pm, central time, weekdays).        Patient Responsibilities    It is your responsibility as a [facility] patient:    · To provide all personal and family health information needed to provide you with appropriate care.    · To participate to the best of your ability in making decisions about your medical treatment, and to comply with the agreed upon plan of care.    · To ask questions of your physician or other care providers when you do not understand any information or instructions.    · To maintain appointments as scheduled, or to reschedule in a timely fashion.    · To inform your physician and other care providers if you anticipate problems in following prescribed treatment.    · To recognize the impact of your lifestyle on your personal health.    · To inform your physician or other care provider if you desire a  transfer of care to another physician.    · To be considerate of others receiving and providing care.  To observe relevant surgery center policies and procedures.    · To accept financial responsibility for health care services and to work cooperatively with [facility] to resolve financial obligations    Please contact [facility] regarding billing, to verify your coverage and any out-of-pocket responsibility at [phone]

## 2022-11-16 ENCOUNTER — HOSPITAL ENCOUNTER (INPATIENT)
Facility: HOSPITAL | Age: 82
LOS: 10 days | Discharge: HOSPICE | DRG: 299 | End: 2022-11-26
Attending: STUDENT IN AN ORGANIZED HEALTH CARE EDUCATION/TRAINING PROGRAM | Admitting: FAMILY MEDICINE
Payer: MEDICARE

## 2022-11-16 DIAGNOSIS — M86.9 OSTEOMYELITIS: ICD-10-CM

## 2022-11-16 DIAGNOSIS — R74.01 TRANSAMINITIS: ICD-10-CM

## 2022-11-16 DIAGNOSIS — E16.2 HYPOGLYCEMIA: ICD-10-CM

## 2022-11-16 DIAGNOSIS — I96 DRY GANGRENE: Primary | ICD-10-CM

## 2022-11-16 DIAGNOSIS — E87.20 LACTIC ACIDOSIS: ICD-10-CM

## 2022-11-16 DIAGNOSIS — I73.9 PAD (PERIPHERAL ARTERY DISEASE): ICD-10-CM

## 2022-11-16 DIAGNOSIS — E86.0 DEHYDRATION: ICD-10-CM

## 2022-11-16 DIAGNOSIS — R07.9 CHEST PAIN: ICD-10-CM

## 2022-11-16 DIAGNOSIS — L89.154 SACRAL DECUBITUS ULCER, STAGE IV: ICD-10-CM

## 2022-11-16 DIAGNOSIS — D64.9 SYMPTOMATIC ANEMIA: ICD-10-CM

## 2022-11-16 LAB
ABO + RH BLD: NORMAL
ALBUMIN SERPL BCP-MCNC: 1.6 G/DL (ref 3.5–5.2)
ALP SERPL-CCNC: 239 U/L (ref 55–135)
ALT SERPL W/O P-5'-P-CCNC: 62 U/L (ref 10–44)
ANION GAP SERPL CALC-SCNC: 8 MMOL/L (ref 8–16)
AST SERPL-CCNC: 89 U/L (ref 10–40)
BASOPHILS # BLD AUTO: 0.01 K/UL (ref 0–0.2)
BASOPHILS NFR BLD: 0.1 % (ref 0–1.9)
BILIRUB SERPL-MCNC: 0.4 MG/DL (ref 0.1–1)
BILIRUB UR QL STRIP: NEGATIVE
BLD GP AB SCN CELLS X3 SERPL QL: NORMAL
BUN SERPL-MCNC: 46 MG/DL (ref 8–23)
CALCIUM SERPL-MCNC: 7.2 MG/DL (ref 8.7–10.5)
CHLORIDE SERPL-SCNC: 104 MMOL/L (ref 95–110)
CLARITY UR: CLEAR
CO2 SERPL-SCNC: 23 MMOL/L (ref 23–29)
COLOR UR: YELLOW
CREAT SERPL-MCNC: 2 MG/DL (ref 0.5–1.4)
DIFFERENTIAL METHOD: ABNORMAL
EOSINOPHIL # BLD AUTO: 0 K/UL (ref 0–0.5)
EOSINOPHIL NFR BLD: 0 % (ref 0–8)
ERYTHROCYTE [DISTWIDTH] IN BLOOD BY AUTOMATED COUNT: 15.9 % (ref 11.5–14.5)
EST. GFR  (NO RACE VARIABLE): 24.6 ML/MIN/1.73 M^2
GLUCOSE SERPL-MCNC: 31 MG/DL (ref 70–110)
GLUCOSE SERPL-MCNC: 69 MG/DL (ref 70–110)
GLUCOSE SERPL-MCNC: 70 MG/DL (ref 70–110)
GLUCOSE SERPL-MCNC: 76 MG/DL (ref 70–110)
GLUCOSE UR QL STRIP: NEGATIVE
HCT VFR BLD AUTO: 19.7 % (ref 37–48.5)
HGB BLD-MCNC: 6.2 G/DL (ref 12–16)
HGB UR QL STRIP: ABNORMAL
IMM GRANULOCYTES # BLD AUTO: 0.15 K/UL (ref 0–0.04)
IMM GRANULOCYTES NFR BLD AUTO: 0.8 % (ref 0–0.5)
KETONES UR QL STRIP: NEGATIVE
LACTATE SERPL-SCNC: 3 MMOL/L (ref 0.5–1.9)
LEUKOCYTE ESTERASE UR QL STRIP: NEGATIVE
LYMPHOCYTES # BLD AUTO: 0.6 K/UL (ref 1–4.8)
LYMPHOCYTES NFR BLD: 3 % (ref 18–48)
MCH RBC QN AUTO: 27.1 PG (ref 27–31)
MCHC RBC AUTO-ENTMCNC: 31.5 G/DL (ref 32–36)
MCV RBC AUTO: 86 FL (ref 82–98)
MONOCYTES # BLD AUTO: 0.6 K/UL (ref 0.3–1)
MONOCYTES NFR BLD: 3 % (ref 4–15)
NEUTROPHILS # BLD AUTO: 18.2 K/UL (ref 1.8–7.7)
NEUTROPHILS NFR BLD: 93.1 % (ref 38–73)
NITRITE UR QL STRIP: NEGATIVE
NRBC BLD-RTO: 0 /100 WBC
PH UR STRIP: 6 [PH] (ref 5–8)
PLATELET # BLD AUTO: 575 K/UL (ref 150–450)
PMV BLD AUTO: 10.8 FL (ref 9.2–12.9)
POTASSIUM SERPL-SCNC: 3.5 MMOL/L (ref 3.5–5.1)
PROT SERPL-MCNC: 5.7 G/DL (ref 6–8.4)
PROT UR QL STRIP: NEGATIVE
RBC # BLD AUTO: 2.29 M/UL (ref 4–5.4)
SODIUM SERPL-SCNC: 135 MMOL/L (ref 136–145)
SP GR UR STRIP: 1.02 (ref 1–1.03)
URN SPEC COLLECT METH UR: ABNORMAL
UROBILINOGEN UR STRIP-ACNC: NEGATIVE EU/DL
WBC # BLD AUTO: 19.5 K/UL (ref 3.9–12.7)

## 2022-11-16 PROCEDURE — 25000003 PHARM REV CODE 250: Performed by: FAMILY MEDICINE

## 2022-11-16 PROCEDURE — 21000000 HC CCU ICU ROOM CHARGE

## 2022-11-16 PROCEDURE — 96366 THER/PROPH/DIAG IV INF ADDON: CPT

## 2022-11-16 PROCEDURE — 87040 BLOOD CULTURE FOR BACTERIA: CPT | Performed by: STUDENT IN AN ORGANIZED HEALTH CARE EDUCATION/TRAINING PROGRAM

## 2022-11-16 PROCEDURE — 86920 COMPATIBILITY TEST SPIN: CPT | Performed by: STUDENT IN AN ORGANIZED HEALTH CARE EDUCATION/TRAINING PROGRAM

## 2022-11-16 PROCEDURE — 96367 TX/PROPH/DG ADDL SEQ IV INF: CPT

## 2022-11-16 PROCEDURE — 86850 RBC ANTIBODY SCREEN: CPT | Performed by: STUDENT IN AN ORGANIZED HEALTH CARE EDUCATION/TRAINING PROGRAM

## 2022-11-16 PROCEDURE — 96365 THER/PROPH/DIAG IV INF INIT: CPT

## 2022-11-16 PROCEDURE — 25000003 PHARM REV CODE 250: Performed by: STUDENT IN AN ORGANIZED HEALTH CARE EDUCATION/TRAINING PROGRAM

## 2022-11-16 PROCEDURE — 96375 TX/PRO/DX INJ NEW DRUG ADDON: CPT

## 2022-11-16 PROCEDURE — 82962 GLUCOSE BLOOD TEST: CPT

## 2022-11-16 PROCEDURE — 85025 COMPLETE CBC W/AUTO DIFF WBC: CPT | Performed by: STUDENT IN AN ORGANIZED HEALTH CARE EDUCATION/TRAINING PROGRAM

## 2022-11-16 PROCEDURE — 99285 EMERGENCY DEPT VISIT HI MDM: CPT | Mod: 25

## 2022-11-16 PROCEDURE — 51701 INSERT BLADDER CATHETER: CPT

## 2022-11-16 PROCEDURE — 82947 ASSAY GLUCOSE BLOOD QUANT: CPT | Performed by: STUDENT IN AN ORGANIZED HEALTH CARE EDUCATION/TRAINING PROGRAM

## 2022-11-16 PROCEDURE — 80053 COMPREHEN METABOLIC PANEL: CPT | Performed by: STUDENT IN AN ORGANIZED HEALTH CARE EDUCATION/TRAINING PROGRAM

## 2022-11-16 PROCEDURE — 63600175 PHARM REV CODE 636 W HCPCS: Performed by: STUDENT IN AN ORGANIZED HEALTH CARE EDUCATION/TRAINING PROGRAM

## 2022-11-16 PROCEDURE — 83605 ASSAY OF LACTIC ACID: CPT | Performed by: STUDENT IN AN ORGANIZED HEALTH CARE EDUCATION/TRAINING PROGRAM

## 2022-11-16 PROCEDURE — 81003 URINALYSIS AUTO W/O SCOPE: CPT | Performed by: STUDENT IN AN ORGANIZED HEALTH CARE EDUCATION/TRAINING PROGRAM

## 2022-11-16 PROCEDURE — 36430 TRANSFUSION BLD/BLD COMPNT: CPT

## 2022-11-16 PROCEDURE — 63600175 PHARM REV CODE 636 W HCPCS: Performed by: FAMILY MEDICINE

## 2022-11-16 PROCEDURE — 86920 COMPATIBILITY TEST SPIN: CPT | Performed by: FAMILY MEDICINE

## 2022-11-16 RX ORDER — CEFEPIME HYDROCHLORIDE 1 G/50ML
1 INJECTION, SOLUTION INTRAVENOUS ONCE
Status: COMPLETED | OUTPATIENT
Start: 2022-11-16 | End: 2022-11-16

## 2022-11-16 RX ORDER — DEXTROSE MONOHYDRATE 100 MG/ML
INJECTION, SOLUTION INTRAVENOUS CONTINUOUS
Status: DISCONTINUED | OUTPATIENT
Start: 2022-11-16 | End: 2022-11-18

## 2022-11-16 RX ORDER — ONDANSETRON 4 MG/1
4 TABLET, ORALLY DISINTEGRATING ORAL EVERY 6 HOURS PRN
COMMUNITY
Start: 2022-05-22 | End: 2022-11-16

## 2022-11-16 RX ORDER — LANOLIN ALCOHOL/MO/W.PET/CERES
400 CREAM (GRAM) TOPICAL 2 TIMES DAILY
Status: ON HOLD | COMMUNITY
Start: 2022-01-14 | End: 2022-11-25

## 2022-11-16 RX ORDER — CALCIUM GLUCONATE 20 MG/ML
1 INJECTION, SOLUTION INTRAVENOUS
Status: COMPLETED | OUTPATIENT
Start: 2022-11-16 | End: 2022-11-16

## 2022-11-16 RX ORDER — CLOPIDOGREL BISULFATE 75 MG/1
75 TABLET ORAL DAILY
Status: DISCONTINUED | OUTPATIENT
Start: 2022-11-17 | End: 2022-11-17

## 2022-11-16 RX ORDER — AMIODARONE HYDROCHLORIDE 200 MG/1
200 TABLET ORAL 2 TIMES DAILY
Status: DISCONTINUED | OUTPATIENT
Start: 2022-11-17 | End: 2022-11-26 | Stop reason: HOSPADM

## 2022-11-16 RX ORDER — HYDROCODONE BITARTRATE AND ACETAMINOPHEN 500; 5 MG/1; MG/1
TABLET ORAL
Status: DISCONTINUED | OUTPATIENT
Start: 2022-11-16 | End: 2022-11-26 | Stop reason: HOSPADM

## 2022-11-16 RX ORDER — FUROSEMIDE 40 MG/1
40 TABLET ORAL DAILY
COMMUNITY
Start: 2022-08-10 | End: 2022-11-16

## 2022-11-16 RX ORDER — TIZANIDINE HYDROCHLORIDE 4 MG/1
4 CAPSULE, GELATIN COATED ORAL NIGHTLY PRN
COMMUNITY
Start: 2022-01-10 | End: 2022-11-16

## 2022-11-16 RX ORDER — IBUPROFEN 200 MG
16 TABLET ORAL
Status: DISCONTINUED | OUTPATIENT
Start: 2022-11-16 | End: 2022-11-26 | Stop reason: HOSPADM

## 2022-11-16 RX ORDER — NALOXONE HCL 0.4 MG/ML
0.02 VIAL (ML) INJECTION
Status: DISCONTINUED | OUTPATIENT
Start: 2022-11-16 | End: 2022-11-26 | Stop reason: HOSPADM

## 2022-11-16 RX ORDER — TALC
6 POWDER (GRAM) TOPICAL NIGHTLY PRN
Status: DISCONTINUED | OUTPATIENT
Start: 2022-11-16 | End: 2022-11-26 | Stop reason: HOSPADM

## 2022-11-16 RX ORDER — IPRATROPIUM BROMIDE AND ALBUTEROL SULFATE 2.5; .5 MG/3ML; MG/3ML
3 SOLUTION RESPIRATORY (INHALATION) EVERY 4 HOURS PRN
Status: DISCONTINUED | OUTPATIENT
Start: 2022-11-16 | End: 2022-11-26 | Stop reason: HOSPADM

## 2022-11-16 RX ORDER — SODIUM CHLORIDE 9 MG/ML
INJECTION, SOLUTION INTRAVENOUS CONTINUOUS
Status: DISCONTINUED | OUTPATIENT
Start: 2022-11-16 | End: 2022-11-26 | Stop reason: HOSPADM

## 2022-11-16 RX ORDER — SODIUM CHLORIDE 0.9 % (FLUSH) 0.9 %
10 SYRINGE (ML) INJECTION
Status: DISCONTINUED | OUTPATIENT
Start: 2022-11-16 | End: 2022-11-26 | Stop reason: HOSPADM

## 2022-11-16 RX ORDER — DEXTROSE MONOHYDRATE 100 MG/ML
25 INJECTION, SOLUTION INTRAVENOUS
Status: COMPLETED | OUTPATIENT
Start: 2022-11-16 | End: 2022-11-16

## 2022-11-16 RX ORDER — POTASSIUM CHLORIDE 1.5 G/1
40 POWDER, FOR SOLUTION ORAL 2 TIMES DAILY
Status: ON HOLD | COMMUNITY
Start: 2022-10-27 | End: 2022-11-25

## 2022-11-16 RX ORDER — AMIODARONE HYDROCHLORIDE 200 MG/1
200 TABLET ORAL 2 TIMES DAILY
COMMUNITY
Start: 2022-08-10 | End: 2023-08-10

## 2022-11-16 RX ORDER — APIXABAN 2.5 MG/1
2.5 TABLET, FILM COATED ORAL 2 TIMES DAILY
Status: ON HOLD | COMMUNITY
Start: 2022-11-02 | End: 2022-11-25

## 2022-11-16 RX ORDER — IBUPROFEN 200 MG
24 TABLET ORAL
Status: DISCONTINUED | OUTPATIENT
Start: 2022-11-16 | End: 2022-11-26 | Stop reason: HOSPADM

## 2022-11-16 RX ORDER — CEFEPIME HYDROCHLORIDE 1 G/50ML
1 INJECTION, SOLUTION INTRAVENOUS
Status: DISCONTINUED | OUTPATIENT
Start: 2022-11-17 | End: 2022-11-16

## 2022-11-16 RX ORDER — TRAMADOL HYDROCHLORIDE AND ACETAMINOPHEN 37.5; 325 MG/1; MG/1
1 TABLET, FILM COATED ORAL EVERY 6 HOURS PRN
Status: ON HOLD | COMMUNITY
Start: 2022-10-10 | End: 2022-11-25

## 2022-11-16 RX ORDER — ATORVASTATIN CALCIUM 40 MG/1
40 TABLET, FILM COATED ORAL DAILY
Status: DISCONTINUED | OUTPATIENT
Start: 2022-11-17 | End: 2022-11-26 | Stop reason: HOSPADM

## 2022-11-16 RX ORDER — ONDANSETRON 2 MG/ML
4 INJECTION INTRAMUSCULAR; INTRAVENOUS EVERY 6 HOURS PRN
Status: DISCONTINUED | OUTPATIENT
Start: 2022-11-16 | End: 2022-11-26 | Stop reason: HOSPADM

## 2022-11-16 RX ORDER — MUPIROCIN 20 MG/G
1 OINTMENT TOPICAL 2 TIMES DAILY
COMMUNITY
Start: 2022-03-21 | End: 2022-11-16

## 2022-11-16 RX ORDER — WARFARIN 2 MG/1
2 TABLET ORAL DAILY
Status: ON HOLD | COMMUNITY
Start: 2022-08-22 | End: 2022-11-25

## 2022-11-16 RX ORDER — FLUTICASONE PROPIONATE 50 MCG
2 SPRAY, SUSPENSION (ML) NASAL DAILY
Status: ON HOLD | COMMUNITY
Start: 2022-01-14 | End: 2022-11-25

## 2022-11-16 RX ORDER — GLUCAGON 1 MG
1 KIT INJECTION
Status: DISCONTINUED | OUTPATIENT
Start: 2022-11-16 | End: 2022-11-26 | Stop reason: HOSPADM

## 2022-11-16 RX ORDER — SIMETHICONE 80 MG
1 TABLET,CHEWABLE ORAL 4 TIMES DAILY PRN
Status: DISCONTINUED | OUTPATIENT
Start: 2022-11-16 | End: 2022-11-26 | Stop reason: HOSPADM

## 2022-11-16 RX ORDER — AMOXICILLIN 250 MG
1 CAPSULE ORAL 2 TIMES DAILY PRN
Status: DISCONTINUED | OUTPATIENT
Start: 2022-11-16 | End: 2022-11-26 | Stop reason: HOSPADM

## 2022-11-16 RX ORDER — METOPROLOL TARTRATE 25 MG/1
25 TABLET, FILM COATED ORAL 2 TIMES DAILY
Status: ON HOLD | COMMUNITY
Start: 2022-08-10 | End: 2022-11-25

## 2022-11-16 RX ORDER — ATORVASTATIN CALCIUM 40 MG/1
40 TABLET, FILM COATED ORAL DAILY
Status: ON HOLD | COMMUNITY
Start: 2022-08-10 | End: 2022-11-25

## 2022-11-16 RX ORDER — CEFEPIME HYDROCHLORIDE 1 G/50ML
1 INJECTION, SOLUTION INTRAVENOUS
Status: DISCONTINUED | OUTPATIENT
Start: 2022-11-17 | End: 2022-11-18

## 2022-11-16 RX ORDER — VANCOMYCIN HCL IN 5 % DEXTROSE 1G/250ML
1000 PLASTIC BAG, INJECTION (ML) INTRAVENOUS ONCE
Status: COMPLETED | OUTPATIENT
Start: 2022-11-16 | End: 2022-11-16

## 2022-11-16 RX ORDER — POLYETHYLENE GLYCOL 3350 17 G/17G
17 POWDER, FOR SOLUTION ORAL 2 TIMES DAILY PRN
Status: DISCONTINUED | OUTPATIENT
Start: 2022-11-16 | End: 2022-11-26 | Stop reason: HOSPADM

## 2022-11-16 RX ORDER — METOPROLOL TARTRATE 25 MG/1
25 TABLET, FILM COATED ORAL 2 TIMES DAILY
Status: DISCONTINUED | OUTPATIENT
Start: 2022-11-17 | End: 2022-11-26 | Stop reason: HOSPADM

## 2022-11-16 RX ORDER — MONTELUKAST SODIUM 10 MG/1
10 TABLET ORAL NIGHTLY
Status: ON HOLD | COMMUNITY
Start: 2022-01-10 | End: 2022-11-25

## 2022-11-16 RX ORDER — CLOPIDOGREL BISULFATE 75 MG/1
75 TABLET ORAL DAILY
Status: ON HOLD | COMMUNITY
Start: 2022-11-02 | End: 2022-11-25

## 2022-11-16 RX ORDER — OXYBUTYNIN CHLORIDE 15 MG/1
1 TABLET, EXTENDED RELEASE ORAL DAILY
COMMUNITY
Start: 2022-08-10 | End: 2022-11-16

## 2022-11-16 RX ORDER — MONTELUKAST SODIUM 10 MG/1
10 TABLET ORAL NIGHTLY
Status: DISCONTINUED | OUTPATIENT
Start: 2022-11-17 | End: 2022-11-26 | Stop reason: HOSPADM

## 2022-11-16 RX ORDER — ACETAMINOPHEN 325 MG/1
650 TABLET ORAL EVERY 8 HOURS PRN
Status: DISCONTINUED | OUTPATIENT
Start: 2022-11-16 | End: 2022-11-26 | Stop reason: HOSPADM

## 2022-11-16 RX ADMIN — SODIUM CHLORIDE, SODIUM LACTATE, POTASSIUM CHLORIDE, AND CALCIUM CHLORIDE 1000 ML: .6; .31; .03; .02 INJECTION, SOLUTION INTRAVENOUS at 10:11

## 2022-11-16 RX ADMIN — CALCIUM GLUCONATE 1 G: 20 INJECTION, SOLUTION INTRAVENOUS at 07:11

## 2022-11-16 RX ADMIN — DEXTROSE: 10 SOLUTION INTRAVENOUS at 10:11

## 2022-11-16 RX ADMIN — CEFEPIME HYDROCHLORIDE 1 G: 1 INJECTION, SOLUTION INTRAVENOUS at 10:11

## 2022-11-16 RX ADMIN — VANCOMYCIN HYDROCHLORIDE 1000 MG: 1 INJECTION, POWDER, LYOPHILIZED, FOR SOLUTION INTRAVENOUS at 10:11

## 2022-11-16 RX ADMIN — DEXTROSE 25 G: 10 SOLUTION INTRAVENOUS at 09:11

## 2022-11-16 RX ADMIN — VANCOMYCIN HYDROCHLORIDE 500 MG: 500 INJECTION, POWDER, LYOPHILIZED, FOR SOLUTION INTRAVENOUS at 11:11

## 2022-11-17 LAB
ALBUMIN SERPL BCP-MCNC: 1.4 G/DL (ref 3.5–5.2)
ALP SERPL-CCNC: 221 U/L (ref 55–135)
ALT SERPL W/O P-5'-P-CCNC: 57 U/L (ref 10–44)
ANION GAP SERPL CALC-SCNC: 11 MMOL/L (ref 8–16)
AST SERPL-CCNC: 73 U/L (ref 10–40)
BASOPHILS # BLD AUTO: 0.01 K/UL (ref 0–0.2)
BASOPHILS # BLD AUTO: 0.02 K/UL (ref 0–0.2)
BASOPHILS NFR BLD: 0.1 % (ref 0–1.9)
BASOPHILS NFR BLD: 0.1 % (ref 0–1.9)
BILIRUB SERPL-MCNC: 0.8 MG/DL (ref 0.1–1)
BLD PROD TYP BPU: NORMAL
BLOOD UNIT EXPIRATION DATE: NORMAL
BLOOD UNIT TYPE CODE: 5100
BLOOD UNIT TYPE: NORMAL
BNP SERPL-MCNC: 175 PG/ML (ref 0–99)
BUN SERPL-MCNC: 44 MG/DL (ref 8–23)
CALCIUM SERPL-MCNC: 7.2 MG/DL (ref 8.7–10.5)
CHLORIDE SERPL-SCNC: 102 MMOL/L (ref 95–110)
CO2 SERPL-SCNC: 21 MMOL/L (ref 23–29)
CODING SYSTEM: NORMAL
CREAT SERPL-MCNC: 1.9 MG/DL (ref 0.5–1.4)
CRP SERPL-MCNC: 20.77 MG/DL
CRP SERPL-MCNC: 21.14 MG/DL
DIFFERENTIAL METHOD: ABNORMAL
DIFFERENTIAL METHOD: ABNORMAL
DISPENSE STATUS: NORMAL
EOSINOPHIL # BLD AUTO: 0 K/UL (ref 0–0.5)
EOSINOPHIL # BLD AUTO: 0 K/UL (ref 0–0.5)
EOSINOPHIL NFR BLD: 0 % (ref 0–8)
EOSINOPHIL NFR BLD: 0 % (ref 0–8)
ERYTHROCYTE [DISTWIDTH] IN BLOOD BY AUTOMATED COUNT: 15.8 % (ref 11.5–14.5)
ERYTHROCYTE [DISTWIDTH] IN BLOOD BY AUTOMATED COUNT: 17.5 % (ref 11.5–14.5)
ERYTHROCYTE [SEDIMENTATION RATE] IN BLOOD BY WESTERGREN METHOD: 66 MM/HR (ref 0–20)
EST. GFR  (NO RACE VARIABLE): 26.2 ML/MIN/1.73 M^2
FERRITIN SERPL-MCNC: 442 NG/ML (ref 20–300)
GLUCOSE SERPL-MCNC: 142 MG/DL (ref 70–110)
GLUCOSE SERPL-MCNC: 196 MG/DL (ref 70–110)
GLUCOSE SERPL-MCNC: 203 MG/DL (ref 70–110)
GLUCOSE SERPL-MCNC: 249 MG/DL (ref 70–110)
GLUCOSE SERPL-MCNC: 318 MG/DL (ref 70–110)
GLUCOSE SERPL-MCNC: 340 MG/DL (ref 70–110)
GLUCOSE SERPL-MCNC: 351 MG/DL (ref 70–110)
GLUCOSE SERPL-MCNC: 370 MG/DL (ref 70–110)
GLUCOSE SERPL-MCNC: 393 MG/DL (ref 70–110)
GLUCOSE SERPL-MCNC: 48 MG/DL (ref 70–110)
HCT VFR BLD AUTO: 18 % (ref 37–48.5)
HCT VFR BLD AUTO: 23 % (ref 37–48.5)
HGB BLD-MCNC: 5.7 G/DL (ref 12–16)
HGB BLD-MCNC: 7.2 G/DL (ref 12–16)
IMM GRANULOCYTES # BLD AUTO: 0.16 K/UL (ref 0–0.04)
IMM GRANULOCYTES # BLD AUTO: 0.17 K/UL (ref 0–0.04)
IMM GRANULOCYTES NFR BLD AUTO: 0.8 % (ref 0–0.5)
IMM GRANULOCYTES NFR BLD AUTO: 0.8 % (ref 0–0.5)
INR PPP: 1.4
IRON SERPL-MCNC: 6 UG/DL (ref 30–160)
LACTATE SERPL-SCNC: 1.9 MMOL/L (ref 0.5–1.9)
LACTATE SERPL-SCNC: 4.1 MMOL/L (ref 0.5–1.9)
LACTATE SERPL-SCNC: 4.7 MMOL/L (ref 0.5–1.9)
LDH SERPL L TO P-CCNC: 229 U/L (ref 110–260)
LYMPHOCYTES # BLD AUTO: 0.6 K/UL (ref 1–4.8)
LYMPHOCYTES # BLD AUTO: 0.7 K/UL (ref 1–4.8)
LYMPHOCYTES NFR BLD: 3.1 % (ref 18–48)
LYMPHOCYTES NFR BLD: 3.3 % (ref 18–48)
MAGNESIUM SERPL-MCNC: 1.4 MG/DL (ref 1.6–2.6)
MCH RBC QN AUTO: 26.1 PG (ref 27–31)
MCH RBC QN AUTO: 27 PG (ref 27–31)
MCHC RBC AUTO-ENTMCNC: 31.3 G/DL (ref 32–36)
MCHC RBC AUTO-ENTMCNC: 31.7 G/DL (ref 32–36)
MCV RBC AUTO: 83 FL (ref 82–98)
MCV RBC AUTO: 85 FL (ref 82–98)
MONOCYTES # BLD AUTO: 0.5 K/UL (ref 0.3–1)
MONOCYTES # BLD AUTO: 0.6 K/UL (ref 0.3–1)
MONOCYTES NFR BLD: 2.2 % (ref 4–15)
MONOCYTES NFR BLD: 3.3 % (ref 4–15)
NEUTROPHILS # BLD AUTO: 17.6 K/UL (ref 1.8–7.7)
NEUTROPHILS # BLD AUTO: 19.6 K/UL (ref 1.8–7.7)
NEUTROPHILS NFR BLD: 92.7 % (ref 38–73)
NEUTROPHILS NFR BLD: 93.6 % (ref 38–73)
NRBC BLD-RTO: 0 /100 WBC
NRBC BLD-RTO: 0 /100 WBC
NUM UNITS TRANS PACKED RBC: NORMAL
OB PNL STL: POSITIVE
PLATELET # BLD AUTO: 469 K/UL (ref 150–450)
PLATELET # BLD AUTO: 570 K/UL (ref 150–450)
PMV BLD AUTO: 10.6 FL (ref 9.2–12.9)
PMV BLD AUTO: 10.9 FL (ref 9.2–12.9)
POTASSIUM SERPL-SCNC: 4.1 MMOL/L (ref 3.5–5.1)
PROT SERPL-MCNC: 4.9 G/DL (ref 6–8.4)
PROTHROMBIN TIME: 16.6 SEC (ref 11.4–13.7)
RBC # BLD AUTO: 2.11 M/UL (ref 4–5.4)
RBC # BLD AUTO: 2.76 M/UL (ref 4–5.4)
RETICS/RBC NFR AUTO: 2.2 % (ref 0.5–2.5)
SATURATED IRON: 4 % (ref 20–50)
SODIUM SERPL-SCNC: 134 MMOL/L (ref 136–145)
TOTAL IRON BINDING CAPACITY: 151 UG/DL (ref 250–450)
TRANSFERRIN SERPL-MCNC: 108 MG/DL (ref 200–375)
TRANSFERRIN SERPL-MCNC: 108 MG/DL (ref 200–375)
TROPONIN I SERPL HS-MCNC: 18.4 PG/ML (ref 0–14.9)
VANCOMYCIN SERPL-MCNC: 15.2 UG/ML
WBC # BLD AUTO: 19.01 K/UL (ref 3.9–12.7)
WBC # BLD AUTO: 20.91 K/UL (ref 3.9–12.7)

## 2022-11-17 PROCEDURE — 83735 ASSAY OF MAGNESIUM: CPT | Performed by: FAMILY MEDICINE

## 2022-11-17 PROCEDURE — 83880 ASSAY OF NATRIURETIC PEPTIDE: CPT | Performed by: FAMILY MEDICINE

## 2022-11-17 PROCEDURE — 83010 ASSAY OF HAPTOGLOBIN QUANT: CPT | Performed by: FAMILY MEDICINE

## 2022-11-17 PROCEDURE — P9016 RBC LEUKOCYTES REDUCED: HCPCS | Performed by: STUDENT IN AN ORGANIZED HEALTH CARE EDUCATION/TRAINING PROGRAM

## 2022-11-17 PROCEDURE — 85610 PROTHROMBIN TIME: CPT | Performed by: FAMILY MEDICINE

## 2022-11-17 PROCEDURE — 85651 RBC SED RATE NONAUTOMATED: CPT | Performed by: FAMILY MEDICINE

## 2022-11-17 PROCEDURE — 85045 AUTOMATED RETICULOCYTE COUNT: CPT | Performed by: FAMILY MEDICINE

## 2022-11-17 PROCEDURE — 83036 HEMOGLOBIN GLYCOSYLATED A1C: CPT | Performed by: FAMILY MEDICINE

## 2022-11-17 PROCEDURE — 80053 COMPREHEN METABOLIC PANEL: CPT | Performed by: FAMILY MEDICINE

## 2022-11-17 PROCEDURE — 51798 US URINE CAPACITY MEASURE: CPT

## 2022-11-17 PROCEDURE — 85025 COMPLETE CBC W/AUTO DIFF WBC: CPT | Performed by: FAMILY MEDICINE

## 2022-11-17 PROCEDURE — 63600175 PHARM REV CODE 636 W HCPCS: Performed by: FAMILY MEDICINE

## 2022-11-17 PROCEDURE — 83605 ASSAY OF LACTIC ACID: CPT | Mod: 91 | Performed by: FAMILY MEDICINE

## 2022-11-17 PROCEDURE — 25000003 PHARM REV CODE 250: Performed by: FAMILY MEDICINE

## 2022-11-17 PROCEDURE — 36415 COLL VENOUS BLD VENIPUNCTURE: CPT | Performed by: FAMILY MEDICINE

## 2022-11-17 PROCEDURE — 86140 C-REACTIVE PROTEIN: CPT | Performed by: FAMILY MEDICINE

## 2022-11-17 PROCEDURE — 82272 OCCULT BLD FECES 1-3 TESTS: CPT | Performed by: FAMILY MEDICINE

## 2022-11-17 PROCEDURE — 21000000 HC CCU ICU ROOM CHARGE

## 2022-11-17 PROCEDURE — 82947 ASSAY GLUCOSE BLOOD QUANT: CPT | Performed by: FAMILY MEDICINE

## 2022-11-17 PROCEDURE — 99222 1ST HOSP IP/OBS MODERATE 55: CPT | Mod: ,,, | Performed by: PODIATRIST

## 2022-11-17 PROCEDURE — 83605 ASSAY OF LACTIC ACID: CPT | Performed by: FAMILY MEDICINE

## 2022-11-17 PROCEDURE — 84466 ASSAY OF TRANSFERRIN: CPT | Performed by: FAMILY MEDICINE

## 2022-11-17 PROCEDURE — 86140 C-REACTIVE PROTEIN: CPT | Mod: 91 | Performed by: FAMILY MEDICINE

## 2022-11-17 PROCEDURE — 99222 PR INITIAL HOSPITAL CARE,LEVL II: ICD-10-PCS | Mod: ,,, | Performed by: PODIATRIST

## 2022-11-17 PROCEDURE — 80202 ASSAY OF VANCOMYCIN: CPT | Performed by: FAMILY MEDICINE

## 2022-11-17 PROCEDURE — 84484 ASSAY OF TROPONIN QUANT: CPT | Performed by: FAMILY MEDICINE

## 2022-11-17 PROCEDURE — 82728 ASSAY OF FERRITIN: CPT | Performed by: FAMILY MEDICINE

## 2022-11-17 PROCEDURE — 92610 EVALUATE SWALLOWING FUNCTION: CPT

## 2022-11-17 PROCEDURE — 83615 LACTATE (LD) (LDH) ENZYME: CPT | Performed by: FAMILY MEDICINE

## 2022-11-17 RX ORDER — IBUPROFEN 200 MG
16 TABLET ORAL
Status: DISCONTINUED | OUTPATIENT
Start: 2022-11-17 | End: 2022-11-17 | Stop reason: SDUPTHER

## 2022-11-17 RX ORDER — INSULIN ASPART 100 [IU]/ML
0-5 INJECTION, SOLUTION INTRAVENOUS; SUBCUTANEOUS
Status: DISCONTINUED | OUTPATIENT
Start: 2022-11-17 | End: 2022-11-26 | Stop reason: HOSPADM

## 2022-11-17 RX ORDER — ENOXAPARIN SODIUM 100 MG/ML
30 INJECTION SUBCUTANEOUS EVERY 24 HOURS
Status: DISCONTINUED | OUTPATIENT
Start: 2022-11-17 | End: 2022-11-20

## 2022-11-17 RX ORDER — HYDROCODONE BITARTRATE AND ACETAMINOPHEN 500; 5 MG/1; MG/1
TABLET ORAL
Status: DISCONTINUED | OUTPATIENT
Start: 2022-11-17 | End: 2022-11-26 | Stop reason: HOSPADM

## 2022-11-17 RX ORDER — IBUPROFEN 200 MG
24 TABLET ORAL
Status: DISCONTINUED | OUTPATIENT
Start: 2022-11-17 | End: 2022-11-17 | Stop reason: SDUPTHER

## 2022-11-17 RX ORDER — TRAMADOL HYDROCHLORIDE 50 MG/1
50 TABLET ORAL EVERY 6 HOURS PRN
Status: DISCONTINUED | OUTPATIENT
Start: 2022-11-17 | End: 2022-11-26 | Stop reason: HOSPADM

## 2022-11-17 RX ORDER — GLUCAGON 1 MG
1 KIT INJECTION
Status: DISCONTINUED | OUTPATIENT
Start: 2022-11-17 | End: 2022-11-17 | Stop reason: SDUPTHER

## 2022-11-17 RX ORDER — HYDROMORPHONE HYDROCHLORIDE 1 MG/ML
0.5 INJECTION, SOLUTION INTRAMUSCULAR; INTRAVENOUS; SUBCUTANEOUS EVERY 4 HOURS PRN
Status: DISCONTINUED | OUTPATIENT
Start: 2022-11-17 | End: 2022-11-26 | Stop reason: HOSPADM

## 2022-11-17 RX ORDER — ENOXAPARIN SODIUM 100 MG/ML
40 INJECTION SUBCUTANEOUS EVERY 24 HOURS
Status: DISCONTINUED | OUTPATIENT
Start: 2022-11-17 | End: 2022-11-17

## 2022-11-17 RX ADMIN — SODIUM CHLORIDE: 0.9 INJECTION, SOLUTION INTRAVENOUS at 01:11

## 2022-11-17 RX ADMIN — TRAMADOL HYDROCHLORIDE 50 MG: 50 TABLET, COATED ORAL at 05:11

## 2022-11-17 RX ADMIN — TRAMADOL HYDROCHLORIDE 50 MG: 50 TABLET, COATED ORAL at 12:11

## 2022-11-17 RX ADMIN — DEXTROSE 250 ML: 10 SOLUTION INTRAVENOUS at 12:11

## 2022-11-17 RX ADMIN — METOPROLOL TARTRATE 25 MG: 25 TABLET, FILM COATED ORAL at 09:11

## 2022-11-17 RX ADMIN — HYDROMORPHONE HYDROCHLORIDE 0.5 MG: 1 INJECTION, SOLUTION INTRAMUSCULAR; INTRAVENOUS; SUBCUTANEOUS at 09:11

## 2022-11-17 RX ADMIN — HYDROMORPHONE HYDROCHLORIDE 0.5 MG: 1 INJECTION, SOLUTION INTRAMUSCULAR; INTRAVENOUS; SUBCUTANEOUS at 01:11

## 2022-11-17 RX ADMIN — AMIODARONE HYDROCHLORIDE 200 MG: 200 TABLET ORAL at 09:11

## 2022-11-17 RX ADMIN — INSULIN ASPART 5 UNITS: 100 INJECTION, SOLUTION INTRAVENOUS; SUBCUTANEOUS at 05:11

## 2022-11-17 RX ADMIN — MONTELUKAST 10 MG: 10 TABLET, FILM COATED ORAL at 09:11

## 2022-11-17 RX ADMIN — HYDROMORPHONE HYDROCHLORIDE 0.5 MG: 1 INJECTION, SOLUTION INTRAMUSCULAR; INTRAVENOUS; SUBCUTANEOUS at 03:11

## 2022-11-17 RX ADMIN — ACETAMINOPHEN 650 MG: 325 TABLET ORAL at 02:11

## 2022-11-17 RX ADMIN — Medication 6 MG: at 12:11

## 2022-11-17 RX ADMIN — CEFEPIME HYDROCHLORIDE 1 G: 1 INJECTION, SOLUTION INTRAVENOUS at 09:11

## 2022-11-17 RX ADMIN — ENOXAPARIN SODIUM 30 MG: 30 INJECTION SUBCUTANEOUS at 03:11

## 2022-11-17 NOTE — CONSULTS
[facility]  Vascular Surgery  Consult Note    Inpatient consult to Vascular Surgery  Consult performed by: Ali Khoobehi, MD  Consult ordered by: Arjun Casper DPM      Subjective:     Chief Complaint/Reason for Admission: left foot ischemia, FTT    History of Present Illness:  Patient is an 82-year-old female history of peripheral vascular disease, bilateral hallux gangrene.  Patient's visiting nurse noted worsening ischemic changes to the left foot yesterday and patient was directed to come to the emergency room.  Patient herself is confused and history comes primarily from the son.  Apparently she would an angioplasty of her lower extremities about 2 weeks ago in [place].  Per the son, her p.o. intake has been very poor for the past 3 weeks.  She is nonambulatory and primarily gets around in a wheelchair.    Medications Prior to Admission   Medication Sig Dispense Refill Last Dose    amiodarone (PACERONE) 200 MG Tab Take 200 mg by mouth 2 (two) times daily. Verified by pharmacy   11/15/2022    amLODIPine (NORVASC) 10 MG tablet Take 1 tablet (10 mg total) by mouth once daily. (Patient taking differently: Take 10 mg by mouth once daily. Verified by pharmacy) 30 tablet 0 11/15/2022    atorvastatin (LIPITOR) 40 MG tablet Take 40 mg by mouth once daily. Verified by pharmacy   11/15/2022    clopidogreL (PLAVIX) 75 mg tablet Take 75 mg by mouth once daily. Verified by pharmacy   11/15/2022    ELIQUIS 2.5 mg Tab Take 2.5 mg by mouth 2 (two) times daily. Verified by pharmacy   11/15/2022    KLOR-CON 20 mEq Pack Take 40 mEq by mouth 2 (two) times daily. Verified by pharmacy   11/15/2022    metFORMIN (GLUCOPHAGE) 500 MG tablet Take 1 tablet (500 mg total) by mouth 2 (two) times daily with meals. (Patient taking differently: Take 500 mg by mouth 2 (two) times daily with meals. Verified by pharmacy) 60 tablet 0 11/15/2022    metoprolol tartrate (LOPRESSOR) 25 MG tablet Take 25 mg by mouth 2 (two) times  "daily. Verified by pharmacy   11/15/2022    montelukast (SINGULAIR) 10 mg tablet Take 10 mg by mouth every evening. Verified by pharmacy   11/15/2022    tramadol-acetaminophen 37.5-325 mg (ULTRACET) 37.5-325 mg Tab Take 1 tablet by mouth every 6 (six) hours as needed. Verified by pharmacy   11/15/2022    warfarin (COUMADIN) 2 MG tablet Take 2 mg by mouth Daily. Verified by pharmacy   11/15/2022    acetaminophen (TYLENOL) 325 MG tablet Take 2 tablets (650 mg total) by mouth every 6 (six) hours as needed.  0 Unknown    aspirin 81 MG Chew Take 1 tablet (81 mg total) by mouth once daily.  0 Unknown    BD INSULIN PEN NEEDLE UF ORIG 29 x 1/2 " Ndle    Unknown    BD INSULIN PEN NEEDLE UF SHORT 31 X 5/16 " Ndle    Unknown    BD INSULIN SYRINGE ULT-FINE II 1/2 mL 31 x 5/16" Syrg    Unknown    BD INSULIN SYRINGE ULTRA-FINE 1 mL 31 x 5/16" Syrg    Unknown    fluticasone propionate (FLONASE) 50 mcg/actuation nasal spray 2 sprays by Nasal route once daily.   Unknown    insulin  unit/mL injection Inject 20 Units into the skin once daily. (Patient taking differently: Inject 10 Units into the skin 2 (two) times daily before meals. Verified by pharmacy) 1 vial 0     lisinopril (PRINIVIL,ZESTRIL) 20 MG tablet Take 1 tablet (20 mg total) by mouth every evening. 30 tablet 0     magnesium oxide (MAG-OX) 400 mg (241.3 mg magnesium) tablet Take 400 mg by mouth 2 (two) times daily. Verified by pharmacy   Unknown    metoprolol succinate (TOPROL-XL) 50 MG 24 hr tablet Take 50 mg by mouth once daily.       mirabegron (MYRBETRIQ) 50 mg Tb24 Take 1 tablet (50 mg total) by mouth once daily. 30 tablet 11     ONE TOUCH ULTRA TEST Strp    Unknown    pantoprazole (PROTONIX) 40 MG tablet Take 1 tablet (40 mg total) by mouth once daily. 30 tablet 0     polyethylene glycol (GLYCOLAX) 17 gram/dose powder 1 cap daily (Patient taking differently: Take 17 g by mouth daily as needed. 1 cap daily) 255 g 12     solifenacin (VESICARE) 5 MG tablet Take 1 " tablet (5 mg total) by mouth once daily. 30 tablet 12        Review of patient's allergies indicates:   Allergen Reactions    Codeine Anaphylaxis and Itching    Morphine Itching    Morphine (pf) Itching       Past Medical History:   Diagnosis Date    Back pain     Diabetes mellitus     Hypertension     Reflux     Short-term memory loss     Stroke      Past Surgical History:   Procedure Laterality Date    BACK SURGERY      with tea    [place]ECTOMY      [place]      2 years ago    HYSTERECTOMY      INTRAOCULAR PROSTHESES INSERTION Left 2014    SPINAL CORD STIMULATOR IMPLANT      3-4 years ago-nonfuncitioning    TOTAL HIP ARTHROPLASTY       Family History       Problem Relation (Age of Onset)    Cancer Father, Son    Diabetes Mother, Father    Hypertension Father          Tobacco Use    Smoking status: Former     Types: Cigarettes     Quit date: 10/15/2010     Years since quittin.0    Smokeless tobacco: Former   Substance and Sexual Activity    Alcohol use: No    Drug use: No    Sexual activity: Not Currently     Review of Systems   Unable to perform ROS: Dementia   Objective:     Vital Signs (Most Recent):  Temp: 97.3 °F (36.3 °C) (22 0404)  Pulse: 84 (22 0645)  Resp: 18 (22 0913)  BP: (!) 122/57 (22 0645)  SpO2: 100 % (22 0532)   Vital Signs (24h Range):  Temp:  [97.3 °F (36.3 °C)-97.7 °F (36.5 °C)] 97.3 °F (36.3 °C)  Pulse:  [72-87] 84  Resp:  [16-31] 18  SpO2:  [96 %-100 %] 100 %  BP: (110-136)/(52-67) 122/57     Weight: 65.8 kg (145 lb 1 oz)  Body mass index is 22.06 kg/m².        Physical Exam  Constitutional:       Appearance: She is underweight. She is ill-appearing.   HENT:      Head: Normocephalic.   Cardiovascular:      Rate and Rhythm: Normal rate and regular rhythm.      Pulses:           Femoral pulses are 2+ on the right side and 2+ on the left side.       Dorsalis pedis pulses are detected w/ Doppler on the right side.      Heart sounds: Normal heart sounds.       Comments: Distal peroneal signal left calf  Steven hallux gangrene  Left foot mottled with pre gangrenous changes to dorsum and medial foot  Pulmonary:      Effort: Pulmonary effort is normal.      Breath sounds: Normal breath sounds.   Abdominal:      Palpations: Abdomen is soft.      Tenderness: There is no abdominal tenderness.   Neurological:      General: No focal deficit present.      Mental Status: She is alert. She is disoriented.       Significant Labs:  CBC:   Recent Labs   Lab 11/17/22  0705   WBC 19.01*   RBC 2.76*   HGB 7.2*   HCT 23.0*   *   MCV 83   MCH 26.1*   MCHC 31.3*     CMP:   Recent Labs   Lab 11/17/22  0705   *   CALCIUM 7.2*   ALBUMIN 1.4*   PROT 4.9*   *   K 4.1   CO2 21*      BUN 44*   CREATININE 1.9*   ALKPHOS 221*   ALT 57*   AST 73*   BILITOT 0.8     Coagulation:   Recent Labs   Lab 11/17/22  0111   INR 1.4       Significant Diagnostics:  I have reviewed all pertinent imaging results/findings within the past 24 hours.    Assessment/Plan:   Patient's left foot appears nonviable and has likely been ischemic for several days.  Even should she have presented sooner, she does not appear to be a candidate for limb salvage, due to her age, comorbidities, nonambulatory status, and poor nutritional status.    In my opinion she should be considered for a below-knee amputation if her nutritional status can be improved.  Discussed with patient's son at length, and with Dr. Alegre.    Active Diagnoses:    Diagnosis Date Noted POA    PRINCIPAL PROBLEM:  Hypoglycemia [E16.2] 11/16/2022 Yes    Gangrene [I96] 11/16/2022 Yes    PAD (peripheral artery disease) [I73.9] 11/16/2022 Yes    Osteomyelitis [M86.9] 11/16/2022 Yes    Anemia [D64.9] 11/16/2022 Yes    Type 2 diabetes mellitus without complication [E11.9] 05/02/2018 Yes    Benign essential HTN [I10] 09/19/2017 Yes      Problems Resolved During this Admission:       Thank you for your consult. I will sign off. Please contact us  if you have any additional questions.    Ali Khoobehi, MD  Vascular Surgery  [facility]

## 2022-11-17 NOTE — PROGRESS NOTES
Pharmacist Renal Dose Adjustment Note    Deonna Montero is a 82 y.o. female being treated with the medication enoxaparin    Patient Data:    Vital Signs (Most Recent):  Temp: 97.3 °F (36.3 °C) (11/17/22 0404)  Pulse: 84 (11/17/22 0645)  Resp: 16 (11/17/22 1317)  BP: (!) 122/57 (11/17/22 0645)  SpO2: 100 % (11/17/22 0532)   Vital Signs (72h Range):  Temp:  [97.3 °F (36.3 °C)-97.7 °F (36.5 °C)]   Pulse:  [72-87]   Resp:  [16-31]   BP: (110-136)/(52-67)   SpO2:  [96 %-100 %]      Recent Labs   Lab 11/16/22  1652 11/17/22  0705   CREATININE 2.0* 1.9*     Serum creatinine: 1.9 mg/dL (H) 11/17/22 0705  Estimated creatinine clearance: 23.4 mL/min (A)    Medication:enoxaparin 40 mg Q 24 hours will be changed to 30 mg Q 24 hours for crcl < 30 ml/min and bmi < 40 per [facility] renal dosing protocol.     Pharmacist's Name: Benito Delgado  Pharmacist's Extension: 1516

## 2022-11-17 NOTE — CONSULTS
Orthopedic Surgery Consult    History of present illness:   Deonna Montero is a 82 y.o. female who presents with bilateral lower extremity pain.  Record indicates the patient had underwent angioplasty of her lower extremities within the past month by an outside vascular surgeon.  Patient was brought to the emergency room secondary to increasing necrosis of the bilateral great toe.  Patient seen by podiatry for evaluation with no significant recommendation.  Vascular surgery made recommendation for below-knee amputation.  Consultation placed for evaluation.  Patient poor historian.      Allergies:   Review of patient's allergies indicates:   Allergen Reactions    Codeine Anaphylaxis and Itching    Morphine Itching    Morphine (pf) Itching         Past medical history:   Past Medical History:   Diagnosis Date    Back pain     Diabetes mellitus     Hypertension     Reflux     Short-term memory loss     Stroke          Past surgical history:  Past Surgical History:   Procedure Laterality Date    BACK SURGERY      with tea    [place]STECTOMY      [place]      2 years ago    HYSTERECTOMY      INTRAOCULAR PROSTHESES INSERTION Left 2014    SPINAL CORD STIMULATOR IMPLANT      3-4 years ago-nonfuncitioning    TOTAL HIP ARTHROPLASTY           Social history:   Reviewed per EPIC history for tobacco or alcohol use       Medications:    Current Facility-Administered Medications:     0.9%  NaCl infusion (for blood administration), , Intravenous, Q24H PRN, Venecia Calvillo MD    0.9%  NaCl infusion (for blood administration), , Intravenous, Q24H PRN, Venecia Calvillo MD    0.9%  NaCl infusion, , Intravenous, Continuous, Venecia Calvillo MD, Last Rate: 100 mL/hr at 11/17/22 0112, New Bag at 11/17/22 0112    acetaminophen tablet 650 mg, 650 mg, Oral, Q8H PRN, Venecia Calvillo MD, 650 mg at 11/17/22 0237    albuterol-ipratropium 2.5 mg-0.5 mg/3 mL nebulizer solution 3 mL, 3 mL, Nebulization, Q4H PRN, Venecia Calvillo MD     amiodarone tablet 200 mg, 200 mg, Oral, BID, Venecia Calvillo MD, 200 mg at 11/17/22 0901    atorvastatin tablet 40 mg, 40 mg, Oral, Daily, Venecia Calvillo MD    cefepime in dextrose 5 % 1 gram/50 mL IVPB 1 g, 1 g, Intravenous, Q24H, Venecia Calvillo MD    dextrose 10 % infusion, , Intravenous, Continuous, Venecia Calvillo MD, Last Rate: 25 mL/hr at 11/16/22 2252, New Bag at 11/16/22 2252    dextrose 10% bolus 125 mL, 12.5 g, Intravenous, PRN, Venecia Calvillo MD    dextrose 10% bolus 250 mL, 25 g, Intravenous, PRN, Venecia Calvillo MD, Stopped at 11/17/22 0058    enoxaparin injection 30 mg, 30 mg, Subcutaneous, Daily, Garland Alegre MD    glucagon (human recombinant) injection 1 mg, 1 mg, Intramuscular, PRN, Venecia Calvillo MD    glucose chewable tablet 16 g, 16 g, Oral, PRN, Venecia Calvillo MD    glucose chewable tablet 24 g, 24 g, Oral, PRN, Venecia Calvillo MD    HYDROmorphone injection 0.5 mg, 0.5 mg, Intravenous, Q4H PRN, Venecia Calvillo MD, 0.5 mg at 11/17/22 1317    insulin aspart U-100 pen 0-5 Units, 0-5 Units, Subcutaneous, QID (AC + HS) PRN, Garland Alegre MD    melatonin tablet 6 mg, 6 mg, Oral, Nightly PRN, Venecia Calvillo MD, 6 mg at 11/17/22 0059    metoprolol tartrate (LOPRESSOR) tablet 25 mg, 25 mg, Oral, BID, Venecia Calvillo MD, 25 mg at 11/17/22 0902    montelukast tablet 10 mg, 10 mg, Oral, QHS, Venecia Calvillo MD    naloxone 0.4 mg/mL injection 0.02 mg, 0.02 mg, Intravenous, PRN, Venecia Calvillo MD    ondansetron injection 4 mg, 4 mg, Intravenous, Q6H PRN, Venecia Calvillo MD    polyethylene glycol packet 17 g, 17 g, Oral, BID PRN, Venecia Calvillo MD    senna-docusate 8.6-50 mg per tablet 1 tablet, 1 tablet, Oral, BID PRN, Venecia Calvillo MD    simethicone chewable tablet 80 mg, 1 tablet, Oral, QID PRN, Venecia Calvillo MD    sodium chloride 0.9% flush 10 mL, 10 mL, Intravenous, PRN, Venecia Calvillo MD    traMADoL tablet 50 mg, 50 mg, Oral, Q6H PRN, Venecia Calvillo MD, 50 mg at  11/17/22 0059    Pharmacy to dose Vancomycin consult, , , Once **AND** vancomycin - pharmacy to dose, , Intravenous, pharmacy to manage frequency, Venecia Calvillo MD        Physical Exam:   Vitals:    11/17/22 0532 11/17/22 0645 11/17/22 0913 11/17/22 1317   BP: 124/60 (!) 122/57     BP Location:       Patient Position:       Pulse: 83 84     Resp: (!) 24  18 16   Temp:       TempSrc:       SpO2: 100%      Weight:       Height:         Recent Labs   Lab 11/17/22  0705   CALCIUM 7.2*   PROT 4.9*   *   K 4.1   CO2 21*      BUN 44*   CREATININE 1.9*     Recent Labs   Lab 11/17/22  0705   WBC 19.01*   RBC 2.76*   HGB 7.2*   HCT 23.0*   *     Recent Labs   Lab 11/17/22  0111   INR 1.4     Awake/alert/oriented x1, No acute distress, Afebrile, Vital signs stable  Normocephalic, Atraumatic  Heart is beating at normal rate  Good inspiratory effort with unlaboured breathing  Abdomen soft/nondistended/nontender    Bilateral lower extremity  Motor grossly intact  Palpable popliteal pulse bilaterally  Nonpalpable posterior tibial and dorsalis pedis pulses, bilaterally  There is dry gangrenous changes of the bilateral great toe and bilateral heel which is without significant erythema or drainage.       Assessment:   82 y.o. female with bilateral lower extremity stable dry gangrene      Plan:   No orthopedic intervention for reconstructive purposes.    Concur with vascular surgery's recommendation to proceed with below-knee amputation.  Pain control per primary    Cortes Eugene MD  [facility]

## 2022-11-17 NOTE — PLAN OF CARE
Problem: SLP  Goal: SLP Goal  Description: 1. Pt will tolerate least restrictive diet w/o overt s/s aspiration or oral residue/pocketing  Outcome: Ongoing, Progressing

## 2022-11-17 NOTE — PLAN OF CARE
Problem: Skin Injury Risk Increased  Goal: Skin Health and Integrity  Intervention: Promote and Optimize Oral Intake  Flowsheets (Taken 11/17/2022 1627)  Oral Nutrition Promotion: calorie-dense liquids provided     Problem: Oral Intake Inadequate  Goal: Improved Oral Intake  Outcome: Ongoing, Progressing  Intervention: Promote and Optimize Oral Intake  Flowsheets (Taken 11/17/2022 1627)  Oral Nutrition Promotion: calorie-dense liquids provided

## 2022-11-17 NOTE — CONSULTS
Vascular surgery following for chronic PID.  I was consulted for necrotizing fasciitis.  This appears to be dry gangrene of bilateral great toes.  Recommend Podiatry consult for management.

## 2022-11-17 NOTE — PT/OT/SLP EVAL
Speech Language Pathology Evaluation  Bedside Swallow    Patient Name:  Deonna Montero   MRN:  [id number]  Admitting Diagnosis: Hypoglycemia    Recommendations:                 General Recommendations:  Dysphagia therapy  Diet recommendations:  Puree, Nectar Thick   Aspiration Precautions: 1 bite/sip at a time, Assistance with meals and Assistance with thickening liquids, Check for pocketing/oral residue, Eliminate distractions, Feed only when awake/alert, Frequent oral care, HOB to 90 degrees, Meds crushed in puree, Remain upright 30 minutes post meal, Small bites/sips, and Strict aspiration precautions   General Precautions: Standard, aspiration, fall  Communication strategies:  yes/no questions only and simple directives with visual cues    History:   Deonna Montero is a 82 y.o. 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 Pt with hx of reflux, diabetes, HTN, CVA, dementia, and dysphagia. Pt with limited communication and cognitive deficits. According to RN, pt's family reports the pt eats a regular diet and thin liquids at home.     Past Medical History:   Diagnosis Date    Back pain     Diabetes mellitus     Hypertension     Reflux     Short-term memory loss     Stroke        Past Surgical History:   Procedure Laterality Date    BACK SURGERY      with tea    CHO[place]STECTOMY      [place]      2 years ago    HYSTERECTOMY      INTRAOCULAR PROSTHESES INSERTION Left 2014    SPINAL CORD STIMULATOR IMPLANT      3-4 years ago-nonfuncitioning    TOTAL HIP ARTHROPLASTY         Social History: Unable to obtain due to cognitive deficits.    Prior Intubation HX:  None this admit    Modified Barium Swallow: 8/30/2019   General Recommendations:  Dysphagia therapy  Diet recommendations:  Puree, Nectar Thick Liquid (no straw)  Chin tuck with liquids  Aspiration Precautions: 1 bite/sip at a time, constant direct supervision/Assistance with meals, Assistance with meals and Assistance with  thickening liquids, Avoid talking while eating, Chin tuck during liquid (nectar) swallows, Double swallow with each bite/sip, Eliminate distractions, Feed only when awake/alert, HOB to 90 degrees (preferably up in chair for meals) Meds crushed in puree, Monitor for s/s of aspiration, No straws, Remain upright 30 minutes post meal, Small bites/sips and Strict aspiration precautions.    Impressions    Patient demonstrates moderate  Oral dysphagia characterized by delayed initiation of oral swallow; piecemeal mastication; decreased speed of transit.   She demonstrates mild-mod pharyngeal dysphagia characterized primarily by delay in laryngeal elevation; pooling noted in valleculae and pyriform prior to swallows of thin and nectar liquid was cleared by swallows.  Pt demonstrated cough/gag x1 when no contrast was visible in laryngeal area.  Not safe with straw.  Needs meds crushed into pudding.     Chest X-Rays:  None this admit    CT Head: 11/16/22  Narrative & Impression  Head CT scan without contrast     COMPARISONS: None     ADDITIONAL PERTINENT HISTORY: Altered mental status     TECHNIQUE:  Multiple axial images were obtained from the skull base to the vertex without IV contrast. One of the following dose optimization techniques was utilized in the performance of this exam: Automated exposure control; adjustment of the mA and/or kV according to the patient's size; or use of an iterative  reconstruction technique.  Specific details can be referenced in the facility's radiology CT exam operational policy.     FINDINGS:     Midline shift: Negative     Ventricles:  Mild enlargement of the lateral and third ventricles in keeping with the degree of atrophy present.     Brain parenchyma:  Patchy hypoattenuation within the periventricular and subcortical white matter, nonspecific but likely representing small vessel ischemic change on a chronic basis. Remote area of infarct involving the inferior posterior right parietal  lobe. No intraparenchymal hemorrhage or mass effect.     Extra-axial spaces:  Moderate cerebral atrophy.     Intracranial vasculature:  Cavernous internal carotid and distal vertebral artery calcifications. 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        IMPRESSION:  1. Age-related changes.  2. Remote infarct involving the inferior right parietal lobe.  . No acute intracranial pathology.         Prior diet: Unable to obtain due to cognitive deficits; however, according to RN, pt's family reports the pt eats a regular diet and thin liquids at home.    Occupation/hobbies/homemaking: Unable to obtain due to cognitive deficits.    Subjective     Pt asleep in bed upon entering room. Pt easily awoke and was agreeable to BSE. RN also in room and provided Level 7 breakfast tray to use for PO trials.  Patient goals: none stated     Pain/Comfort:       Respiratory Status: Room air    Objective:   Pt completed BSE with portions of breakfast and lunch tray, as both were in room during eval. Pt responded to simple questions appropriately some of the time; pt only oriented to person and perseverating on . Difficulty following directives and [facility]; pt benefited from multiple cues and visual/verbal models throughout BSE.    Oral Musculature Evaluation  Oral Musculature: general weakness  Dentition: upper and lower dentures, other (see comments) (lower front dentition present; pt reporting top/bottom denture use at home; dentures not present for BSE)  Secretion Management: adequate  Mucosal Quality: dry  Mandibular Strength and Mobility: impaired, other (see comments) (open mouth posture at rest and reduced stability when chewing/breathing)  Oral Labial Strength and Mobility: impaired retraction, functional pursing, impaired  seal  Lingual Strength and Mobility: impaired protrusion, impaired anterior elevation, impaired left lateral movement, impaired right lateral movement, functional strength  Velar Elevation: WNL  Buccal Strength and Mobility: impaired  Volitional Cough: multiple cues to elicit; weak, dry  Volitional Swallow: unable to elicit due to reduced ability to follow directives  Voice Prior to PO Intake: clear, strained    Bedside Swallow Eval:   Consistencies Assessed:  Thin liquids ice chip x1, tsp water x3, single straw sip water x3  Nectar thick liquids single straw sip x7  Puree tsp bite applesauce x3  Soft solids 1/2 tsp bite scrambled eggs x1, bite sized piece of roasted potato w/ gravy x1  Solids bite sized piece of toast x1, bite sized piece of chicken with gravy x1      Oral Phase:   Soft solids/Solids  Excess chewing with fatigue/pausing during mastication  Prolonged mastication  MOD-SEVERE Oral/Lingual residue- Multiple sips NTL and tsp bite of applesauce eventually cleared residue  Slow oral transit time  Poor bolus manipulation  Decreased intraoral/lingual sensation  Orally expelled  Bit of toast ONLY  Poor oral acceptance  Decreased closure around utensil    **Adequate oral acceptance, bolus manipulation, timely oral transit time, adequate oral clearance of pureed trials and liquid trials    Pharyngeal Phase:   Coughing- tsp water 1/3 trials, single straw sip water in 2/3 trials  delayed swallow initiation across trials; however, adequate bolus hold as no overt clinical signs/symptoms of aspiration with NTL, puree, and solid trials  multiple spontaneous swallows (2-3 per bite/sip) across PO trials  Adequate laryngeal elevation/excursion noted via palpation of swallow during PO trials    Compensatory Strategies  Liquid wash  Lingual sweep  Multiple swallows    Treatment: Pt educated re purpose of BSE, role of SLP, results of BSE, diet recs, and swallowing precautions. Pt expressed understanding of recs. Recs  provided to nursing staff as well, with emphasis on small bits/sips and assistance with thickening liquids and feeding during meals.      Assessment:     Deonna Montero is a 82 y.o. female with an SLP diagnosis of Dysphagia.  She presents with severe oral dysphagia, as characterized by poor oral acceptance and labial seal, poor lingual ROM and coordination, reduced lingual strength, reduced dentition, and buccal weakness. Overt s/s aspiration noted during thin liquid PO trials with delayed swallow initiation as well, indicating likely pharyngeal dysphagia. Rec IDDSI Level 4- Pureed textures and Nectar Thick Liquids; meds crushed in puree. ST to f/u to ensure safety/tolerance with current diet recs and swallowing precautions.    Goals:   Multidisciplinary Problems       SLP Goals          Problem: SLP    Goal Priority Disciplines Outcome   SLP Goal    Low SLP Ongoing, Progressing   Description: 1. Pt will tolerate least restrictive diet w/o overt s/s aspiration or oral residue/pocketing                       Plan:     Patient to be seen:  2 x/week   Plan of Care expires:  11/24/22  Plan of Care reviewed with:  patient, other (see comments) (nursing)   SLP Follow-Up:  Yes       Discharge recommendations:  nursing facility, skilled, nursing facility, basic, intermediate care facility/nursing home   Barriers to Discharge:   TBD    Time Tracking:     SLP Treatment Date:   11/17/22  Speech Start Time:  1156  Speech Stop Time:  1238     Speech Total Time (min):  42 min    Billable Minutes: Eval Swallow and Oral Function 42 mins    11/17/2022

## 2022-11-17 NOTE — H&P
[facility] Medicine   History & Physical     Patient Name: Deonna Montero  MRN: [id number]  Admission Date: 2022  4:01 PM  Attending Physician: Venecia Calvillo MD  Face-to-Face encounter date: 2022    Patient information was obtained from patient, past medical records, ER physician, and ER records.     HISTORY OF PRESENT ILLNESS:     Deonna Montero is a 82 y.o. Black or  female   With PMH of DM2, HTN, previous CVA,  who presents with lower extremity wounds.  Family apparently sent pt due to increased lower extremity pain  No family at bedside  Pt reports she sees wound care in [place]  She currently reports back pain  Denies CP, denies LE pain, denies SOB, denies fever  However I am unsure about pt reliability due to AMS vs dementia.    REVIEW OF SYSTEMS:     Unable to obtain:  AMS, dementia    PAST MEDICAL HISTORY:     Past Medical History:   Diagnosis Date    Back pain     Diabetes mellitus     Hypertension     Reflux     Short-term memory loss     Stroke        PAST SURGICAL HISTORY:     Past Surgical History:   Procedure Laterality Date    BACK SURGERY      with tea    CHOLECYSTECTOMY      [place]      2 years ago    HYSTERECTOMY      INTRAOCULAR PROSTHESES INSERTION Left 2014    SPINAL CORD STIMULATOR IMPLANT      3-4 years ago-nonfuncitioning    TOTAL HIP ARTHROPLASTY         ALLERGIES:   Codeine, Morphine, and Morphine (pf)    FAMILY HISTORY:     Family History   Problem Relation Age of Onset    Diabetes Mother     Cancer Father     Diabetes Father     Hypertension Father     Cancer Son        SOCIAL HISTORY:     Social History     Tobacco Use    Smoking status: Former     Types: Cigarettes     Quit date: 10/15/2010     Years since quittin.0    Smokeless tobacco: Former   Substance Use Topics    Alcohol use: No        Social History     Substance and Sexual Activity   Sexual Activity Not Currently        HOME MEDICATIONS:     Prior to  Admission medications    Medication Sig Start Date End Date Taking? Authorizing Provider   amiodarone (PACERONE) 200 MG Tab Take 200 mg by mouth 2 (two) times daily. Verified by pharmacy 8/10/22 8/10/23 Yes Historical Provider   amLODIPine (NORVASC) 10 MG tablet Take 1 tablet (10 mg total) by mouth once daily.  Patient taking differently: Take 10 mg by mouth once daily. Verified by pharmacy 9/5/19 11/16/22 Yes Nicky Diamond MD   atorvastatin (LIPITOR) 40 MG tablet Take 40 mg by mouth once daily. Verified by pharmacy 8/10/22 8/10/23 Yes Historical Provider   clopidogreL (PLAVIX) 75 mg tablet Take 75 mg by mouth once daily. Verified by pharmacy 11/2/22  Yes Historical Provider   ELIQUIS 2.5 mg Tab Take 2.5 mg by mouth 2 (two) times daily. Verified by pharmacy 11/2/22  Yes Historical Provider   KLOR-CON 20 mEq Pack Take 40 mEq by mouth 2 (two) times daily. Verified by pharmacy 10/27/22  Yes Historical Provider   metFORMIN (GLUCOPHAGE) 500 MG tablet Take 1 tablet (500 mg total) by mouth 2 (two) times daily with meals.  Patient taking differently: Take 500 mg by mouth 2 (two) times daily with meals. Verified by pharmacy 9/4/19 11/16/22 Yes Nicky Diamond MD   metoprolol tartrate (LOPRESSOR) 25 MG tablet Take 25 mg by mouth 2 (two) times daily. Verified by pharmacy 8/10/22 8/10/23 Yes Historical Provider   montelukast (SINGULAIR) 10 mg tablet Take 10 mg by mouth every evening. Verified by pharmacy 1/10/22 1/10/23 Yes Historical Provider   tramadol-acetaminophen 37.5-325 mg (ULTRACET) 37.5-325 mg Tab Take 1 tablet by mouth every 6 (six) hours as needed. Verified by pharmacy 10/10/22  Yes Historical Provider   warfarin (COUMADIN) 2 MG tablet Take 2 mg by mouth Daily. Verified by pharmacy 8/22/22  Yes Historical Provider   acetaminophen (TYLENOL) 325 MG tablet Take 2 tablets (650 mg total) by mouth every 6 (six) hours as needed. 9/4/19   Nicky Diamond MD   aspirin 81 MG Chew Take 1 tablet (81 mg total) by  "mouth once daily. 9/5/19 9/4/20  Nicky Diamond MD   BD INSULIN PEN NEEDLE UF ORIG 29 x 1/2 " Ndle  12/12/14   Historical Provider   BD INSULIN PEN NEEDLE UF SHORT 31 X 5/16 " Ndle  10/21/14   Historical Provider   BD INSULIN SYRINGE ULT-FINE II 1/2 mL 31 x 5/16" Syrg  11/30/13   Historical Provider   BD INSULIN SYRINGE ULTRA-FINE 1 mL 31 x 5/16" Syrg  12/12/14   Historical Provider   fluticasone propionate (FLONASE) 50 mcg/actuation nasal spray 2 sprays by Nasal route once daily. 1/14/22 1/14/23  Historical Provider   insulin  unit/mL injection Inject 20 Units into the skin once daily.  Patient taking differently: Inject 10 Units into the skin 2 (two) times daily before meals. Verified by pharmacy 9/5/19 9/4/20  Nicky Diamond MD   lisinopril (PRINIVIL,ZESTRIL) 20 MG tablet Take 1 tablet (20 mg total) by mouth every evening. 9/4/19 9/3/20  Nicky Diamond MD   magnesium oxide (MAG-OX) 400 mg (241.3 mg magnesium) tablet Take 400 mg by mouth 2 (two) times daily. Verified by pharmacy 1/14/22 1/14/23  Historical Provider   metoprolol succinate (TOPROL-XL) 50 MG 24 hr tablet Take 50 mg by mouth once daily. 9/4/18 9/4/19  Historical Provider   mirabegron (MYRBETRIQ) 50 mg Tb24 Take 1 tablet (50 mg total) by mouth once daily. 6/1/21 6/1/22  Kusum Benavides, FNP   ONE TOUCH ULTRA TEST Strp  12/16/13   Historical Provider   pantoprazole (PROTONIX) 40 MG tablet Take 1 tablet (40 mg total) by mouth once daily. 9/5/19 9/4/20  Nicky Diamond MD   polyethylene glycol (GLYCOLAX) 17 gram/dose powder 1 cap daily  Patient taking differently: Take 17 g by mouth daily as needed. 1 cap daily 9/19/17   Sanjana Zambrano MD   solifenacin (VESICARE) 5 MG tablet Take 1 tablet (5 mg total) by mouth once daily. 6/1/21 7/1/21  DIONISIO Patino   bimatoprost (LUMIGAN) 0.01 % Drop Place 1 drop into both eyes every evening.  11/16/22  Historical Provider   brimonidine 0.1% (ALPHAGAN P) 0.1 % Drop Place 1 " "drop into both eyes 3 (three) times daily.  11/16/22  Historical Provider   brimonidine-timoloL (COMBIGAN) 0.2-0.5 % Drop Place 1 drop into both eyes once daily. 5/30/18 11/16/22  Historical Provider   donepezil (ARICEPT) 10 MG tablet Take 10 mg by mouth every evening.  7/4/17 11/16/22  Historical Provider   furosemide (LASIX) 40 MG tablet Take 40 mg by mouth once daily. 8/10/22 11/16/22  Historical Provider   magnesium hydroxide 400 mg/5 ml (MILK OF MAGNESIA) 400 mg/5 mL Susp Take 30 mLs (2,400 mg total) by mouth daily as needed. 9/19/17 11/16/22  Luis Solares MD   meloxicam (MOBIC) 7.5 MG tablet Take 1 tablet (7.5 mg total) by mouth once daily. 9/5/19 11/16/22  Nicky Diamond MD   mupirocin (BACTROBAN) 2 % ointment Apply 1 g topically 2 (two) times daily. 3/21/22 11/16/22  Historical Provider   ondansetron (ZOFRAN-ODT) 4 MG TbDL Take 4 mg by mouth every 6 (six) hours as needed. 5/22/22 11/16/22  Historical Provider   oxybutynin (DITROPAN XL) 15 MG TR24 Take 1 tablet by mouth once daily. 8/10/22 11/16/22  Historical Provider   temazepam (RESTORIL) 15 mg Cap Take 15 mg by mouth every evening.  11/16/22  Historical Provider   tiZANidine 4 mg Cap Take 4 mg by mouth nightly as needed. 1/10/22 11/16/22  Historical Provider   traMADol (ULTRAM) 50 mg tablet Take 50 mg by mouth every 4 (four) hours as needed.  11/16/22  Historical Provider   TRAVATAN Z 0.004 % Drop Place 1 drop into the right eye 2 (two) times daily.  11/29/17 11/16/22  Historical Provider       PHYSICAL EXAM:     BP (!) 129/56   Pulse 79   Temp 97.5 °F (36.4 °C) (Oral)   Resp 20   Ht 5' 8" (1.727 m)   Wt 72.6 kg (160 lb)   SpO2 100%   BMI 24.33 kg/m²     Gen: alert  HEENT:  Eyes - no pallor  External ears with no lesions  Nares patent  Mouth - lips chapped  CV: RRR  Lungs: CTA B/L  Abd: +BS, soft, NT, ND  Ext: dry gangrene; necrosis on hip             R distal pulses not palpable      Skin: warm, dry  Neuro: alert, oriented to " self  Psych: pleasant     LABS AND IMAGING:     Labs Reviewed   CBC W/ AUTO DIFFERENTIAL - Abnormal; Notable for the following components:       Result Value    WBC 19.50 (*)     RBC 2.29 (*)     Hemoglobin 6.2 (*)     Hematocrit 19.7 (*)     MCHC 31.5 (*)     RDW 15.9 (*)     Platelets 575 (*)     Immature Granulocytes 0.8 (*)     Gran # (ANC) 18.2 (*)     Immature Grans (Abs) 0.15 (*)     Lymph # 0.6 (*)     Gran % 93.1 (*)     Lymph % 3.0 (*)     Mono % 3.0 (*)     All other components within normal limits    Narrative:     hgb/hct critical result(s) repeated. Called and verbal readback   obtained from Sharath Junior RN by [facility] 11/16/2022 17:53  Recoll. [id number] by [facility] at 11/16/2022 17:26, reason: recollect to   confirm   COMPREHENSIVE METABOLIC PANEL - Abnormal; Notable for the following components:    Sodium 135 (*)     Glucose 69 (*)     BUN 46 (*)     Creatinine 2.0 (*)     Calcium 7.2 (*)     Total Protein 5.7 (*)     Albumin 1.6 (*)     Alkaline Phosphatase 239 (*)     AST 89 (*)     ALT 62 (*)     eGFR 24.6 (*)     All other components within normal limits   LACTIC ACID, PLASMA - Abnormal; Notable for the following components:    Lactate (Lactic Acid) 3.0 (*)     All other components within normal limits    Narrative:       Lactid acid critical result(s) called and verbal readback obtained   from Hillary Mukherjee RN ER  by [facility] 11/16/2022 18:58   URINALYSIS, REFLEX TO URINE CULTURE - Abnormal; Notable for the following components:    Occult Blood UA Trace (*)     All other components within normal limits    Narrative:     Specimen Source->Urine   GLUCOSE, RANDOM - Abnormal; Notable for the following components:    Glucose 31 (*)     All other components within normal limits    Narrative:        Glucose critical result(s) called and verbal readback obtained   from Tao Gardner RN ER  by [facility] 11/16/2022 22:22   CULTURE, BLOOD   CULTURE, BLOOD   CBC W/ AUTO DIFFERENTIAL   OCCULT BLOOD X 1, STOOL   IRON AND TIBC    FERRITIN   TRANSFERRIN   HAPTOGLOBIN   LACTATE DEHYDROGENASE   RETICULOCYTES   LACTIC ACID, PLASMA   PROTIME-INR   B-TYPE NATRIURETIC PEPTIDE   TROPONIN I HIGH SENSITIVITY   HEMOGLOBIN A1C   POCT GLUCOSE, HAND-HELD DEVICE   TYPE & SCREEN   POCT GLUCOSE   POCT GLUCOSE   POCT GLUCOSE MONITORING CONTINUOUS   POCT GLUCOSE MONITORING CONTINUOUS   PREPARE RBC SOFT     Imaging Results              US Lower Extremity Arteries Bilateral (Final result)  Result time 11/16/22 20:05:55      Final result by Lorenzo Chavira MD (11/16/22 20:05:55)                   Narrative:    Reason: Osteomyelitis    COMPARISON: None    FINDINGS:  Grayscale, color and spectral Doppler analysis of the bilateral lower extremity arteries was performed.  Right leg:  Grayscale images show diffuse calcified plaque. Biphasic spectral waveforms are present throughout aside from monophasic spectral waveforms in distal posterior tibial artery and dorsalis pedis arteries. No focal elevated peak systolic velocity to suggest a hemodynamically significant arterial stenosis.    Left leg:  Grayscale images show diffuse calcified plaque. Triphasic spectral waveforms occur in left common femoral and superficial femoral arteries. Monophasic spectral waveforms occur in left profunda femoral and popliteal arteries. Left posterior tibial and anterior tibial arteries are occluded as is the left dorsalis pedis artery. Very little monophasic spectral waveform occurs in left peroneal artery.    IMPRESSION:    1. Bilateral lower extremity peripheral arterial disease.  2. Complete occlusions of left posterior tibial, anterior tibial, and dorsalis pedis arteries.    Electronically signed by:  Lorenzo Chavira MD  11/16/2022 8:05 PM CST Workstation: [id number]                                     US Abdomen Limited (Gallbladder) (Final result)  Result time 11/16/22 20:11:20      Final result by Lorenzo Chavira MD (11/16/22 20:11:20)                   Narrative:    Reason:  Transaminitis    COMPARISON: CT 8/2/2017    FINDINGS:  Liver maintains normal echogenicity without focal hepatic mass or intrahepatic bile duct dilation. Hepatopedal flow present in main portal vein.    Gallbladder is absent, confirmed with 8/2/2017 CT where surgical clips are noted in the gallbladder fossa. Common duct diameter of 4 mm is normal.    Visualized pancreas is unremarkable with bowel gas obscuring portions of pancreas. Right kidney is free of hydronephrosis.    Juxtahepatic IVC is unremarkable. Visualized abdominal aorta is nonaneurysmal.    IMPRESSION:    1. Prior cholecystectomy.  2. No abnormality identified in the right upper quadrant.    Electronically signed by:  Lorenzo Chavira MD  11/16/2022 8:11 PM CST Workstation: [id number]                                     X-Ray Foot Complete Bilateral (Final result)  Result time 11/16/22 17:10:07      Final result by Lorenzo Chavira MD (11/16/22 17:10:07)                   Narrative:    Reason: Wound Check, Osteomyelitis    FINDINGS:  Three views of bilateral feet show no acute fracture, dislocation, or destructive osseous lesion. Soft tissue atrophy occurs about the left great toe. Diffuse arterial vascular calcifications occur bilaterally. No retained metallic fragment or radiopaque foreign body.    IMPRESSION:  No convincing radiographic evidence of osteomyelitis throughout right or left foot.    Electronically signed by:  Lorenzo Chavira MD  11/16/2022 5:10 PM CST Workstation: [id number]                                      ASSESSMENT & PLAN:   Deonna Montero is a 82 y.o. female admitted for    Active Hospital Problems    Diagnosis  POA    Hypoglycemia [E16.2]  Yes    Gangrene [I96]  Yes    PAD (peripheral artery disease) [I73.9]  Yes    Osteomyelitis [M86.9]  Yes    Anemia [D64.9]  Yes    Type 2 diabetes mellitus without complication [E11.9]  Yes    Benign essential HTN [I10]  Yes      Resolved Hospital Problems   No resolved problems to display.         Plan    Metabolic encephalopathy, improving  Hypoglycemia  DM2  - D10 gtt  - trending glucose  - holding insulin    Dry gangrene  Concern for osteomyelitis  PAD with LE arterial occlusions as noted above  Anemia requiring blood transfusion   CLAUDY on CKD  - wound Cx, Bcx, ESR, CRP  - vanc, cefepime, IVFs  - vascular surgery consulted in ER, they report that the occlusions are likely chronic:  no anticoagulation recommended at this time  - however pt appears to be on either warfarin or eliquis, per home med list.  Pt isn't sure.   - holding anticoagulation for now due to anemia requiring blood transfusions  - iron studies  - 1 unit pRBC transfusion ordered in ER  - trending CBC  - podiatry and vascular surgery consulted, thank you  - restart anticoagulation when appropriate     Hip / Sacrum necrosis  - wound care  - general surgery consulted, thank you    Chronic conditions as noted above/below; home medications reviewed personally by me and restarted as appropriate  Electrolyte derangement:  Trending BMP; Mg; replacement prn  DVT ppx: SCDs    Venecia Calvillo MD  [facility] Hospitalist  11/16/2022

## 2022-11-17 NOTE — PROGRESS NOTES
Pharmacist Renal Dose Adjustment Note    Deonna Montero is a 82 y.o. female being treated with the medication Cefepime    Patient Data:    Vital Signs (Most Recent):  Temp: 97.7 °F (36.5 °C) (11/16/22 1622)  Pulse: 74 (11/16/22 1853)  Resp: 16 (11/16/22 1853)  BP: 123/60 (11/16/22 1853)  SpO2: 100 % (11/16/22 1853) Vital Signs (72h Range):  Temp:  [97.7 °F (36.5 °C)]   Pulse:  [72-74]   Resp:  [16-17]   BP: (121-129)/(60-62)   SpO2:  [100 %]      Recent Labs   Lab 11/16/22  1652   CREATININE 2.0*     Serum creatinine: 2 mg/dL (H) 11/16/22 1652  Estimated creatinine clearance: 22.3 mL/min (A)    Medication:Cefepime dose: 1 g frequency Q8H will be changed to medication:Cefepime dose:1 g frequency:Q24H    Pharmacist's Name: Chago Roblero  Pharmacist's Extension: [phone]

## 2022-11-17 NOTE — CONSULTS
[facility]  Podiatry  Consult Note    Patient Name: Deonna Montero  MRN: [id number]  Admission Date: 11/16/2022  Hospital Length of Stay: 1 days  Attending Physician: Garland Alegre MD  Primary Care Provider: Primary Doctor No     Inpatient consult to Podiatry  Consult performed by: Arjun Casper DPM  Consult ordered by: Venecia Calvillo MD      Subjective:     History of Present Illness: 82 y.o. Black or  female   With PMH of DM2, HTN, previous CVA,  who presents with lower extremity wounds.    Per patient's son at the bedside she had vascular intervention several months ago to the left lower extremity.  She is also been receiving wound care in [facility].  Apparently home health came out for dressing changes and recommended patient be brought to the hospital for further evaluation.    Scheduled Meds:   amiodarone  200 mg Oral BID    atorvastatin  40 mg Oral Daily    ceFEPime (MAXIPIME) IVPB  1 g Intravenous Q24H    metoprolol tartrate  25 mg Oral BID    montelukast  10 mg Oral QHS     Continuous Infusions:   sodium chloride 0.9% 100 mL/hr at 11/17/22 0112    dextrose 10 % in water (D10W) 25 mL/hr at 11/16/22 2252     PRN Meds:sodium chloride, sodium chloride, acetaminophen, albuterol-ipratropium, dextrose 10%, dextrose 10%, glucagon (human recombinant), glucose, glucose, HYDROmorphone, melatonin, naloxone, ondansetron, polyethylene glycol, senna-docusate 8.6-50 mg, simethicone, sodium chloride 0.9%, traMADoL, Pharmacy to dose Vancomycin consult **AND** vancomycin - pharmacy to dose    Review of patient's allergies indicates:   Allergen Reactions    Codeine Anaphylaxis and Itching    Morphine Itching    Morphine (pf) Itching        Past Medical History:   Diagnosis Date    Back pain     Diabetes mellitus     Hypertension     Reflux     Short-term memory loss     Stroke      Past Surgical History:   Procedure Laterality Date    BACK SURGERY      with tea    CHOLECYSTECTOMY      [place]       2 years ago    HYSTERECTOMY      INTRAOCULAR PROSTHESES INSERTION Left 2014    SPINAL CORD STIMULATOR IMPLANT      3-4 years ago-nonfuncitioning    TOTAL HIP ARTHROPLASTY         Family History       Problem Relation (Age of Onset)    Cancer Father, Son    Diabetes Mother, Father    Hypertension Father          Tobacco Use    Smoking status: Former     Types: Cigarettes     Quit date: 10/15/2010     Years since quittin.0    Smokeless tobacco: Former   Substance and Sexual Activity    Alcohol use: No    Drug use: No    Sexual activity: Not Currently     Review of Systems   Unable to perform ROS: Dementia   Objective:     Vital Signs (Most Recent):  Temp: 97.3 °F (36.3 °C) (22 0404)  Pulse: 84 (22 0645)  Resp: 18 (22 0913)  BP: (!) 122/57 (22 0645)  SpO2: 100 % (22 0532)   Vital Signs (24h Range):  Temp:  [97.3 °F (36.3 °C)-97.7 °F (36.5 °C)] 97.3 °F (36.3 °C)  Pulse:  [72-87] 84  Resp:  [16-31] 18  SpO2:  [96 %-100 %] 100 %  BP: (110-136)/(52-67) 122/57     Weight: 65.8 kg (145 lb 1 oz)  Body mass index is 22.06 kg/m².    Foot Exam    Laboratory:  A1C: No results for input(s): HGBA1C in the last 4320 hours.  CBC:   Recent Labs   Lab 22  07   WBC 19.01*   RBC 2.76*   HGB 7.2*   HCT 23.0*   *   MCV 83   MCH 26.1*   MCHC 31.3*     CMP:   Recent Labs   Lab 22  0705   *   CALCIUM 7.2*   ALBUMIN 1.4*   PROT 4.9*   *   K 4.1   CO2 21*      BUN 44*   CREATININE 1.9*   ALKPHOS 221*   ALT 57*   AST 73*   BILITOT 0.8     CRP:   Recent Labs   Lab 22  0705   CRP 21.14*     ESR:   Recent Labs   Lab 22  0111   SEDRATE 66*       Diagnostic Results:  I have reviewed all pertinent imaging results/findings within the past 24 hours.    CT Head Without Contrast  Head CT scan without contrast    COMPARISONS: None    ADDITIONAL PERTINENT HISTORY: Altered mental status    TECHNIQUE:  Multiple axial images were obtained from the skull base to the vertex  without IV contrast. One of the following dose optimization techniques was utilized in the performance of this exam: Automated exposure control; adjustment of the mA and/or kV according to the patient's size; or use of an iterative  reconstruction technique.  Specific details can be referenced in the facility's radiology CT exam operational policy.    FINDINGS:    Midline shift: Negative    Ventricles:  Mild enlargement of the lateral and third ventricles in keeping with the degree of atrophy present.    Brain parenchyma:  Patchy hypoattenuation within the periventricular and subcortical white matter, nonspecific but likely representing small vessel ischemic change on a chronic basis. Remote area of infarct involving the inferior posterior right parietal lobe. No intraparenchymal hemorrhage or mass effect.    Extra-axial spaces:  Moderate cerebral atrophy.    Intracranial vasculature:  Cavernous internal carotid and distal vertebral artery calcifications. 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    IMPRESSION:  1. Age-related changes.  2. Remote infarct involving the inferior right parietal lobe.  . No acute intracranial pathology.    Electronically signed by:  Emiliano Singh MD  11/16/2022 11:50 PM CST Workstation: [id number]  US Abdomen Limited (Gallbladder)  Reason: Transaminitis    COMPARISON: CT 8/2/2017    FINDINGS:  Liver maintains normal echogenicity without focal hepatic mass or intrahepatic bile duct dilation. Hepatopedal flow present in main portal vein.    Gallbladder is absent, confirmed with 8/2/2017 CT where surgical clips are noted in the gallbladder fossa. Common duct diameter of 4 mm is normal.    Visualized pancreas is unremarkable with bowel gas obscuring portions of pancreas. Right kidney is free of  hydronephrosis.    Juxtahepatic IVC is unremarkable. Visualized abdominal aorta is nonaneurysmal.    IMPRESSION:    1. Prior cholecystectomy.  2. No abnormality identified in the right upper quadrant.    Electronically signed by:  Lorenzo Chavira MD  11/16/2022 8:11 PM CST Workstation: [id number]  US Lower Extremity Arteries Bilateral  Reason: Osteomyelitis    COMPARISON: None    FINDINGS:  Grayscale, color and spectral Doppler analysis of the bilateral lower extremity arteries was performed.  Right leg:  Grayscale images show diffuse calcified plaque. Biphasic spectral waveforms are present throughout aside from monophasic spectral waveforms in distal posterior tibial artery and dorsalis pedis arteries. No focal elevated peak systolic velocity to suggest a hemodynamically significant arterial stenosis.    Left leg:  Grayscale images show diffuse calcified plaque. Triphasic spectral waveforms occur in left common femoral and superficial femoral arteries. Monophasic spectral waveforms occur in left profunda femoral and popliteal arteries. Left posterior tibial and anterior tibial arteries are occluded as is the left dorsalis pedis artery. Very little monophasic spectral waveform occurs in left peroneal artery.    IMPRESSION:    1. Bilateral lower extremity peripheral arterial disease.  2. Complete occlusions of left posterior tibial, anterior tibial, and dorsalis pedis arteries.    Electronically signed by:  Lorenzo Chavira MD  11/16/2022 8:05 PM CST Workstation: [id number]  X-Ray Foot Complete Bilateral  Reason: Wound Check, Osteomyelitis    FINDINGS:  Three views of bilateral feet show no acute fracture, dislocation, or destructive osseous lesion. Soft tissue atrophy occurs about the left great toe. Diffuse arterial vascular calcifications occur bilaterally. No retained metallic fragment or radiopaque foreign body.    IMPRESSION:  No convincing radiographic evidence of osteomyelitis throughout right or left  foot.    Electronically signed by:  Lorenzo Chavira MD  11/16/2022 5:10 PM CST Workstation: [id number]      Clinical Findings:                          Dry stable gangrene to the bilateral great toes.  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  The entire left foot is cold to touch.  There are progressive ischemic changes extending up to the ankle.    Assessment/Plan:     Active Diagnoses:    Diagnosis Date Noted POA    PRINCIPAL PROBLEM:  Hypoglycemia [E16.2] 11/16/2022 Yes    Gangrene [I96] 11/16/2022 Yes    PAD (peripheral artery disease) [I73.9] 11/16/2022 Yes    Osteomyelitis [M86.9] 11/16/2022 Yes    Anemia [D64.9] 11/16/2022 Yes    Type 2 diabetes mellitus without complication [E11.9] 05/02/2018 Yes    Benign essential HTN [I10] 09/19/2017 Yes      Problems Resolved During this Admission:       Patient has critical ischemia to her left foot.  Discussed with the patient's son at the bedside.  Placing a stat consult to vascular surgery.  Ultimately patient's left foot is likely not salvageable.  Questionable be what level proximal amputation can be performed.  Recommend continue local wound care for the right great toe and heel of Betadine.    Thank you for your consult. I will follow-up with patient. Please contact us if you have any additional questions.    Arjun Casper DPM  Podiatry  [facility]

## 2022-11-17 NOTE — CONSULTS
Critical access hospital  Adult Nutrition   Consult Note (Initial Assessment)     SUMMARY     Recommendations  1) RD has added nectar thick ensure with meals the aid in wound healing.   2) Continue current Dysphagia Pureed, [facility]; Diabetic Diet; Nectar Thick diet as tolerated and encourage intake.    Goals:   Pt to met 100% of her EEN and EPN to aid in wound healing.    Nutrition Goal Status: progressing towards goal    Communication of RD Recs: other (comment)    Dietitian Rounds Brief  Consult for Wound Care: Attempted to speak to pt to inquire about how she was eating but she was unable to tell me anything. Speech was consulted and came up with the following:      General Recommendations:  Dysphagia therapy  Diet recommendations:  Puree, Nectar Thick   Aspiration Precautions: 1 bite/sip at a time, Assistance with meals and Assistance with thickening liquids, Check for pocketing/oral residue, Eliminate distractions, Feed only when awake/alert, Frequent oral care, HOB to 90 degrees, Meds crushed in puree, Remain upright 30 minutes post meal, Small bites/sips, and Strict aspiration precautions   General Precautions: Standard, aspiration, fall  Communication strategies:  yes/no questions only and simple directives with visual cues     History:   Deonna Montero is a 82 y.o. 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 Pt with hx of reflux, diabetes, HTN, CVA, dementia, and dysphagia. Pt with limited communication and cognitive deficits. According to RN, pt's family reports the pt eats a regular diet and thin liquids at home.     Nectar Thick Ensure has been ordered for her to increase the nutritional content of her meals and to assist in wound healing. Will continue to follow prn.    Diet order:   Current Diet Order: Dysphagia Pureed, [facility]; Diabetic Diet; [place] Thick      Evaluation of Received Nutrient/Fluid Intake  Energy Calories Required: not meeting needs  Protein Required: not  "meeting needs  Fluid Required: meeting needs  Tolerance: tolerating     % Intake of Estimated Energy Needs: 0%  % Meal Intake: 0 - 25 %      Intake/Output Summary (Last 24 hours) at 11/17/2022 1621  Last data filed at 11/17/2022 1616  Gross per 24 hour   Intake 1652.33 ml   Output 100 ml   Net 1552.33 ml        Anthropometrics  Temp: 99 °F (37.2 °C)  Height Method: Estimated  Height: 5' 8" (172.7 cm)  Height (inches): 68 in  Weight Method: Bed Scale  Weight: 65.8 kg (145 lb 1 oz)  Weight (lb): 145.06 lb  Ideal Body Weight (IBW), Female: 140 lb  % Ideal Body Weight, Female (lb): 114.29 %  BMI (Calculated): 22.1  BMI Grade: 18.5-24.9 - normal       Estimated/Assessed Needs  Weight Used For Calorie Calculations: 65.8 kg (145 lb 1 oz)  Energy Calorie Requirements (kcal): 7365-5366 kcals/day  Energy Need Method: Kcal/kg  Protein Requirements:  g/day  Weight Used For Protein Calculations: 64 kg (141 lb 1.5 oz)     Estimated Fluid Requirement Method: RDA Method  RDA Method (mL): 1645       Reason for Assessment  Reason For Assessment: consult  Diagnosis: other (see comments) (hypoglycemia)  Relevant Medical History: Diabetes mellitus, Hypertension, Short-term memory loss, Reflux, Back pain, Stroke  Interdisciplinary Rounds: did not attend    Nutrition/Diet History  Spiritual, Cultural Beliefs, Gnosticist Practices, Values that Affect Care: no  Food Allergies: NKFA  Factors Affecting Nutritional Intake: None identified at this time    Nutrition Risk Screen  Nutrition Risk Screen: no indicators present       Altered Skin Integrity 11/17/22 0200 Sacral spine #1 Full thickness tissue loss. Base is covered by slough and/or eschar in the wound bed-Wound Image: Images linked       Altered Skin Integrity 11/17/22 0200 Right anterior Hip Full thickness tissue loss. Base is covered by slough and/or eschar in the wound bed-Wound Image: Images linked       Altered Skin Integrity Right anterior Toe, first Diabetic Ulcer Full " thickness tissue loss with exposed bone, tendon, or muscle. Often includes undermining and tunneling. May extend into muscle and/or supporting structures.-Wound Image: Images linked       Altered Skin Integrity 11/17/22 0100 Left anterior Toe, first Diabetic Ulcer Full thickness tissue loss with exposed bone, tendon, or muscle. Often includes undermining and tunneling. May extend into muscle and/or supporting structures.-Wound Image: Images linked       Altered Skin Integrity 11/17/22 0100 Left Heel-Wound Image: Images linked       Altered Skin Integrity Left Heel Purple or maroon localized area of discolored intact skin or non-intact skin or a blood-filled blister.-Wound Image: Images linked  MST Score: 0  Have you recently lost weight without trying?: No  Weight loss score: 0  Have you been eating poorly because of a decreased appetite?: No  Appetite score: 0       Weight History:  Wt Readings from Last 5 Encounters:   11/17/22 65.8 kg (145 lb 1 oz)   09/02/19 98.4 kg (217 lb)   09/18/18 96.5 kg (212 lb 11.9 oz)   09/11/18 96.3 kg (212 lb 4.9 oz)   05/02/18 96.3 kg (212 lb 4.9 oz)        Lab/Procedures/Meds: Pertinent Labs/Meds Reviewed    Medications:Pertinent Medications Reviewed  Scheduled Meds:   amiodarone  200 mg Oral BID    atorvastatin  40 mg Oral Daily    ceFEPime (MAXIPIME) IVPB  1 g Intravenous Q24H    enoxparin  30 mg Subcutaneous Daily    metoprolol tartrate  25 mg Oral BID    montelukast  10 mg Oral QHS     Continuous Infusions:   sodium chloride 0.9% 100 mL/hr at 11/17/22 0112    dextrose 10 % in water (D10W) 25 mL/hr at 11/16/22 2252     PRN Meds:.sodium chloride, sodium chloride, acetaminophen, albuterol-ipratropium, dextrose 10%, dextrose 10%, glucagon (human recombinant), glucose, glucose, HYDROmorphone, insulin aspart U-100, melatonin, naloxone, ondansetron, polyethylene glycol, senna-docusate 8.6-50 mg, simethicone, sodium chloride 0.9%, traMADoL, Pharmacy to dose Vancomycin consult **AND**  vancomycin - pharmacy to dose    Labs: Pertinent Labs Reviewed  Clinical Chemistry:  Recent Labs   Lab 11/17/22  0705   *   K 4.1      CO2 21*   *   BUN 44*   CREATININE 1.9*   CALCIUM 7.2*   PROT 4.9*   ALBUMIN 1.4*   BILITOT 0.8   ALKPHOS 221*   AST 73*   ALT 57*   ANIONGAP 11   MG 1.4*     CBC:   Recent Labs   Lab 11/17/22  0705   WBC 19.01*   RBC 2.76*   HGB 7.2*   HCT 23.0*   *   MCV 83   MCH 26.1*   MCHC 31.3*   Lipid Panel:  No results for input(s): CHOL, HDL, LDLCALC, TRIG, CHOLHDL in the last 168 hours.  Cardiac Profile:  Recent Labs   Lab 11/17/22  0111   *     Inflammatory Labs:  Recent Labs   Lab 11/17/22  0111 11/17/22  0705   CRP 20.77* 21.14*       Monitor and Evaluation  Food and Nutrient Intake: energy intake, food and beverage intake  Food and Nutrient Adminstration: diet order  Knowledge/Beliefs/Attitudes: food and nutrition knowledge/skill, beliefs and attitudes  Physical Activity and Function: nutrition-related ADLs and IADLs, factors affecting access to physical activity  Anthropometric Measurements: weight, weight change, body mass index  Biochemical Data, Medical Tests and Procedures: lipid profile, inflammatory profile, glucose/endocrine profile, electrolyte and renal panel, gastrointestinal profile  Nutrition-Focused Physical Findings: overall appearance     Nutrition Risk  Level of Risk/Frequency of Follow-up: high     Nutrition Follow-Up  RD Follow-up?: Yes      Pamela Valle, DELIA 11/17/2022 4:21 PM

## 2022-11-17 NOTE — PROGRESS NOTES
[facility] Medicine Progress Note  Patient Name: Deonna Montero MRN: [id number]   Patient Class: IP- Inpatient  Length of Stay: 1   Admission Date: 11/16/2022  4:01 PM Attending Physician: Garland Alegre MD   Primary Care Provider: Primary Doctor No Face-to-Face encounter date: 11/17/2022   Chief Complaint: Wound Check    Assessment & Plan:   Deonna Montero is a 82 y.o. female admitted for bilateral hallux gangrene and left foot    Active Problems/Assessment & Plan  1. 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    Orthopedic consult to consider BKA per vascular surgery  Podiatry consulted and following  Continue wound care  Continue cefepime, vancomycin    2. Severe protein calorie malnutrition-POA  Consult nutrition   Discussion with son regarding whether PEG would be in her best interests given comorbidities.    3. Goals of care discussion   Introduced topic to son that patient may be nearing end of life given decline over past several months and decreased p.o. intake over past several weeks.  I asked him to consider whether feeding tube and surgical interventions would be in her best interests given her likely quality of life.  I will revisit the issue with him tomorrow because they wanted him to have time to process some of the topics I discussed.    4. Deep tissue wound -POA-  Surgery consult  Wound care    5. CLAUDY on CKD  Continue to monitor   Gentle IV fluid    6. Diabetes type 2  7. Hypoglycemia  Holding insulin  D10 GTT   Accu-Cheks      Core measures:  - Code status: full code   - Consultants:  Vascular surgery, General surgery, Podiatry  - Diet:  SLP pending     Hospital Course     11/17/2022          Discharge Planning:       Subjective:    Interval History   Patient is without complaints.  D    Discussion with son as  "above.    Initially hypoglycemic.  Hyperglycemic with D10.  Discussed with nursing discontinuing D10 and monitoring      Review of Systems   Limited due to altered mental status  Objective:   Physical Exam  BP (!) 122/57   Pulse 84   Temp 97.3 °F (36.3 °C) (Oral)   Resp 18   Ht 5' 8" (1.727 m)   Wt 65.8 kg (145 lb 1 oz)   SpO2 100%   BMI 22.06 kg/m²   Vitals reviewed.    General:  Alert and oriented x1.  No acute distress  HEENT:  Normocephalic  Cardiovascular:  Regular rate and rhythm, no murmurs rubs or gallops.  No lower extremity edema.  Pulmonary:  Diminished at bases  Abdomen:  Soft, nontender, nondistended.  No guarding.  No rebound.  Negative Wallace's.  Positive bowel sounds.  Extremity:  Dry gangrene bilateral hallux.   Right distal pulses not palpable.  Deep tissue injury with necrosis on hip   Dermatology:  No rashes appreciated on exposed skin  Psychiatric:  Dimnished affect    Following labs were Reviewed   Recent Labs   Lab 11/17/22  0705   WBC 19.01*   HGB 7.2*   HCT 23.0*   *   CALCIUM 7.2*   ALBUMIN 1.4*   PROT 4.9*   *   K 4.1   CO2 21*      BUN 44*   CREATININE 1.9*   ALKPHOS 221*   ALT 57*   AST 73*   BILITOT 0.8     No results found for: POCTGLUCOSE     All labs within the past 24 hours have been reviewed  Microbiology Results (last 7 days)       Procedure Component Value Units Date/Time    Blood Culture #1 **CANNOT BE ORDERED STAT** [[id number]] Collected: 11/16/22 2020    Order Status: Completed Specimen: Blood from Peripheral, Upper Arm, Right Updated: 11/17/22 0317     Blood Culture, Routine No Growth to date    Blood Culture #1 **CANNOT BE ORDERED STAT** [[id number]] Collected: 11/16/22 2028    Order Status: Completed Specimen: Blood from Peripheral, Forearm, Left Updated: 11/17/22 0317     Blood Culture, Routine No Growth to date    Aerobic culture [[id number]]     Order Status: No result Specimen: Wound from Toe, Left Foot           CT Head Without Contrast   Final " Result      US Lower Extremity Arteries Bilateral   Final Result      US Abdomen Limited (Gallbladder)   Final Result      X-Ray Foot Complete Bilateral   Final Result          Inpatient medications  Scheduled Meds:   amiodarone  200 mg Oral BID    atorvastatin  40 mg Oral Daily    ceFEPime (MAXIPIME) IVPB  1 g Intravenous Q24H    metoprolol tartrate  25 mg Oral BID    montelukast  10 mg Oral QHS     Continuous Infusions:   sodium chloride 0.9% 100 mL/hr at 11/17/22 0112    dextrose 10 % in water (D10W) 25 mL/hr at 11/16/22 2252     PRN Meds:.sodium chloride, sodium chloride, acetaminophen, albuterol-ipratropium, dextrose 10%, dextrose 10%, glucagon (human recombinant), glucose, glucose, HYDROmorphone, melatonin, naloxone, ondansetron, polyethylene glycol, senna-docusate 8.6-50 mg, simethicone, sodium chloride 0.9%, traMADoL, Pharmacy to dose Vancomycin consult **AND** vancomycin - pharmacy to dose    Above encounter included review of the medical records, interviewing and examining the patient face-to-face, discussion with family and other health care providers, ordering and interpreting lab/test results and formulating a plan of care.     Medical Decision Making:    [] Low Complexity  [x] Moderate Complexity  [] High Complexity    Garland Alegre  [facility] Hospitalist  11/17/2022

## 2022-11-17 NOTE — ED PROVIDER NOTES
Encounter Date: 2022       History     Chief Complaint   Patient presents with    Wound Check     82-year-old female sent in from home for wound check.  She has bilateral open 1st toe fractures without a clear cause along with bruising to the bilateral heels. Family called 911 because she has increased pain to the legs. It seems like she follows with Wound Care and was seen in [place] for similar complaints about 2 weeks ago but she cannot provide a clear history. She does not have other complaints at this time.     Review of patient's allergies indicates:   Allergen Reactions    Codeine Anaphylaxis and Itching    Morphine Itching    Morphine (pf) Itching     Past Medical History:   Diagnosis Date    Back pain     Diabetes mellitus     Hypertension     Reflux     Short-term memory loss     Stroke      Past Surgical History:   Procedure Laterality Date    BACK SURGERY      with tea    CHOLECYSTECTOMY      [place]      2 years ago    HYSTERECTOMY      INTRAOCULAR PROSTHESES INSERTION Left     SPINAL CORD STIMULATOR IMPLANT      3-4 years ago-nonfuncitioning    TOTAL HIP ARTHROPLASTY       Family History   Problem Relation Age of Onset    Diabetes Mother     Cancer Father     Diabetes Father     Hypertension Father     Cancer Son      Social History     Tobacco Use    Smoking status: Former     Types: Cigarettes     Quit date: 10/15/2010     Years since quittin.0    Smokeless tobacco: Former   Substance Use Topics    Alcohol use: No    Drug use: No     Review of Systems   Constitutional:  Negative for activity change, appetite change, chills, fever and unexpected weight change.   HENT:  Negative for dental problem and drooling.    Eyes:  Negative for discharge and itching.   Respiratory:  Negative for cough, chest tightness, shortness of breath, wheezing and stridor.    Cardiovascular:  Negative for chest pain, palpitations and leg swelling.   Gastrointestinal:  Negative for abdominal distention,  abdominal pain, diarrhea and nausea.   Genitourinary:  Negative for difficulty urinating, dysuria, frequency and urgency.   Musculoskeletal:  Negative for back pain, gait problem and joint swelling.   Skin:  Positive for color change, rash and wound.   Neurological:  Negative for dizziness, syncope, numbness and headaches.   Psychiatric/Behavioral:  Negative for agitation, behavioral problems and confusion.      Physical Exam     Initial Vitals [11/16/22 1622]   BP Pulse Resp Temp SpO2   129/62 72 16 97.7 °F (36.5 °C) 100 %      MAP       --         Physical Exam    Nursing note and vitals reviewed.  Constitutional: She appears well-developed and well-nourished. She is not diaphoretic.   HENT:   Head: Normocephalic and atraumatic.   Mouth/Throat: Oropharynx is clear and moist.   Eyes: EOM are normal. Pupils are equal, round, and reactive to light. Right eye exhibits no discharge. Left eye exhibits no discharge.   Neck: No tracheal deviation present.   Normal range of motion.  Cardiovascular:  Normal rate and regular rhythm.           Unable to palpate right PT.  Left PT 1+    Pulmonary/Chest: No respiratory distress. She has no wheezes. She exhibits no tenderness.   Abdominal: Abdomen is soft. She exhibits no distension. There is no abdominal tenderness.   Musculoskeletal:         General: No tenderness or edema. Normal range of motion.      Cervical back: Normal range of motion.     Neurological: She is alert and oriented to person, place, and time. She has normal strength. No cranial nerve deficit or sensory deficit. GCS eye subscore is 4. GCS verbal subscore is 5. GCS motor subscore is 6.   Skin: Skin is warm and dry. Rash noted.   Gangrenous toes bilaterally with bruising on the bilateral heels.    Psychiatric: She has a normal mood and affect. Her behavior is normal. Thought content normal.             ED Course   Procedures  Labs Reviewed   CBC W/ AUTO DIFFERENTIAL - Abnormal; Notable for the following  components:       Result Value    WBC 19.50 (*)     RBC 2.29 (*)     Hemoglobin 6.2 (*)     Hematocrit 19.7 (*)     MCHC 31.5 (*)     RDW 15.9 (*)     Platelets 575 (*)     Immature Granulocytes 0.8 (*)     Gran # (ANC) 18.2 (*)     Immature Grans (Abs) 0.15 (*)     Lymph # 0.6 (*)     Gran % 93.1 (*)     Lymph % 3.0 (*)     Mono % 3.0 (*)     All other components within normal limits    Narrative:     hgb/hct critical result(s) repeated. Called and verbal readback   obtained from Sharath Junior RN by LS1 11/16/2022 17:53  Recoll. [id number] by LS1 at 11/16/2022 17:26, reason: recollect to   confirm   COMPREHENSIVE METABOLIC PANEL - Abnormal; Notable for the following components:    Sodium 135 (*)     Glucose 69 (*)     BUN 46 (*)     Creatinine 2.0 (*)     Calcium 7.2 (*)     Total Protein 5.7 (*)     Albumin 1.6 (*)     Alkaline Phosphatase 239 (*)     AST 89 (*)     ALT 62 (*)     eGFR 24.6 (*)     All other components within normal limits   LACTIC ACID, PLASMA - Abnormal; Notable for the following components:    Lactate (Lactic Acid) 3.0 (*)     All other components within normal limits    Narrative:       Lactid acid critical result(s) called and verbal readback obtained   from Hillary Mukherjee RN ER  by MS1 11/16/2022 18:58   URINALYSIS, REFLEX TO URINE CULTURE - Abnormal; Notable for the following components:    Occult Blood UA Trace (*)     All other components within normal limits    Narrative:     Specimen Source->Urine   CULTURE, BLOOD   CULTURE, BLOOD   TYPE & SCREEN   POCT GLUCOSE   POCT GLUCOSE MONITORING CONTINUOUS   PREPARE RBC SOFT          Imaging Results              US Lower Extremity Arteries Bilateral (Final result)  Result time 11/16/22 20:05:55      Final result by Lorenzo Chavira MD (11/16/22 20:05:55)                   Narrative:    Reason: Osteomyelitis    COMPARISON: None    FINDINGS:  Grayscale, color and spectral Doppler analysis of the bilateral lower extremity arteries was performed.  Right  leg:  Grayscale images show diffuse calcified plaque. Biphasic spectral waveforms are present throughout aside from monophasic spectral waveforms in distal posterior tibial artery and dorsalis pedis arteries. No focal elevated peak systolic velocity to suggest a hemodynamically significant arterial stenosis.    Left leg:  Grayscale images show diffuse calcified plaque. Triphasic spectral waveforms occur in left common femoral and superficial femoral arteries. Monophasic spectral waveforms occur in left profunda femoral and popliteal arteries. Left posterior tibial and anterior tibial arteries are occluded as is the left dorsalis pedis artery. Very little monophasic spectral waveform occurs in left peroneal artery.    IMPRESSION:    1. Bilateral lower extremity peripheral arterial disease.  2. Complete occlusions of left posterior tibial, anterior tibial, and dorsalis pedis arteries.    Electronically signed by:  Lorenzo Chavira MD  11/16/2022 8:05 PM CST Workstation: [id number]                                     US Abdomen Limited (Gallbladder) (Final result)  Result time 11/16/22 20:11:20      Final result by Lorenzo Chavira MD (11/16/22 20:11:20)                   Narrative:    Reason: Transaminitis    COMPARISON: CT 8/2/2017    FINDINGS:  Liver maintains normal echogenicity without focal hepatic mass or intrahepatic bile duct dilation. Hepatopedal flow present in main portal vein.    Gallbladder is absent, confirmed with 8/2/2017 CT where surgical clips are noted in the gallbladder fossa. Common duct diameter of 4 mm is normal.    Visualized pancreas is unremarkable with bowel gas obscuring portions of pancreas. Right kidney is free of hydronephrosis.    Juxtahepatic IVC is unremarkable. Visualized abdominal aorta is nonaneurysmal.    IMPRESSION:    1. Prior cholecystectomy.  2. No abnormality identified in the right upper quadrant.    Electronically signed by:  Lorenzo Chavira MD  11/16/2022 8:11 PM CST Workstation:  [id number]                                     X-Ray Foot Complete Bilateral (Final result)  Result time 11/16/22 17:10:07      Final result by Lorenzo Chavira MD (11/16/22 17:10:07)                   Narrative:    Reason: Wound Check, Osteomyelitis    FINDINGS:  Three views of bilateral feet show no acute fracture, dislocation, or destructive osseous lesion. Soft tissue atrophy occurs about the left great toe. Diffuse arterial vascular calcifications occur bilaterally. No retained metallic fragment or radiopaque foreign body.    IMPRESSION:  No convincing radiographic evidence of osteomyelitis throughout right or left foot.    Electronically signed by:  Lorenzo Chavira MD  11/16/2022 5:10 PM [facility] Workstation: [id number]                                     Medications   lactated ringers bolus 1,000 mL (has no administration in time range)   0.9%  NaCl infusion (for blood administration) (has no administration in time range)   dextrose 10 % infusion (has no administration in time range)   cefepime in dextrose 5 % 1 gram/50 mL IVPB 1 g (has no administration in time range)   vancomycin - pharmacy to dose (has no administration in time range)   vancomycin in dextrose 5 % 1 gram/250 mL IVPB 1,000 mg (has no administration in time range)     Followed by   vancomycin 500 mg in dextrose 5 % 100 mL IVPB (ready to mix system) (has no administration in time range)   calcium gluconate 1 g in NS IVPB (premixed) (1 g Intravenous New Bag 11/16/22 1948)     Medical Decision Making:   Initial Assessment:   82-year-old female with chronic peripheral arterial disease and bilateral open wounds on her great toes presents for worsening bilateral foot pain.  Vitals normal.  Exam notable for open wounds on the bilateral great toes, decreased perfusion to the bilateral feet with poor pulses.  Concern for worsening peripheral arterial disease, bilateral arterial ultrasound showed no flow through the left DP, right DP with minimal flow.  Case  discussed with vascular surgery who recommended against anticoagulation given this is likely chronic and patient has a low hemoglobin of 6.3.  Unclear cause of the patient's anemia, patient type and screened and transfused 1 unit packed red blood cells.  Bedside fecal occult blood negative Patient given PPI.  CMP notable for pre-renal CLAUDY, will replete with IVF. Broad spectrum antibiotics given patient has lactic acidosis and leukocytosis, which could also be from worsening peripheral arterial disease. Medicine to admit for further management.           ED Course as of 11/16/22 2144 Wed Nov 16, 2022 2044 Case discussed with vascular surgery who advised that the patient should be well hydrated, that there's no acute intervention to be performed at this time. Given chronic PAD and Hgb of 6.3, he recommended against anticoagulation at this time. They will follow along as a consult.  [BS]      ED Course User Index  [BS] Gonsalo Vidal MD                 Clinical Impression:   Final diagnoses:  [M86.9] Osteomyelitis  [R74.01] Transaminitis  [I73.9] PAD (peripheral artery disease)  [I96] Dry gangrene (Primary)  [E87.20] Lactic acidosis  [E86.0] Dehydration  [D64.9] Symptomatic anemia        ED Disposition Condition    Admit Stable                Gonsalo Vidal MD  11/16/22 2144

## 2022-11-17 NOTE — CONSULTS
"[facility]  Adult Nutrition   Consult Note (Initial Assessment)     SUMMARY     Recommendations  1) RD has added nectar thick Glucerna with meals to aid in wound healing.   2) Continue current Dysphagia Pureed, SMH; Diabetic Diet; Nectar Thick diet as tolerated and encourage intake.    Goals:   Pt to met 100% of her EEN and EPN to aid in wound healing.    Nutrition Goal Status: progressing towards goal    Communication of RD Recs: other (comment)    Dietitian Rounds Brief  Consult for Wounds: Pts PO intake needs to be better for optimal healing. Encouraged pt to try to eat better for her recovery.     Per MD notes today:  Discussion with son today.  He understands that patient requires pureed diet with thickened liquids.  I discussed with General surgery team and we agree that patient will best be served by going to the OR once for debridement of right hip/thigh wound and BKA.  Son understands that BKA is what is recommended.  We will target Monday for OR.    Will continue to follow prn.    Diet order:   Current Diet Order: Dysphagia Pureed, SMH; Diabetic Diet; [place] Thick      Evaluation of Received Nutrient/Fluid Intake  Energy Calories Required: not meeting needs  Protein Required: not meeting needs  Fluid Required: meeting needs  Tolerance: tolerating     % Intake of Estimated Energy Needs: 50 - 75 %  % Meal Intake: 25 - 50 %      Intake/Output Summary (Last 24 hours) at 11/17/2022 1532  Last data filed at 11/17/2022 0532  Gross per 24 hour   Intake 1652.33 ml   Output --   Net 1652.33 ml        Anthropometrics  Temp: 97.3 °F (36.3 °C)  Height Method: Estimated  Height: 5' 8" (172.7 cm)  Height (inches): 68 in  Weight Method: Bed Scale  Weight: 65.8 kg (145 lb 1 oz)  Weight (lb): 145.06 lb  Ideal Body Weight (IBW), Female: 140 lb  % Ideal Body Weight, Female (lb): 114.29 %  BMI (Calculated): 22.1  BMI Grade: 18.5-24.9 - normal       Estimated/Assessed Needs  Weight Used For Calorie Calculations: " 65.8 kg (145 lb 1 oz)  Energy Calorie Requirements (kcal): 8228-3961 kcals/day  Energy Need Method: Kcal/kg  Protein Requirements:  g/day  Weight Used For Protein Calculations: 64 kg (141 lb 1.5 oz)     Estimated Fluid Requirement Method: RDA Method  RDA Method (mL): 1645       Reason for Assessment  Reason For Assessment: consult  Diagnosis: other (see comments) (hypoglycemia)  Relevant Medical History: Diabetes mellitus, Hypertension, Short-term memory loss, Reflux, Back pain, Stroke  Interdisciplinary Rounds: did not attend    Nutrition/Diet History  Spiritual, Cultural Beliefs, Oriental orthodox Practices, Values that Affect Care: no  Food Allergies: NKFA  Factors Affecting Nutritional Intake: None identified at this time    Nutrition Risk Screen  Nutrition Risk Screen: no indicators present       Altered Skin Integrity 11/17/22 0200 Sacral spine #1 Full thickness tissue loss. Base is covered by slough and/or eschar in the wound bed-Wound Image: Images linked       Altered Skin Integrity 11/17/22 0200 Right anterior Hip Full thickness tissue loss. Base is covered by slough and/or eschar in the wound bed-Wound Image: Images linked       Altered Skin Integrity Right anterior Toe, first Diabetic Ulcer Full thickness tissue loss with exposed bone, tendon, or muscle. Often includes undermining and tunneling. May extend into muscle and/or supporting structures.-Wound Image: Images linked       Altered Skin Integrity 11/17/22 0100 Left anterior Toe, first Diabetic Ulcer Full thickness tissue loss with exposed bone, tendon, or muscle. Often includes undermining and tunneling. May extend into muscle and/or supporting structures.-Wound Image: Images linked       Altered Skin Integrity 11/17/22 0100 Left Heel-Wound Image: Images linked       Altered Skin Integrity Left Heel Purple or maroon localized area of discolored intact skin or non-intact skin or a blood-filled blister.-Wound Image: Images linked  MST Score: 0  Have  you recently lost weight without trying?: No  Weight loss score: 0  Have you been eating poorly because of a decreased appetite?: No  Appetite score: 0       Weight History:  Wt Readings from Last 5 Encounters:   11/17/22 65.8 kg (145 lb 1 oz)   09/02/19 98.4 kg (217 lb)   09/18/18 96.5 kg (212 lb 11.9 oz)   09/11/18 96.3 kg (212 lb 4.9 oz)   05/02/18 96.3 kg (212 lb 4.9 oz)        Lab/Procedures/Meds: Pertinent Labs/Meds Reviewed    Medications:Pertinent Medications Reviewed  Scheduled Meds:   amiodarone  200 mg Oral BID    atorvastatin  40 mg Oral Daily    ceFEPime (MAXIPIME) IVPB  1 g Intravenous Q24H    enoxparin  30 mg Subcutaneous Daily    metoprolol tartrate  25 mg Oral BID    montelukast  10 mg Oral QHS     Continuous Infusions:   sodium chloride 0.9% 100 mL/hr at 11/17/22 0112    dextrose 10 % in water (D10W) 25 mL/hr at 11/16/22 2252     PRN Meds:.sodium chloride, sodium chloride, acetaminophen, albuterol-ipratropium, dextrose 10%, dextrose 10%, glucagon (human recombinant), glucose, glucose, HYDROmorphone, insulin aspart U-100, melatonin, naloxone, ondansetron, polyethylene glycol, senna-docusate 8.6-50 mg, simethicone, sodium chloride 0.9%, traMADoL, Pharmacy to dose Vancomycin consult **AND** vancomycin - pharmacy to dose    Labs: Pertinent Labs Reviewed  Clinical Chemistry:  Recent Labs   Lab 11/17/22  0705   *   K 4.1      CO2 21*   *   BUN 44*   CREATININE 1.9*   CALCIUM 7.2*   PROT 4.9*   ALBUMIN 1.4*   BILITOT 0.8   ALKPHOS 221*   AST 73*   ALT 57*   ANIONGAP 11   MG 1.4*     CBC:   Recent Labs   Lab 11/17/22  0705   WBC 19.01*   RBC 2.76*   HGB 7.2*   HCT 23.0*   *   MCV 83   MCH 26.1*   MCHC 31.3*     Lipid Panel:  No results for input(s): CHOL, HDL, LDLCALC, TRIG, CHOLHDL in the last 168 hours.  Cardiac Profile:  Recent Labs   Lab 11/17/22  0111   *     Inflammatory Labs:  Recent Labs   Lab 11/17/22  0111 11/17/22  0705   CRP 20.77* 21.14*     Diabetes:  No  results for input(s): HGBA1C, POCTGLUCOSE in the last 168 hours.  Thyroid & Parathyroid:  No results for input(s): TSH, FREET4, B3KUCDO, B9RWIVL, THYROIDAB in the last 168 hours.    Monitor and Evaluation  Food and Nutrient Intake: energy intake, food and beverage intake  Food and Nutrient Adminstration: diet order  Knowledge/Beliefs/Attitudes: food and nutrition knowledge/skill, beliefs and attitudes  Physical Activity and Function: nutrition-related ADLs and IADLs, factors affecting access to physical activity  Anthropometric Measurements: weight, weight change, body mass index  Biochemical Data, Medical Tests and Procedures: lipid profile, inflammatory profile, glucose/endocrine profile, electrolyte and renal panel, gastrointestinal profile  Nutrition-Focused Physical Findings: overall appearance     Nutrition Risk  Level of Risk/Frequency of Follow-up: high     Nutrition Follow-Up  RD Follow-up?: Yes      Pamela Valle RD 11/17/2022 3:32 PM

## 2022-11-17 NOTE — PLAN OF CARE
Unable to speak with patient to complete Discharge Assessment so spoke with Zoerasmo Montero (son) mobile [phone] on the phone to complete the assessment.    Patient has wound care through [facility] in [place], [place] and they have a nurse provide wound care in the home on opposite days of patient going to the wound care clinic.  NO HOME HEALTH.    [facility]  Initial Discharge Assessment       Primary Care Provider: Primary Doctor No    Admission Diagnosis: Hypoglycemia [E16.2]    Admission Date: 11/16/2022  Expected Discharge Date:     Discharge Barriers Identified: None    Payor: MEDICARE / Plan: MEDICARE PART A & B / Product Type: Government /     Extended Emergency Contact Information  Primary Emergency Contact: Suraj Montero  Mobile Phone: [phone]  Relation: Son  Preferred language: English   needed? No    Discharge Plan A: Home with family  Discharge Plan B: Home with family      [id number] - [place], [place] - [address]  [address]  [place] [address]  Phone: [phone] Fax: [phone]      Initial Assessment (most recent)       Adult Discharge Assessment - 11/17/22 1305          Discharge Assessment    Assessment Type Discharge Planning Assessment     Confirmed/corrected address, phone number and insurance Yes     Confirmed Demographics Correct on Facesheet     Source of Information family     Communicated PARTH with patient/caregiver No     Reason For Admission hypoglcemia     Lives With child(sahil), adult     Facility Arrived From: home     Do you expect to return to your current living situation? Yes     Do you have help at home or someone to help you manage your care at home? Yes     Who are your caregiver(s) and their phone number(s)? Suraj Montero (Son)   [phone] (Mobile)     Prior to hospitilization cognitive status: Unable to Assess     Current cognitive status: Unable to Assess     Walking or Climbing Stairs Difficulty ambulation  difficulty, requires equipment;transferring difficulty, requires equipment;transferring difficulty, assistance 1 person     Dressing/Bathing Difficulty bathing difficulty, dependent;dressing difficulty, dependent     Dressing/Bathing Management paid sitters in use     Equipment Currently Used at Home wheelchair;bedside commode;shower chair     Readmission within 30 days? No     Patient currently being followed by outpatient case management? No     Do you currently have service(s) that help you manage your care at home? Yes     Name and Contact number of agency [facility] in [place], [place] has a nurse provide wound care in the home on opposite days of patient going to the wound care clinic.  NO HOME HEALTH. (P)      Is the pt/caregiver preference to resume services with current agency Yes     Do you take prescription medications? Yes     Do you have prescription coverage? Yes     Coverage Medicare, Medicare supplement     Do you have any problems affording any of your prescribed medications? No     Who is going to help you get home at discharge? Suraj Montero (Son)   [phone] (Mobile)     Are you on dialysis? No     Do you take coumadin? No     Discharge Plan A Home with family     Discharge Plan B Home with family     DME Needed Upon Discharge  none     Discharge Plan discussed with: Adult children     Discharge Barriers Identified None        Relationship/Environment    Name(s) of Who Lives With Patient Suraj Montero (Son)   [phone] (Mobile)

## 2022-11-17 NOTE — PROGRESS NOTES
Pharmacokinetic Initial Assessment: IV Vancomycin    Assessment/Plan:    Initiate intravenous vancomycin with loading dose of 1500 mg once with subsequent doses when random concentrations are less than 20 mcg/mL  Desired empiric serum trough concentration is 15 to 20 mcg/mL  Draw vancomycin random level on 11/17 at 2130.  Pharmacy will continue to follow and monitor vancomycin.      Please contact pharmacy at extension [phone] with any questions regarding this assessment.     Thank you for the consult,   Chago Roblero       Patient brief summary:  Deonna Montero is a 82 y.o. female initiated on antimicrobial therapy with IV Vancomycin for treatment of suspected bone/joint infection    Drug Allergies:   Review of patient's allergies indicates:   Allergen Reactions    Codeine Anaphylaxis and Itching    Morphine Itching    Morphine (pf) Itching       Actual Body Weight:   72.6 kg    Renal Function:   Estimated Creatinine Clearance: 22.3 mL/min (A) (based on SCr of 2 mg/dL (H)).,     CBC (last 72 hours):  Recent Labs   Lab Result Units 11/16/22  1732   WBC K/uL 19.50*   Hemoglobin g/dL 6.2*   Hematocrit % 19.7*   Platelets K/uL 575*   Gran % % 93.1*   Lymph % % 3.0*   Mono % % 3.0*   Eosinophil % % 0.0   Basophil % % 0.1   Differential Method  Automated       Metabolic Panel (last 72 hours):  Recent Labs   Lab Result Units 11/16/22  1652 11/16/22 2012 11/16/22  2152   Sodium mmol/L 135*  --   --    Potassium mmol/L 3.5  --   --    Chloride mmol/L 104  --   --    CO2 mmol/L 23  --   --    Glucose mg/dL 69*  --  31*   Glucose, UA   --  Negative  --    BUN mg/dL 46*  --   --    Creatinine mg/dL 2.0*  --   --    Albumin g/dL 1.6*  --   --    Total Bilirubin mg/dL 0.4  --   --    Alkaline Phosphatase U/L 239*  --   --    AST U/L 89*  --   --    ALT U/L 62*  --   --        Drug levels (last 3 results):  No results for input(s): VANCOMYCINRA, VANCORANDOM, VANCOMYCINPE, VANCOPEAK, VANCOMYCINTR, VANCOTROUGH in the last 72  hours.    Microbiologic Results:  Microbiology Results (last 7 days)       Procedure Component Value Units Date/Time    Aerobic culture [[id number]]     Order Status: No result Specimen: Wound from Toe, Left Foot     Blood Culture #1 **CANNOT BE ORDERED STAT** [[id number]] Collected: 11/16/22 2028    Order Status: Sent Specimen: Blood from Peripheral, Forearm, Left Updated: 11/16/22 2050    Blood Culture #1 **CANNOT BE ORDERED STAT** [[id number]] Collected: 11/16/22 2020    Order Status: Sent Specimen: Blood from Peripheral, Upper Arm, Right Updated: 11/16/22 2050

## 2022-11-18 LAB
ESTIMATED AVG GLUCOSE: 125 MG/DL (ref 68–131)
GLUCOSE SERPL-MCNC: 320 MG/DL (ref 70–110)
GLUCOSE SERPL-MCNC: 329 MG/DL (ref 70–110)
GLUCOSE SERPL-MCNC: 399 MG/DL (ref 70–110)
GLUCOSE SERPL-MCNC: 412 MG/DL (ref 70–110)
HAPTOGLOB SERPL-MCNC: 432 MG/DL (ref 41–333)
HBA1C MFR BLD: ABNORMAL % (ref 4.5–6.2)

## 2022-11-18 PROCEDURE — 87070 CULTURE OTHR SPECIMN AEROBIC: CPT | Performed by: FAMILY MEDICINE

## 2022-11-18 PROCEDURE — 63600175 PHARM REV CODE 636 W HCPCS: Performed by: FAMILY MEDICINE

## 2022-11-18 PROCEDURE — 25000003 PHARM REV CODE 250: Performed by: FAMILY MEDICINE

## 2022-11-18 PROCEDURE — 36415 COLL VENOUS BLD VENIPUNCTURE: CPT | Performed by: FAMILY MEDICINE

## 2022-11-18 PROCEDURE — 97162 PT EVAL MOD COMPLEX 30 MIN: CPT

## 2022-11-18 PROCEDURE — 97167 OT EVAL HIGH COMPLEX 60 MIN: CPT

## 2022-11-18 PROCEDURE — 80053 COMPREHEN METABOLIC PANEL: CPT | Performed by: FAMILY MEDICINE

## 2022-11-18 PROCEDURE — 97535 SELF CARE MNGMENT TRAINING: CPT

## 2022-11-18 PROCEDURE — 99900031 HC PATIENT EDUCATION (STAT)

## 2022-11-18 PROCEDURE — 80202 ASSAY OF VANCOMYCIN: CPT | Performed by: FAMILY MEDICINE

## 2022-11-18 PROCEDURE — 92526 ORAL FUNCTION THERAPY: CPT

## 2022-11-18 PROCEDURE — 83735 ASSAY OF MAGNESIUM: CPT | Performed by: FAMILY MEDICINE

## 2022-11-18 PROCEDURE — 94761 N-INVAS EAR/PLS OXIMETRY MLT: CPT

## 2022-11-18 PROCEDURE — 12000002 HC ACUTE/MED SURGE SEMI-PRIVATE ROOM

## 2022-11-18 PROCEDURE — 99900035 HC TECH TIME PER 15 MIN (STAT)

## 2022-11-18 PROCEDURE — 86140 C-REACTIVE PROTEIN: CPT | Performed by: FAMILY MEDICINE

## 2022-11-18 RX ADMIN — INSULIN ASPART 4 UNITS: 100 INJECTION, SOLUTION INTRAVENOUS; SUBCUTANEOUS at 05:11

## 2022-11-18 RX ADMIN — METOPROLOL TARTRATE 25 MG: 25 TABLET, FILM COATED ORAL at 09:11

## 2022-11-18 RX ADMIN — AMIODARONE HYDROCHLORIDE 200 MG: 200 TABLET ORAL at 09:11

## 2022-11-18 RX ADMIN — SODIUM CHLORIDE: 0.9 INJECTION, SOLUTION INTRAVENOUS at 09:11

## 2022-11-18 RX ADMIN — Medication 6 MG: at 09:11

## 2022-11-18 RX ADMIN — VANCOMYCIN HYDROCHLORIDE 1250 MG: 1.25 INJECTION, POWDER, LYOPHILIZED, FOR SOLUTION INTRAVENOUS at 12:11

## 2022-11-18 RX ADMIN — HYDROMORPHONE HYDROCHLORIDE 0.5 MG: 1 INJECTION, SOLUTION INTRAMUSCULAR; INTRAVENOUS; SUBCUTANEOUS at 12:11

## 2022-11-18 RX ADMIN — INSULIN ASPART 5 UNITS: 100 INJECTION, SOLUTION INTRAVENOUS; SUBCUTANEOUS at 11:11

## 2022-11-18 RX ADMIN — INSULIN ASPART 4 UNITS: 100 INJECTION, SOLUTION INTRAVENOUS; SUBCUTANEOUS at 09:11

## 2022-11-18 RX ADMIN — CEFEPIME 1 G: 1 INJECTION, POWDER, FOR SOLUTION INTRAMUSCULAR; INTRAVENOUS at 09:11

## 2022-11-18 RX ADMIN — MONTELUKAST 10 MG: 10 TABLET, FILM COATED ORAL at 09:11

## 2022-11-18 RX ADMIN — SENNOSIDES AND DOCUSATE SODIUM 1 TABLET: 8.6; 5 TABLET ORAL at 09:11

## 2022-11-18 RX ADMIN — HYDROMORPHONE HYDROCHLORIDE 0.5 MG: 1 INJECTION, SOLUTION INTRAMUSCULAR; INTRAVENOUS; SUBCUTANEOUS at 09:11

## 2022-11-18 RX ADMIN — TRAMADOL HYDROCHLORIDE 50 MG: 50 TABLET, COATED ORAL at 05:11

## 2022-11-18 RX ADMIN — ENOXAPARIN SODIUM 30 MG: 30 INJECTION SUBCUTANEOUS at 05:11

## 2022-11-18 RX ADMIN — SODIUM CHLORIDE: 0.9 INJECTION, SOLUTION INTRAVENOUS at 11:11

## 2022-11-18 NOTE — CARE UPDATE
11/18/22 1010   Patient Assessment/Suction   Level of Consciousness (AVPU) alert   All Lung Fields Breath Sounds clear;diminished   PRE-TX-O2   O2 Device (Oxygen Therapy) room air   Pulse Oximetry Type Continuous   $ Pulse Oximetry - Multiple Charge Pulse Oximetry - Multiple   Aerosol Therapy   $ Aerosol Therapy Charges PRN treatment not required   Education   $ Education Bronchodilator;15 min   Respiratory Evaluation   $ Care Plan Tech Time 15 min

## 2022-11-18 NOTE — CONSULTS
[facility]  General Surgery  Consult Note    Consults  Subjective:     Chief Complaint/Reason for Admission:  Wound debridement, possible amputation    History of Present Illness:  Patient is 82-year-old debilitated, chronically ill female admitted with large decubitus wounds of the sacrum, hips, gangrene of both great toes with ischemia of the left foot.  She is awake, lethargic.  She is able to communicate but somewhat disoriented and is a very poor historian.  Interview taken by her extended family and son by phone.  She has been declining physically, clinically over the past several months.  She is essentially bed-bound.  She had endovascular intervention to the left lower extremity 2 weeks ago.  Arterial ultrasound on admission shows complete occlusion of the 3 major vessels below the left knee.  Left Popliteal is dopplerable.  She reports significant discomfort in both feet worse on the left.  She had Evaluation by vascular surgery and left amputation recommended.  She is hemodynamically stable.  She is afebrile.    No current facility-administered medications on file prior to encounter.     Current Outpatient Medications on File Prior to Encounter   Medication Sig    amiodarone (PACERONE) 200 MG Tab Take 200 mg by mouth 2 (two) times daily. Verified by pharmacy    amLODIPine (NORVASC) 10 MG tablet Take 1 tablet (10 mg total) by mouth once daily. (Patient taking differently: Take 10 mg by mouth once daily. Verified by pharmacy)    atorvastatin (LIPITOR) 40 MG tablet Take 40 mg by mouth once daily. Verified by pharmacy    clopidogreL (PLAVIX) 75 mg tablet Take 75 mg by mouth once daily. Verified by pharmacy    ELIQUIS 2.5 mg Tab Take 2.5 mg by mouth 2 (two) times daily. Verified by pharmacy    KLOR-CON 20 mEq Pack Take 40 mEq by mouth 2 (two) times daily. Verified by pharmacy    metFORMIN (GLUCOPHAGE) 500 MG tablet Take 1 tablet (500 mg total) by mouth 2 (two) times daily with meals. (Patient  "taking differently: Take 500 mg by mouth 2 (two) times daily with meals. Verified by pharmacy)    metoprolol tartrate (LOPRESSOR) 25 MG tablet Take 25 mg by mouth 2 (two) times daily. Verified by pharmacy    montelukast (SINGULAIR) 10 mg tablet Take 10 mg by mouth every evening. Verified by pharmacy    tramadol-acetaminophen 37.5-325 mg (ULTRACET) 37.5-325 mg Tab Take 1 tablet by mouth every 6 (six) hours as needed. Verified by pharmacy    warfarin (COUMADIN) 2 MG tablet Take 2 mg by mouth Daily. Verified by pharmacy    acetaminophen (TYLENOL) 325 MG tablet Take 2 tablets (650 mg total) by mouth every 6 (six) hours as needed.    aspirin 81 MG Chew Take 1 tablet (81 mg total) by mouth once daily.    BD INSULIN PEN NEEDLE UF ORIG 29 x 1/2 " Ndle     BD INSULIN PEN NEEDLE UF SHORT 31 X 5/16 " Ndle     BD INSULIN SYRINGE ULT-FINE II 1/2 mL 31 x 5/16" Syrg     BD INSULIN SYRINGE ULTRA-FINE 1 mL 31 x 5/16" Syrg     fluticasone propionate (FLONASE) 50 mcg/actuation nasal spray 2 sprays by Nasal route once daily.    insulin  unit/mL injection Inject 20 Units into the skin once daily. (Patient taking differently: Inject 10 Units into the skin 2 (two) times daily before meals. Verified by pharmacy)    lisinopril (PRINIVIL,ZESTRIL) 20 MG tablet Take 1 tablet (20 mg total) by mouth every evening.    magnesium oxide (MAG-OX) 400 mg (241.3 mg magnesium) tablet Take 400 mg by mouth 2 (two) times daily. Verified by pharmacy    metoprolol succinate (TOPROL-XL) 50 MG 24 hr tablet Take 50 mg by mouth once daily.    mirabegron (MYRBETRIQ) 50 mg Tb24 Take 1 tablet (50 mg total) by mouth once daily.    ONE TOUCH ULTRA TEST Strp     pantoprazole (PROTONIX) 40 MG tablet Take 1 tablet (40 mg total) by mouth once daily.    polyethylene glycol (GLYCOLAX) 17 gram/dose powder 1 cap daily (Patient taking differently: Take 17 g by mouth daily as needed. 1 cap daily)    solifenacin (VESICARE) 5 MG tablet Take 1 tablet (5 mg total) by mouth " once daily.       Review of patient's allergies indicates:   Allergen Reactions    Codeine Anaphylaxis and Itching    Morphine Itching     PT TOLERATES DILAUDID WITHOUT DIFFICULTY    Morphine (pf) Itching       Past Medical History:   Diagnosis Date    Back pain     Diabetes mellitus     Hypertension     Reflux     Short-term memory loss     Stroke      Past Surgical History:   Procedure Laterality Date    BACK SURGERY      with tea    CHOLECYSTECTOMY      [place]      2 years ago    HYSTERECTOMY      INTRAOCULAR PROSTHESES INSERTION Left 2014    SPINAL CORD STIMULATOR IMPLANT      3-4 years ago-nonfuncitioning    TOTAL HIP ARTHROPLASTY       Family History       Problem Relation (Age of Onset)    Cancer Father, Son    Diabetes Mother, Father    Hypertension Father          Tobacco Use    Smoking status: Former     Types: Cigarettes     Quit date: 10/15/2010     Years since quittin.0    Smokeless tobacco: Former   Substance and Sexual Activity    Alcohol use: No    Drug use: No    Sexual activity: Not Currently     Review of Systems   Unable to perform ROS: Acuity of condition   Musculoskeletal:  Positive for arthralgias and myalgias.   Skin:  Positive for color change and wound.   Objective:     Vital Signs (Most Recent):  Temp: 98.4 °F (36.9 °C) (22)  Pulse: 81 (22)  Resp: 15 (22)  BP: (!) 117/56 (81) (22)  SpO2: 100 % (22)   Vital Signs (24h Range):  Temp:  [97.3 °F (36.3 °C)-99 °F (37.2 °C)] 98.4 °F (36.9 °C)  Pulse:  [79-87] 81  Resp:  [15-40] 15  SpO2:  [100 %] 100 %  BP: (113-136)/(55-67) 117/56     Weight: 65.8 kg (145 lb 1 oz)  Body mass index is 22.06 kg/m².      Intake/Output Summary (Last 24 hours) at 2022 2246  Last data filed at 2022 1616  Gross per 24 hour   Intake 1302.33 ml   Output 100 ml   Net 1202.33 ml       Physical Exam  Vitals reviewed.   Constitutional:       General: She is awake. She is not in acute distress.      Appearance: She is ill-appearing. She is not toxic-appearing.      Comments: Sickly, frail, elderly female in no acute distress.  Has a hard time keeping her head up   HENT:      Head: Normocephalic and atraumatic.   Pulmonary:      Effort: Pulmonary effort is normal. No tachypnea, bradypnea, accessory muscle usage or respiratory distress.   Abdominal:      General: There is no distension.      Palpations: Abdomen is soft.      Tenderness: There is no abdominal tenderness.   Musculoskeletal:      Cervical back: Neck supple.      Comments: Palpable popliteal pulse bilaterally  Nonpalpable posterior tibial and dorsalis pedis pulses, bilaterally  There is dry gangrenous changes of the bilateral great toe and bilateral heel which is without significant erythema or drainage.    Neurological:      Mental Status: She is lethargic and disoriented.       Significant Labs:  CBC:   Recent Labs   Lab 11/17/22  0705   WBC 19.01*   RBC 2.76*   HGB 7.2*   HCT 23.0*   *   MCV 83   MCH 26.1*   MCHC 31.3*     CMP:   Recent Labs   Lab 11/17/22  0705   *   CALCIUM 7.2*   ALBUMIN 1.4*   PROT 4.9*   *   K 4.1   CO2 21*      BUN 44*   CREATININE 1.9*   ALKPHOS 221*   ALT 57*   AST 73*   BILITOT 0.8       Significant Diagnostics:  I have reviewed all pertinent imaging results/findings within the past 24 hours.    Assessment/Plan:     Active Diagnoses:    Diagnosis Date Noted POA    PRINCIPAL PROBLEM:  Hypoglycemia [E16.2] 11/16/2022 Yes    Gangrene [I96] 11/16/2022 Yes    PAD (peripheral artery disease) [I73.9] 11/16/2022 Yes    Osteomyelitis [M86.9] 11/16/2022 Yes    Anemia [D64.9] 11/16/2022 Yes    Type 2 diabetes mellitus without complication [E11.9] 05/02/2018 Yes    Benign essential HTN [I10] 09/19/2017 Yes      Problems Resolved During this Admission:   -patient has significant necrotic soft tissue wounds to hips, sacrum.  She has gangrenous left toe and foot with poor flow below the knee.  She is quite  weak, frail, malnourished.  Agree that she would benefit from debridement of the pressure wounds of the hips and sacrum as well as amputation of the left leg.  Would recommend an above knee amputation of the left lower extremity.  Had discussion with her son who is not ready to consent to any procedure just yet.  Timing is not emergent today.  Will plan on debridement and amputation potentially has 1 surgical event in a couple days.  Rashawn Waters III, MD  General Surgery  [facility]

## 2022-11-18 NOTE — PROGRESS NOTES
[facility] Medicine Progress Note  Patient Name: Deonna Montero MRN: [id number]   Patient Class: IP- Inpatient  Length of Stay: 2   Admission Date: 11/16/2022  4:01 PM Attending Physician: Garland Alegre MD   Primary Care Provider: Primary Doctor No Face-to-Face encounter date: 11/18/2022   Chief Complaint: Wound Check    Assessment & Plan:   Deonna Montero is a 82 y.o. female admitted for bilateral hallux gangrene and left foot    Active Problems/Assessment & Plan  1. 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    Orthopedic consult to consider BKA per vascular surgery  Podiatry consulted and following  Continue wound care  Continue cefepime, vancomycin    2. Severe protein calorie malnutrition-POA   fortunately patient did well with pureed diet with thickened liquids.    Will continue to follow and support nutrition      3. Deep tissue wound -POA-  Surgery consult  Wound care    4. CLAUDY on CKD  Continue to monitor   Gentle IV fluid    5. Diabetes type 2  6. Hypoglycemia  Holding insulin  D10 GTT   Accu-Cheks    Discussion with son today.  He understands that patient requires pureed diet with thickened liquids.  I discussed with General surgery team and we agree that patient will best be served by going to the OR once for debridement of right hip/thigh wound and BKA.  Son understands that BKA is what is recommended.  We will target Monday for OR.      Core measures:  - Code status: full code   - Consultants:  Vascular surgery, General surgery, Podiatry  - Diet:   puree with thickened liquid     Hospital Course     82-year-old with past medical history of diabetes type 2, hypertension, prior CVA presenting with lower extremity wounds.  Also noted to have decubitus ulcer on right hip.    Vascular surgery, General surgery, Podiatry  "consulted    Vascular surgery feels that she is not a candidate for revascularization and will require BKA.  Orthopedics subsequently consulted.  It was felt that to spare this frail patient multiple trips to the OR/general anesthesia it would be best to combine the debridement of the right hip wound with a BKA.    Patient had poor nutrition.  SLP consulted and recommended pureed diet with thickened liquids.  Patient did well with this consistency diet and had adequate p.o. intake.        Discharge Planning:       Subjective:    Interval History   Patient is without complaints.  D    Discussion with son as above.    Initially hypoglycemic.  Hyperglycemic with D10.  Discussed with nursing discontinuing D10 and monitoring      Review of Systems   Limited due to altered mental status  Objective:   Physical Exam  BP (!) 140/65 (BP Location: Right arm, Patient Position: Lying)   Pulse 85   Temp 98.4 °F (36.9 °C) (Oral)   Resp 16   Ht 5' 8" (1.727 m)   Wt 65.8 kg (145 lb 1 oz)   SpO2 100%   BMI 22.06 kg/m²   Vitals reviewed.    General:  Alert and oriented x1.  No acute distress  HEENT:  Normocephalic  Cardiovascular:  Regular rate and rhythm, no murmurs rubs or gallops.  No lower extremity edema.  Pulmonary:  Diminished at bases  Abdomen:  Soft, nontender, nondistended.  No guarding.  No rebound.  Negative Wallace's.  Positive bowel sounds.  Extremity:  Dry gangrene bilateral hallux.   Right distal pulses not palpable.  Deep tissue injury with necrosis on hip   Dermatology:  No rashes appreciated on exposed skin  Psychiatric:  Dimnished affect    Following labs were Reviewed   No results for input(s): WBC, HGB, HCT, PLT, GLUCOSE, CALCIUM, ALBUMIN, PROT, NA, K, CO2, CL, BUN, CREATININE, ALKPHOS, ALT, AST, BILITOT in the last 24 hours.    No results found for: POCTGLUCOSE     All labs within the past 24 hours have been reviewed  Microbiology Results (last 7 days)       Procedure Component Value Units Date/Time    " Aerobic culture [[id number]] Collected: 11/18/22 0348    Order Status: Completed Specimen: Wound from Toe, Left Foot Updated: 11/18/22 1155     Aerobic Bacterial Culture No growth    Blood Culture #1 **CANNOT BE ORDERED STAT** [[id number]] Collected: 11/16/22 2020    Order Status: Completed Specimen: Blood from Peripheral, Upper Arm, Right Updated: 11/17/22 2232     Blood Culture, Routine No Growth to date      No Growth to date    Blood Culture #1 **CANNOT BE ORDERED STAT** [[id number]] Collected: 11/16/22 2028    Order Status: Completed Specimen: Blood from Peripheral, Forearm, Left Updated: 11/17/22 2232     Blood Culture, Routine No Growth to date      No Growth to date          US Upper Extremity Veins Right   Final Result      CT Head Without Contrast   Final Result      US Lower Extremity Arteries Bilateral   Final Result      US Abdomen Limited (Gallbladder)   Final Result      X-Ray Foot Complete Bilateral   Final Result          Inpatient medications  Scheduled Meds:   amiodarone  200 mg Oral BID    atorvastatin  40 mg Oral Daily    ceFEPime (MAXIPIME) IVPB  1 g Intravenous Q24H    enoxparin  30 mg Subcutaneous Daily    metoprolol tartrate  25 mg Oral BID    montelukast  10 mg Oral QHS     Continuous Infusions:   sodium chloride 0.9% 100 mL/hr at 11/18/22 1150    dextrose 10 % in water (D10W) 25 mL/hr at 11/16/22 2252     PRN Meds:.sodium chloride, sodium chloride, acetaminophen, albuterol-ipratropium, dextrose 10%, dextrose 10%, glucagon (human recombinant), glucose, glucose, HYDROmorphone, insulin aspart U-100, melatonin, naloxone, ondansetron, polyethylene glycol, senna-docusate 8.6-50 mg, simethicone, sodium chloride 0.9%, traMADoL, Pharmacy to dose Vancomycin consult **AND** vancomycin - pharmacy to dose    Above encounter included review of the medical records, interviewing and examining the patient face-to-face, discussion with family and other health care providers, ordering and interpreting lab/test  results and formulating a plan of care.     Medical Decision Making:    [] Low Complexity  [x] Moderate Complexity  [] High Complexity    Garland Alegre  [facility] Hospitalist  11/18/2022

## 2022-11-18 NOTE — PT/OT/SLP PROGRESS
"Speech Language Pathology Treatment    Patient Name:  Deonna Montero   MRN:  [id number]  Admitting Diagnosis: Hypoglycemia    Recommendations:                 General Recommendations:  Dysphagia therapy  Diet recommendations:  Puree, Liquid Diet Level: Nectar Thick   Aspiration Precautions:  1 bite/sip at a time, Assistance with meals and Assistance with thickening liquids, Check for pocketing/oral residue, Eliminate distractions, Feed only when awake/alert, Frequent oral care, HOB to 90 degrees, Meds crushed in puree, Remain upright 30 minutes post meal, Small bites/sips, and Strict aspiration precautions     General Precautions: Standard, fall, aspiration, pureed diet, nectar thick  Communication strategies:   yes/no questions only and simple directives with visual cues    Subjective     Pt laying in bed resting with in-laws at bedside. Pt awoke easily. When initially asked if pt was willing to eat/drink, pt responded no; however, when asked a second time, pt agreed.  Patient goals: none stated     Pain/Comfort:       Respiratory Status: Room air    Objective:     Has the patient been evaluated by SLP for swallowing?   Yes  Keep patient NPO? No   Current Respiratory Status:        Pt offered straw sip of NTL, pt attempted to drink liquid; however, pt stopped mid sucking of straw and said "no." Pt was offered another sip of NTL, but she refused; pt also continued to refuse pureed trials. Pt's family at bedside reporting pt w/o difficulty consuming pureed lunch tray; coughing was noted during drinking thin liquid. Family was unaware of need for NTL. Liquids thickened and coughing subsided. SLP provided written diet recommendations and swallowing precautions to the pt's nurse to transport with the pt to next room.    Assessment:     Deonna Montero is a 82 y.o. female with an SLP diagnosis of Dysphagia.  She presents with limited participation today due to reported pain and fatigue noted by pt's family " and nurse. Continue POC.    Goals:   Multidisciplinary Problems       SLP Goals          Problem: SLP    Goal Priority Disciplines Outcome   SLP Goal    Low SLP Ongoing, Progressing   Description: 1. Pt will tolerate least restrictive diet w/o overt s/s aspiration or oral residue/pocketing                       Plan:     Patient to be seen:  2 x/week   Plan of Care expires:  11/24/22  Plan of Care reviewed with:  patient, other (see comments) (nursing)   SLP Follow-Up:  Yes       Discharge recommendations:  nursing facility, skilled, nursing facility, basic, intermediate care facility/nursing home   Barriers to Discharge:  None    Time Tracking:     SLP Treatment Date:   11/18/22  Speech Start Time:  1314  Speech Stop Time:  1332     Speech Total Time (min):  18 min    Billable Minutes: Treatment Swallowing Dysfunction 18 mins    11/18/2022

## 2022-11-18 NOTE — PT/OT/SLP EVAL
Occupational Therapy   Evaluation    Name: Deonna Montero  MRN: [id number]  Admitting Diagnosis:  Hypoglycemia  Recent Surgery: Procedure(s) (LRB):  DEBRIDEMENT, WOUND, SACRUM (N/A)      Recommendations:     Discharge Recommendations:  (Home with family and sitter assistance.)  Discharge Equipment Recommendations:  none  Barriers to discharge:  None    Assessment:     Deonna Montero is a 82 y.o. female with a medical diagnosis of Hypoglycemia.  She presents with extreme physical deconditioning, mostly bed bound with poor neck/truck control. Performance deficits affecting function: weakness, impaired endurance, impaired self care skills, impaired functional mobility, gait instability, impaired balance, decreased upper extremity function, decreased lower extremity function, impaired cognition, impaired cardiopulmonary response to activity.      Rehab Prognosis: Poor; patient would benefit from acute skilled OT services to address these deficits and reach maximum level of function.       Plan:     Patient to be seen 2 x/week to address the above listed problems via self-care/home management, therapeutic activities, therapeutic exercises  Plan of Care Expires: 12/18/22  Plan of Care Reviewed with: patient    Subjective     Chief Complaint: extreme physical deconditioning with poor neck/trunk control  Patient/Family Comments/goals: improved sitting balance and UE use.     Occupational Profile:  Living Environment: lives with son and DIL. Has paid sitter to watch patient when son is at work.   Previous level of function: At baseline 90% bed bound and occasionally sits in wheelchair. Total dependent for all ADLs.   Roles and Routines: dependent on family  Equipment Used at Home:  wheelchair, bedside commode, shower chair, rollator  Assistance upon Discharge: family and sitter    Pain/Comfort:  Pain Rating 1: 0/10  Pain Rating Post-Intervention 1: 0/10    Patients cultural, spiritual, Sabianist conflicts  given the current situation: no    Objective:     Communicated with: nurse prior to session.  Patient found HOB elevated with telemetry, peripheral IV, kidd catheter upon OT entry to room.    General Precautions: Standard, fall   Orthopedic Precautions:RLE weight bearing as tolerated, LLE weight bearing as tolerated (Per Podiatrist)   Braces: N/A  Respiratory Status: Room air    Occupational Performance:    Bed Mobility:    Patient completed Scooting/Bridging with maximal assistance x2  Patient completed Supine to Sit with maximal assistance x2  Patient completed Sit to Supine with maximal assistance x2  Performed unsupported sitting EOB with maximal assistance x1.    Activities of Daily Living:  Grooming: total assistance to wash face sitting EOB.    Cognitive/Visual Perceptual:  Cognitive/Psychosocial Skills:     -       Oriented to: Person   -       Follows Commands/attention:Follows one-step commands  -       Communication: Hard of hearing. Only communicated 1-2 word answers.  -       Memory: Impaired STM and Poor immediate recall  -       Safety awareness/insight to disability: impaired   -       Mood/Affect/Coping skills/emotional control: Flat affect    Physical Exam:  Balance:    -       Sitting: Maximal Assistand (Poor neck/trunk control)  Upper Extremity Range of Motion:     -       Right Upper Extremity: shoulder flexion up to 90 degrees  -       Left Upper Extremity: shoulder flexion up to 90 degrees  Upper Extremity Strength:    -       Right Upper Extremity: shoulder 2/5, elbow/wrist 3/5  -       Left Upper Extremity: shoulder 2/5, elbow/wrist 3/5   Strength:    -       Right Upper Extremity: fair  -       Left Upper Extremity: fair  Fine Motor Coordination:    -       Intact    AMPAC 6 Click ADL:  AMPAC Total Score: 6    Treatment & Education:  Patient educated on the purpose of Occupational Therapy.    Patient left HOB elevated with all lines intact, call button in reach, and bed alarm  on    GOALS:   Multidisciplinary Problems       Occupational Therapy Goals          Problem: Occupational Therapy    Goal Priority Disciplines Outcome Interventions   Occupational Therapy Goal     OT, PT/OT     Description: Goals to be met by: 12/18/2022     Patient will increase functional independence with ADLs by performing:    Grooming while seated with Minimal Assistance.  Sitting at edge of bed x10 minutes with Minimal Assistance.  Supine to sit with Minimal Assistance.  Upper extremity exercise program x10 reps per handout, with assistance as needed.                         History:     Past Medical History:   Diagnosis Date    Back pain     Diabetes mellitus     Hypertension     Reflux     Short-term memory loss     Stroke          Past Surgical History:   Procedure Laterality Date    BACK SURGERY      with tea    CHOLE[place]STECTOMY      [place]      2 years ago    HYSTERECTOMY      INTRAOCULAR PROSTHESES INSERTION Left 2014    SPINAL CORD STIMULATOR IMPLANT      3-4 years ago-nonfuncitioning    TOTAL HIP ARTHROPLASTY         Time Tracking:     OT Date of Treatment: 11/18/22  OT Start Time: 1051  OT Stop Time: 1107  OT Total Time (min): 16 min    Billable Minutes:Evaluation 2  Self Care/Home Management 14    Co-Eval between OT and PT. OT addressed ADLs and PT addressed functional mobility.     11/18/2022

## 2022-11-18 NOTE — PT/OT/SLP EVAL
Physical Therapy Evaluation    Patient Name:  Deonna Montero   MRN:  [id number]    Recommendations:     Discharge Recommendations:  home (with son and sitter)   Discharge Equipment Recommendations: none   Barriers to discharge:  medical status    Assessment:     Deonna Montero is a 82 y.o. female admitted with a medical diagnosis of Hypoglycemia.  She presents with the following impairments/functional limitations:  weakness, impaired endurance, impaired functional mobility, impaired balance, decreased safety awareness, pain, decreased lower extremity function, impaired cardiopulmonary response to activity       [place] with OT requiring two skilled therapists for optimal safety with mobility and limited tolerance for two therapy session at this time.    Pt found in bed with HOB elevated. Pt agreeable to visit. Pt required mod A x 2 for bed mobility. Poor sitting balance at EOB staticly and dynamicly. Pt requires max A to achieve and maintain sitting balance at EOB. Per son pt is 90% bedbound but he does occasionally assist her to a wheelchair. Pt requires Ax 1 for bed mobility and transfers.  Pt has sitters when son is at work. Per discussion with son he would like to see patient improve sitting balance to improve OOB to wheelchair tolerance.       Rehab Prognosis: Fair; patient would benefit from acute skilled PT services to address these deficits and reach maximum level of function.    Recent Surgery: Procedure(s) (LRB):  DEBRIDEMENT, WOUND, SACRUM (N/A)      Plan:     During this hospitalization, patient to be seen 3 x/week to address the identified rehab impairments via therapeutic activities, therapeutic exercises, neuromuscular re-education and progress toward the following goals:    Plan of Care Expires:       Subjective     Chief Complaint: back pain   Patient/Family Comments/goals: per son to improve sitting tolerance/balance to increase tolerance for OOB to wheelchair  Pain/Comfort:  Pain  Rating 1: other (see comments) (did not quantify)  Location 1: back  Pain Addressed 1: Reposition, Distraction, Cessation of Activity    Patients cultural, spiritual, Islam conflicts given the current situation: no    Living Environment:  Pt lives with her son in a one story home with sitter while he is at work. 90% of the time she is bedbound but son does assist her to a wheelchair occasionally.   Prior to admission, patients level of function was mod A x 1.  Equipment used at home: wheelchair, bedside commode, shower chair, rollator.  DME owned (not currently used): none.  Upon discharge, patient will have assistance from son and sitters.    Objective:     Communicated with RN prior to session.  Patient found HOB elevated with peripheral IV, telemetry, pulse ox (continuous), blood pressure cuff  upon PT entry to room.    General Precautions: Standard, fall   Orthopedic Precautions:RLE weight bearing as tolerated, LLE weight bearing as tolerated (per podiatrist)   Braces: N/A  Respiratory Status: Room air    Exams:  RLE ROM: PROM WFL  RLE Strength: ankle DF 3-/5, knee ext 3+/5, hip flexion at least 1/5  LLE ROM: L ankle to neutral PROM, all other WFL PROM  LLE Strength: ankle DF 0/5, knee ext 3-/5, hip flexion at least 1/5    Functional Mobility:  Bed Mobility:     Supine to Sit: moderate assistance and of 2 persons  Sit to Supine: moderate assistance and of 2 persons      AM-PAC 6 CLICK MOBILITY  Total Score:8       Treatment & Education:  Pt educated on POC, discharge recommendation, midline seated positioning/balance, importance of time OOB, proper form with bed mobility, need for assist with mobility, use of call bell to seek assistance as needed and fall prevention      Patient left HOB elevated with all lines intact, call button in reach, bed alarm on, and RN present.    GOALS:   Multidisciplinary Problems       Physical Therapy Goals          Problem: Physical Therapy    Goal Priority Disciplines  Outcome Goal Variances Interventions   Physical Therapy Goal     PT, PT/OT Ongoing, Progressing     Description: Goals to be met by: 22     Patient will increase functional independence with mobility by performin. R/L rolling with moderate assistance  2.Supine to sit with Moderate Assistance  3. Bed to chair transfer with low squat pivot moderate Assistance  4. Pt to tolerate sitting EOB x 10 mins with min A                              History:     Past Medical History:   Diagnosis Date    Back pain     Diabetes mellitus     Hypertension     Reflux     Short-term memory loss     Stroke        Past Surgical History:   Procedure Laterality Date    BACK SURGERY      with tea    CHOLECYSTECTOMY      [place]      2 years ago    HYSTERECTOMY      INTRAOCULAR PROSTHESES INSERTION Left 2014    SPINAL CORD STIMULATOR IMPLANT      3-4 years ago-nonfuncitioning    TOTAL HIP ARTHROPLASTY         Time Tracking:     PT Received On: 22  PT Start Time: 1055     PT Stop Time: 1107  PT Total Time (min): 12 min     Billable Minutes: Evaluation 12      2022

## 2022-11-18 NOTE — NURSING
Wound care saw pt today. Poss surgery Monday depending on family's decision for plan. 25% intake for lunch. Attempted to call son's cell phone 2x and left message both times to update him on pt's new room change.

## 2022-11-18 NOTE — CONSULTS
82 yr old male   Bilat feet and toes Dr Casper has seen and request paint with betadine                             Left lower leg pain with betadine     Right hip paint with betadine and cover with mepilex till surgery         Back  apply medihoney and cover with mepilex     Pump medihoney and cover with mepilex     Sacral apply medihoney and cover with mepilex

## 2022-11-18 NOTE — PROGRESS NOTES
Pharmacokinetic Assessment Follow Up: IV Vancomycin    Patient brief summary:  Deonna Montero is a 82 y.o. female initiated on antimicrobial therapy with IV Vancomycin for treatment of Bone/Joint infection    The patient's current regimen is intermittent dosing based on random levels.      Actual Body Weight = 65.8 kg (145 lb 1 oz).    Renal Function:   Estimated Creatinine Clearance: 23.4 mL/min (A) (based on SCr of 1.9 mg/dL (H)).      Vancomycin serum concentration assessment(s):    The random level was drawn correctly and can be used to guide therapy at this time. The measurement is within the desired definitive target range of 15 to 20 mcg/mL.    Vancomycin Regimen Plan:    Re-dose Vancomycin 1250 mg IV once .Random level to be drawn at 23:00 on 11/18.    Drug levels (last 3 results):  Recent Labs   Lab Result Units 11/17/22  2232   Vancomycin, Random ug/mL 15.2       Pharmacy will continue to follow and monitor vancomycin.    Please contact pharmacy at extension [phone] for questions regarding this assessment.    Thank you for the consult,   Lidia Varela

## 2022-11-18 NOTE — PLAN OF CARE
Problem: Infection  Goal: Absence of Infection Signs and Symptoms  Outcome: Ongoing, Progressing     Problem: Adult Inpatient Plan of Care  Goal: Plan of Care Review  Outcome: Ongoing, Progressing  Goal: Patient-Specific Goal (Individualized)  Outcome: Ongoing, Progressing  Goal: Absence of Hospital-Acquired Illness or Injury  Outcome: Ongoing, Progressing  Goal: Optimal Comfort and Wellbeing  Outcome: Ongoing, Progressing  Goal: Readiness for Transition of Care  Outcome: Ongoing, Progressing     Problem: Diabetes Comorbidity  Goal: Blood Glucose Level Within Targeted Range  Outcome: Ongoing, Progressing     Problem: Fall Injury Risk  Goal: Absence of Fall and Fall-Related Injury  Outcome: Ongoing, Progressing     Problem: Skin Injury Risk Increased  Goal: Skin Health and Integrity  Outcome: Ongoing, Progressing     Problem: Impaired Wound Healing  Goal: Optimal Wound Healing  Outcome: Ongoing, Progressing     Problem: Oral Intake Inadequate  Goal: Improved Oral Intake  Outcome: Ongoing, Progressing

## 2022-11-19 LAB
ALBUMIN SERPL BCP-MCNC: 1.3 G/DL (ref 3.5–5.2)
ALBUMIN SERPL BCP-MCNC: 3.3 G/DL (ref 3.5–5.2)
ALP SERPL-CCNC: 221 U/L (ref 55–135)
ALP SERPL-CCNC: 65 U/L (ref 55–135)
ALT SERPL W/O P-5'-P-CCNC: 10 U/L (ref 10–44)
ALT SERPL W/O P-5'-P-CCNC: 58 U/L (ref 10–44)
ANION GAP SERPL CALC-SCNC: 7 MMOL/L (ref 8–16)
ANION GAP SERPL CALC-SCNC: 9 MMOL/L (ref 8–16)
AST SERPL-CCNC: 15 U/L (ref 10–40)
AST SERPL-CCNC: 53 U/L (ref 10–40)
BASOPHILS # BLD AUTO: 0 K/UL (ref 0–0.2)
BASOPHILS NFR BLD: 0 % (ref 0–1.9)
BILIRUB SERPL-MCNC: 0.8 MG/DL (ref 0.1–1)
BILIRUB SERPL-MCNC: 1 MG/DL (ref 0.1–1)
BLD PROD TYP BPU: NORMAL
BLOOD UNIT EXPIRATION DATE: NORMAL
BLOOD UNIT TYPE CODE: 9500
BLOOD UNIT TYPE: NORMAL
BUN SERPL-MCNC: 33 MG/DL (ref 8–23)
BUN SERPL-MCNC: 47 MG/DL (ref 8–23)
CALCIUM SERPL-MCNC: 7.5 MG/DL (ref 8.7–10.5)
CALCIUM SERPL-MCNC: 8.4 MG/DL (ref 8.7–10.5)
CHLORIDE SERPL-SCNC: 106 MMOL/L (ref 95–110)
CHLORIDE SERPL-SCNC: 106 MMOL/L (ref 95–110)
CO2 SERPL-SCNC: 23 MMOL/L (ref 23–29)
CO2 SERPL-SCNC: 25 MMOL/L (ref 23–29)
CODING SYSTEM: NORMAL
CREAT SERPL-MCNC: 1.2 MG/DL (ref 0.5–1.4)
CREAT SERPL-MCNC: 2.3 MG/DL (ref 0.5–1.4)
CRP SERPL-MCNC: 0.4 MG/DL
CRP SERPL-MCNC: 22.33 MG/DL
DIFFERENTIAL METHOD: ABNORMAL
DISPENSE STATUS: NORMAL
EOSINOPHIL # BLD AUTO: 0 K/UL (ref 0–0.5)
EOSINOPHIL NFR BLD: 0.1 % (ref 0–8)
ERYTHROCYTE [DISTWIDTH] IN BLOOD BY AUTOMATED COUNT: 17.9 % (ref 11.5–14.5)
EST. GFR  (NO RACE VARIABLE): 20.8 ML/MIN/1.73 M^2
EST. GFR  (NO RACE VARIABLE): 45.5 ML/MIN/1.73 M^2
GLUCOSE SERPL-MCNC: 101 MG/DL (ref 70–110)
GLUCOSE SERPL-MCNC: 206 MG/DL (ref 70–110)
GLUCOSE SERPL-MCNC: 254 MG/DL (ref 70–110)
GLUCOSE SERPL-MCNC: 262 MG/DL (ref 70–110)
GLUCOSE SERPL-MCNC: 275 MG/DL (ref 70–110)
GLUCOSE SERPL-MCNC: 311 MG/DL (ref 70–110)
HCT VFR BLD AUTO: 21.3 % (ref 37–48.5)
HGB BLD-MCNC: 6.7 G/DL (ref 12–16)
IMM GRANULOCYTES # BLD AUTO: 0.12 K/UL (ref 0–0.04)
IMM GRANULOCYTES NFR BLD AUTO: 0.7 % (ref 0–0.5)
LYMPHOCYTES # BLD AUTO: 0.6 K/UL (ref 1–4.8)
LYMPHOCYTES NFR BLD: 3.6 % (ref 18–48)
MAGNESIUM SERPL-MCNC: 1.5 MG/DL (ref 1.6–2.6)
MAGNESIUM SERPL-MCNC: 2.1 MG/DL (ref 1.6–2.6)
MCH RBC QN AUTO: 25.9 PG (ref 27–31)
MCHC RBC AUTO-ENTMCNC: 31.5 G/DL (ref 32–36)
MCV RBC AUTO: 82 FL (ref 82–98)
MONOCYTES # BLD AUTO: 0.6 K/UL (ref 0.3–1)
MONOCYTES NFR BLD: 3.5 % (ref 4–15)
NEUTROPHILS # BLD AUTO: 15.3 K/UL (ref 1.8–7.7)
NEUTROPHILS NFR BLD: 92.1 % (ref 38–73)
NRBC BLD-RTO: 0 /100 WBC
NUM UNITS TRANS PACKED RBC: NORMAL
PLATELET # BLD AUTO: 477 K/UL (ref 150–450)
PMV BLD AUTO: 10.7 FL (ref 9.2–12.9)
POTASSIUM SERPL-SCNC: 3.6 MMOL/L (ref 3.5–5.1)
POTASSIUM SERPL-SCNC: 4.4 MMOL/L (ref 3.5–5.1)
PROT SERPL-MCNC: 5.1 G/DL (ref 6–8.4)
PROT SERPL-MCNC: 6.2 G/DL (ref 6–8.4)
RBC # BLD AUTO: 2.59 M/UL (ref 4–5.4)
SODIUM SERPL-SCNC: 138 MMOL/L (ref 136–145)
SODIUM SERPL-SCNC: 138 MMOL/L (ref 136–145)
VANCOMYCIN SERPL-MCNC: 18.5 UG/ML
WBC # BLD AUTO: 16.56 K/UL (ref 3.9–12.7)

## 2022-11-19 PROCEDURE — 83735 ASSAY OF MAGNESIUM: CPT | Performed by: FAMILY MEDICINE

## 2022-11-19 PROCEDURE — 63600175 PHARM REV CODE 636 W HCPCS: Performed by: FAMILY MEDICINE

## 2022-11-19 PROCEDURE — 12000002 HC ACUTE/MED SURGE SEMI-PRIVATE ROOM

## 2022-11-19 PROCEDURE — 99900035 HC TECH TIME PER 15 MIN (STAT)

## 2022-11-19 PROCEDURE — 85025 COMPLETE CBC W/AUTO DIFF WBC: CPT | Performed by: FAMILY MEDICINE

## 2022-11-19 PROCEDURE — 25000003 PHARM REV CODE 250: Performed by: FAMILY MEDICINE

## 2022-11-19 PROCEDURE — 94761 N-INVAS EAR/PLS OXIMETRY MLT: CPT

## 2022-11-19 PROCEDURE — 80053 COMPREHEN METABOLIC PANEL: CPT | Performed by: FAMILY MEDICINE

## 2022-11-19 PROCEDURE — 86140 C-REACTIVE PROTEIN: CPT | Performed by: FAMILY MEDICINE

## 2022-11-19 PROCEDURE — P9016 RBC LEUKOCYTES REDUCED: HCPCS | Performed by: FAMILY MEDICINE

## 2022-11-19 RX ORDER — HYDROCODONE BITARTRATE AND ACETAMINOPHEN 500; 5 MG/1; MG/1
TABLET ORAL
Status: DISCONTINUED | OUTPATIENT
Start: 2022-11-19 | End: 2022-11-26 | Stop reason: HOSPADM

## 2022-11-19 RX ORDER — VANCOMYCIN HCL IN 5 % DEXTROSE 1G/250ML
15 PLASTIC BAG, INJECTION (ML) INTRAVENOUS ONCE
Status: DISCONTINUED | OUTPATIENT
Start: 2022-11-19 | End: 2022-11-19

## 2022-11-19 RX ORDER — HYDROMORPHONE HYDROCHLORIDE 1 MG/ML
1 INJECTION, SOLUTION INTRAMUSCULAR; INTRAVENOUS; SUBCUTANEOUS EVERY 6 HOURS PRN
Status: DISCONTINUED | OUTPATIENT
Start: 2022-11-19 | End: 2022-11-25

## 2022-11-19 RX ORDER — VANCOMYCIN HCL IN 5 % DEXTROSE 1G/250ML
15 PLASTIC BAG, INJECTION (ML) INTRAVENOUS ONCE
Status: COMPLETED | OUTPATIENT
Start: 2022-11-19 | End: 2022-11-19

## 2022-11-19 RX ORDER — MAGNESIUM SULFATE HEPTAHYDRATE 40 MG/ML
2 INJECTION, SOLUTION INTRAVENOUS ONCE
Status: COMPLETED | OUTPATIENT
Start: 2022-11-19 | End: 2022-11-19

## 2022-11-19 RX ADMIN — METOPROLOL TARTRATE 25 MG: 25 TABLET, FILM COATED ORAL at 09:11

## 2022-11-19 RX ADMIN — ATORVASTATIN CALCIUM 40 MG: 40 TABLET, FILM COATED ORAL at 10:11

## 2022-11-19 RX ADMIN — VANCOMYCIN HYDROCHLORIDE 1000 MG: 1 INJECTION, POWDER, LYOPHILIZED, FOR SOLUTION INTRAVENOUS at 09:11

## 2022-11-19 RX ADMIN — AMIODARONE HYDROCHLORIDE 200 MG: 200 TABLET ORAL at 09:11

## 2022-11-19 RX ADMIN — CEFEPIME 1 G: 1 INJECTION, POWDER, FOR SOLUTION INTRAMUSCULAR; INTRAVENOUS at 11:11

## 2022-11-19 RX ADMIN — INSULIN ASPART 4 UNITS: 100 INJECTION, SOLUTION INTRAVENOUS; SUBCUTANEOUS at 11:11

## 2022-11-19 RX ADMIN — METOPROLOL TARTRATE 25 MG: 25 TABLET, FILM COATED ORAL at 10:11

## 2022-11-19 RX ADMIN — INSULIN ASPART 3 UNITS: 100 INJECTION, SOLUTION INTRAVENOUS; SUBCUTANEOUS at 05:11

## 2022-11-19 RX ADMIN — TRAMADOL HYDROCHLORIDE 50 MG: 50 TABLET, COATED ORAL at 03:11

## 2022-11-19 RX ADMIN — TRAMADOL HYDROCHLORIDE 50 MG: 50 TABLET, COATED ORAL at 09:11

## 2022-11-19 RX ADMIN — ENOXAPARIN SODIUM 30 MG: 30 INJECTION SUBCUTANEOUS at 05:11

## 2022-11-19 RX ADMIN — AMIODARONE HYDROCHLORIDE 200 MG: 200 TABLET ORAL at 10:11

## 2022-11-19 RX ADMIN — HYDROMORPHONE HYDROCHLORIDE 1 MG: 1 INJECTION, SOLUTION INTRAMUSCULAR; INTRAVENOUS; SUBCUTANEOUS at 11:11

## 2022-11-19 RX ADMIN — MONTELUKAST 10 MG: 10 TABLET, FILM COATED ORAL at 09:11

## 2022-11-19 RX ADMIN — HYDROMORPHONE HYDROCHLORIDE 1 MG: 1 INJECTION, SOLUTION INTRAMUSCULAR; INTRAVENOUS; SUBCUTANEOUS at 06:11

## 2022-11-19 RX ADMIN — INSULIN ASPART 2 UNITS: 100 INJECTION, SOLUTION INTRAVENOUS; SUBCUTANEOUS at 10:11

## 2022-11-19 RX ADMIN — SODIUM CHLORIDE 100 MG: 0.9 INJECTION, SOLUTION INTRAVENOUS at 05:11

## 2022-11-19 RX ADMIN — TRAMADOL HYDROCHLORIDE 50 MG: 50 TABLET, COATED ORAL at 10:11

## 2022-11-19 RX ADMIN — MAGNESIUM SULFATE HEPTAHYDRATE 2 G: 40 INJECTION, SOLUTION INTRAVENOUS at 10:11

## 2022-11-19 RX ADMIN — Medication 6 MG: at 09:11

## 2022-11-19 NOTE — CARE UPDATE
11/18/22 1721   Patient Assessment/Suction   Level of Consciousness (AVPU) alert   Respiratory Effort Unlabored   Expansion/Accessory Muscles/Retractions no use of accessory muscles   All Lung Fields Breath Sounds clear;diminished   Rhythm/Pattern, Respiratory no shortness of breath reported   Cough Frequency no cough   PRE-TX-O2   O2 Device (Oxygen Therapy) room air   SpO2 98 %   Pulse Oximetry Type Continuous   $ Pulse Oximetry - Multiple Charge Pulse Oximetry - Multiple   Pulse 75   Resp 12   Positioning   Head of Bed (HOB) Positioning HOB elevated;HOB at 30 degrees   Respiratory Evaluation   $ Care Plan Tech Time 15 min

## 2022-11-19 NOTE — CONSULTS
Pharmacokinetic Assessment Follow Up: IV Vancomycin    Vancomycin serum concentration assessment(s):    The trough level was drawn correctly and can be used to guide therapy at this time. The measurement is within the desired definitive target range of 15 to 20 mcg/mL.    Vancomycin Regimen Plan:    Improved SCr 2.3 >1.2  Vancomycin 1000 mg IV x 1  Re-dose when the random level is less than 20 mcg/mL, next level to be drawn at 0430 on 11/20 with AM labs  (~12 hours post dose)    Drug levels (last 3 results):  Recent Labs   Lab Result Units 11/17/22  2232 11/18/22  2315   Vancomycin, Random ug/mL 15.2 18.5       Pharmacy will continue to follow and monitor vancomycin.    Please contact pharmacy at extension [phone] for questions regarding this assessment.    Thank you for the consult,   Jada Farley, PharmD       Patient brief summary:  Deonna Montero is a 82 y.o. female initiated on antimicrobial therapy with IV Vancomycin for treatment of bone/joint infection    Drug Allergies:   Review of patient's allergies indicates:   Allergen Reactions    Codeine Anaphylaxis and Itching    Morphine Itching     PT TOLERATES DILAUDID WITHOUT DIFFICULTY    Morphine (pf) Itching       Actual Body Weight:   65.8 kg    Renal Function:   Estimated Creatinine Clearance: 37.1 mL/min (based on SCr of 1.2 mg/dL).,     Dialysis Method (if applicable):  N/A    CBC (last 72 hours):  Recent Labs   Lab Result Units 11/16/22  1732 11/17/22  0110 11/17/22  0111 11/17/22  0705 11/19/22  0541   WBC K/uL 19.50*  --  20.91* 19.01* 16.56*   Hemoglobin g/dL 6.2*  --  5.7* 7.2* 6.7*   Hemoglobin A1C %  --  Invalid,Hgb<6.0*  --   --   --    Hematocrit % 19.7*  --  18.0* 23.0* 21.3*   Platelets K/uL 575*  --  570* 469* 477*   Gran % % 93.1*  --  93.6* 92.7* 92.1*   Lymph % % 3.0*  --  3.3* 3.1* 3.6*   Mono % % 3.0*  --  2.2* 3.3* 3.5*   Eosinophil % % 0.0  --  0.0 0.0 0.1   Basophil % % 0.1  --  0.1 0.1 0.0   Differential Method  Automated  --   Automated Automated Automated       Metabolic Panel (last 72 hours):  Recent Labs   Lab Result Units 11/16/22  1652 11/16/22 2012 11/16/22  2152 11/17/22  0110 11/17/22  0705 11/18/22  2315 11/19/22  0541   Sodium mmol/L 135*  --   --   --  134* 138 138   Potassium mmol/L 3.5  --   --   --  4.1 4.4 3.6   Chloride mmol/L 104  --   --   --  102 106 106   CO2 mmol/L 23  --   --   --  21* 25 23   Glucose mg/dL 69*  --  31* 142* 249* 101 262*   Glucose, UA   --  Negative  --   --   --   --   --    BUN mg/dL 46*  --   --   --  44* 47* 33*   Creatinine mg/dL 2.0*  --   --   --  1.9* 2.3* 1.2   Albumin g/dL 1.6*  --   --   --  1.4* 3.3* 1.3*   Total Bilirubin mg/dL 0.4  --   --   --  0.8 1.0 0.8   Alkaline Phosphatase U/L 239*  --   --   --  221* 65 221*   AST U/L 89*  --   --   --  73* 15 53*   ALT U/L 62*  --   --   --  57* 10 58*   Magnesium mg/dL  --   --   --   --  1.4* 2.1 1.5*       Vancomycin Administrations:  vancomycin given in the last 96 hours                     vancomycin 1.25 g in dextrose 5% 250 mL IVPB (ready to mix) (mg) 1,250 mg New Bag 11/18/22 0022    vancomycin 500 mg in dextrose 5 % 100 mL IVPB (ready to mix system) (mg) 500 mg New Bag 11/16/22 2337    vancomycin in dextrose 5 % 1 gram/250 mL IVPB 1,000 mg (mg) 1,000 mg New Bag 11/16/22 2235                    Microbiologic Results:  Microbiology Results (last 7 days)       Procedure Component Value Units Date/Time    Aerobic culture [[id number]] Collected: 11/18/22 3061    Order Status: Completed Specimen: Wound from Toe, Left Foot Updated: 11/19/22 0911     Aerobic Bacterial Culture No growth    Blood Culture #1 **CANNOT BE ORDERED STAT** [[id number]] Collected: 11/16/22 2020    Order Status: Completed Specimen: Blood from Peripheral, Upper Arm, Right Updated: 11/18/22 2232     Blood Culture, Routine No Growth to date      No Growth to date      No Growth to date    Blood Culture #1 **CANNOT BE ORDERED STAT** [[id number]] Collected: 11/16/22 2028     Order Status: Completed Specimen: Blood from Peripheral, Forearm, Left Updated: 11/18/22 2196     Blood Culture, Routine No Growth to date      No Growth to date      No Growth to date

## 2022-11-19 NOTE — PROGRESS NOTES
[facility] Medicine Progress Note  Patient Name: Deonna Montero MRN: [id number]   Patient Class: IP- Inpatient  Length of Stay: 3   Admission Date: 11/16/2022  4:01 PM Attending Physician: Garland Alegre MD   Primary Care Provider: Primary Doctor No Face-to-Face encounter date: 11/19/2022   Chief Complaint: Wound Check    Assessment & Plan:   Deonna Montero is a 82 y.o. female admitted for bilateral hallux gangrene and left foot    Active Problems/Assessment & Plan  1. 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    Orthopedic consult to consider BKA per vascular surgery  Podiatry consulted and following  Continue wound care  Continue cefepime, vancomycin  C/O pain today.  D/W nursing gving pain medications on more frequent basis.    2. Severe protein calorie malnutrition-POA   fortunately patient did well with pureed diet with thickened liquids.    Will continue to follow and support nutrition      3. Deep tissue wound -POA-  Surgery consult  Wound care    4. CLAUDY on CKD  Continue to monitor   Gentle IV fluid    5. Diabetes type 2  6. Hypoglycemia  Holding insulin  Accu-Cheks     We will target Monday for OR.      Core measures:  - Code status: full code   - Consultants:  Vascular surgery, General surgery, Podiatry  - Diet:   puree with thickened liquid     Hospital Course     82-year-old with past medical history of diabetes type 2, hypertension, prior CVA presenting with lower extremity wounds.  Also noted to have decubitus ulcer on right hip.    Vascular surgery, General surgery, Podiatry consulted    Vascular surgery feels that she is not a candidate for revascularization and will require BKA.  Orthopedics subsequently consulted.  It was felt that to spare this frail patient multiple trips to the OR/general anesthesia it would be best  "to combine the debridement of the right hip wound with a BKA.    Patient had poor nutrition.  SLP consulted and recommended pureed diet with thickened liquids.  Patient did well with this consistency diet and had adequate p.o. intake.        Discharge Planning:       Subjective:    Interval History   Patient is without complaints.  D    Discussion with son as above.    Initially hypoglycemic.  Hyperglycemic with D10.  Discussed with nursing discontinuing D10 and monitoring      Review of Systems   Limited due to altered mental status  Objective:   Physical Exam  /67   Pulse 85   Temp 97.7 °F (36.5 °C) (Axillary)   Resp 18   Ht 5' 8" (1.727 m)   Wt 65.8 kg (145 lb 1 oz)   SpO2 (!) 94%   BMI 22.06 kg/m²   Vitals reviewed.    General:  Alert and oriented x1.  No acute distress  HEENT:  Normocephalic  Cardiovascular:  Regular rate and rhythm, no murmurs rubs or gallops.  No lower extremity edema.  Pulmonary:  Diminished at bases  Abdomen:  Soft, nontender, nondistended.  No guarding.  No rebound.  Negative Wallace's.  Positive bowel sounds.  Extremity:  Dry gangrene bilateral hallux.   Right distal pulses not palpable.  Deep tissue injury with necrosis on hip   Dermatology:  No rashes appreciated on exposed skin  Psychiatric:  Dimnished affect    Following labs were Reviewed   Recent Labs   Lab 11/19/22  0541   WBC 16.56*   HGB 6.7*   HCT 21.3*   *   CALCIUM 7.5*   ALBUMIN 1.3*   PROT 5.1*      K 3.6   CO2 23      BUN 33*   CREATININE 1.2   ALKPHOS 221*   ALT 58*   AST 53*   BILITOT 0.8       No results found for: POCTGLUCOSE     All labs within the past 24 hours have been reviewed  Microbiology Results (last 7 days)       Procedure Component Value Units Date/Time    Aerobic culture [[id number]] Collected: 11/18/22 5588    Order Status: Completed Specimen: Wound from Toe, Left Foot Updated: 11/19/22 0917     Aerobic Bacterial Culture No growth    Blood Culture #1 **CANNOT BE ORDERED STAT** " [[id number]] Collected: 11/16/22 2020    Order Status: Completed Specimen: Blood from Peripheral, Upper Arm, Right Updated: 11/18/22 2232     Blood Culture, Routine No Growth to date      No Growth to date      No Growth to date    Blood Culture #1 **CANNOT BE ORDERED STAT** [[id number]] Collected: 11/16/22 2028    Order Status: Completed Specimen: Blood from Peripheral, Forearm, Left Updated: 11/18/22 2232     Blood Culture, Routine No Growth to date      No Growth to date      No Growth to date          US Upper Extremity Veins Right   Final Result      CT Head Without Contrast   Final Result      US Lower Extremity Arteries Bilateral   Final Result      US Abdomen Limited (Gallbladder)   Final Result      X-Ray Foot Complete Bilateral   Final Result          Inpatient medications  Scheduled Meds:   amiodarone  200 mg Oral BID    atorvastatin  40 mg Oral Daily    ceFEPime (MAXIPIME) IVPB  1 g Intravenous Q24H    enoxparin  30 mg Subcutaneous Daily    iron dextran IVPB  100 mg Intravenous Once    metoprolol tartrate  25 mg Oral BID    montelukast  10 mg Oral QHS     Continuous Infusions:   sodium chloride 0.9% 100 mL/hr at 11/18/22 2138     PRN Meds:.sodium chloride, sodium chloride, sodium chloride, acetaminophen, albuterol-ipratropium, dextrose 10%, dextrose 10%, glucagon (human recombinant), glucose, glucose, HYDROmorphone, HYDROmorphone, insulin aspart U-100, melatonin, naloxone, ondansetron, polyethylene glycol, senna-docusate 8.6-50 mg, simethicone, sodium chloride 0.9%, traMADoL, Pharmacy to dose Vancomycin consult **AND** vancomycin - pharmacy to dose    Above encounter included review of the medical records, interviewing and examining the patient face-to-face, discussion with family and other health care providers, ordering and interpreting lab/test results and formulating a plan of care.     Medical Decision Making:    [] Low Complexity  [x] Moderate Complexity  [] High Complexity    Garland Alegre  [facility]  Hospitalist  11/19/2022

## 2022-11-19 NOTE — PROGRESS NOTES
Pharmacokinetic Assessment Follow Up: IV Vancomycin    Patient brief summary:  Deonna Montero is a 82 y.o. female initiated on antimicrobial therapy with IV Vancomycin for treatment of Bone/Joint infection    The patient's current regimen is intermittent dosing dose based on random levels.      Actual Body Weight = 65.8 kg (145 lb 1 oz).    Renal Function:   Estimated Creatinine Clearance: 19.4 mL/min (A) (based on SCr of 2.3 mg/dL (H)).      Vancomycin serum concentration assessment(s):    The random level was drawn correctly and can be used to guide therapy at this time. The measurement is within the desired definitive target range of 15 to 20 mcg/mL.    Vancomycin Regimen Plan:    Level within desired range. Next random level to be drawn at 10:00 on 11/19.     Drug levels (last 3 results):  Recent Labs   Lab Result Units 11/17/22  2232 11/18/22  2315   Vancomycin, Random ug/mL 15.2 18.5     Pharmacy will continue to follow and monitor vancomycin.    Please contact pharmacy at extension [phone] for questions regarding this assessment.    Thank you for the consult,   Lidia Varela

## 2022-11-20 LAB
ALBUMIN SERPL BCP-MCNC: 1.4 G/DL (ref 3.5–5.2)
ALP SERPL-CCNC: 290 U/L (ref 55–135)
ALT SERPL W/O P-5'-P-CCNC: 67 U/L (ref 10–44)
ANION GAP SERPL CALC-SCNC: 12 MMOL/L (ref 8–16)
AST SERPL-CCNC: 69 U/L (ref 10–40)
BASOPHILS # BLD AUTO: 0.04 K/UL (ref 0–0.2)
BASOPHILS NFR BLD: 0.2 % (ref 0–1.9)
BILIRUB SERPL-MCNC: 1.5 MG/DL (ref 0.1–1)
BUN SERPL-MCNC: 29 MG/DL (ref 8–23)
CALCIUM SERPL-MCNC: 7.8 MG/DL (ref 8.7–10.5)
CHLORIDE SERPL-SCNC: 109 MMOL/L (ref 95–110)
CO2 SERPL-SCNC: 20 MMOL/L (ref 23–29)
CREAT SERPL-MCNC: 1.1 MG/DL (ref 0.5–1.4)
CRP SERPL-MCNC: 24.59 MG/DL
DIFFERENTIAL METHOD: ABNORMAL
EOSINOPHIL # BLD AUTO: 0 K/UL (ref 0–0.5)
EOSINOPHIL NFR BLD: 0 % (ref 0–8)
ERYTHROCYTE [DISTWIDTH] IN BLOOD BY AUTOMATED COUNT: 17 % (ref 11.5–14.5)
EST. GFR  (NO RACE VARIABLE): 50.5 ML/MIN/1.73 M^2
GLUCOSE SERPL-MCNC: 258 MG/DL (ref 70–110)
GLUCOSE SERPL-MCNC: 271 MG/DL (ref 70–110)
GLUCOSE SERPL-MCNC: 286 MG/DL (ref 70–110)
GLUCOSE SERPL-MCNC: 309 MG/DL (ref 70–110)
GLUCOSE SERPL-MCNC: 335 MG/DL (ref 70–110)
HCT VFR BLD AUTO: 26.9 % (ref 37–48.5)
HGB BLD-MCNC: 8.7 G/DL (ref 12–16)
IMM GRANULOCYTES # BLD AUTO: 0.3 K/UL (ref 0–0.04)
IMM GRANULOCYTES NFR BLD AUTO: 1.4 % (ref 0–0.5)
LYMPHOCYTES # BLD AUTO: 0.7 K/UL (ref 1–4.8)
LYMPHOCYTES NFR BLD: 3.1 % (ref 18–48)
MAGNESIUM SERPL-MCNC: 1.8 MG/DL (ref 1.6–2.6)
MCH RBC QN AUTO: 26.9 PG (ref 27–31)
MCHC RBC AUTO-ENTMCNC: 32.3 G/DL (ref 32–36)
MCV RBC AUTO: 83 FL (ref 82–98)
MONOCYTES # BLD AUTO: 0.7 K/UL (ref 0.3–1)
MONOCYTES NFR BLD: 3.2 % (ref 4–15)
NEUTROPHILS # BLD AUTO: 19.1 K/UL (ref 1.8–7.7)
NEUTROPHILS NFR BLD: 92.1 % (ref 38–73)
NRBC BLD-RTO: 0 /100 WBC
PLATELET # BLD AUTO: 508 K/UL (ref 150–450)
PMV BLD AUTO: 10.9 FL (ref 9.2–12.9)
POTASSIUM SERPL-SCNC: 3.7 MMOL/L (ref 3.5–5.1)
PROT SERPL-MCNC: 5.1 G/DL (ref 6–8.4)
RBC # BLD AUTO: 3.23 M/UL (ref 4–5.4)
SODIUM SERPL-SCNC: 141 MMOL/L (ref 136–145)
VANCOMYCIN SERPL-MCNC: 15.8 UG/ML
WBC # BLD AUTO: 20.79 K/UL (ref 3.9–12.7)

## 2022-11-20 PROCEDURE — 99900035 HC TECH TIME PER 15 MIN (STAT)

## 2022-11-20 PROCEDURE — 25000003 PHARM REV CODE 250: Performed by: FAMILY MEDICINE

## 2022-11-20 PROCEDURE — 36415 COLL VENOUS BLD VENIPUNCTURE: CPT | Performed by: FAMILY MEDICINE

## 2022-11-20 PROCEDURE — 99900031 HC PATIENT EDUCATION (STAT)

## 2022-11-20 PROCEDURE — 12000002 HC ACUTE/MED SURGE SEMI-PRIVATE ROOM

## 2022-11-20 PROCEDURE — 80202 ASSAY OF VANCOMYCIN: CPT | Performed by: FAMILY MEDICINE

## 2022-11-20 PROCEDURE — 94761 N-INVAS EAR/PLS OXIMETRY MLT: CPT

## 2022-11-20 PROCEDURE — 83735 ASSAY OF MAGNESIUM: CPT | Performed by: FAMILY MEDICINE

## 2022-11-20 PROCEDURE — 80053 COMPREHEN METABOLIC PANEL: CPT | Performed by: FAMILY MEDICINE

## 2022-11-20 PROCEDURE — 86140 C-REACTIVE PROTEIN: CPT | Performed by: FAMILY MEDICINE

## 2022-11-20 PROCEDURE — 63600175 PHARM REV CODE 636 W HCPCS: Performed by: FAMILY MEDICINE

## 2022-11-20 PROCEDURE — 85025 COMPLETE CBC W/AUTO DIFF WBC: CPT | Performed by: FAMILY MEDICINE

## 2022-11-20 RX ORDER — ENOXAPARIN SODIUM 100 MG/ML
40 INJECTION SUBCUTANEOUS EVERY 24 HOURS
Status: DISCONTINUED | OUTPATIENT
Start: 2022-11-20 | End: 2022-11-23

## 2022-11-20 RX ORDER — CEFEPIME HYDROCHLORIDE 1 G/50ML
1 INJECTION, SOLUTION INTRAVENOUS
Status: DISCONTINUED | OUTPATIENT
Start: 2022-11-20 | End: 2022-11-26 | Stop reason: HOSPADM

## 2022-11-20 RX ADMIN — METOPROLOL TARTRATE 25 MG: 25 TABLET, FILM COATED ORAL at 08:11

## 2022-11-20 RX ADMIN — CEFEPIME HYDROCHLORIDE 1 G: 1 INJECTION, SOLUTION INTRAVENOUS at 12:11

## 2022-11-20 RX ADMIN — TRAMADOL HYDROCHLORIDE 50 MG: 50 TABLET, COATED ORAL at 12:11

## 2022-11-20 RX ADMIN — ATORVASTATIN CALCIUM 40 MG: 40 TABLET, FILM COATED ORAL at 08:11

## 2022-11-20 RX ADMIN — ENOXAPARIN SODIUM 40 MG: 100 INJECTION SUBCUTANEOUS at 06:11

## 2022-11-20 RX ADMIN — AMIODARONE HYDROCHLORIDE 200 MG: 200 TABLET ORAL at 09:11

## 2022-11-20 RX ADMIN — Medication 6 MG: at 09:11

## 2022-11-20 RX ADMIN — HYDROMORPHONE HYDROCHLORIDE 1 MG: 1 INJECTION, SOLUTION INTRAMUSCULAR; INTRAVENOUS; SUBCUTANEOUS at 08:11

## 2022-11-20 RX ADMIN — TRAMADOL HYDROCHLORIDE 50 MG: 50 TABLET, COATED ORAL at 09:11

## 2022-11-20 RX ADMIN — INSULIN ASPART 4 UNITS: 100 INJECTION, SOLUTION INTRAVENOUS; SUBCUTANEOUS at 08:11

## 2022-11-20 RX ADMIN — AMIODARONE HYDROCHLORIDE 200 MG: 200 TABLET ORAL at 08:11

## 2022-11-20 RX ADMIN — HYDROMORPHONE HYDROCHLORIDE 1 MG: 1 INJECTION, SOLUTION INTRAMUSCULAR; INTRAVENOUS; SUBCUTANEOUS at 03:11

## 2022-11-20 RX ADMIN — MONTELUKAST 10 MG: 10 TABLET, FILM COATED ORAL at 09:11

## 2022-11-20 RX ADMIN — ACETAMINOPHEN 650 MG: 325 TABLET ORAL at 09:11

## 2022-11-20 RX ADMIN — METOPROLOL TARTRATE 25 MG: 25 TABLET, FILM COATED ORAL at 09:11

## 2022-11-20 RX ADMIN — INSULIN ASPART 3 UNITS: 100 INJECTION, SOLUTION INTRAVENOUS; SUBCUTANEOUS at 06:11

## 2022-11-20 RX ADMIN — INSULIN ASPART 3 UNITS: 100 INJECTION, SOLUTION INTRAVENOUS; SUBCUTANEOUS at 09:11

## 2022-11-20 RX ADMIN — SENNOSIDES AND DOCUSATE SODIUM 1 TABLET: 8.6; 5 TABLET ORAL at 09:11

## 2022-11-20 RX ADMIN — VANCOMYCIN HYDROCHLORIDE 1250 MG: 1.25 INJECTION, POWDER, LYOPHILIZED, FOR SOLUTION INTRAVENOUS at 09:11

## 2022-11-20 RX ADMIN — INSULIN DETEMIR 5 UNITS: 100 INJECTION, SOLUTION SUBCUTANEOUS at 08:11

## 2022-11-20 NOTE — PROGRESS NOTES
[facility] Medicine Progress Note  Patient Name: Deonna Montero MRN: [id number]   Patient Class: IP- Inpatient  Length of Stay: 4   Admission Date: 11/16/2022  4:01 PM Attending Physician: Garland Alegre MD   Primary Care Provider: Primary Doctor No Face-to-Face encounter date: 11/20/2022   Chief Complaint: Wound Check    Assessment & Plan:   Deonna Montero is a 82 y.o. female admitted for bilateral hallux gangrene and left foot    Active Problems/Assessment & Plan  1. 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    Orthopedic consult to consider BKA per vascular surgery  Podiatry consulted and following  Continue wound care  Continue cefepime, vancomycin      2. Severe protein calorie malnutrition-POA   fortunately patient did well with pureed diet with thickened liquids.    Will continue to follow and support nutrition      3. Deep tissue wound -POA-  Surgery consult  Wound care    4. CLAUDY on CKD  Continue to monitor   Gentle IV fluid    5. Diabetes type 2  6. Hypoglycemia  Holding insulin  Accu-Cheks    D/W General Surgery.   We will target Monday for OR.      Core measures:  - Code status: full code   - Consultants:  Vascular surgery, General surgery, Podiatry  - Diet:   puree with thickened liquid     Hospital Course     82-year-old with past medical history of diabetes type 2, hypertension, prior CVA presenting with lower extremity wounds.  Also noted to have decubitus ulcer on right hip.    Vascular surgery, General surgery, Podiatry consulted    Vascular surgery feels that she is not a candidate for revascularization and will require BKA.  Orthopedics subsequently consulted.  It was felt that to spare this frail patient multiple trips to the OR/general anesthesia it would be best to combine the debridement  "of the right hip wound with a BKA.    Patient had poor nutrition.  SLP consulted and recommended pureed diet with thickened liquids.  Patient did well with this consistency diet and had adequate p.o. intake.        Discharge Planning:       Subjective:    Interval History   Patient moaning in bed.  Minimally verbal.   Plan for surgery tomorrow.      Review of Systems   Limited due to altered mental status  Objective:   Physical Exam  BP (!) 156/83 Comment: notify nursr  Pulse 87   Temp 98.1 °F (36.7 °C) (Axillary)   Resp 18   Ht 5' 8" (1.727 m)   Wt 65.8 kg (145 lb 1 oz)   SpO2 95%   BMI 22.06 kg/m²   Vitals reviewed.    General:  Alert and oriented x1.  No acute distress  HEENT:  Normocephalic  Cardiovascular:  Regular rate and rhythm, no murmurs rubs or gallops.  No lower extremity edema.  Pulmonary:  Diminished at bases  Abdomen:  Soft, nontender, nondistended.  No guarding.  No rebound.  Negative Wallace's.  Positive bowel sounds.  Extremity:  Dry gangrene bilateral hallux.   Right distal pulses not palpable.  Deep tissue injury with necrosis on hip   Dermatology:  No rashes appreciated on exposed skin  Psychiatric:  Dimnished affect    Following labs were Reviewed   Recent Labs   Lab 11/20/22  0428   WBC 20.79*   HGB 8.7*   HCT 26.9*   *   CALCIUM 7.8*   ALBUMIN 1.4*   PROT 5.1*      K 3.7   CO2 20*      BUN 29*   CREATININE 1.1   ALKPHOS 290*   ALT 67*   AST 69*   BILITOT 1.5*       No results found for: POCTGLUCOSE     All labs within the past 24 hours have been reviewed  Microbiology Results (last 7 days)       Procedure Component Value Units Date/Time    Aerobic culture [[id number]] Collected: 11/18/22 0348    Order Status: Completed Specimen: Wound from Toe, Left Foot Updated: 11/20/22 0839     Aerobic Bacterial Culture No growth    Blood Culture #1 **CANNOT BE ORDERED STAT** [[id number]] Collected: 11/16/22 2020    Order Status: Completed Specimen: Blood from Peripheral, Upper Arm, " Right Updated: 11/19/22 2232     Blood Culture, Routine No Growth to date      No Growth to date      No Growth to date      No Growth to date    Blood Culture #1 **CANNOT BE ORDERED STAT** [[id number]] Collected: 11/16/22 2028    Order Status: Completed Specimen: Blood from Peripheral, Forearm, Left Updated: 11/19/22 2232     Blood Culture, Routine No Growth to date      No Growth to date      No Growth to date      No Growth to date          US Upper Extremity Veins Right   Final Result      CT Head Without Contrast   Final Result      US Lower Extremity Arteries Bilateral   Final Result      US Abdomen Limited (Gallbladder)   Final Result      X-Ray Foot Complete Bilateral   Final Result          Inpatient medications  Scheduled Meds:   amiodarone  200 mg Oral BID    atorvastatin  40 mg Oral Daily    ceFEPime (MAXIPIME) IVPB  1 g Intravenous Q12H    enoxparin  40 mg Subcutaneous Daily    insulin detemir U-100  5 Units Subcutaneous Daily    metoprolol tartrate  25 mg Oral BID    montelukast  10 mg Oral QHS     Continuous Infusions:   sodium chloride 0.9% 100 mL/hr at 11/18/22 2138     PRN Meds:.sodium chloride, sodium chloride, sodium chloride, acetaminophen, albuterol-ipratropium, dextrose 10%, dextrose 10%, glucagon (human recombinant), glucose, glucose, HYDROmorphone, HYDROmorphone, insulin aspart U-100, melatonin, naloxone, ondansetron, polyethylene glycol, senna-docusate 8.6-50 mg, simethicone, sodium chloride 0.9%, traMADoL, Pharmacy to dose Vancomycin consult **AND** vancomycin - pharmacy to dose    Above encounter included review of the medical records, interviewing and examining the patient face-to-face, discussion with family and other health care providers, ordering and interpreting lab/test results and formulating a plan of care.     Medical Decision Making:    [] Low Complexity  [x] Moderate Complexity  [] High Complexity    Garland Alegre  [facility] Hospitalist  11/20/2022

## 2022-11-20 NOTE — CARE UPDATE
11/19/22 7414   Patient Assessment/Suction   Level of Consciousness (AVPU) alert   Respiratory Effort Unlabored   Expansion/Accessory Muscles/Retractions no use of accessory muscles   All Lung Fields Breath Sounds clear;diminished   Rhythm/Pattern, Respiratory no shortness of breath reported   Cough Frequency no cough   PRE-TX-O2   O2 Device (Oxygen Therapy) room air   SpO2 95 %   Pulse Oximetry Type Intermittent   $ Pulse Oximetry - Multiple Charge Pulse Oximetry - Multiple   Pulse 88   Resp 15   Positioning   Head of Bed (HOB) Positioning HOB elevated;HOB at 30 degrees   Respiratory Evaluation   $ Care Plan Tech Time 15 min

## 2022-11-20 NOTE — CARE UPDATE
11/20/22 0748   Patient Assessment/Suction   Level of Consciousness (AVPU) alert   Respiratory Effort Normal;Unlabored   Expansion/Accessory Muscles/Retractions no use of accessory muscles;no retractions;expansion symmetric   All Lung Fields Breath Sounds clear   Rhythm/Pattern, Respiratory unlabored;pattern regular;depth regular;chest wiggle adequate   Cough Frequency no cough   PRE-TX-O2   O2 Device (Oxygen Therapy) room air   SpO2 95 %   Pulse Oximetry Type Intermittent   $ Pulse Oximetry - Multiple Charge Pulse Oximetry - Multiple   Pulse 95   Resp 17   Aerosol Therapy   $ Aerosol Therapy Charges PRN treatment not required   Education   $ Education Bronchodilator;15 min   Respiratory Evaluation   $ Care Plan Tech Time 15 min

## 2022-11-20 NOTE — PROGRESS NOTES
Pharmacist Renal Dose Adjustment Note    Deonna Montero is a 82 y.o. female being treated with the medication cefepime    Patient Data:    Vital Signs (Most Recent):  Temp: 97.9 °F (36.6 °C) (11/20/22 0413)  Pulse: 86 (11/20/22 0413)  Resp: 18 (11/20/22 0413)  BP: 132/75 (11/20/22 0413)  SpO2: 95 % (11/19/22 2205) Vital Signs (72h Range):  Temp:  [97.4 °F (36.3 °C)-99 °F (37.2 °C)]   Pulse:  [75-90]   Resp:  [12-40]   BP: ()/(53-75)   SpO2:  [94 %-100 %]      Recent Labs   Lab 11/18/22  2315 11/19/22  0541 11/20/22  0428   CREATININE 2.3* 1.2 1.1     Serum creatinine: 1.1 mg/dL 11/20/22 0428  Estimated creatinine clearance: 40.5 mL/min    CEFEPIME 1 GM EVERY 24 HR will be changed to CEFEPIME 1 GM EVERY 12 HOURS    Pharmacist's Name: Pamela Torres  Pharmacist's Extension: [phone]

## 2022-11-20 NOTE — PROGRESS NOTES
Pharmacist Renal Dose Adjustment Note    Deonna Montero is a 82 y.o. female being treated with the medication enoxaparin    Patient Data:    Vital Signs (Most Recent):  Temp: 97.9 °F (36.6 °C) (11/20/22 0413)  Pulse: 86 (11/20/22 0413)  Resp: 18 (11/20/22 0413)  BP: 132/75 (11/20/22 0413)  SpO2: 95 % (11/19/22 2205) Vital Signs (72h Range):  Temp:  [97.4 °F (36.3 °C)-99 °F (37.2 °C)]   Pulse:  [75-90]   Resp:  [12-40]   BP: ()/(53-75)   SpO2:  [94 %-100 %]      Recent Labs   Lab 11/18/22  2315 11/19/22  0541 11/20/22  0428   CREATININE 2.3* 1.2 1.1     Serum creatinine: 1.1 mg/dL 11/20/22 0428  Estimated creatinine clearance: 40.5 mL/min    ENOXAPARIN 30 MG EVERY 24 HOURS will be changed to ENOXAPARIN 40 MG EVERY 24 HOURS.    Pharmacist's Name: Pamela Torres  Pharmacist's Extension: [phone]

## 2022-11-21 LAB
ALBUMIN SERPL BCP-MCNC: 1.2 G/DL (ref 3.5–5.2)
ALP SERPL-CCNC: 254 U/L (ref 55–135)
ALT SERPL W/O P-5'-P-CCNC: 57 U/L (ref 10–44)
ANION GAP SERPL CALC-SCNC: 10 MMOL/L (ref 8–16)
AST SERPL-CCNC: 42 U/L (ref 10–40)
BACTERIA BLD CULT: NORMAL
BACTERIA BLD CULT: NORMAL
BACTERIA SPEC AEROBE CULT: NO GROWTH
BASOPHILS # BLD AUTO: 0.02 K/UL (ref 0–0.2)
BASOPHILS NFR BLD: 0.1 % (ref 0–1.9)
BILIRUB SERPL-MCNC: 1.4 MG/DL (ref 0.1–1)
BUN SERPL-MCNC: 24 MG/DL (ref 8–23)
CALCIUM SERPL-MCNC: 7.8 MG/DL (ref 8.7–10.5)
CHLORIDE SERPL-SCNC: 113 MMOL/L (ref 95–110)
CO2 SERPL-SCNC: 19 MMOL/L (ref 23–29)
CREAT SERPL-MCNC: 1 MG/DL (ref 0.5–1.4)
CRP SERPL-MCNC: 25.11 MG/DL
DIFFERENTIAL METHOD: ABNORMAL
EOSINOPHIL # BLD AUTO: 0 K/UL (ref 0–0.5)
EOSINOPHIL NFR BLD: 0 % (ref 0–8)
ERYTHROCYTE [DISTWIDTH] IN BLOOD BY AUTOMATED COUNT: 17.3 % (ref 11.5–14.5)
EST. GFR  (NO RACE VARIABLE): 56.6 ML/MIN/1.73 M^2
GLUCOSE SERPL-MCNC: 320 MG/DL (ref 70–110)
GLUCOSE SERPL-MCNC: 334 MG/DL (ref 70–110)
GLUCOSE SERPL-MCNC: 335 MG/DL (ref 70–110)
GLUCOSE SERPL-MCNC: 338 MG/DL (ref 70–110)
GLUCOSE SERPL-MCNC: 36 MG/DL (ref 70–110)
GLUCOSE SERPL-MCNC: 458 MG/DL (ref 70–110)
HCT VFR BLD AUTO: 26 % (ref 37–48.5)
HGB BLD-MCNC: 8.2 G/DL (ref 12–16)
IMM GRANULOCYTES # BLD AUTO: 0.25 K/UL (ref 0–0.04)
IMM GRANULOCYTES NFR BLD AUTO: 1.3 % (ref 0–0.5)
LYMPHOCYTES # BLD AUTO: 0.5 K/UL (ref 1–4.8)
LYMPHOCYTES NFR BLD: 2.7 % (ref 18–48)
MAGNESIUM SERPL-MCNC: 1.5 MG/DL (ref 1.6–2.6)
MCH RBC QN AUTO: 26.6 PG (ref 27–31)
MCHC RBC AUTO-ENTMCNC: 31.5 G/DL (ref 32–36)
MCV RBC AUTO: 84 FL (ref 82–98)
MONOCYTES # BLD AUTO: 0.7 K/UL (ref 0.3–1)
MONOCYTES NFR BLD: 3.5 % (ref 4–15)
NEUTROPHILS # BLD AUTO: 17.3 K/UL (ref 1.8–7.7)
NEUTROPHILS NFR BLD: 92.4 % (ref 38–73)
NRBC BLD-RTO: 0 /100 WBC
PLATELET # BLD AUTO: 445 K/UL (ref 150–450)
PMV BLD AUTO: 10 FL (ref 9.2–12.9)
POTASSIUM SERPL-SCNC: 3.5 MMOL/L (ref 3.5–5.1)
PROT SERPL-MCNC: 4.8 G/DL (ref 6–8.4)
RBC # BLD AUTO: 3.08 M/UL (ref 4–5.4)
SODIUM SERPL-SCNC: 142 MMOL/L (ref 136–145)
WBC # BLD AUTO: 18.73 K/UL (ref 3.9–12.7)

## 2022-11-21 PROCEDURE — 36415 COLL VENOUS BLD VENIPUNCTURE: CPT | Performed by: FAMILY MEDICINE

## 2022-11-21 PROCEDURE — 63600175 PHARM REV CODE 636 W HCPCS: Performed by: FAMILY MEDICINE

## 2022-11-21 PROCEDURE — 25000242 PHARM REV CODE 250 ALT 637 W/ HCPCS: Performed by: FAMILY MEDICINE

## 2022-11-21 PROCEDURE — 27000221 HC OXYGEN, UP TO 24 HOURS

## 2022-11-21 PROCEDURE — 94761 N-INVAS EAR/PLS OXIMETRY MLT: CPT

## 2022-11-21 PROCEDURE — 94640 AIRWAY INHALATION TREATMENT: CPT

## 2022-11-21 PROCEDURE — 86140 C-REACTIVE PROTEIN: CPT | Performed by: FAMILY MEDICINE

## 2022-11-21 PROCEDURE — 85025 COMPLETE CBC W/AUTO DIFF WBC: CPT | Performed by: FAMILY MEDICINE

## 2022-11-21 PROCEDURE — 12000002 HC ACUTE/MED SURGE SEMI-PRIVATE ROOM

## 2022-11-21 PROCEDURE — 83735 ASSAY OF MAGNESIUM: CPT | Performed by: FAMILY MEDICINE

## 2022-11-21 PROCEDURE — 99900031 HC PATIENT EDUCATION (STAT)

## 2022-11-21 PROCEDURE — 80053 COMPREHEN METABOLIC PANEL: CPT | Performed by: FAMILY MEDICINE

## 2022-11-21 PROCEDURE — 25000003 PHARM REV CODE 250: Performed by: FAMILY MEDICINE

## 2022-11-21 PROCEDURE — 99900035 HC TECH TIME PER 15 MIN (STAT)

## 2022-11-21 PROCEDURE — 94799 UNLISTED PULMONARY SVC/PX: CPT

## 2022-11-21 PROCEDURE — 97530 THERAPEUTIC ACTIVITIES: CPT | Mod: CQ

## 2022-11-21 RX ORDER — DIPHENHYDRAMINE HYDROCHLORIDE 50 MG/ML
12.5 INJECTION INTRAMUSCULAR; INTRAVENOUS
Status: DISCONTINUED | OUTPATIENT
Start: 2022-11-21 | End: 2022-11-21 | Stop reason: HOSPADM

## 2022-11-21 RX ORDER — ONDANSETRON 2 MG/ML
4 INJECTION INTRAMUSCULAR; INTRAVENOUS DAILY PRN
Status: DISCONTINUED | OUTPATIENT
Start: 2022-11-21 | End: 2022-11-21 | Stop reason: HOSPADM

## 2022-11-21 RX ORDER — OXYCODONE HYDROCHLORIDE 5 MG/1
5 TABLET ORAL
Status: DISCONTINUED | OUTPATIENT
Start: 2022-11-21 | End: 2022-11-21 | Stop reason: HOSPADM

## 2022-11-21 RX ORDER — HYDROMORPHONE HYDROCHLORIDE 1 MG/ML
0.2 INJECTION, SOLUTION INTRAMUSCULAR; INTRAVENOUS; SUBCUTANEOUS EVERY 5 MIN PRN
Status: DISCONTINUED | OUTPATIENT
Start: 2022-11-21 | End: 2022-11-21 | Stop reason: HOSPADM

## 2022-11-21 RX ORDER — MAGNESIUM SULFATE HEPTAHYDRATE 40 MG/ML
2 INJECTION, SOLUTION INTRAVENOUS ONCE
Status: COMPLETED | OUTPATIENT
Start: 2022-11-21 | End: 2022-11-21

## 2022-11-21 RX ADMIN — AMIODARONE HYDROCHLORIDE 200 MG: 200 TABLET ORAL at 08:11

## 2022-11-21 RX ADMIN — INSULIN ASPART 4 UNITS: 100 INJECTION, SOLUTION INTRAVENOUS; SUBCUTANEOUS at 12:11

## 2022-11-21 RX ADMIN — HYDROMORPHONE HYDROCHLORIDE 0.5 MG: 1 INJECTION, SOLUTION INTRAMUSCULAR; INTRAVENOUS; SUBCUTANEOUS at 06:11

## 2022-11-21 RX ADMIN — MONTELUKAST 10 MG: 10 TABLET, FILM COATED ORAL at 08:11

## 2022-11-21 RX ADMIN — INSULIN ASPART 5 UNITS: 100 INJECTION, SOLUTION INTRAVENOUS; SUBCUTANEOUS at 08:11

## 2022-11-21 RX ADMIN — METOPROLOL TARTRATE 25 MG: 25 TABLET, FILM COATED ORAL at 08:11

## 2022-11-21 RX ADMIN — MAGNESIUM SULFATE 2 G: 2 INJECTION INTRAVENOUS at 10:11

## 2022-11-21 RX ADMIN — IPRATROPIUM BROMIDE AND ALBUTEROL SULFATE 3 ML: .5; 3 SOLUTION RESPIRATORY (INHALATION) at 08:11

## 2022-11-21 RX ADMIN — INSULIN DETEMIR 5 UNITS: 100 INJECTION, SOLUTION SUBCUTANEOUS at 09:11

## 2022-11-21 RX ADMIN — VANCOMYCIN HYDROCHLORIDE 1250 MG: 1.25 INJECTION, POWDER, LYOPHILIZED, FOR SOLUTION INTRAVENOUS at 09:11

## 2022-11-21 RX ADMIN — CEFEPIME HYDROCHLORIDE 1 G: 1 INJECTION, SOLUTION INTRAVENOUS at 12:11

## 2022-11-21 RX ADMIN — Medication 6 MG: at 09:11

## 2022-11-21 RX ADMIN — INSULIN ASPART 4 UNITS: 100 INJECTION, SOLUTION INTRAVENOUS; SUBCUTANEOUS at 08:11

## 2022-11-21 RX ADMIN — IPRATROPIUM BROMIDE AND ALBUTEROL SULFATE 3 ML: .5; 3 SOLUTION RESPIRATORY (INHALATION) at 12:11

## 2022-11-21 RX ADMIN — CEFEPIME HYDROCHLORIDE 1 G: 1 INJECTION, SOLUTION INTRAVENOUS at 01:11

## 2022-11-21 RX ADMIN — HYDROMORPHONE HYDROCHLORIDE 1 MG: 1 INJECTION, SOLUTION INTRAMUSCULAR; INTRAVENOUS; SUBCUTANEOUS at 10:11

## 2022-11-21 NOTE — PROGRESS NOTES
[facility] Medicine Progress Note  Patient Name: Deonna Montero MRN: [id number]   Patient Class: IP- Inpatient  Length of Stay: 5   Admission Date: 11/16/2022  4:01 PM Attending Physician: Garland Alegre MD   Primary Care Provider: Primary Doctor No Face-to-Face encounter date: 11/21/2022   Chief Complaint: Wound Check    Assessment & Plan:   Deonna Montero is a 82 y.o. female admitted for bilateral hallux gangrene and left foot    Active Problems/Assessment & Plan  1. 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    Orthopedic consult to consider BKA per vascular surgery  Podiatry consulted and following  Continue wound care  Continue cefepime, vancomycin      2. Severe protein calorie malnutrition-POA   fortunately patient did well with pureed diet with thickened liquids.    Will continue to follow and support nutrition      3. Deep tissue wound -POA-  Surgery consult  Wound care    4. CLAUDY on CKD  Continue to monitor   Gentle IV fluid    5. Diabetes type 2  6. Hypoglycemia  Holding insulin  Accu-Cheks    7. Pneumonia   Patient noted to desat into the 60s this morning.  Stat chest x-ray demonstrated left lower lobe ill-defined lung opacity.  Patient has been on broad-spectrum antibiotics stents admission    Son at bedside this morning.  Discussed course thus far in likelihood that she would not do well postoperatively.  Discussed end of life issues and benefits of intervention verses pursuing aggressive comfort measures.  Son appeared undecided at time of my discussion.  Other family members are supposed to be coming later today.      Core measures:  - Code status: full code   - Consultants:  Vascular surgery, General surgery, Podiatry  - Diet:   NPO today pending possible surgery.  Otherwise puree with thickened  "liquid     Hospital Course     82-year-old with past medical history of diabetes type 2, hypertension, prior CVA presenting with lower extremity wounds.  Also noted to have decubitus ulcer on right hip.    Vascular surgery, General surgery, Podiatry consulted    Vascular surgery feels that she is not a candidate for revascularization and will require BKA.  Orthopedics subsequently consulted.  It was felt that to spare this frail patient multiple trips to the OR/general anesthesia it would be best to combine the debridement of the right hip wound with a BKA.    Patient had poor nutrition.  SLP consulted and recommended pureed diet with thickened liquids.  Patient did well with this consistency diet and had adequate p.o. intake.    On 11/21/2022 patient noted desats into the 60s.  Improved with non-rebreather mask.  Chest x-ray showed left lower lobe opacity.        Discharge Planning:       Subjective:    Interval History   Patient moaning in bed.  Minimally verbal.   Plan for surgery tomorrow.      Review of Systems   Limited due to altered mental status  Objective:   Physical Exam  BP (!) 133/59   Pulse 86   Temp 98 °F (36.7 °C) (Oral)   Resp 16   Ht 5' 8" (1.727 m)   Wt 65.8 kg (145 lb 1 oz)   SpO2 (!) 94%   BMI 22.06 kg/m²   Vitals reviewed.    General:  Alert and oriented x1.  Appears acutely uncomfortable.  Has been calling out.  HEENT:  Normocephalic  Cardiovascular:  Regular rate and rhythm, no murmurs rubs or gallops.  No lower extremity edema.  Pulmonary:  Diminished at bases  Abdomen:  Soft, nontender, nondistended.  No guarding.  No rebound.  Negative Wallace's.  Positive bowel sounds.  Extremity:  Dry gangrene bilateral hallux.   Right distal pulses not palpable.  Deep tissue injury with necrosis on hip   Dermatology:  No rashes appreciated on exposed skin  Psychiatric:  Dimnished affect    Following labs were Reviewed   Recent Labs   Lab 11/21/22  0624   WBC 18.73*   HGB 8.2*   HCT 26.0*    "   CALCIUM 7.8*   ALBUMIN 1.2*   PROT 4.8*      K 3.5   CO2 19*   *   BUN 24*   CREATININE 1.0   ALKPHOS 254*   ALT 57*   AST 42*   BILITOT 1.4*       No results found for: POCTGLUCOSE     All labs within the past 24 hours have been reviewed  Microbiology Results (last 7 days)       Procedure Component Value Units Date/Time    Aerobic culture [[id number]] Collected: 11/18/22 0348    Order Status: Completed Specimen: Wound from Toe, Left Foot Updated: 11/21/22 0716     Aerobic Bacterial Culture No growth    Blood Culture #1 **CANNOT BE ORDERED STAT** [[id number]] Collected: 11/16/22 2020    Order Status: Completed Specimen: Blood from Peripheral, Upper Arm, Right Updated: 11/20/22 2232     Blood Culture, Routine No Growth to date      No Growth to date      No Growth to date      No Growth to date      No Growth to date    Blood Culture #1 **CANNOT BE ORDERED STAT** [[id number]] Collected: 11/16/22 2028    Order Status: Completed Specimen: Blood from Peripheral, Forearm, Left Updated: 11/20/22 2232     Blood Culture, Routine No Growth to date      No Growth to date      No Growth to date      No Growth to date      No Growth to date          X-Ray Chest 1 View   Final Result      US Upper Extremity Veins Right   Final Result      CT Head Without Contrast   Final Result      US Lower Extremity Arteries Bilateral   Final Result      US Abdomen Limited (Gallbladder)   Final Result      X-Ray Foot Complete Bilateral   Final Result          Inpatient medications  Scheduled Meds:   amiodarone  200 mg Oral BID    atorvastatin  40 mg Oral Daily    ceFEPime (MAXIPIME) IVPB  1 g Intravenous Q12H    enoxparin  40 mg Subcutaneous Daily    insulin detemir U-100  5 Units Subcutaneous Daily    magnesium sulfate IVPB  2 g Intravenous Once    metoprolol tartrate  25 mg Oral BID    montelukast  10 mg Oral QHS    vancomycin (VANCOCIN) IVPB  1,250 mg Intravenous Q24H     Continuous Infusions:   sodium chloride 0.9% 100 mL/hr at  11/18/22 2138     PRN Meds:.sodium chloride, sodium chloride, sodium chloride, acetaminophen, albuterol-ipratropium, dextrose 10%, dextrose 10%, glucagon (human recombinant), glucose, glucose, HYDROmorphone, HYDROmorphone, insulin aspart U-100, melatonin, naloxone, ondansetron, polyethylene glycol, senna-docusate 8.6-50 mg, simethicone, sodium chloride 0.9%, traMADoL, Pharmacy to dose Vancomycin consult **AND** vancomycin - pharmacy to dose    Above encounter included review of the medical records, interviewing and examining the patient face-to-face, discussion with family and other health care providers, ordering and interpreting lab/test results and formulating a plan of care.     Medical Decision Making:    [] Low Complexity  [x] Moderate Complexity  [] High Complexity    Garland Alegre  [facility] Hospitalist  11/21/2022

## 2022-11-21 NOTE — PROGRESS NOTES
Pharmacokinetic Assessment Follow Up: IV Vancomycin    Patient brief summary:  Deonna Montero is a 82 y.o. female initiated on antimicrobial therapy with IV Vancomycin for treatment of Bone/Joint infection    The patient's current regimen is intermittent dosing based on random levels.      Actual Body Weight = 65.8 kg (145 lb 1 oz).    Renal Function:   Estimated Creatinine Clearance: 40.5 mL/min (based on SCr of 1.1 mg/dL).      Vancomycin serum concentration assessment(s):    The random level was drawn correctly and can be used to guide therapy at this time. The measurement is within the desired definitive target range of 15 to 20 mcg/mL.    Vancomycin Regimen Plan:    Change regimen to Vancomycin 1250 mg IV every 24 hours with next serum trough concentration measured at 21:00 prior to 6th dose on 11/22    Drug levels (last 3 results):  Recent Labs   Lab Result Units 11/17/22  2232 11/18/22  2315 11/20/22  1954   Vancomycin, Random ug/mL 15.2 18.5 15.8       Pharmacy will continue to follow and monitor vancomycin.    Please contact pharmacy at extension [phone] for questions regarding this assessment.    Thank you for the consult,   Lidia Varela

## 2022-11-21 NOTE — PROGRESS NOTES
Patient's family would like to proceed with surgery. She is disoriented. She is very deconditioned and frail. She will be high risk for perioperative mortality and morbidity. Risks and benefits discussed and included non healing, infection, death.

## 2022-11-21 NOTE — PT/OT/SLP PROGRESS
Speech Language Pathology      Deonnaarlette Montero  MRN: [id number]    Patient not seen today secondary to Nurse/ TAQUERIA hold; pt NPO for procedure today. Will follow-up 11/22/22.

## 2022-11-21 NOTE — PLAN OF CARE
11/21/22 0809   Patient Assessment/Suction   Level of Consciousness (AVPU) alert   Respiratory Effort Unlabored   Expansion/Accessory Muscles/Retractions no use of accessory muscles   All Lung Fields Breath Sounds diminished   Rhythm/Pattern, Respiratory pattern regular;unlabored   PRE-TX-O2   O2 Device (Oxygen Therapy) Oxymask   $ Is the patient on Low Flow Oxygen? Yes   Flow (L/min) 6   SpO2 (!) 94 %   Pulse Oximetry Type Intermittent   $ Pulse Oximetry - Multiple Charge Pulse Oximetry - Multiple   Pulse 86   Resp 18   Aerosol Therapy   $ Aerosol Therapy Charges Aerosol Treatment   Daily Review of Necessity (SVN) completed   Respiratory Treatment Status (SVN) given   Treatment Route (SVN) mask;oxygen   Patient Position (SVN) Milian's;HOB elevated   Post Treatment Assessment (SVN) breath sounds unchanged;increased aeration   Signs of Intolerance (SVN) none   Education   $ Education Bronchodilator;15 min   Respiratory Evaluation   $ Care Plan Tech Time 15 min   $ Eval/Re-eval Charges Evaluation   Evaluation For New Orders

## 2022-11-21 NOTE — PROGRESS NOTES
Patient has continued to significantly decline clinically.  I agree that she is dealing with end of life issues.  I agree that her surgery would speed up the process of her dying. She may not survive the operation in her state. I would expect her to remain intubated post operatively. She has no means for nutrition. Would not recommend feeding tube. I was present for discussion between family and attending. We both agree that comfort measures/hospice would be most appropriate. Will cancel surgery.

## 2022-11-21 NOTE — PT/OT/SLP PROGRESS
Physical Therapy Treatment    Patient Name:  Deonna Montero   MRN:  [id number]    Recommendations:     Discharge Recommendations:  home (with son and sitter)   Discharge Equipment Recommendations: none   Barriers to discharge:  increased assist with mobility    Assessment:     Deonna Montero is a 82 y.o. female admitted with a medical diagnosis of Hypoglycemia.  She presents with the following impairments/functional limitations:  weakness, impaired endurance, impaired functional mobility, impaired balance, decreased safety awareness, pain, decreased lower extremity function, impaired cardiopulmonary response to activity.  Pt agreeable to visit. Pt with c/o pain due to sacral ulcer. Pt required max assist x 2 for bed mobility with poor command follow and motor planning. Pt's extremities noted to have edema. Pt with poor control of L UE allowing arm to flop off the bed despite cues for pt to hold arm by her side. Pt with poor sitting balance leaning left, right, anteriorly, and posteriorly most likely due to sacral pain and trying to shift weight to relieve pain. Pt required constant verbal cuing to improve posture sitting EOB. Unable to get accurate pulse ox reading as pt was making tight fists with her hands throughout session. SpO2 would go from 70s with pt's hand in fist and jump up to 90s when therapist would stretch pt's hand out flat. RN informed about pt's pain and SpO2. Family member present throughout session.    Rehab Prognosis: Fair; patient would benefit from acute skilled PT services to address these deficits and reach maximum level of function.    Recent Surgery: Procedure(s) (LRB):  DEBRIDEMENT, WOUND, SACRUM (N/A)  AMPUTATION, ABOVE KNEE (Left) Day of Surgery    Plan:     During this hospitalization, patient to be seen 3 x/week to address the identified rehab impairments via therapeutic activities, therapeutic exercises, neuromuscular re-education and progress toward the following  goals:    Plan of Care Expires:       Subjective     Chief Complaint: pain in sacral area  Patient/Family Comments/goals: to get pain relief  Pain/Comfort:  Pain Rating 1: other (see comments) (did not quantify)  Location 1:  (sacral area)  Pain Addressed 1: Reposition, Distraction, Cessation of Activity, Nurse notified      Objective:     Communicated with RN prior to session.  Patient found HOB elevated with peripheral IV, telemetry, pulse ox (continuous) upon PT entry to room.     General Precautions: Standard, fall   Orthopedic Precautions:RLE weight bearing as tolerated, LLE weight bearing as tolerated   Braces:    Respiratory Status:  Oxymask 6 L/m O2     Functional Mobility:  Bed Mobility:     Supine to Sit: maximal assistance and of 2 persons  Sit to Supine: maximal assistance and of 2 persons  Balance: poor sitting balance with pt leaning left, right, anteriorly, posteriorly to try to relieve sacral pain.      AM-PAC 6 CLICK MOBILITY          Treatment & Education:  Pt educated on importance of time OOB, importance of intermittent mobility, safe techniques for transfers/ambulation, discharge recommendations/options, and use of call light for assistance and fall prevention.      Patient left HOB elevated with all lines intact, call button in reach, bed alarm on, RN notified, and family member present..    GOALS:   Multidisciplinary Problems       Physical Therapy Goals          Problem: Physical Therapy    Goal Priority Disciplines Outcome Goal Variances Interventions   Physical Therapy Goal     PT, PT/OT Ongoing, Progressing     Description: Goals to be met by: 22     Patient will increase functional independence with mobility by performin. R/L rolling with moderate assistance  2.Supine to sit with Moderate Assistance  3. Bed to chair transfer with low squat pivot moderate Assistance  4. Pt to tolerate sitting EOB x 10 mins with min A                              Time Tracking:     PT Received  On: 11/21/22  PT Start Time: 0915     PT Stop Time: 0938  PT Total Time (min): 23 min     Billable Minutes: Therapeutic Activity 23    Treatment Type: Treatment  PT/PTA: PTA     PTA Visit Number: 1     11/21/2022

## 2022-11-22 LAB
ALBUMIN SERPL BCP-MCNC: 1.5 G/DL (ref 3.5–5.2)
ALP SERPL-CCNC: 306 U/L (ref 55–135)
ALT SERPL W/O P-5'-P-CCNC: 65 U/L (ref 10–44)
ANION GAP SERPL CALC-SCNC: 8 MMOL/L (ref 8–16)
AST SERPL-CCNC: 53 U/L (ref 10–40)
BASOPHILS # BLD AUTO: 0.03 K/UL (ref 0–0.2)
BASOPHILS NFR BLD: 0.1 % (ref 0–1.9)
BILIRUB SERPL-MCNC: 0.9 MG/DL (ref 0.1–1)
BUN SERPL-MCNC: 20 MG/DL (ref 8–23)
CALCIUM SERPL-MCNC: 7.8 MG/DL (ref 8.7–10.5)
CHLORIDE SERPL-SCNC: 112 MMOL/L (ref 95–110)
CO2 SERPL-SCNC: 21 MMOL/L (ref 23–29)
CREAT SERPL-MCNC: 1 MG/DL (ref 0.5–1.4)
CRP SERPL-MCNC: 25.59 MG/DL
DIFFERENTIAL METHOD: ABNORMAL
EOSINOPHIL # BLD AUTO: 0 K/UL (ref 0–0.5)
EOSINOPHIL NFR BLD: 0 % (ref 0–8)
ERYTHROCYTE [DISTWIDTH] IN BLOOD BY AUTOMATED COUNT: 17.6 % (ref 11.5–14.5)
EST. GFR  (NO RACE VARIABLE): 56.6 ML/MIN/1.73 M^2
GLUCOSE SERPL-MCNC: 230 MG/DL (ref 70–110)
GLUCOSE SERPL-MCNC: 258 MG/DL (ref 70–110)
GLUCOSE SERPL-MCNC: 264 MG/DL (ref 70–110)
GLUCOSE SERPL-MCNC: 289 MG/DL (ref 70–110)
GLUCOSE SERPL-MCNC: 299 MG/DL (ref 70–110)
GLUCOSE SERPL-MCNC: 368 MG/DL (ref 70–110)
GLUCOSE SERPL-MCNC: 405 MG/DL (ref 70–110)
HCT VFR BLD AUTO: 28.3 % (ref 37–48.5)
HGB BLD-MCNC: 8.8 G/DL (ref 12–16)
IMM GRANULOCYTES # BLD AUTO: 0.23 K/UL (ref 0–0.04)
IMM GRANULOCYTES NFR BLD AUTO: 1.1 % (ref 0–0.5)
LYMPHOCYTES # BLD AUTO: 0.4 K/UL (ref 1–4.8)
LYMPHOCYTES NFR BLD: 1.9 % (ref 18–48)
MAGNESIUM SERPL-MCNC: 1.6 MG/DL (ref 1.6–2.6)
MCH RBC QN AUTO: 26.9 PG (ref 27–31)
MCHC RBC AUTO-ENTMCNC: 31.1 G/DL (ref 32–36)
MCV RBC AUTO: 87 FL (ref 82–98)
MONOCYTES # BLD AUTO: 0.6 K/UL (ref 0.3–1)
MONOCYTES NFR BLD: 3.1 % (ref 4–15)
NEUTROPHILS # BLD AUTO: 18.9 K/UL (ref 1.8–7.7)
NEUTROPHILS NFR BLD: 93.8 % (ref 38–73)
NRBC BLD-RTO: 0 /100 WBC
PLATELET # BLD AUTO: 433 K/UL (ref 150–450)
PMV BLD AUTO: 10.6 FL (ref 9.2–12.9)
POTASSIUM SERPL-SCNC: 3.1 MMOL/L (ref 3.5–5.1)
PROT SERPL-MCNC: 5.2 G/DL (ref 6–8.4)
RBC # BLD AUTO: 3.27 M/UL (ref 4–5.4)
SODIUM SERPL-SCNC: 141 MMOL/L (ref 136–145)
VANCOMYCIN TROUGH SERPL-MCNC: 26.6 UG/ML
WBC # BLD AUTO: 20.19 K/UL (ref 3.9–12.7)

## 2022-11-22 PROCEDURE — 83735 ASSAY OF MAGNESIUM: CPT | Performed by: FAMILY MEDICINE

## 2022-11-22 PROCEDURE — 80202 ASSAY OF VANCOMYCIN: CPT | Performed by: FAMILY MEDICINE

## 2022-11-22 PROCEDURE — 36415 COLL VENOUS BLD VENIPUNCTURE: CPT | Performed by: FAMILY MEDICINE

## 2022-11-22 PROCEDURE — 25000003 PHARM REV CODE 250: Performed by: FAMILY MEDICINE

## 2022-11-22 PROCEDURE — 94761 N-INVAS EAR/PLS OXIMETRY MLT: CPT

## 2022-11-22 PROCEDURE — 92526 ORAL FUNCTION THERAPY: CPT

## 2022-11-22 PROCEDURE — 63600175 PHARM REV CODE 636 W HCPCS: Performed by: INTERNAL MEDICINE

## 2022-11-22 PROCEDURE — 63600175 PHARM REV CODE 636 W HCPCS: Performed by: FAMILY MEDICINE

## 2022-11-22 PROCEDURE — 27000221 HC OXYGEN, UP TO 24 HOURS

## 2022-11-22 PROCEDURE — 99900031 HC PATIENT EDUCATION (STAT)

## 2022-11-22 PROCEDURE — 12000002 HC ACUTE/MED SURGE SEMI-PRIVATE ROOM

## 2022-11-22 PROCEDURE — 85025 COMPLETE CBC W/AUTO DIFF WBC: CPT | Performed by: FAMILY MEDICINE

## 2022-11-22 PROCEDURE — 99900035 HC TECH TIME PER 15 MIN (STAT)

## 2022-11-22 PROCEDURE — 80053 COMPREHEN METABOLIC PANEL: CPT | Performed by: FAMILY MEDICINE

## 2022-11-22 PROCEDURE — 86140 C-REACTIVE PROTEIN: CPT | Performed by: FAMILY MEDICINE

## 2022-11-22 RX ORDER — POTASSIUM CHLORIDE 7.45 MG/ML
40 INJECTION INTRAVENOUS ONCE
Status: COMPLETED | OUTPATIENT
Start: 2022-11-22 | End: 2022-11-22

## 2022-11-22 RX ORDER — MAGNESIUM SULFATE HEPTAHYDRATE 40 MG/ML
2 INJECTION, SOLUTION INTRAVENOUS ONCE
Status: COMPLETED | OUTPATIENT
Start: 2022-11-22 | End: 2022-11-22

## 2022-11-22 RX ADMIN — CEFEPIME HYDROCHLORIDE 1 G: 1 INJECTION, SOLUTION INTRAVENOUS at 12:11

## 2022-11-22 RX ADMIN — INSULIN ASPART 3 UNITS: 100 INJECTION, SOLUTION INTRAVENOUS; SUBCUTANEOUS at 01:11

## 2022-11-22 RX ADMIN — SENNOSIDES AND DOCUSATE SODIUM 1 TABLET: 8.6; 5 TABLET ORAL at 07:11

## 2022-11-22 RX ADMIN — METOPROLOL TARTRATE 25 MG: 25 TABLET, FILM COATED ORAL at 07:11

## 2022-11-22 RX ADMIN — HYDROMORPHONE HYDROCHLORIDE 1 MG: 1 INJECTION, SOLUTION INTRAMUSCULAR; INTRAVENOUS; SUBCUTANEOUS at 07:11

## 2022-11-22 RX ADMIN — HYDROMORPHONE HYDROCHLORIDE 0.5 MG: 1 INJECTION, SOLUTION INTRAMUSCULAR; INTRAVENOUS; SUBCUTANEOUS at 09:11

## 2022-11-22 RX ADMIN — MONTELUKAST 10 MG: 10 TABLET, FILM COATED ORAL at 09:11

## 2022-11-22 RX ADMIN — METOPROLOL TARTRATE 25 MG: 25 TABLET, FILM COATED ORAL at 09:11

## 2022-11-22 RX ADMIN — INSULIN ASPART 3 UNITS: 100 INJECTION, SOLUTION INTRAVENOUS; SUBCUTANEOUS at 05:11

## 2022-11-22 RX ADMIN — INSULIN DETEMIR 5 UNITS: 100 INJECTION, SOLUTION SUBCUTANEOUS at 07:11

## 2022-11-22 RX ADMIN — INSULIN ASPART 1 UNITS: 100 INJECTION, SOLUTION INTRAVENOUS; SUBCUTANEOUS at 09:11

## 2022-11-22 RX ADMIN — AMIODARONE HYDROCHLORIDE 200 MG: 200 TABLET ORAL at 09:11

## 2022-11-22 RX ADMIN — HYDROMORPHONE HYDROCHLORIDE 1 MG: 1 INJECTION, SOLUTION INTRAMUSCULAR; INTRAVENOUS; SUBCUTANEOUS at 04:11

## 2022-11-22 RX ADMIN — INSULIN ASPART 3 UNITS: 100 INJECTION, SOLUTION INTRAVENOUS; SUBCUTANEOUS at 12:11

## 2022-11-22 RX ADMIN — SODIUM CHLORIDE: 0.9 INJECTION, SOLUTION INTRAVENOUS at 09:11

## 2022-11-22 RX ADMIN — ATORVASTATIN CALCIUM 40 MG: 40 TABLET, FILM COATED ORAL at 07:11

## 2022-11-22 RX ADMIN — POTASSIUM CHLORIDE 40 MEQ: 7.46 INJECTION, SOLUTION INTRAVENOUS at 11:11

## 2022-11-22 RX ADMIN — AMIODARONE HYDROCHLORIDE 200 MG: 200 TABLET ORAL at 07:11

## 2022-11-22 RX ADMIN — INSULIN ASPART 5 UNITS: 100 INJECTION, SOLUTION INTRAVENOUS; SUBCUTANEOUS at 07:11

## 2022-11-22 RX ADMIN — MAGNESIUM SULFATE 2 G: 2 INJECTION INTRAVENOUS at 07:11

## 2022-11-22 NOTE — NURSING
0730  Assumed care of patient. Report received from ELI Roth  Assessment completed  VS, labs assessed  Patient NPO for BKA and right hip debridement today.  IV infusing without complication.  Upon assessment, O2 78% on room air.  Increased to 86% on 6L NC, placed on hi shaun mask, 6L, 94%. Respiratory gave patient PRN tx. MD notified at 0829. CXR stat ordered.    1230  Patient suppose to go to surgery but O2 dropped once again into the 80's. Respiratory notified to provide another treatment. MD also notified.    1500 Dr. Alegre and sx, Dr. Waters, at bedside speaking to family regarding current situation. Surgery cancelled. Considering other care options including hospice.    1900  Hospice rep in to discuss options. Family planning to talk and considering hospice care. DNR now in place. Diet re-instated to pureed diabetic, nectar thick liquids.  Report given to ELI Fernando.

## 2022-11-22 NOTE — PT/OT/SLP PROGRESS
"Speech Language Pathology Treatment    Patient Name:  Deonna Montero   MRN:  [id number]  Admitting Diagnosis: Hypoglycemia    Recommendations:                 General Recommendations:  Follow-up not indicated  Diet recommendations:  Puree, Liquid Diet Level: Nectar Thick   Aspiration Precautions: 1 bite/sip at a time, Assistance with meals and Assistance with thickening liquids, Check for pocketing/oral residue, Feed only when awake/alert, HOB to 90 degrees, Meds crushed in puree, Monitor for s/s of aspiration, Small bites/sips, Strict aspiration precautions, and Wear oxygen during intake   General Precautions: Standard, aspiration, respiratory, nectar thick, pureed diet, fall  Communication strategies:  yes/no questions only and provide increased time to answer    Subjective     Pt laying in bed awake upon entering room.  Patient goals: Pt stating "I want food."     Pain/Comfort:       Respiratory Status:  oxymask 6L oxygen    Objective:     Has the patient been evaluated by SLP for swallowing?   Yes  Keep patient NPO? No   Current Respiratory Status:        Pt initially requesting food and agreeable to PO trials; however, when presented with 1 tsp bite pureed protein from lunch tray and 1 tsp bite applesauce pt spit out both trials. Pt presented with straw for trials of NTL. Pt not accepting straw/sucking liquid from straw. Pt was cued to swallow, and drink; cues unsuccessful. SLP asked the pt multiple time if she wanted to eat or drink; pt with both yes/no responses. No consistent responses; however, pt closing mouth and turning away saying no to PO trials. RN reporting pt ate breakfast and drank NTL today w/o overt s/s aspiration or oral residue/pocketing. RN noting pt not eating a lot of food and requires high levels of encouragement for intake. RN reporting there has been discussions of hospice for pt, as pt status not improving and no longer wanting to eat.     Assessment:     Deonna Montero is " a 82 y.o. female with an SLP diagnosis of Dysphagia.  She presents with reduced participation and appetite during today's visit. Pt tolerating least restrictive diet (Puree with Nectar Thick Liquids) when willing to eat. No further ST warranted at this time, as pt unwilling to eat but tolerating appropriate diet well when she is willing to.    Goals:   Multidisciplinary Problems       SLP Goals          Problem: SLP    Goal Priority Disciplines Outcome   SLP Goal    Low SLP Ongoing, Progressing   Description: 1. Pt will tolerate least restrictive diet w/o overt s/s aspiration or oral residue/pocketing                       Plan:     Patient to be seen:  2 x/week   Plan of Care expires:  11/24/22  Plan of Care reviewed with:  patient, other (see comments) (nursing)   SLP Follow-Up:  No       Discharge recommendations:  nursing facility, skilled, nursing facility, basic, intermediate care facility/nursing home   Barriers to Discharge:  None    Time Tracking:     SLP Treatment Date:   11/22/22  Speech Start Time:  1432  Speech Stop Time:  1450     Speech Total Time (min):  18 min    Billable Minutes: Treatment Swallowing Dysfunction 18 mins    11/22/2022

## 2022-11-22 NOTE — PT/OT/SLP PROGRESS
Occupational Therapy      Patient Name:  Deonna Montero   MRN:  [id number]    Patient not seen today secondary to  (Hold per RN, due to desating. Not appropriate for therapy.). Will follow-up tomorrow.    11/22/2022

## 2022-11-22 NOTE — SIGNIFICANT EVENT
Discussed with patient's family including son endo of life issues.  Dr. Waters present.  Patient not likely to survive surgical intervention.  Reviewed decline despite efforts towards improving nutrition, and broad spectrum ABX.     Discused goals of care including hospice.  Family not ready to accept hospice at this time, but will think about it.    I discussed with son code status.  He wants to make her DNR/DNI at this time.  Order entered.    Anticipate revisiting hospice on follow up tomorrow.

## 2022-11-22 NOTE — PLAN OF CARE
11/22/22 0917   Patient Assessment/Suction   Level of Consciousness (AVPU) alert   Respiratory Effort Mild   Expansion/Accessory Muscles/Retractions no use of accessory muscles   All Lung Fields Breath Sounds diminished;clear   Rhythm/Pattern, Respiratory pattern regular   PRE-TX-O2   O2 Device (Oxygen Therapy) Oxymask   $ Is the patient on Low Flow Oxygen? Yes   Flow (L/min) 6   SpO2 95 %   Pulse Oximetry Type Intermittent   $ Pulse Oximetry - Multiple Charge Pulse Oximetry - Multiple   Pulse 89   Resp 18   Aerosol Therapy   $ Aerosol Therapy Charges PRN treatment not required   Respiratory Treatment Status (SVN) PRN treatment not required   Education   $ Education 15 min   Respiratory Evaluation   $ Care Plan Tech Time 15 min

## 2022-11-22 NOTE — NURSING
Notified MD of patient ,administered insulin sliding scale of 5 units.MD order to recheck in 1 hour.

## 2022-11-22 NOTE — PROGRESS NOTES
[facility]  Adult Nutrition   Progress Note (Follow-Up)    SUMMARY      Recommendations:   1. Continue diet per SLP as tolerated.  2. Continue Glucerna with meals, Nectar-thick to aid in wound healing.  3. Consult RD for NS recommendations if desired.  4. RD to monitor for intake, labs, and BM PRN.     Goals:   Goals: Pt to met 100% of her EEN and EPN to aid in wound healing. Ongoing    Dietitian Rounds Brief  Surgery cancelled yesterday. MD discussing hospice with family; family not ready but agreeable to DNR, DNI. Intake poor. Diet modified per SLP Diabetic confirmed. Encourage supplement.   Last BM 11/16--continue mirralax. RD to follow and monitor intake, labs, and bowel function as needed.    Diet order: Dysphagia Pureed (IDDSI Level 4) [place] Thick, Diabetic diet.    Oral Supplement: Nectar-Thick Glucerna with meals    % Intake of Estimated Energy Needs: 0 - 25 %  % Meal Intake: 0 - 25 %    Estimated/Assessed Needs  Weight Used For Calorie Calculations: 65.8 kg (145 lb 1 oz)  Energy Calorie Requirements (kcal): 4910-1652 kcals/day  Energy Need Method: Kcal/kg  Protein Requirements:  g/day  Weight Used For Protein Calculations: 64 kg (141 lb 1.5 oz)     Estimated Fluid Requirement Method: RDA Method  RDA Method (mL): 1645     Weight History:  Wt Readings from Last 5 Encounters:   11/18/22 65.8 kg (145 lb 1 oz)   09/02/19 98.4 kg (217 lb)   09/18/18 96.5 kg (212 lb 11.9 oz)   09/11/18 96.3 kg (212 lb 4.9 oz)   05/02/18 96.3 kg (212 lb 4.9 oz)      Reason for Assessment  Reason For Assessment: consult  Diagnosis: other (see comments) (hypoglycemia)  Relevant Medical History: Diabetes mellitus, Hypertension, Short-term memory loss, Reflux, Back pain, Stroke  Interdisciplinary Rounds: did not attend    Medications:Pertinent Medications Reviewed  Scheduled Meds:   amiodarone  200 mg Oral BID    atorvastatin  40 mg Oral Daily    ceFEPime (MAXIPIME) IVPB  1 g Intravenous Q12H    enoxparin  40 mg  Subcutaneous Daily    insulin detemir U-100  5 Units Subcutaneous Daily    metoprolol tartrate  25 mg Oral BID    montelukast  10 mg Oral QHS    vancomycin (VANCOCIN) IVPB  1,250 mg Intravenous Q24H     Continuous Infusions:   sodium chloride 0.9% 100 mL/hr at 11/22/22 0939     PRN Meds:.sodium chloride, sodium chloride, sodium chloride, acetaminophen, albuterol-ipratropium, dextrose 10%, dextrose 10%, glucagon (human recombinant), glucose, glucose, HYDROmorphone, HYDROmorphone, insulin aspart U-100, melatonin, naloxone, ondansetron, polyethylene glycol, senna-docusate 8.6-50 mg, simethicone, sodium chloride 0.9%, traMADoL, Pharmacy to dose Vancomycin consult **AND** vancomycin - pharmacy to dose    Labs: Pertinent Labs Reviewed  Clinical Chemistry:  Recent Labs   Lab 11/22/22  0452      K 3.1*   *   CO2 21*   *   BUN 20   CREATININE 1.0   CALCIUM 7.8*   PROT 5.2*   ALBUMIN 1.5*   BILITOT 0.9   ALKPHOS 306*   AST 53*   ALT 65*   ANIONGAP 8   MG 1.6     CBC:   Recent Labs   Lab 11/22/22  0452   WBC 20.19*   RBC 3.27*   HGB 8.8*   HCT 28.3*      MCV 87   MCH 26.9*   MCHC 31.1*     Cardiac Profile:  Recent Labs   Lab 11/17/22  0111   *     Inflammatory Labs:  Recent Labs   Lab 11/20/22  0428 11/21/22  0624 11/22/22  0452   CRP 24.59* 25.11* 25.59*     Diabetes:  Recent Labs   Lab 11/17/22  0110   HGBA1C Invalid,Hgb<6.0*     Monitor and Evaluation  Food and Nutrient Intake: energy intake, food and beverage intake  Food and Nutrient Adminstration: diet order  Knowledge/Beliefs/Attitudes: food and nutrition knowledge/skill, beliefs and attitudes  Physical Activity and Function: nutrition-related ADLs and IADLs, factors affecting access to physical activity  Anthropometric Measurements: weight, weight change, body mass index  Biochemical Data, Medical Tests and Procedures: lipid profile, inflammatory profile, glucose/endocrine profile, electrolyte and renal panel, gastrointestinal  profile  Nutrition-Focused Physical Findings: overall appearance     Nutrition Risk  Level of Risk/Frequency of Follow-up: high     Nutrition Follow-Up  RD Follow-up?: Yes    Dara Lopez RD 11/22/2022 11:26 AM

## 2022-11-22 NOTE — PT/OT/SLP PROGRESS
Physical Therapy      Patient Name:  Deonna Montero   MRN:  [id number]    Patient not seen today secondary to Other (Comment) (Nursing care in am and speech present in pm.). Will follow-up 11/23/22.

## 2022-11-23 PROBLEM — Z51.5 COMFORT MEASURES ONLY STATUS: Status: ACTIVE | Noted: 2022-11-23

## 2022-11-23 LAB
ALBUMIN SERPL BCP-MCNC: 1.3 G/DL (ref 3.5–5.2)
ALP SERPL-CCNC: 424 U/L (ref 55–135)
ALT SERPL W/O P-5'-P-CCNC: 89 U/L (ref 10–44)
ANION GAP SERPL CALC-SCNC: 8 MMOL/L (ref 8–16)
AST SERPL-CCNC: 100 U/L (ref 10–40)
BASOPHILS # BLD AUTO: 0.02 K/UL (ref 0–0.2)
BASOPHILS NFR BLD: 0.1 % (ref 0–1.9)
BILIRUB SERPL-MCNC: 1 MG/DL (ref 0.1–1)
BUN SERPL-MCNC: 17 MG/DL (ref 8–23)
CALCIUM SERPL-MCNC: 7.6 MG/DL (ref 8.7–10.5)
CHLORIDE SERPL-SCNC: 113 MMOL/L (ref 95–110)
CO2 SERPL-SCNC: 22 MMOL/L (ref 23–29)
CREAT SERPL-MCNC: 0.9 MG/DL (ref 0.5–1.4)
CRP SERPL-MCNC: 21.73 MG/DL
DIFFERENTIAL METHOD: ABNORMAL
EOSINOPHIL # BLD AUTO: 0 K/UL (ref 0–0.5)
EOSINOPHIL NFR BLD: 0.1 % (ref 0–8)
ERYTHROCYTE [DISTWIDTH] IN BLOOD BY AUTOMATED COUNT: 17.8 % (ref 11.5–14.5)
EST. GFR  (NO RACE VARIABLE): >60 ML/MIN/1.73 M^2
GLUCOSE SERPL-MCNC: 267 MG/DL (ref 70–110)
GLUCOSE SERPL-MCNC: 275 MG/DL (ref 70–110)
GLUCOSE SERPL-MCNC: 277 MG/DL (ref 70–110)
GLUCOSE SERPL-MCNC: 283 MG/DL (ref 70–110)
GLUCOSE SERPL-MCNC: 310 MG/DL (ref 70–110)
HCT VFR BLD AUTO: 27.3 % (ref 37–48.5)
HGB BLD-MCNC: 8.5 G/DL (ref 12–16)
IMM GRANULOCYTES # BLD AUTO: 0.17 K/UL (ref 0–0.04)
IMM GRANULOCYTES NFR BLD AUTO: 1 % (ref 0–0.5)
LYMPHOCYTES # BLD AUTO: 0.5 K/UL (ref 1–4.8)
LYMPHOCYTES NFR BLD: 2.7 % (ref 18–48)
MAGNESIUM SERPL-MCNC: 1.7 MG/DL (ref 1.6–2.6)
MCH RBC QN AUTO: 26.2 PG (ref 27–31)
MCHC RBC AUTO-ENTMCNC: 31.1 G/DL (ref 32–36)
MCV RBC AUTO: 84 FL (ref 82–98)
MONOCYTES # BLD AUTO: 0.4 K/UL (ref 0.3–1)
MONOCYTES NFR BLD: 2.4 % (ref 4–15)
NEUTROPHILS # BLD AUTO: 16.6 K/UL (ref 1.8–7.7)
NEUTROPHILS NFR BLD: 93.7 % (ref 38–73)
NRBC BLD-RTO: 0 /100 WBC
PLATELET # BLD AUTO: 443 K/UL (ref 150–450)
PMV BLD AUTO: 10.5 FL (ref 9.2–12.9)
POTASSIUM SERPL-SCNC: 3.3 MMOL/L (ref 3.5–5.1)
PROT SERPL-MCNC: 5.3 G/DL (ref 6–8.4)
RBC # BLD AUTO: 3.24 M/UL (ref 4–5.4)
SODIUM SERPL-SCNC: 143 MMOL/L (ref 136–145)
VANCOMYCIN SERPL-MCNC: 19.8 UG/ML
WBC # BLD AUTO: 17.71 K/UL (ref 3.9–12.7)

## 2022-11-23 PROCEDURE — 63600175 PHARM REV CODE 636 W HCPCS: Performed by: INTERNAL MEDICINE

## 2022-11-23 PROCEDURE — 25000003 PHARM REV CODE 250: Performed by: INTERNAL MEDICINE

## 2022-11-23 PROCEDURE — 85025 COMPLETE CBC W/AUTO DIFF WBC: CPT | Performed by: FAMILY MEDICINE

## 2022-11-23 PROCEDURE — 80053 COMPREHEN METABOLIC PANEL: CPT | Performed by: FAMILY MEDICINE

## 2022-11-23 PROCEDURE — 25000003 PHARM REV CODE 250: Performed by: FAMILY MEDICINE

## 2022-11-23 PROCEDURE — 80202 ASSAY OF VANCOMYCIN: CPT | Performed by: INTERNAL MEDICINE

## 2022-11-23 PROCEDURE — 36415 COLL VENOUS BLD VENIPUNCTURE: CPT | Performed by: FAMILY MEDICINE

## 2022-11-23 PROCEDURE — 63600175 PHARM REV CODE 636 W HCPCS: Performed by: FAMILY MEDICINE

## 2022-11-23 PROCEDURE — 12000002 HC ACUTE/MED SURGE SEMI-PRIVATE ROOM

## 2022-11-23 PROCEDURE — 36415 COLL VENOUS BLD VENIPUNCTURE: CPT | Performed by: INTERNAL MEDICINE

## 2022-11-23 PROCEDURE — 97530 THERAPEUTIC ACTIVITIES: CPT | Mod: CQ

## 2022-11-23 PROCEDURE — 86140 C-REACTIVE PROTEIN: CPT | Performed by: FAMILY MEDICINE

## 2022-11-23 PROCEDURE — 83735 ASSAY OF MAGNESIUM: CPT | Performed by: FAMILY MEDICINE

## 2022-11-23 RX ORDER — VANCOMYCIN HCL IN 5 % DEXTROSE 1G/250ML
1000 PLASTIC BAG, INJECTION (ML) INTRAVENOUS
Status: DISCONTINUED | OUTPATIENT
Start: 2022-11-23 | End: 2022-11-26 | Stop reason: HOSPADM

## 2022-11-23 RX ADMIN — SODIUM CHLORIDE: 0.9 INJECTION, SOLUTION INTRAVENOUS at 09:11

## 2022-11-23 RX ADMIN — AMIODARONE HYDROCHLORIDE 200 MG: 200 TABLET ORAL at 09:11

## 2022-11-23 RX ADMIN — APIXABAN 10 MG: 5 TABLET, FILM COATED ORAL at 04:11

## 2022-11-23 RX ADMIN — VANCOMYCIN HYDROCHLORIDE 1000 MG: 1 INJECTION, POWDER, LYOPHILIZED, FOR SOLUTION INTRAVENOUS at 10:11

## 2022-11-23 RX ADMIN — HYDROMORPHONE HYDROCHLORIDE 1 MG: 1 INJECTION, SOLUTION INTRAMUSCULAR; INTRAVENOUS; SUBCUTANEOUS at 04:11

## 2022-11-23 RX ADMIN — INSULIN DETEMIR 5 UNITS: 100 INJECTION, SOLUTION SUBCUTANEOUS at 09:11

## 2022-11-23 RX ADMIN — TRAMADOL HYDROCHLORIDE 50 MG: 50 TABLET, COATED ORAL at 02:11

## 2022-11-23 RX ADMIN — ATORVASTATIN CALCIUM 40 MG: 40 TABLET, FILM COATED ORAL at 09:11

## 2022-11-23 RX ADMIN — MONTELUKAST 10 MG: 10 TABLET, FILM COATED ORAL at 10:11

## 2022-11-23 RX ADMIN — CEFEPIME HYDROCHLORIDE 1 G: 1 INJECTION, SOLUTION INTRAVENOUS at 01:11

## 2022-11-23 RX ADMIN — INSULIN ASPART 1 UNITS: 100 INJECTION, SOLUTION INTRAVENOUS; SUBCUTANEOUS at 10:11

## 2022-11-23 RX ADMIN — HYDROMORPHONE HYDROCHLORIDE 1 MG: 1 INJECTION, SOLUTION INTRAMUSCULAR; INTRAVENOUS; SUBCUTANEOUS at 12:11

## 2022-11-23 RX ADMIN — INSULIN ASPART 3 UNITS: 100 INJECTION, SOLUTION INTRAVENOUS; SUBCUTANEOUS at 09:11

## 2022-11-23 RX ADMIN — METOPROLOL TARTRATE 25 MG: 25 TABLET, FILM COATED ORAL at 09:11

## 2022-11-23 RX ADMIN — CEFEPIME HYDROCHLORIDE 1 G: 1 INJECTION, SOLUTION INTRAVENOUS at 12:11

## 2022-11-23 RX ADMIN — METOPROLOL TARTRATE 25 MG: 25 TABLET, FILM COATED ORAL at 10:11

## 2022-11-23 RX ADMIN — AMIODARONE HYDROCHLORIDE 200 MG: 200 TABLET ORAL at 10:11

## 2022-11-23 RX ADMIN — HYDROMORPHONE HYDROCHLORIDE 0.5 MG: 1 INJECTION, SOLUTION INTRAMUSCULAR; INTRAVENOUS; SUBCUTANEOUS at 12:11

## 2022-11-23 NOTE — CARE UPDATE
MD waiting on the patient son to call back today to discuss the patient disposition with possible hospice.    MD sent secure chat stating the patient son has decided on hospice.  Consult being sent to Dennys with [facility] ph#[phone].

## 2022-11-23 NOTE — PROGRESS NOTES
[facility] Medicine Progress Note  Patient Name: Deonna Montero MRN: [id number]   Patient Class: IP- Inpatient  Length of Stay: 7   Admission Date: 11/16/2022  4:01 PM Attending Physician: Garland Alegre MD   Primary Care Provider: Primary Doctor No Face-to-Face encounter date: 11/23/2022   Chief Complaint: Wound Check    Assessment & Plan:   Deonna Montero is a 82 y.o. female admitted for bilateral hallux gangrene and left foot    Active Problems/Assessment & Plan  1. 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    Podiatry consulted and following  Continue wound care  Continue cefepime, vancomycin  Patient's son opted for home hospice  Comfort measures only      2. Severe protein calorie malnutrition-POA   fortunately patient did well with pureed diet with thickened liquids.    Will continue to follow and support nutrition      3. Deep tissue wound -POA-  Surgery consult  Wound care    4. CLAUDY on CKD  Continue to monitor   Gentle IV fluid    5. Diabetes type 2  6. Hypoglycemia  Holding insulin  Accu-Cheks    7. Pneumonia   Patient noted to desat into the 60s this morning.  Stat chest x-ray demonstrated left lower lobe ill-defined lung opacity.  Patient has been on broad-spectrum antibiotics stents admission    8. LUE swelling-duplex LUE with brachial dvt, likely POA  -eliquis    CM working on palliative care options      Core measures:  - Code status: full code   - Consultants:  Vascular surgery, General surgery, Podiatry  - Diet:   NPO today pending possible surgery.  Otherwise puree with thickened liquid     Hospital Course     82-year-old with past medical history of diabetes type 2, hypertension, prior CVA presenting with lower extremity wounds.  Also noted to have decubitus ulcer on right hip.    Vascular  "surgery, General surgery, Podiatry consulted    Vascular surgery feels that she is not a candidate for revascularization and will require BKA.  Orthopedics subsequently consulted.  It was felt that to spare this frail patient multiple trips to the OR/general anesthesia it would be best to combine the debridement of the right hip wound with a BKA.    Patient had poor nutrition.  SLP consulted and recommended pureed diet with thickened liquids.  Patient did well with this consistency diet and had adequate p.o. intake.    On 11/21/2022 patient noted desats into the 60s.  Improved with non-rebreather mask.  Chest x-ray showed left lower lobe opacity.        Discharge Planning:       Subjective:    Interval History   Alert and oriented, appears comfortable.  Discussed with patient's son Suraj who agrees with home hospice     Review of Systems   Limited due to altered mental status  Objective:   Physical Exam  BP (!) 149/78   Pulse 84   Temp 97.9 °F (36.6 °C) (Oral)   Resp 16   Ht 5' 8" (1.727 m)   Wt 74.7 kg (164 lb 10.9 oz)   SpO2 96%   BMI 25.04 kg/m²   Vitals reviewed.    General:  Alert and oriented x1.  Appears acutely uncomfortable.  Has been calling out.  HEENT:  Normocephalic  Cardiovascular:  Regular rate and rhythm, no murmurs rubs or gallops.  No lower extremity edema.  Pulmonary:  Diminished at bases  Abdomen:  Soft, nontender, nondistended.  No guarding.  No rebound.  Negative Wallace's.  Positive bowel sounds.  Extremity:  Dry gangrene bilateral hallux.   Right distal pulses not palpable.  Deep tissue injury with necrosis on hip   Dermatology:  No rashes appreciated on exposed skin  Psychiatric:  Dimnished affect    Following labs were Reviewed   Recent Labs   Lab 11/23/22  0633 11/23/22  0634   WBC  --  17.71*   HGB  --  8.5*   HCT  --  27.3*   PLT  --  443   CALCIUM 7.6*  --    ALBUMIN 1.3*  --    PROT 5.3*  --      --    K 3.3*  --    CO2 22*  --    *  --    BUN 17  --    CREATININE " 0.9  --    ALKPHOS 424*  --    ALT 89*  --    *  --    BILITOT 1.0  --          No results found for: POCTGLUCOSE     All labs within the past 24 hours have been reviewed  Microbiology Results (last 7 days)       Procedure Component Value Units Date/Time    Blood Culture #1 **CANNOT BE ORDERED STAT** [[id number]] Collected: 11/16/22 2020    Order Status: Completed Specimen: Blood from Peripheral, Upper Arm, Right Updated: 11/21/22 2232     Blood Culture, Routine No growth after 5 days.    Blood Culture #1 **CANNOT BE ORDERED STAT** [[id number]] Collected: 11/16/22 2028    Order Status: Completed Specimen: Blood from Peripheral, Forearm, Left Updated: 11/21/22 2232     Blood Culture, Routine No growth after 5 days.    Aerobic culture [[id number]] Collected: 11/18/22 0348    Order Status: Completed Specimen: Wound from Toe, Left Foot Updated: 11/21/22 0716     Aerobic Bacterial Culture No growth          US Upper Extremity Veins Left   Final Result      X-Ray Chest 1 View   Final Result      US Upper Extremity Veins Right   Final Result      CT Head Without Contrast   Final Result      US Lower Extremity Arteries Bilateral   Final Result      US Abdomen Limited (Gallbladder)   Final Result      X-Ray Foot Complete Bilateral   Final Result          Inpatient medications  Scheduled Meds:   amiodarone  200 mg Oral BID    atorvastatin  40 mg Oral Daily    ceFEPime (MAXIPIME) IVPB  1 g Intravenous Q12H    enoxparin  40 mg Subcutaneous Daily    insulin detemir U-100  5 Units Subcutaneous Daily    metoprolol tartrate  25 mg Oral BID    montelukast  10 mg Oral QHS    vancomycin (VANCOCIN) IVPB  1,000 mg Intravenous Q36H     Continuous Infusions:   sodium chloride 0.9% 100 mL/hr at 11/23/22 0954     PRN Meds:.sodium chloride, sodium chloride, sodium chloride, acetaminophen, albuterol-ipratropium, dextrose 10%, dextrose 10%, glucagon (human recombinant), glucose, glucose, HYDROmorphone, HYDROmorphone, insulin aspart U-100,  melatonin, naloxone, ondansetron, polyethylene glycol, senna-docusate 8.6-50 mg, simethicone, sodium chloride 0.9%, traMADoL, Pharmacy to dose Vancomycin consult **AND** vancomycin - pharmacy to dose    Above encounter included review of the medical records, interviewing and examining the patient face-to-face, discussion with family and other health care providers, ordering and interpreting lab/test results and formulating a plan of care.     Medical Decision Making:    [] Low Complexity  [x] Moderate Complexity  [] High Complexity    Oneil Vega  [facility] Hospitalist  11/23/2022

## 2022-11-23 NOTE — NURSING
Assumed care of patient.   Patient had disrobed and taken her oxygen off upon arriving to room. O2 on room air 78%, replaced hi shaun O2 2 6L, now 94%. Assisted patient with redressing and repositioning.  Patient AOX1, c/o pain especially to back and toes.  Medicated PRN for pain with dilaudid.  Ongoing IV abt, IVF, and wound care.  Son at bedside this morning.    No changes overnight or this morning regarding plan of care.  Family still deciding on hospice services.    1330  Continue to monitor patient pain, CBG, and O2. Resting quietly at this time.  RN had stated was up all night, therefore trying to accomodate rest.    1745  Family at bedside. Ongoing assessment of patient needs for comfort.    1900  Patient medicated for pain, ongoing IV therapies, no significant changes during shift with exception to ongoing need for oxygen, and increase in sleeping.

## 2022-11-23 NOTE — PROGRESS NOTES
Pharmacokinetic Assessment Follow Up: IV Vancomycin    Patient brief summary:  Deonna Montero is a 82 y.o. female initiated on antimicrobial therapy with IV Vancomycin for treatment of Bone/Joint infection and Pneumonia    The patient's current regimen is Vancomycin 1250 mg IV every 24 hours.       Actual Body Weight = 65.8 kg (145 lb 1 oz).    Renal Function:   Estimated Creatinine Clearance: 44.5 mL/min (based on SCr of 1 mg/dL).      Vancomycin serum concentration assessment(s):    The trough level was drawn 25 hours post 5th dose and can be used to guide therapy at this time. The measurement is above the desired definitive target range of 15 to 20 mcg/mL.    Vancomycin Regimen Plan:    Discontinue the scheduled vancomycin regimen and re-dose when the random level is less than 20 mcg/mL, next level to be drawn at 10:00 on 11/23.    Drug levels (last 3 results):  Recent Labs   Lab Result Units 11/20/22 1954 11/22/22  2238   Vancomycin, Random ug/mL 15.8  --    Vancomycin-Trough ug/mL  --  26.6*       Pharmacy will continue to follow and monitor vancomycin.    Please contact pharmacy at extension [phone] for questions regarding this assessment.    Thank you for the consult,   Lidia Varela

## 2022-11-23 NOTE — PT/OT/SLP PROGRESS
Physical Therapy Treatment    Patient Name:  Deonna Montero   MRN:  [id number]    Recommendations:     Discharge Recommendations:  home (with son and sitter)   Discharge Equipment Recommendations: none   Barriers to discharge:  increased assist with mobility and pain    Assessment:     Deonna Montero is a 82 y.o. female admitted with a medical diagnosis of Hypoglycemia.  She presents with the following impairments/functional limitations:  weakness, impaired endurance, impaired functional mobility, impaired balance, decreased safety awareness, pain, decreased lower extremity function, impaired cardiopulmonary response to activity.  Pt agreeable to visit. Pt able to follow simple commands. Attempted supine to sit transfer with max assist x 2, however could not complete due to increased pain in B LE and sacral area with pt resisting further movement. Pt performed rolling left and right with max assist x 2 to position in bed to decrease pain and relieve pressure on sacral area. RN informed and to give pt something for pain.    Rehab Prognosis: Fair; patient would benefit from acute skilled PT services to address these deficits and reach maximum level of function.    Recent Surgery: Procedure(s) (LRB):  DEBRIDEMENT, WOUND, SACRUM (N/A)  AMPUTATION, ABOVE KNEE (Left) 2 Days Post-Op    Plan:     During this hospitalization, patient to be seen 3 x/week to address the identified rehab impairments via therapeutic activities, therapeutic exercises, neuromuscular re-education and progress toward the following goals:    Plan of Care Expires:       Subjective     Chief Complaint: pain with mobility  Patient/Family Comments/goals: for relief of pain  Pain/Comfort:  Pain Rating 1: other (see comments) (not rated)  Location - Side 1: Bilateral  Location - Orientation 1: generalized  Location 1: leg (and sacral area)  Pain Addressed 1: Reposition, Distraction, Cessation of Activity, Nurse notified  Pain Rating  Post-Intervention 1: other (see comments) (not rated)      Objective:     Communicated with RN prior to session.  Patient found HOB elevated with peripheral IV, telemetry, pulse ox (continuous) upon PT entry to room.     General Precautions: Standard, fall   Orthopedic Precautions:RLE weight bearing as tolerated, LLE weight bearing as tolerated   Braces:    Respiratory Status: Nasal cannula, flow 2 L/min     Functional Mobility:  Bed Mobility:     Rolling Left:  maximal assistance and of 2 persons  Rolling Right: maximal assistance and of 2 persons  Supine to Sit: attempted with maximal assistance, of 2 persons, but unable to complete due to increased LE and sacral pain.      AM-PAC 6 CLICK MOBILITY          Treatment & Education:  Pt educated on importance of time OOB, importance of intermittent mobility, and use of call light for assistance and fall prevention.      Patient left HOB elevated with all lines intact, call button in reach, bed alarm on, and RN notified..    GOALS:   Multidisciplinary Problems       Physical Therapy Goals          Problem: Physical Therapy    Goal Priority Disciplines Outcome Goal Variances Interventions   Physical Therapy Goal     PT, PT/OT Ongoing, Progressing     Description: Goals to be met by: 22     Patient will increase functional independence with mobility by performin. R/L rolling with moderate assistance  2.Supine to sit with Moderate Assistance  3. Bed to chair transfer with low squat pivot moderate Assistance  4. Pt to tolerate sitting EOB x 10 mins with min A                              Time Tracking:     PT Received On: 22  PT Start Time: 1136     PT Stop Time: 1151  PT Total Time (min): 15 min     Billable Minutes: Therapeutic Activity 15    Treatment Type: Treatment  PT/PTA: PTA     PTA Visit Number: 2     2022

## 2022-11-23 NOTE — PROGRESS NOTES
Pharmacokinetic Assessment Follow Up: IV Vancomycin    Patient brief summary:  Deonna Montero is a 82 y.o. female initiated on antimicrobial therapy with IV Vancomycin for treatment of Osteomyelitis    The patient's current regimen is ON HOLD. Restarted at 1000 mg IV q36h      Actual Body Weight = 74.7 kg (164 lb 10.9 oz).    Renal Function:   Estimated Creatinine Clearance: 49.5 mL/min (based on SCr of 0.9 mg/dL).      Vancomycin serum concentration assessment(s):    The random level was drawn correctly and can be used to guide therapy at this time. The measurement is within the desired definitive target range of 15 to 20 mcg/mL.    Vancomycin Regimen Plan:    Re-dose when the random level is less than 20 mcg/mL, next level to be drawn at 11/25 on 08:00    Drug levels (last 3 results):  Recent Labs   Lab Result Units 11/20/22  1954 11/22/22  2238 11/23/22  1034   Vancomycin, Random ug/mL 15.8  --  19.8   Vancomycin-Trough ug/mL  --  26.6*  --              Pharmacy will continue to follow and monitor vancomycin.    Please contact pharmacy at extension [phone] for questions regarding this assessment.    Thank you for the consult,   Hattie Giraldo

## 2022-11-23 NOTE — PT/OT/SLP PROGRESS
Occupational Therapy      Patient Name:  Deonna Montero   MRN:  [id number]    Patient not seen today secondary to Off the floor for procedure. Will follow-up next scheduled service date.    11/23/2022

## 2022-11-23 NOTE — PLAN OF CARE
Problem: Skin Injury Risk Increased  Goal: Skin Health and Integrity  Intervention: Promote and Optimize Oral Intake  Flowsheets (Taken 11/23/2022 0915)  Oral Nutrition Promotion: calorie-dense liquids provided--continue Glucerna nectar-thick with meals.     Problem: Oral Intake Inadequate  Goal: Improved Oral Intake  Outcome: Ongoing, Not Progressing  Intervention: Promote and Optimize Oral Intake  Flowsheets (Taken 11/23/2022 0915)  Oral Nutrition Promotion: calorie-dense liquids provided

## 2022-11-23 NOTE — PROGRESS NOTES
[facility] Medicine Progress Note  Patient Name: Deonna Montero MRN: [id number]   Patient Class: IP- Inpatient  Length of Stay: 6   Admission Date: 11/16/2022  4:01 PM Attending Physician: Garland Alegre MD   Primary Care Provider: Primary Doctor No Face-to-Face encounter date: 11/22/2022   Chief Complaint: Wound Check    Assessment & Plan:   Deonna Montero is a 82 y.o. female admitted for bilateral hallux gangrene and left foot    Active Problems/Assessment & Plan  1. 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    Podiatry consulted and following  Continue wound care  Continue cefepime, vancomycin      2. Severe protein calorie malnutrition-POA   fortunately patient did well with pureed diet with thickened liquids.    Will continue to follow and support nutrition      3. Deep tissue wound -POA-  Surgery consult  Wound care    4. CLAUDY on CKD  Continue to monitor   Gentle IV fluid    5. Diabetes type 2  6. Hypoglycemia  Holding insulin  Accu-Cheks    7. Pneumonia   Patient noted to desat into the 60s this morning.  Stat chest x-ray demonstrated left lower lobe ill-defined lung opacity.  Patient has been on broad-spectrum antibiotics stents admission    CM working on palliative care options      Core measures:  - Code status: full code   - Consultants:  Vascular surgery, General surgery, Podiatry  - Diet:   NPO today pending possible surgery.  Otherwise puree with thickened liquid     Hospital Course     82-year-old with past medical history of diabetes type 2, hypertension, prior CVA presenting with lower extremity wounds.  Also noted to have decubitus ulcer on right hip.    Vascular surgery, General surgery, Podiatry consulted    Vascular surgery feels that she is not a candidate for revascularization and will  "require BKA.  Orthopedics subsequently consulted.  It was felt that to spare this frail patient multiple trips to the OR/general anesthesia it would be best to combine the debridement of the right hip wound with a BKA.    Patient had poor nutrition.  SLP consulted and recommended pureed diet with thickened liquids.  Patient did well with this consistency diet and had adequate p.o. intake.    On 11/21/2022 patient noted desats into the 60s.  Improved with non-rebreather mask.  Chest x-ray showed left lower lobe opacity.        Discharge Planning:       Subjective:    Interval History   Patient moaning in bed.  Minimally verbal.   Plan for surgery tomorrow.      Review of Systems   Limited due to altered mental status  Objective:   Physical Exam  BP (!) 145/79   Pulse 86   Temp 97.5 °F (36.4 °C) (Oral)   Resp 16   Ht 5' 8" (1.727 m)   Wt 65.8 kg (145 lb 1 oz)   SpO2 (!) 90%   BMI 22.06 kg/m²   Vitals reviewed.    General:  Alert and oriented x1.  Appears acutely uncomfortable.  Has been calling out.  HEENT:  Normocephalic  Cardiovascular:  Regular rate and rhythm, no murmurs rubs or gallops.  No lower extremity edema.  Pulmonary:  Diminished at bases  Abdomen:  Soft, nontender, nondistended.  No guarding.  No rebound.  Negative Wallace's.  Positive bowel sounds.  Extremity:  Dry gangrene bilateral hallux.   Right distal pulses not palpable.  Deep tissue injury with necrosis on hip   Dermatology:  No rashes appreciated on exposed skin  Psychiatric:  Dimnished affect    Following labs were Reviewed   Recent Labs   Lab 11/22/22  0452   WBC 20.19*   HGB 8.8*   HCT 28.3*      CALCIUM 7.8*   ALBUMIN 1.5*   PROT 5.2*      K 3.1*   CO2 21*   *   BUN 20   CREATININE 1.0   ALKPHOS 306*   ALT 65*   AST 53*   BILITOT 0.9         No results found for: POCTGLUCOSE     All labs within the past 24 hours have been reviewed  Microbiology Results (last 7 days)       Procedure Component Value Units Date/Time    " Blood Culture #1 **CANNOT BE ORDERED STAT** [[id number]] Collected: 11/16/22 2020    Order Status: Completed Specimen: Blood from Peripheral, Upper Arm, Right Updated: 11/21/22 2232     Blood Culture, Routine No growth after 5 days.    Blood Culture #1 **CANNOT BE ORDERED STAT** [[id number]] Collected: 11/16/22 2028    Order Status: Completed Specimen: Blood from Peripheral, Forearm, Left Updated: 11/21/22 2232     Blood Culture, Routine No growth after 5 days.    Aerobic culture [[id number]] Collected: 11/18/22 0348    Order Status: Completed Specimen: Wound from Toe, Left Foot Updated: 11/21/22 0716     Aerobic Bacterial Culture No growth          X-Ray Chest 1 View   Final Result      US Upper Extremity Veins Right   Final Result      CT Head Without Contrast   Final Result      US Lower Extremity Arteries Bilateral   Final Result      US Abdomen Limited (Gallbladder)   Final Result      X-Ray Foot Complete Bilateral   Final Result          Inpatient medications  Scheduled Meds:   amiodarone  200 mg Oral BID    atorvastatin  40 mg Oral Daily    ceFEPime (MAXIPIME) IVPB  1 g Intravenous Q12H    enoxparin  40 mg Subcutaneous Daily    insulin detemir U-100  5 Units Subcutaneous Daily    metoprolol tartrate  25 mg Oral BID    montelukast  10 mg Oral QHS    vancomycin (VANCOCIN) IVPB  1,250 mg Intravenous Q24H     Continuous Infusions:   sodium chloride 0.9% 100 mL/hr at 11/22/22 0939     PRN Meds:.sodium chloride, sodium chloride, sodium chloride, acetaminophen, albuterol-ipratropium, dextrose 10%, dextrose 10%, glucagon (human recombinant), glucose, glucose, HYDROmorphone, HYDROmorphone, insulin aspart U-100, melatonin, naloxone, ondansetron, polyethylene glycol, senna-docusate 8.6-50 mg, simethicone, sodium chloride 0.9%, traMADoL, Pharmacy to dose Vancomycin consult **AND** vancomycin - pharmacy to dose    Above encounter included review of the medical records, interviewing and examining the patient face-to-face,  discussion with family and other health care providers, ordering and interpreting lab/test results and formulating a plan of care.     Medical Decision Making:    [] Low Complexity  [x] Moderate Complexity  [] High Complexity    Oneil Vega  [facility] Hospitalist  11/22/2022

## 2022-11-24 LAB
GLUCOSE SERPL-MCNC: 269 MG/DL (ref 70–110)
GLUCOSE SERPL-MCNC: 275 MG/DL (ref 70–110)
GLUCOSE SERPL-MCNC: 318 MG/DL (ref 70–110)
GLUCOSE SERPL-MCNC: 358 MG/DL (ref 70–110)

## 2022-11-24 PROCEDURE — 99900035 HC TECH TIME PER 15 MIN (STAT)

## 2022-11-24 PROCEDURE — 27000221 HC OXYGEN, UP TO 24 HOURS

## 2022-11-24 PROCEDURE — 63600175 PHARM REV CODE 636 W HCPCS: Performed by: FAMILY MEDICINE

## 2022-11-24 PROCEDURE — 25000003 PHARM REV CODE 250: Performed by: FAMILY MEDICINE

## 2022-11-24 PROCEDURE — 12000002 HC ACUTE/MED SURGE SEMI-PRIVATE ROOM

## 2022-11-24 PROCEDURE — 94761 N-INVAS EAR/PLS OXIMETRY MLT: CPT

## 2022-11-24 PROCEDURE — 25000003 PHARM REV CODE 250: Performed by: INTERNAL MEDICINE

## 2022-11-24 RX ADMIN — INSULIN DETEMIR 5 UNITS: 100 INJECTION, SOLUTION SUBCUTANEOUS at 10:11

## 2022-11-24 RX ADMIN — ATORVASTATIN CALCIUM 40 MG: 40 TABLET, FILM COATED ORAL at 09:11

## 2022-11-24 RX ADMIN — APIXABAN 10 MG: 5 TABLET, FILM COATED ORAL at 09:11

## 2022-11-24 RX ADMIN — CEFEPIME HYDROCHLORIDE 1 G: 1 INJECTION, SOLUTION INTRAVENOUS at 02:11

## 2022-11-24 RX ADMIN — TRAMADOL HYDROCHLORIDE 50 MG: 50 TABLET, COATED ORAL at 09:11

## 2022-11-24 RX ADMIN — Medication 6 MG: at 08:11

## 2022-11-24 RX ADMIN — APIXABAN 10 MG: 5 TABLET, FILM COATED ORAL at 08:11

## 2022-11-24 RX ADMIN — INSULIN ASPART 4 UNITS: 100 INJECTION, SOLUTION INTRAVENOUS; SUBCUTANEOUS at 10:11

## 2022-11-24 RX ADMIN — AMIODARONE HYDROCHLORIDE 200 MG: 200 TABLET ORAL at 08:11

## 2022-11-24 RX ADMIN — MONTELUKAST 10 MG: 10 TABLET, FILM COATED ORAL at 08:11

## 2022-11-24 RX ADMIN — HYDROMORPHONE HYDROCHLORIDE 1 MG: 1 INJECTION, SOLUTION INTRAMUSCULAR; INTRAVENOUS; SUBCUTANEOUS at 03:11

## 2022-11-24 RX ADMIN — METOPROLOL TARTRATE 25 MG: 25 TABLET, FILM COATED ORAL at 09:11

## 2022-11-24 RX ADMIN — HYDROMORPHONE HYDROCHLORIDE 1 MG: 1 INJECTION, SOLUTION INTRAMUSCULAR; INTRAVENOUS; SUBCUTANEOUS at 02:11

## 2022-11-24 RX ADMIN — AMIODARONE HYDROCHLORIDE 200 MG: 200 TABLET ORAL at 09:11

## 2022-11-24 RX ADMIN — METOPROLOL TARTRATE 25 MG: 25 TABLET, FILM COATED ORAL at 08:11

## 2022-11-24 RX ADMIN — HYDROMORPHONE HYDROCHLORIDE 1 MG: 1 INJECTION, SOLUTION INTRAMUSCULAR; INTRAVENOUS; SUBCUTANEOUS at 10:11

## 2022-11-24 RX ADMIN — TRAMADOL HYDROCHLORIDE 50 MG: 50 TABLET, COATED ORAL at 08:11

## 2022-11-25 LAB
GLUCOSE SERPL-MCNC: 296 MG/DL (ref 70–110)
GLUCOSE SERPL-MCNC: 339 MG/DL (ref 70–110)
VANCOMYCIN TROUGH SERPL-MCNC: 15.2 UG/ML

## 2022-11-25 PROCEDURE — 25000003 PHARM REV CODE 250: Performed by: FAMILY MEDICINE

## 2022-11-25 PROCEDURE — 25000003 PHARM REV CODE 250: Performed by: INTERNAL MEDICINE

## 2022-11-25 PROCEDURE — 36415 COLL VENOUS BLD VENIPUNCTURE: CPT | Performed by: INTERNAL MEDICINE

## 2022-11-25 PROCEDURE — 27000221 HC OXYGEN, UP TO 24 HOURS

## 2022-11-25 PROCEDURE — 63600175 PHARM REV CODE 636 W HCPCS: Performed by: FAMILY MEDICINE

## 2022-11-25 PROCEDURE — 94761 N-INVAS EAR/PLS OXIMETRY MLT: CPT

## 2022-11-25 PROCEDURE — 80202 ASSAY OF VANCOMYCIN: CPT | Performed by: INTERNAL MEDICINE

## 2022-11-25 PROCEDURE — 12000002 HC ACUTE/MED SURGE SEMI-PRIVATE ROOM

## 2022-11-25 PROCEDURE — 63600175 PHARM REV CODE 636 W HCPCS: Performed by: INTERNAL MEDICINE

## 2022-11-25 RX ORDER — ONDANSETRON 4 MG/1
4 TABLET, ORALLY DISINTEGRATING ORAL EVERY 6 HOURS PRN
Qty: 30 TABLET | Refills: 1 | Status: SHIPPED | OUTPATIENT
Start: 2022-11-25 | End: 2022-11-26 | Stop reason: SDUPTHER

## 2022-11-25 RX ORDER — HYDROMORPHONE HYDROCHLORIDE 4 MG/1
4 TABLET ORAL EVERY 4 HOURS PRN
Qty: 20 TABLET | Refills: 0 | Status: SHIPPED | OUTPATIENT
Start: 2022-11-25 | End: 2022-11-26 | Stop reason: HOSPADM

## 2022-11-25 RX ORDER — LORAZEPAM 1 MG/1
1 TABLET ORAL EVERY 6 HOURS PRN
Qty: 20 TABLET | Refills: 0 | Status: SHIPPED | OUTPATIENT
Start: 2022-11-25 | End: 2022-11-26 | Stop reason: SDUPTHER

## 2022-11-25 RX ORDER — HYDROMORPHONE HYDROCHLORIDE 1 MG/ML
1 INJECTION, SOLUTION INTRAMUSCULAR; INTRAVENOUS; SUBCUTANEOUS EVERY 4 HOURS PRN
Status: DISCONTINUED | OUTPATIENT
Start: 2022-11-25 | End: 2022-11-26 | Stop reason: HOSPADM

## 2022-11-25 RX ORDER — HYDROMORPHONE HYDROCHLORIDE 2 MG/1
2 TABLET ORAL EVERY 4 HOURS PRN
Qty: 20 TABLET | Refills: 0 | Status: SHIPPED | OUTPATIENT
Start: 2022-11-25 | End: 2022-11-26 | Stop reason: HOSPADM

## 2022-11-25 RX ADMIN — MONTELUKAST 10 MG: 10 TABLET, FILM COATED ORAL at 08:11

## 2022-11-25 RX ADMIN — AMIODARONE HYDROCHLORIDE 200 MG: 200 TABLET ORAL at 09:11

## 2022-11-25 RX ADMIN — CEFEPIME HYDROCHLORIDE 1 G: 1 INJECTION, SOLUTION INTRAVENOUS at 01:11

## 2022-11-25 RX ADMIN — VANCOMYCIN HYDROCHLORIDE 1000 MG: 1 INJECTION, POWDER, LYOPHILIZED, FOR SOLUTION INTRAVENOUS at 09:11

## 2022-11-25 RX ADMIN — HYDROMORPHONE HYDROCHLORIDE 1 MG: 1 INJECTION, SOLUTION INTRAMUSCULAR; INTRAVENOUS; SUBCUTANEOUS at 05:11

## 2022-11-25 RX ADMIN — METOPROLOL TARTRATE 25 MG: 25 TABLET, FILM COATED ORAL at 09:11

## 2022-11-25 RX ADMIN — ATORVASTATIN CALCIUM 40 MG: 40 TABLET, FILM COATED ORAL at 09:11

## 2022-11-25 RX ADMIN — INSULIN ASPART 4 UNITS: 100 INJECTION, SOLUTION INTRAVENOUS; SUBCUTANEOUS at 09:11

## 2022-11-25 RX ADMIN — HYDROMORPHONE HYDROCHLORIDE 1 MG: 1 INJECTION, SOLUTION INTRAMUSCULAR; INTRAVENOUS; SUBCUTANEOUS at 10:11

## 2022-11-25 RX ADMIN — HYDROMORPHONE HYDROCHLORIDE 1 MG: 1 INJECTION, SOLUTION INTRAMUSCULAR; INTRAVENOUS; SUBCUTANEOUS at 01:11

## 2022-11-25 RX ADMIN — TRAMADOL HYDROCHLORIDE 50 MG: 50 TABLET, COATED ORAL at 08:11

## 2022-11-25 RX ADMIN — AMIODARONE HYDROCHLORIDE 200 MG: 200 TABLET ORAL at 08:11

## 2022-11-25 RX ADMIN — TRAMADOL HYDROCHLORIDE 50 MG: 50 TABLET, COATED ORAL at 09:11

## 2022-11-25 RX ADMIN — CEFEPIME HYDROCHLORIDE 1 G: 1 INJECTION, SOLUTION INTRAVENOUS at 02:11

## 2022-11-25 RX ADMIN — METOPROLOL TARTRATE 25 MG: 25 TABLET, FILM COATED ORAL at 08:11

## 2022-11-25 RX ADMIN — INSULIN DETEMIR 5 UNITS: 100 INJECTION, SOLUTION SUBCUTANEOUS at 09:11

## 2022-11-25 RX ADMIN — APIXABAN 10 MG: 5 TABLET, FILM COATED ORAL at 09:11

## 2022-11-25 RX ADMIN — APIXABAN 10 MG: 5 TABLET, FILM COATED ORAL at 08:11

## 2022-11-25 RX ADMIN — Medication 6 MG: at 08:11

## 2022-11-25 NOTE — PLAN OF CARE
CM called pts son John Montero [phone] to confirm receipt of home DME delivery and discuss home transport. However no one answered and there is no option to leave a voicemail. Per Dennys - he has tried to call the son four times and he will keep trying. CM following.      11/25/22 8764   Post-Acute Status   Post-Acute Authorization Hospice   Hospice Status Pending post acute provider review/more information requested

## 2022-11-25 NOTE — PLAN OF CARE
Per Dennys with [facility]- [phone]- He has this patient and is working on getting the pts home DME delivered to [facility] today. I asked if they will be ready for discharge today and he stated yes. KINGS sent Dr. Mejía an update via secure chat and updated the pts AVS. CM following.   11/25/22 1024   Post-Acute Status   Post-Acute Authorization Hospice   Hospice Status Pending post acute provider review/more information requested

## 2022-11-25 NOTE — PLAN OF CARE
Per Dennys with [facility]- he has hospice house approval but don't set up transport just yet. He will let me know when it is ok to set it up.    11/25/22 1314   Post-Acute Status   Post-Acute Authorization Hospice   Hospice Status Set-up Complete/Auth obtained

## 2022-11-25 NOTE — PLAN OF CARE
I spoke to Dennys with [facility] [phone] and he stated that he is awaiting Hospice house approval from there sitter service to admit today and pt has to be there by three pm. CM emailed him packet and discharge orders to [email or web address] He will keep me updated when CM can set up [facility] transport.    CM called pts son John at [phone] to update him and there is no option to leave a voicemail.      11/25/22 1224   Post-Acute Status   Post-Acute Authorization Placement   Hospice Status Pending post acute provider review/more information requested

## 2022-11-25 NOTE — PROGRESS NOTES
Pharmacokinetic Assessment Follow Up: IV Vancomycin    Vancomycin serum concentration assessment(s):    The random level was drawn correctly and can be used to guide therapy at this time. The measurement is within the desired definitive target range of 15 to 20 mcg/mL.    Vancomycin Regimen Plan:    Continue regimen to Vancomycin 1000 mg IV every 36 hours with next serum trough concentration measured at 0800 prior to the 9th dose on 11/28/22    Drug levels (last 3 results):  Recent Labs   Lab Result Units 11/22/22  2238 11/23/22  1034 11/25/22  0810   Vancomycin, Random ug/mL  --  19.8  --    Vancomycin-Trough ug/mL 26.6*  --  15.2       Pharmacy will continue to follow and monitor vancomycin.    Please contact pharmacy at extension [phone] for questions regarding this assessment.    Thank you for the consult,   Kusum Omalley       Patient brief summary:  Deonna Montero is a 82 y.o. female initiated on antimicrobial therapy with IV Vancomycin for treatment of  osteomyelitis    Drug Allergies:   Review of patient's allergies indicates:   Allergen Reactions    Codeine Anaphylaxis and Itching    Morphine Itching     PT TOLERATES DILAUDID WITHOUT DIFFICULTY    Morphine (pf) Itching       Actual Body Weight:   74.7 kg    Renal Function:   Estimated Creatinine Clearance: 49.5 mL/min (based on SCr of 0.9 mg/dL).,     Dialysis Method (if applicable):  N/A    CBC (last 72 hours):  Recent Labs   Lab Result Units 11/23/22  0634   WBC K/uL 17.71*   Hemoglobin g/dL 8.5*   Hematocrit % 27.3*   Platelets K/uL 443   Gran % % 93.7*   Lymph % % 2.7*   Mono % % 2.4*   Eosinophil % % 0.1   Basophil % % 0.1   Differential Method  Automated       Metabolic Panel (last 72 hours):  Recent Labs   Lab Result Units 11/23/22  0633   Sodium mmol/L 143   Potassium mmol/L 3.3*   Chloride mmol/L 113*   CO2 mmol/L 22*   Glucose mg/dL 283*   BUN mg/dL 17   Creatinine mg/dL 0.9   Albumin g/dL 1.3*   Total Bilirubin mg/dL 1.0   Alkaline  Phosphatase U/L 424*   AST U/L 100*   ALT U/L 89*   Magnesium mg/dL 1.7       Vancomycin Administrations:  vancomycin given in the last 96 hours                     vancomycin in dextrose 5 % 1 gram/250 mL IVPB 1,000 mg (mg) 1,000 mg New Bag 11/25/22 0934     1,000 mg New Bag 11/23/22 2214    vancomycin 1.25 g in dextrose 5% 250 mL IVPB (ready to mix) (mg) 1,250 mg New Bag 11/21/22 2138                    Microbiologic Results:  Microbiology Results (last 7 days)       Procedure Component Value Units Date/Time    Blood Culture #1 **CANNOT BE ORDERED STAT** [[id number]] Collected: 11/16/22 2020    Order Status: Completed Specimen: Blood from Peripheral, Upper Arm, Right Updated: 11/21/22 2232     Blood Culture, Routine No growth after 5 days.    Blood Culture #1 **CANNOT BE ORDERED STAT** [[id number]] Collected: 11/16/22 2028    Order Status: Completed Specimen: Blood from Peripheral, Forearm, Left Updated: 11/21/22 2232     Blood Culture, Routine No growth after 5 days.    Aerobic culture [[id number]] Collected: 11/18/22 0348    Order Status: Completed Specimen: Wound from Toe, Left Foot Updated: 11/21/22 0716     Aerobic Bacterial Culture No growth

## 2022-11-25 NOTE — PT/OT/SLP PROGRESS
Occupational Therapy      Patient Name:  Deonna Montero   MRN:  [id number]    Patient not seen today secondary to Other (Comment) (Therapist unavailable.). Will follow-up next service date.    11/25/2022

## 2022-11-25 NOTE — PROGRESS NOTES
[facility] Medicine Progress Note  Patient Name: Deonna Montero MRN: [id number]   Patient Class: IP- Inpatient  Length of Stay: 8   Admission Date: 11/16/2022  4:01 PM Attending Physician: Garland Alegre MD   Primary Care Provider: Primary Doctor No Face-to-Face encounter date: 11/24/2022   Chief Complaint: Wound Check    Assessment & Plan:   Deonna Montero is a 82 y.o. female admitted for bilateral hallux gangrene and left foot    Active Problems/Assessment & Plan  1. 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    Podiatry consulted and following  Continue wound care  Continue cefepime, vancomycin  Patient's son opted for home hospice  Comfort measures only      2. Severe protein calorie malnutrition-POA   fortunately patient did well with pureed diet with thickened liquids.    Will continue to follow and support nutrition      3. Deep tissue wound -POA-  Surgery consult  Wound care    4. CLAUDY on CKD  Continue to monitor   Gentle IV fluid    5. Diabetes type 2  6. Hypoglycemia  Holding insulin  Accu-Cheks    7. Pneumonia   Patient noted to desat into the 60s this morning.  Stat chest x-ray demonstrated left lower lobe ill-defined lung opacity.  Patient has been on broad-spectrum antibiotics stents admission    8. LUE swelling-duplex LUE with brachial dvt, likely POA  -eliquis    CM working on palliative care options      Core measures:  - Code status: full code   - Consultants:  Vascular surgery, General surgery, Podiatry  - Diet:   NPO today pending possible surgery.  Otherwise puree with thickened liquid     Hospital Course     82-year-old with past medical history of diabetes type 2, hypertension, prior CVA presenting with lower extremity wounds.  Also noted to have decubitus ulcer on right hip.    Vascular  "surgery, General surgery, Podiatry consulted    Vascular surgery feels that she is not a candidate for revascularization and will require BKA.  Orthopedics subsequently consulted.  It was felt that to spare this frail patient multiple trips to the OR/general anesthesia it would be best to combine the debridement of the right hip wound with a BKA.    Patient had poor nutrition.  SLP consulted and recommended pureed diet with thickened liquids.  Patient did well with this consistency diet and had adequate p.o. intake.    On 11/21/2022 patient noted desats into the 60s.  Improved with non-rebreather mask.  Chest x-ray showed left lower lobe opacity.        Discharge Planning:       Subjective:    Interval History   Alert and oriented, appears comfortable.  Discussed with patient's son Suraj who agrees with home hospice     Review of Systems   Limited due to altered mental status  Objective:   Physical Exam  BP (!) 152/76   Pulse 85   Temp 97.9 °F (36.6 °C)   Resp 16   Ht 5' 8" (1.727 m)   Wt 74.7 kg (164 lb 10.9 oz)   SpO2 (!) 80%   BMI 25.04 kg/m²   Vitals reviewed.    General:  Alert and oriented x1.  Appears acutely uncomfortable.  Has been calling out.  HEENT:  Normocephalic  Cardiovascular:  Regular rate and rhythm, no murmurs rubs or gallops.  No lower extremity edema.  Pulmonary:  Diminished at bases  Abdomen:  Soft, nontender, nondistended.  No guarding.  No rebound.  Negative Wallace's.  Positive bowel sounds.  Extremity:  Dry gangrene bilateral hallux.   Right distal pulses not palpable.  Deep tissue injury with necrosis on hip   Dermatology:  No rashes appreciated on exposed skin  Psychiatric:  Dimnished affect    Following labs were Reviewed   No results for input(s): WBC, HGB, HCT, PLT, GLUCOSE, CALCIUM, ALBUMIN, PROT, NA, K, CO2, CL, BUN, CREATININE, ALKPHOS, ALT, AST, BILITOT in the last 24 hours.      No results found for: POCTGLUCOSE     All labs within the past 24 hours have been " reviewed  Microbiology Results (last 7 days)       Procedure Component Value Units Date/Time    Blood Culture #1 **CANNOT BE ORDERED STAT** [[id number]] Collected: 11/16/22 2020    Order Status: Completed Specimen: Blood from Peripheral, Upper Arm, Right Updated: 11/21/22 2232     Blood Culture, Routine No growth after 5 days.    Blood Culture #1 **CANNOT BE ORDERED STAT** [[id number]] Collected: 11/16/22 2028    Order Status: Completed Specimen: Blood from Peripheral, Forearm, Left Updated: 11/21/22 2232     Blood Culture, Routine No growth after 5 days.    Aerobic culture [[id number]] Collected: 11/18/22 0348    Order Status: Completed Specimen: Wound from Toe, Left Foot Updated: 11/21/22 0716     Aerobic Bacterial Culture No growth          US Upper Extremity Veins Left   Final Result      X-Ray Chest 1 View   Final Result      US Upper Extremity Veins Right   Final Result      CT Head Without Contrast   Final Result      US Lower Extremity Arteries Bilateral   Final Result      US Abdomen Limited (Gallbladder)   Final Result      X-Ray Foot Complete Bilateral   Final Result          Inpatient medications  Scheduled Meds:   amiodarone  200 mg Oral BID    apixaban  10 mg Oral BID    [START ON 11/30/2022] apixaban  5 mg Oral BID    atorvastatin  40 mg Oral Daily    ceFEPime (MAXIPIME) IVPB  1 g Intravenous Q12H    insulin detemir U-100  5 Units Subcutaneous Daily    metoprolol tartrate  25 mg Oral BID    montelukast  10 mg Oral QHS    vancomycin (VANCOCIN) IVPB  1,000 mg Intravenous Q36H     Continuous Infusions:   sodium chloride 0.9% 100 mL/hr at 11/23/22 0954     PRN Meds:.sodium chloride, sodium chloride, sodium chloride, acetaminophen, albuterol-ipratropium, dextrose 10%, dextrose 10%, glucagon (human recombinant), glucose, glucose, HYDROmorphone, HYDROmorphone, insulin aspart U-100, melatonin, naloxone, ondansetron, polyethylene glycol, senna-docusate 8.6-50 mg, simethicone, sodium chloride 0.9%, traMADoL,  Pharmacy to dose Vancomycin consult **AND** vancomycin - pharmacy to dose    Above encounter included review of the medical records, interviewing and examining the patient face-to-face, discussion with family and other health care providers, ordering and interpreting lab/test results and formulating a plan of care.     Medical Decision Making:    [] Low Complexity  [x] Moderate Complexity  [] High Complexity    Oneil Vega  [facility] Hospitalist  11/24/2022

## 2022-11-25 NOTE — PLAN OF CARE
KINGS spoke to Pts son John at [phone] and he stated that he would be available to take the pt home with hospice services on Monday. I explained to him that the pt is ready for discharge now and can not remain here if we are not treating her for medical reasons. I asked if hospice had talked to him about delivering DME and he replied no. I told him that I was going to give Dennys with [facility] this updated phone number to get in contact with him about equipment delivery today and the pt would be discharging home tomorrow. He expressed an understanding and stated that he would be available and ready . I asked if has an alternative pharmacy that he would like to use besides [facility] and he stated that he can use the [facility]. I updated Dr. Vega via secure chat as well as the pts nurse.    11/25/22 2249   Post-Acute Status   Post-Acute Authorization Hospice   Hospice Status Pending post acute provider review/more information requested

## 2022-11-25 NOTE — CARE UPDATE
11/24/22 5797   Patient Assessment/Suction   Level of Consciousness (AVPU) alert   Respiratory Effort Unlabored   Expansion/Accessory Muscles/Retractions no use of accessory muscles   All Lung Fields Breath Sounds diminished   Rhythm/Pattern, Respiratory no shortness of breath reported   Cough Frequency no cough   PRE-TX-O2   O2 Device (Oxygen Therapy) nasal cannula   $ Is the patient on Low Flow Oxygen? Yes   Flow (L/min) 3   SpO2 99 %   Pulse Oximetry Type Intermittent   $ Pulse Oximetry - Multiple Charge Pulse Oximetry - Multiple   Pulse 80   Resp 15   Positioning   Head of Bed (HOB) Positioning HOB elevated;HOB at 30 degrees   Respiratory Evaluation   $ Care Plan Tech Time 15 min

## 2022-11-26 VITALS
DIASTOLIC BLOOD PRESSURE: 79 MMHG | RESPIRATION RATE: 18 BRPM | OXYGEN SATURATION: 93 % | BODY MASS INDEX: 24.96 KG/M2 | WEIGHT: 164.69 LBS | HEIGHT: 68 IN | TEMPERATURE: 97 F | HEART RATE: 76 BPM | SYSTOLIC BLOOD PRESSURE: 168 MMHG

## 2022-11-26 LAB — GLUCOSE SERPL-MCNC: 322 MG/DL (ref 70–110)

## 2022-11-26 PROCEDURE — 25000003 PHARM REV CODE 250: Performed by: FAMILY MEDICINE

## 2022-11-26 PROCEDURE — 63600175 PHARM REV CODE 636 W HCPCS: Performed by: FAMILY MEDICINE

## 2022-11-26 PROCEDURE — 25000003 PHARM REV CODE 250: Performed by: INTERNAL MEDICINE

## 2022-11-26 RX ORDER — LORAZEPAM 1 MG/1
1 TABLET ORAL EVERY 6 HOURS PRN
Qty: 20 TABLET | Refills: 0 | Status: SHIPPED | OUTPATIENT
Start: 2022-11-26 | End: 2022-12-26

## 2022-11-26 RX ORDER — ONDANSETRON 4 MG/1
4 TABLET, ORALLY DISINTEGRATING ORAL EVERY 6 HOURS PRN
Qty: 30 TABLET | Refills: 1 | Status: SHIPPED | OUTPATIENT
Start: 2022-11-26

## 2022-11-26 RX ORDER — OXYCODONE AND ACETAMINOPHEN 5; 325 MG/1; MG/1
1 TABLET ORAL EVERY 4 HOURS PRN
Qty: 40 TABLET | Refills: 0 | Status: SHIPPED | OUTPATIENT
Start: 2022-11-26

## 2022-11-26 RX ADMIN — INSULIN DETEMIR 5 UNITS: 100 INJECTION, SOLUTION SUBCUTANEOUS at 10:11

## 2022-11-26 RX ADMIN — SODIUM CHLORIDE: 0.9 INJECTION, SOLUTION INTRAVENOUS at 04:11

## 2022-11-26 RX ADMIN — AMIODARONE HYDROCHLORIDE 200 MG: 200 TABLET ORAL at 10:11

## 2022-11-26 RX ADMIN — ATORVASTATIN CALCIUM 40 MG: 40 TABLET, FILM COATED ORAL at 10:11

## 2022-11-26 RX ADMIN — CEFEPIME HYDROCHLORIDE 1 G: 1 INJECTION, SOLUTION INTRAVENOUS at 02:11

## 2022-11-26 RX ADMIN — METOPROLOL TARTRATE 25 MG: 25 TABLET, FILM COATED ORAL at 10:11

## 2022-11-26 RX ADMIN — APIXABAN 10 MG: 5 TABLET, FILM COATED ORAL at 10:11

## 2022-11-26 RX ADMIN — INSULIN ASPART 4 UNITS: 100 INJECTION, SOLUTION INTRAVENOUS; SUBCUTANEOUS at 10:11

## 2022-11-26 NOTE — PLAN OF CARE
Problem: Infection  Goal: Absence of Infection Signs and Symptoms  Outcome: Met     Problem: Adult Inpatient Plan of Care  Goal: Plan of Care Review  Outcome: Met  Goal: Patient-Specific Goal (Individualized)  Outcome: Met  Goal: Absence of Hospital-Acquired Illness or Injury  Outcome: Met  Goal: Optimal Comfort and Wellbeing  Outcome: Met  Goal: Readiness for Transition of Care  Outcome: Met     Problem: Diabetes Comorbidity  Goal: Blood Glucose Level Within Targeted Range  Outcome: Met     Problem: Fall Injury Risk  Goal: Absence of Fall and Fall-Related Injury  Outcome: Met     Problem: Skin Injury Risk Increased  Goal: Skin Health and Integrity  Outcome: Met     Problem: Impaired Wound Healing  Goal: Optimal Wound Healing  Outcome: Met     Problem: Oral Intake Inadequate  Goal: Improved Oral Intake  Outcome: Met

## 2022-11-26 NOTE — PLAN OF CARE
11/26/22 1034   Final Note   Assessment Type Final Discharge Note   Anticipated Discharge Disposition [facility]ospice[facility]   What phone number can be called within the next 1-3 days to see how you are doing after discharge? [phone]   Hospital Resources/Appts/Education Provided Provided patient/caregiver with written discharge plan information   Post-Acute Status   Post-Acute Authorization Hospice   Hospice Status Set-up Complete/Auth obtained   Discharge Delays None known at this time     Patient discharged home with [facility].  CM ordered ambulance transport and nurse notified.  CM called patient's family to notify of discharge.

## 2022-11-26 NOTE — PROGRESS NOTES
[facility] Medicine Progress Note  Patient Name: Deonna Montero MRN: [id number]   Patient Class: IP- Inpatient  Length of Stay: 9   Admission Date: 11/16/2022  4:01 PM Attending Physician: Garland Alegre MD   Primary Care Provider: Primary Doctor No Face-to-Face encounter date: 11/25/2022   Chief Complaint: Wound Check    Assessment & Plan:   Deonna Montero is a 82 y.o. female admitted for bilateral hallux gangrene and left foot    Active Problems/Assessment & Plan  1. 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    Podiatry consulted and following  Continue wound care  Continue cefepime, vancomycin  Patient's son opted for home hospice  Comfort measures only      2. Severe protein calorie malnutrition-POA   fortunately patient did well with pureed diet with thickened liquids.    Will continue to follow and support nutrition      3. Deep tissue wound -POA-  Surgery consult  Wound care    4. CLAUDY on CKD  Continue to monitor   Gentle IV fluid    5. Diabetes type 2  6. Hypoglycemia  Holding insulin  Accu-Cheks    7. Pneumonia   Patient noted to desat into the 60s this morning.  Stat chest x-ray demonstrated left lower lobe ill-defined lung opacity.  Patient has been on broad-spectrum antibiotics stents admission    8. LUE swelling-duplex LUE with brachial dvt, likely POA  -eliquis    Expected home hospice for tomorrow      Core measures:  - Code status: full code   - Consultants:  Vascular surgery, General surgery, Podiatry  - Diet:   NPO today pending possible surgery.  Otherwise puree with thickened liquid     Hospital Course     82-year-old with past medical history of diabetes type 2, hypertension, prior CVA presenting with lower extremity wounds.  Also noted to have decubitus ulcer on right hip.    Vascular  "surgery, General surgery, Podiatry consulted    Vascular surgery feels that she is not a candidate for revascularization and will require BKA.  Orthopedics subsequently consulted.  It was felt that to spare this frail patient multiple trips to the OR/general anesthesia it would be best to combine the debridement of the right hip wound with a BKA.    Patient had poor nutrition.  SLP consulted and recommended pureed diet with thickened liquids.  Patient did well with this consistency diet and had adequate p.o. intake.    On 11/21/2022 patient noted desats into the 60s.  Improved with non-rebreather mask.  Chest x-ray showed left lower lobe opacity.        Discharge Planning:       Subjective:    Interval History   Alert and oriented, appears comfortable.  Case management working on home hospice placement     Review of Systems   Limited due to altered mental status  Objective:   Physical Exam  /70 (BP Location: Right arm)   Pulse 86   Temp 98.2 °F (36.8 °C)   Resp 16   Ht 5' 8" (1.727 m)   Wt 74.7 kg (164 lb 10.9 oz)   SpO2 97%   BMI 25.04 kg/m²   Vitals reviewed.    General:  Alert and oriented x1.  Appears acutely uncomfortable.  Has been calling out.  HEENT:  Normocephalic  Cardiovascular:  Regular rate and rhythm, no murmurs rubs or gallops.  No lower extremity edema.  Pulmonary:  Diminished at bases  Abdomen:  Soft, nontender, nondistended.  No guarding.  No rebound.  Negative Wallace's.  Positive bowel sounds.  Extremity:  Dry gangrene bilateral hallux.   Right distal pulses not palpable.  Deep tissue injury with necrosis on hip   Dermatology:  No rashes appreciated on exposed skin  Psychiatric:  Dimnished affect    Following labs were Reviewed   No results for input(s): WBC, HGB, HCT, PLT, GLUCOSE, CALCIUM, ALBUMIN, PROT, NA, K, CO2, CL, BUN, CREATININE, ALKPHOS, ALT, AST, BILITOT in the last 24 hours.      No results found for: POCTGLUCOSE     All labs within the past 24 hours have been " reviewed  Microbiology Results (last 7 days)       Procedure Component Value Units Date/Time    Blood Culture #1 **CANNOT BE ORDERED STAT** [[id number]] Collected: 11/16/22 2020    Order Status: Completed Specimen: Blood from Peripheral, Upper Arm, Right Updated: 11/21/22 2232     Blood Culture, Routine No growth after 5 days.    Blood Culture #1 **CANNOT BE ORDERED STAT** [[id number]] Collected: 11/16/22 2028    Order Status: Completed Specimen: Blood from Peripheral, Forearm, Left Updated: 11/21/22 2232     Blood Culture, Routine No growth after 5 days.    Aerobic culture [[id number]] Collected: 11/18/22 0348    Order Status: Completed Specimen: Wound from Toe, Left Foot Updated: 11/21/22 0716     Aerobic Bacterial Culture No growth          US Upper Extremity Veins Left   Final Result      X-Ray Chest 1 View   Final Result      US Upper Extremity Veins Right   Final Result      CT Head Without Contrast   Final Result      US Lower Extremity Arteries Bilateral   Final Result      US Abdomen Limited (Gallbladder)   Final Result      X-Ray Foot Complete Bilateral   Final Result          Inpatient medications  Scheduled Meds:   amiodarone  200 mg Oral BID    apixaban  10 mg Oral BID    [START ON 11/30/2022] apixaban  5 mg Oral BID    atorvastatin  40 mg Oral Daily    ceFEPime (MAXIPIME) IVPB  1 g Intravenous Q12H    insulin detemir U-100  5 Units Subcutaneous Daily    metoprolol tartrate  25 mg Oral BID    montelukast  10 mg Oral QHS    vancomycin (VANCOCIN) IVPB  1,000 mg Intravenous Q36H     Continuous Infusions:   sodium chloride 0.9% 100 mL/hr at 11/23/22 0954     PRN Meds:.sodium chloride, sodium chloride, sodium chloride, acetaminophen, albuterol-ipratropium, dextrose 10%, dextrose 10%, glucagon (human recombinant), glucose, glucose, HYDROmorphone, HYDROmorphone, insulin aspart U-100, melatonin, naloxone, ondansetron, polyethylene glycol, senna-docusate 8.6-50 mg, simethicone, sodium chloride 0.9%, traMADoL,  Pharmacy to dose Vancomycin consult **AND** vancomycin - pharmacy to dose    Above encounter included review of the medical records, interviewing and examining the patient face-to-face, discussion with family and other health care providers, ordering and interpreting lab/test results and formulating a plan of care.     Medical Decision Making:    [] Low Complexity  [x] Moderate Complexity  [] High Complexity    Oneil Vega  [facility] Hospitalist  11/25/2022

## 2022-11-27 NOTE — DISCHARGE SUMMARY
[facility] Medicine  Discharge Summary      Patient Name: Deonna Montero  MRN: [id number]  Admission Date: 11/16/2022  Hospital Length of Stay: 10 days  Discharge Date and Time: 11/26/2022 12:31 PM  Attending Physician: No att. providers found   Discharging Provider: Oneil Vega MD  Primary Care Provider: Primary Doctor No        HPI: Deonna Montero is a 82 y.o. Black or  female   With PMH of DM2, HTN, previous CVA,  who presents with lower extremity wounds.  Family apparently sent pt due to increased lower extremity pain  No family at bedside  Pt reports she sees wound care in [place]  She currently reports back pain  Denies CP, denies LE pain, denies SOB, denies fever  However I am unsure about pt reliability due to AMS vs dementia.    Procedure(s) (LRB):  DEBRIDEMENT, WOUND, SACRUM (N/A)  AMPUTATION, ABOVE KNEE (Left)      Hospital Course:  Patient was admitted for metabolic encephalopathy, hypoxemic, targeting ring, sent for osteomyelitis, peripheral arterial disease with lower extremity arterial occlusions, anemia and CLAUDY, hip and sacral necrosis.  Patient was evaluated by my Podiatry and vascular surgery.  Vascular surgery felt limb was nonviable.  Surgery originally recommended above-the-knee amputation.  However patient began to progressively deteriorate and was felt that she would not do well after surgery and that she may not survive the operation state.  Decision for comfort measures/hospice were made. ROBERTA coles also discovered, eliquis to prevent uncomfortable swelling  rx'd. Home hospice arrangements were made.  This was delayed because of the holiday however ultimately patient went home with son with home hospice.  The patient was discharged with pain control, anxiety control, nausea medicine.  Further medication adjustments to be done by hospice physician.    General:  Alert and oriented x1.  Appears comfortable.    HEENT:   Normocephalic  Cardiovascular:  Regular rate and rhythm, no murmurs rubs or gallops.  No lower extremity edema.  Pulmonary:  Diminished at bases  Abdomen:  Soft, nontender, nondistended.  No guarding.  No rebound.  Negative Wallace's.  Positive bowel sounds.  Extremity:  Dry gangrene bilateral hallux.   Right distal pulses not palpable.  Deep tissue injury with necrosis on hip. LUE ext   Dermatology:  No rashes appreciated on exposed skin  Psychiatric:  Dimnished affect    Consults:   Consults (From admission, onward)          Status Ordering Provider     Inpatient consult to Social Work/Case Management  Once        Provider:  (Not yet assigned)    Completed MAURIZIO SERRANO     Inpatient consult to Orthopedic Surgery  Once        Provider:  Cortes Eugene MD    Completed KHOOBEHI, ALI     Inpatient consult to Vascular Surgery  Once        Provider:  Ali Khoobehi, MD    Completed KLEVER VUONG     Inpatient consult to Registered Dietitian/Nutritionist  Once        Provider:  (Not yet assigned)    Completed ROBY BUITRAGO     IP consult to case management  Once        Provider:  (Not yet assigned)    Completed TANESHA BUITRAGONA     Inpatient consult to Podiatry  Once        Provider:  Sigrid Lemon MD    Completed MINNA ROBY     Inpatient consult to General surgery  Once        Provider:  Nolan Peterson MD    Completed GRETA BUCK            Final Active Diagnoses:    Diagnosis Date Noted POA    PRINCIPAL PROBLEM:  Hypoglycemia [E16.2] 11/16/2022 Yes    Comfort measures only status [Z51.5] 11/23/2022 Not Applicable    Gangrene [I96] 11/16/2022 Yes    PAD (peripheral artery disease) [I73.9] 11/16/2022 Yes    Osteomyelitis [M86.9] 11/16/2022 Yes    Anemia [D64.9] 11/16/2022 Yes    Type 2 diabetes mellitus without complication [E11.9] 05/02/2018 Yes    Benign essential HTN [I10] 09/19/2017 Yes      Problems Resolved During this Admission:      Discharged Condition: poor    Disposition: Home  "or Self Care    Follow Up:   Follow-up Information       [facility]    Specialty: Hospice Services  Why: Hospice  Contact information:  [address] [address]  [phone]                           Patient Instructions:      Diet Dysphagia Pureed     Other restrictions (specify):   Order Comments: Aspiration precautions     Activity as tolerated     Medications:  Reconciled Home Medications:      Medication List        START taking these medications      LORazepam 1 MG tablet  Commonly known as: ATIVAN  Take 1 tablet (1 mg total) by mouth every 6 (six) hours as needed (severe anxiety/agitation).     ondansetron 4 MG Tbdl  Commonly known as: ZOFRAN-ODT  Take 1 tablet (4 mg total) by mouth every 6 (six) hours as needed (for n/v).     oxyCODONE-acetaminophen 5-325 mg per tablet  Commonly known as: PERCOCET  Take 1 tablet by mouth every 4 (four) hours as needed for Pain (Moderate to severe pain). Take 1-2 tablets q.4 hours p.r.n. moderate to severe pain            CHANGE how you take these medications      apixaban 5 mg Tab  Commonly known as: ELIQUIS  Take 1 tablet (5 mg total) by mouth 2 (two) times daily.  Start taking on: November 30, 2022  What changed:   medication strength  how much to take  additional instructions  These instructions start on November 30, 2022. If you are unsure what to do until then, ask your doctor or other care provider.            CONTINUE taking these medications      amiodarone 200 MG Tab  Commonly known as: PACERONE  Take 200 mg by mouth 2 (two) times daily. Verified by pharmacy            STOP taking these medications      acetaminophen 325 MG tablet  Commonly known as: TYLENOL     amLODIPine 10 MG tablet  Commonly known as: NORVASC     aspirin 81 MG Chew     atorvastatin 40 MG tablet  Commonly known as: LIPITOR     BD INSULIN SYRINGE ULT-FINE II 0.5 mL 31 gauge x 5/16" Syrg  Generic drug: insulin syringe-needle U-100     BD INSULIN " "SYRINGE ULTRA-FINE 1 mL 31 gauge x 5/16 Syrg  Generic drug: insulin syringe-needle U-100     BD ULTRA-FINE ORIG PEN NEEDLE 29 gauge x 1/2" Ndle  Generic drug: pen needle, diabetic     BD ULTRA-FINE SHORT PEN NEEDLE 31 gauge x 5/16" Ndle  Generic drug: pen needle, diabetic     clopidogreL 75 mg tablet  Commonly known as: PLAVIX     fluticasone propionate 50 mcg/actuation nasal spray  Commonly known as: FLONASE     insulin  unit/mL injection     KLOR-CON 20 mEq Pack  Generic drug: potassium chloride     lisinopriL 20 MG tablet  Commonly known as: PRINIVIL,ZESTRIL     magnesium oxide 400 mg (241.3 mg magnesium) tablet  Commonly known as: MAG-OX     metFORMIN 500 MG tablet  Commonly known as: GLUCOPHAGE     metoprolol succinate 50 MG 24 hr tablet  Commonly known as: TOPROL-XL     metoprolol tartrate 25 MG tablet  Commonly known as: LOPRESSOR     montelukast 10 mg tablet  Commonly known as: SINGULAIR     MYRBETRIQ 50 mg Tb24  Generic drug: mirabegron     ONETOUCH ULTRA TEST Strp  Generic drug: blood sugar diagnostic     pantoprazole 40 MG tablet  Commonly known as: PROTONIX     polyethylene glycol 17 gram/dose powder  Commonly known as: GLYCOLAX     solifenacin 5 MG tablet  Commonly known as: VESICARE     tramadol-acetaminophen 37.5-325 mg 37.5-325 mg Tab  Commonly known as: ULTRACET     warfarin 2 MG tablet  Commonly known as: COUMADIN                Pending Diagnostic Studies:       None          Indwelling Lines/Drains at time of discharge:   Lines/Drains/Airways       None                   Time spent on the discharge of patient: 40 minutes         Oneil Vega MD  Department of Hospital Medicine  [facility]      "

## 2022-11-28 NOTE — PT/OT/SLP DISCHARGE
Occupational Therapy Discharge Summary    Deonna Montero  MRN: [id number]   Principal Problem: Hypoglycemia      Patient Discharged from acute Occupational Therapy on 11/26/2022.  Please refer to prior OT note dated 11/25/2022 for functional status.    Assessment:      Patient has met all goals and is not appropriate for therapy.    Objective:     GOALS:   Multidisciplinary Problems       Occupational Therapy Goals          Problem: Occupational Therapy    Goal Priority Disciplines Outcome Interventions   Occupational Therapy Goal     OT, PT/OT     Description: Goals to be met by: 12/18/2022     Patient will increase functional independence with ADLs by performing:    Grooming while seated with Minimal Assistance.  Sitting at edge of bed x10 minutes with Minimal Assistance.  Supine to sit with Minimal Assistance.  Upper extremity exercise program x10 reps per handout, with assistance as needed.                         Reasons for Discontinuation of Therapy Services  Transfer to alternate level of care.      Plan:     Patient Discharged to: Palliative Care/Hospice    11/28/2022

## 2022-12-01 LAB — GLUCOSE SERPL-MCNC: 415 MG/DL (ref 70–110)

## 2024-04-08 NOTE — TELEPHONE ENCOUNTER
----- Message from Sanjana Zambrano MD sent at 9/19/2017  5:21 PM CDT -----  Please let asc know this pt will need a cysto, stress test and possible botox that day. Make pt last pt that day.   
Adair at surgery suite informed  
temporal

## (undated) DEVICE — UNDERGLOVES BIOGEL PI SZ 6 LF

## (undated) DEVICE — SEE MEDLINE ITEM 152651

## (undated) DEVICE — SOLN BETADINE SWAB AIDS

## (undated) DEVICE — SPONGE KERLIX SUPER   NON25854

## (undated) DEVICE — GOWN SMART LRG 044673

## (undated) DEVICE — NDL TUOHY EPIDURAL 20G X 3.5

## (undated) DEVICE — APRON DISPOSP PLASTIC 24INX42

## (undated) DEVICE — BANDAGE SOFTBAND 4 441506

## (undated) DEVICE — SOLUTION SCRUB IODINE 4OZ

## (undated) DEVICE — CONTAINER SPECIMEN STRL 4OZ

## (undated) DEVICE — WATER STERILE INJ 500ML BAG

## (undated) DEVICE — GLOVE BIOGEL MICRO SURGEON PINK SZ 7.5

## (undated) DEVICE — TRAY SKIN PREP DRY

## (undated) DEVICE — NDL HYPODERMIC BLUNT 18G 1.5IN

## (undated) DEVICE — TIP BOVIE TEFLON E1450X

## (undated) DEVICE — SUTURE SILK 2-0 18 A185H

## (undated) DEVICE — PAD BOVIE ADULT

## (undated) DEVICE — SYR 10CC LUER LOCK

## (undated) DEVICE — TUBING MINIBORE EXTENSION

## (undated) DEVICE — TOWEL OR BLUE      MDT2168284

## (undated) DEVICE — SOLUTION PREP IODINE 4OZ

## (undated) DEVICE — SPONGE GAUZE 4X8

## (undated) DEVICE — SYR DISP LL 5CC

## (undated) DEVICE — SHEET DRAPE MEDIUM

## (undated) DEVICE — SOLUTION IRRI NS BOTTLE 1000ML R5200-01

## (undated) DEVICE — DRAPE 3/4

## (undated) DEVICE — NDL SAFETY 25G X 1.5 ECLIPSE

## (undated) DEVICE — STAPLER SKIN NON ROTATE PXW35

## (undated) DEVICE — UNDERGLOVE BIOGEL PI MICRO BLUE SZ 8

## (undated) DEVICE — SUTURE ETHILON 2 TP-1 60 825G

## (undated) DEVICE — BANDAGE ACE STERILE 4 REB3114

## (undated) DEVICE — SUTURE SILK 3-0 18 A184H

## (undated) DEVICE — BANDAGE KERLIX   441106

## (undated) DEVICE — SUTURE SILK 0 18 A186H

## (undated) DEVICE — TAPE SILK DURAPORE 1 1538-1

## (undated) DEVICE — SET CYSTO IRRIGATION UNIV SPIK

## (undated) DEVICE — GLOVE SURG ULTRA TOUCH 7.5

## (undated) DEVICE — SYS LABEL CORRECT MED

## (undated) DEVICE — SUTURE VICRYL 0 CT-1 8-27 JJ31G

## (undated) DEVICE — SUTURE SILK 2-0 SH 18 CR C012D

## (undated) DEVICE — JELLY LUBRICANT STERILE 4 OZ